# Patient Record
Sex: FEMALE | Race: ASIAN | NOT HISPANIC OR LATINO | Employment: OTHER | URBAN - METROPOLITAN AREA
[De-identification: names, ages, dates, MRNs, and addresses within clinical notes are randomized per-mention and may not be internally consistent; named-entity substitution may affect disease eponyms.]

---

## 2017-07-24 ENCOUNTER — HOSPITAL ENCOUNTER (EMERGENCY)
Facility: CLINIC | Age: 82
Discharge: HOME | End: 2017-07-24
Attending: EMERGENCY MEDICINE | Admitting: EMERGENCY MEDICINE
Payer: MEDICARE

## 2017-07-24 ENCOUNTER — APPOINTMENT (OUTPATIENT)
Dept: MRI IMAGING | Facility: CLINIC | Age: 82
End: 2017-07-24
Attending: EMERGENCY MEDICINE
Payer: MEDICARE

## 2017-07-24 ENCOUNTER — APPOINTMENT (OUTPATIENT)
Dept: CT IMAGING | Facility: CLINIC | Age: 82
End: 2017-07-24
Attending: EMERGENCY MEDICINE
Payer: MEDICARE

## 2017-07-24 VITALS
WEIGHT: 100 LBS | RESPIRATION RATE: 16 BRPM | SYSTOLIC BLOOD PRESSURE: 174 MMHG | TEMPERATURE: 98.5 F | OXYGEN SATURATION: 98 % | DIASTOLIC BLOOD PRESSURE: 89 MMHG | HEART RATE: 70 BPM | BODY MASS INDEX: 20.9 KG/M2

## 2017-07-24 DIAGNOSIS — R29.898 ARM HEAVINESS: ICD-10-CM

## 2017-07-24 LAB
ANION GAP SERPL CALCULATED.3IONS-SCNC: 4 MMOL/L (ref 3–14)
BASOPHILS # BLD AUTO: 0 10E9/L (ref 0–0.2)
BASOPHILS NFR BLD AUTO: 0.4 %
BUN SERPL-MCNC: 19 MG/DL (ref 7–30)
CALCIUM SERPL-MCNC: 9.2 MG/DL (ref 8.5–10.1)
CHLORIDE SERPL-SCNC: 103 MMOL/L (ref 94–109)
CO2 SERPL-SCNC: 28 MMOL/L (ref 20–32)
CREAT SERPL-MCNC: 0.63 MG/DL (ref 0.52–1.04)
DIFFERENTIAL METHOD BLD: NORMAL
EOSINOPHIL # BLD AUTO: 0.1 10E9/L (ref 0–0.7)
EOSINOPHIL NFR BLD AUTO: 1.3 %
ERYTHROCYTE [DISTWIDTH] IN BLOOD BY AUTOMATED COUNT: 12.3 % (ref 10–15)
GFR SERPL CREATININE-BSD FRML MDRD: 88 ML/MIN/1.7M2
GLUCOSE SERPL-MCNC: 84 MG/DL (ref 70–99)
HCT VFR BLD AUTO: 40.9 % (ref 35–47)
HGB BLD-MCNC: 13.6 G/DL (ref 11.7–15.7)
IMM GRANULOCYTES # BLD: 0 10E9/L (ref 0–0.4)
IMM GRANULOCYTES NFR BLD: 0.3 %
INTERPRETATION ECG - MUSE: NORMAL
LYMPHOCYTES # BLD AUTO: 1.8 10E9/L (ref 0.8–5.3)
LYMPHOCYTES NFR BLD AUTO: 26.5 %
MCH RBC QN AUTO: 30.8 PG (ref 26.5–33)
MCHC RBC AUTO-ENTMCNC: 33.3 G/DL (ref 31.5–36.5)
MCV RBC AUTO: 93 FL (ref 78–100)
MONOCYTES # BLD AUTO: 0.6 10E9/L (ref 0–1.3)
MONOCYTES NFR BLD AUTO: 8.7 %
NEUTROPHILS # BLD AUTO: 4.3 10E9/L (ref 1.6–8.3)
NEUTROPHILS NFR BLD AUTO: 62.8 %
NRBC # BLD AUTO: 0 10*3/UL
NRBC BLD AUTO-RTO: 0 /100
PLATELET # BLD AUTO: 280 10E9/L (ref 150–450)
POTASSIUM SERPL-SCNC: 4.1 MMOL/L (ref 3.4–5.3)
RBC # BLD AUTO: 4.42 10E12/L (ref 3.8–5.2)
SODIUM SERPL-SCNC: 135 MMOL/L (ref 133–144)
TROPONIN I BLD-MCNC: 0 UG/L (ref 0–0.1)
WBC # BLD AUTO: 6.9 10E9/L (ref 4–11)

## 2017-07-24 PROCEDURE — 96361 HYDRATE IV INFUSION ADD-ON: CPT

## 2017-07-24 PROCEDURE — 85025 COMPLETE CBC W/AUTO DIFF WBC: CPT | Performed by: EMERGENCY MEDICINE

## 2017-07-24 PROCEDURE — 84484 ASSAY OF TROPONIN QUANT: CPT

## 2017-07-24 PROCEDURE — 80048 BASIC METABOLIC PNL TOTAL CA: CPT | Performed by: EMERGENCY MEDICINE

## 2017-07-24 PROCEDURE — 70553 MRI BRAIN STEM W/O & W/DYE: CPT

## 2017-07-24 PROCEDURE — 25000128 H RX IP 250 OP 636: Performed by: EMERGENCY MEDICINE

## 2017-07-24 PROCEDURE — 96360 HYDRATION IV INFUSION INIT: CPT | Mod: 59

## 2017-07-24 PROCEDURE — A9585 GADOBUTROL INJECTION: HCPCS | Performed by: RADIOLOGY

## 2017-07-24 PROCEDURE — A9270 NON-COVERED ITEM OR SERVICE: HCPCS | Mod: GY | Performed by: EMERGENCY MEDICINE

## 2017-07-24 PROCEDURE — 70450 CT HEAD/BRAIN W/O DYE: CPT

## 2017-07-24 PROCEDURE — 99284 EMERGENCY DEPT VISIT MOD MDM: CPT | Mod: 25

## 2017-07-24 PROCEDURE — 93005 ELECTROCARDIOGRAM TRACING: CPT

## 2017-07-24 PROCEDURE — 25000128 H RX IP 250 OP 636: Performed by: RADIOLOGY

## 2017-07-24 PROCEDURE — 25000132 ZZH RX MED GY IP 250 OP 250 PS 637: Mod: GY | Performed by: EMERGENCY MEDICINE

## 2017-07-24 RX ORDER — SODIUM CHLORIDE 9 MG/ML
1000 INJECTION, SOLUTION INTRAVENOUS CONTINUOUS
Status: DISCONTINUED | OUTPATIENT
Start: 2017-07-24 | End: 2017-07-24 | Stop reason: HOSPADM

## 2017-07-24 RX ORDER — ASPIRIN 81 MG/1
324 TABLET, CHEWABLE ORAL ONCE
Status: COMPLETED | OUTPATIENT
Start: 2017-07-24 | End: 2017-07-24

## 2017-07-24 RX ORDER — LISINOPRIL 10 MG/1
20 TABLET ORAL ONCE
Status: COMPLETED | OUTPATIENT
Start: 2017-07-24 | End: 2017-07-24

## 2017-07-24 RX ORDER — GADOBUTROL 604.72 MG/ML
7.5 INJECTION INTRAVENOUS ONCE
Status: COMPLETED | OUTPATIENT
Start: 2017-07-24 | End: 2017-07-24

## 2017-07-24 RX ORDER — LIDOCAINE 40 MG/G
CREAM TOPICAL
Status: DISCONTINUED | OUTPATIENT
Start: 2017-07-24 | End: 2017-07-24 | Stop reason: HOSPADM

## 2017-07-24 RX ADMIN — GADOBUTROL 5 ML: 604.72 INJECTION INTRAVENOUS at 13:04

## 2017-07-24 RX ADMIN — LISINOPRIL 20 MG: 10 TABLET ORAL at 10:05

## 2017-07-24 RX ADMIN — ASPIRIN 81 MG 324 MG: 81 TABLET ORAL at 10:05

## 2017-07-24 RX ADMIN — SODIUM CHLORIDE 1000 ML: 9 INJECTION, SOLUTION INTRAVENOUS at 10:07

## 2017-07-24 NOTE — ED NOTES
"Dizziness episode yesterday evening that resolved. This morning at 0200 she felt left arm pain \"like lead\". Also reports \"gum pain\". These symptoms have continued since 0200. Also recurrent dizziness comes and goes as well as nausea. EKG at this time.  "

## 2017-07-24 NOTE — ED AVS SNAPSHOT
[facility] Emergency Department    [address]    [facility] [address]    Phone:  [phone]    Fax:  [phone]                                       Sury Barnett   MRN: [id number]    Department:  [facility] Emergency Department   Date of Visit:  7/24/2017           Patient Information     Date Of Birth          8/27/1925        Your diagnoses for this visit were:     Arm heaviness        You were seen by Sarmad Ramachandran MD.      Follow-up Information     Follow up with Keyla Ceron MD In 2 days.    Contact information:    [facility]  [address] [facility] [address]  [phone]          Discharge Instructions       Please see your primary care doctor in the next 1-2 days for a recheck.    Return to the Emergency Room if you develop weakness in face, arms, legs, speech difficultly, trouble swallowing, or if you have any new concerns about your health.        It was my pleasure to take care of you today. Thanks for visiting [facility] Emergency Room.     Sarmad Ramachandran MD        24 Hour Appointment Hotline       To make an appointment at any [facility] clinic, call [phone] ([phone]). If you don't have a family doctor or clinic, we will help you find one. [facility] clinics are conveniently located to serve the needs of you and your family.             Review of your medicines      Our records show that you are taking the medicines listed below. If these are incorrect, please call your family doctor or clinic.        Dose / Directions Last dose taken    LEVOTHROID PO        Take  by mouth.   Refills:  0        LISINOPRIL PO        Take  by mouth.   Refills:  0        traMADol 50 MG tablet   Commonly known as:  ULTRAM   Dose:  50 mg   Quantity:  15 tablet        Take 1 tablet (50 mg) by mouth every 6 hours as needed for moderate pain   Refills:  0        VITAMIN D (CHOLECALCIFEROL) PO        Take  by mouth daily.   Refills:  0                 Procedures and tests performed during your visit     Basic metabolic panel    CBC with platelets differential    CT Head w/o Contrast    Cardiac Continuous Monitoring    EKG 12 lead    ISTAT troponin nursing POCT    MR Brain w/o & w Contrast    Peripheral IV catheter    Troponin POCT      Orders Needing Specimen Collection     None      Pending Results     No orders found from 7/22/2017 to 7/25/2017.            Pending Culture Results     No orders found from 7/22/2017 to 7/25/2017.            Pending Results Instructions     If you had any lab results that were not finalized at the time of your Discharge, you can call the ED Lab Result RN at [phone]. You will be contacted by this team for any positive Lab results or changes in treatment. The nurses are available 7 days a week from 10A to 6:30P.  You can leave a message 24 hours per day and they will return your call.        Test Results From Your Hospital Stay        7/24/2017 10:37 AM      Component Results     Component Value Ref Range & Units Status    Sodium 135 133 - 144 mmol/L Final    Potassium 4.1 3.4 - 5.3 mmol/L Final    Chloride 103 94 - 109 mmol/L Final    Carbon Dioxide 28 20 - 32 mmol/L Final    Anion Gap 4 3 - 14 mmol/L Final    Glucose 84 70 - 99 mg/dL Final    Urea Nitrogen 19 7 - 30 mg/dL Final    Creatinine 0.63 0.52 - 1.04 mg/dL Final    GFR Estimate 88 >60 mL/min/1.7m2 Final    Non  GFR Calc    GFR Estimate If Black >90   GFR Calc   >60 mL/min/1.7m2 Final    Calcium 9.2 8.5 - 10.1 mg/dL Final         7/24/2017 11:58 AM      Narrative     CT HEAD W/O CONTRAST   7/24/2017 10:51 AM     HISTORY: left arm weakness vs pain overnight, eval for stroke, mass,  bleed    TECHNIQUE: Axial images of the head without IV contrast material.  Radiation dose for this scan was reduced using automated exposure  control, adjustment of the mA and/or kV according to patient size, or  iterative reconstruction  technique.    COMPARISON: None.    FINDINGS:  There is generalized atrophy of the brain.  Areas of low  attenuation are present in the white matter of the cerebral  hemispheres that are consistent with small vessel ischemic disease in  this age patient. There is no evidence of intracranial hemorrhage,  mass, acute infarct or anomaly. The visualized portions of the sinuses  and mastoids appear normal. There is no evidence of trauma. Vascular  calcifications are noted.        Impression     IMPRESSION:   1. No acute pathology is identified. No bleed, mass, or acute infarcts  are seen.  2. Age related changes including diffuse brain atrophy.  White matter  changes consistent with small vessel ischemic disease.    MORENO MANZANO MD         7/24/2017 10:20 AM      Component Results     Component Value Ref Range & Units Status    Troponin I 0.00 0.00 - 0.10 ug/L Final         7/24/2017 10:25 AM      Component Results     Component Value Ref Range & Units Status    WBC 6.9 4.0 - 11.0 10e9/L Final    RBC Count 4.42 3.8 - 5.2 10e12/L Final    Hemoglobin 13.6 11.7 - 15.7 g/dL Final    Hematocrit 40.9 35.0 - 47.0 % Final    MCV 93 78 - 100 fl Final    MCH 30.8 26.5 - 33.0 pg Final    MCHC 33.3 31.5 - 36.5 g/dL Final    RDW 12.3 10.0 - 15.0 % Final    Platelet Count 280 150 - 450 10e9/L Final    Diff Method Automated Method  Final    % Neutrophils 62.8 % Final    % Lymphocytes 26.5 % Final    % Monocytes 8.7 % Final    % Eosinophils 1.3 % Final    % Basophils 0.4 % Final    % Immature Granulocytes 0.3 % Final    Nucleated RBCs 0 0 /100 Final    Absolute Neutrophil 4.3 1.6 - 8.3 10e9/L Final    Absolute Lymphocytes 1.8 0.8 - 5.3 10e9/L Final    Absolute Monocytes 0.6 0.0 - 1.3 10e9/L Final    Absolute Eosinophils 0.1 0.0 - 0.7 10e9/L Final    Absolute Basophils 0.0 0.0 - 0.2 10e9/L Final    Abs Immature Granulocytes 0.0 0 - 0.4 10e9/L Final    Absolute Nucleated RBC 0.0  Final         7/24/2017  1:45 PM      Narrative     MR BRAIN  WITHOUT AND WITH CONTRAST 7/24/2017 1:08 PM     HISTORY: Left arm heaviness, evaluate for stroke.    TECHNIQUE: Multiplanar, multisequence MRI of the brain without and  with 5 mL Gadavist  IV contrast material.    COMPARISON: CT scan dated 7/24/2017.    FINDINGS:  There is generalized atrophy of the brain.  White matter  changes are present in the cerebral hemispheres that are consistent  with small vessel ischemic disease in this age patient. There is no  evidence of hemorrhage, mass, demyelination, acute infarct, or  anomaly. There are no gadolinium enhancing lesions. Left frontal sinus  is opacified. Mucosal thickening in the left maxillary sinus. The  arteries at the base of the brain and the dural venous sinuses appear  patent. There is a tiny lipoma seen along the right tentorium. This  can be seen as a small fat density structure on the recent CT scan. It  measures about 0.6 cm in maximum dimension.        Impression     IMPRESSION:   1. No acute pathology is identified. No bleed, mass, or acute infarcts  are seen.  2. Age-related changes including generalized atrophy of the brain.  White matter changes in the cerebral hemispheres consistent with small  vessel ischemic disease in this age patient.  3. Tiny incidental lipoma along the right tentorium.    MORENO MANZANO MD                Clinical Quality Measure: Blood Pressure Screening     Your blood pressure was checked while you were in the emergency department today. The last reading we obtained was  BP: 182/86 . Please read the guidelines below about what these numbers mean and what you should do about them.  If your systolic blood pressure (the top number) is less than 120 and your diastolic blood pressure (the bottom number) is less than 80, then your blood pressure is normal. There is nothing more that you need to do about it.  If your systolic blood pressure (the top number) is 120-139 or your diastolic blood pressure (the bottom number) is 80-89, your  "blood pressure may be higher than it should be. You should have your blood pressure rechecked within a year by a primary care provider.  If your systolic blood pressure (the top number) is 140 or greater or your diastolic blood pressure (the bottom number) is 90 or greater, you may have high blood pressure. High blood pressure is treatable, but if left untreated over time it can put you at risk for heart attack, stroke, or kidney failure. You should have your blood pressure rechecked by a primary care provider within the next 4 weeks.  If your provider in the emergency department today gave you specific instructions to follow-up with your doctor or provider even sooner than that, you should follow that instruction and not wait for up to 4 weeks for your follow-up visit.        Thank you for choosing [facility]       Thank you for choosing [facility] for your care. Our goal is always to provide you with excellent care. Hearing back from our patients is one way we can continue to improve our services. Please take a few minutes to complete the written survey that you may receive in the mail after you visit with us. Thank you!        [facility] Information     [facility] lets you send messages to your doctor, view your test results, renew your prescriptions, schedule appointments and more. To sign up, go to [email or web address] . Click on \"Log in\" on the left side of the screen, which will take you to the Welcome page. Then click on \"Sign up Now\" on the right side of the page.     You will be asked to enter the access code listed below, as well as some personal information. Please follow the directions to create your username and password.     Your access code is: [email or web address]  Expires: 10/22/2017  1:36 PM     Your access code will  in 90 days. If you need help or a new code, please call your [facility] clinic or [phone].        Care EveryWhere ID     This is your Care EveryWhere ID. This could be used by other " organizations to access your [facility] medical records  [id number]        Equal Access to Services     ANUJ FERMIN : Haley Barajas, doe alvarez, brendon arambula. So [facility] [phone].    ATENCIÓN: Si habla español, tiene a leonard disposición servicios gratuitos de asistencia lingüística. Llame al [phone].    We comply with applicable federal civil rights laws and [place] laws. We do not discriminate on the basis of race, color, national origin, age, disability sex, sexual orientation or gender identity.            After Visit Summary       This is your record. Keep this with you and show to your community pharmacist(s) and doctor(s) at your next visit.

## 2017-07-24 NOTE — ED PROVIDER NOTES
"[facility] Emergency Medicine Note:    History     Chief Complaint:  Dizziness; Arm Pain; and Jaw Pain      HPI: Sury Barnett is a 89 year old female who presents for evaluation of the above.  She notes last night she will cup at 2 AM and noticed a brief episode of left arm pain and gum pain. The gum pain and arm pain rapidly went away but left arm heaviness continued. She notes no headache with this no chest pain no shortness of breath. The patient is that she has no abdominal pain vomiting or diarrhea. No fevers chills or recent illness. The patient currently still feels like her left arm is heavy, describing the sensation as \"like she did a lot of work with her left arm the day before\". She has no history of coronary artery disease or cerebrovascular disease no history of stroke there is a family history of heart disease. She does not take an aspirin today she did not take her morning blood pressure medicine last night the only thing else noted on review of systems is that when this episode did happen last night she did feel a pulsing or pounding sensation in her neck and head and wonders if her blood pressure was high last night..      Allergies:  Allergies   Allergen Reactions     Sulfa Drugs Other (See Comments)     Really sick and high fever     Amoxicillin        Medications:      traMADol (ULTRAM) 50 MG tablet   Levothyroxine Sodium (LEVOTHROID PO)   LISINOPRIL PO   VITAMIN D, CHOLECALCIFEROL, PO       Problem List:    There are no active problems to display for this patient.      Past Medical History:    Past Medical History:   Diagnosis Date     Hypertension      Thyroid disease        Past Surgical History:    Past Surgical History:   Procedure Laterality Date     COLONOSCOPY  7/15/2013    Procedure: COLONOSCOPY;  Colonoscopy ;  Surgeon: Maida Rehman MD;  Location:  GI     HEAD & NECK SURGERY      tonsil      TONSILLECTOMY         Family History:    Family History   Problem Relation Age of " Onset     Cancer - colorectal Father      Hypertension Father      Colon Cancer Father      Hypertension Mother      Hyperlipidemia Mother      Hypertension Sister      Hyperlipidemia Sister        Social History:  Social History   Substance Use Topics     Smoking status: Never Smoker     Smokeless tobacco: Not on file     Alcohol use No       Review of Systems  See HPI, a 10 point review of systems was performed and is otherwise negative except as noted in HPI.     Physical Exam   Vital signs:  Patient Vitals for the past 24 hrs:   BP Temp Temp src Pulse Heart Rate Resp SpO2 Weight   07/24/17 0914 (!) 196/94 98.5  F (36.9  C) Oral 70 70 16 97 % 45.4 kg (100 lb)       Physical Exam  General: Patient is alert and interactive when I enter the room.     She appears in no distress.  Head:  The scalp, face, and head appear normal  Eyes:  no photophobia.    Conjunctivae and sclerae are normal  ENT:    External acoustic canals are normal    The oropharynx is normal without erythema.     Uvula is in the midline  Neck:  Normal range of motion  CV:  Regular rate. S1/S2. No murmurs.   Resp:  Lungs are clear without wheezes or rales. No distress  GI:  Abdomen is soft, no rigidity, guarding, or rebound    No distension. No tenderness to palpation in any quadrant.     MS:  Normal tone. Joints grossly normal without effusions.     No asymmetric leg swelling, calf or thigh tenderness.      Normal motor assessment of all extremities.    There is no cervical paraspinal tenderness.  Skin:  No rash or lesions noted. Normal capillary refill noted  Neuro: Speech is normal and fluent. Face is symmetric without droop.     Moving all extremities well. CN's II-XII intact. 5/5 UE and LE     strength. PERRLA. EOMI without nystagmus. Reflexes symmetric.     Sensation intact to light touch. Negative pronator drift.    Finger to nose intact from a pronator drift position.   Psych:  Awake. Alert.  Normal affect.  Appropriate  interactions.  Lymph: No anterior cervical lymphadenopathy noted          Emergency Department Course   ECG:  Ventricular rate 74, , QRS 72, , axis is normal there is no ST elevation there is no ST depression there is abnormal R-wave progression in the precordial leads, there are no ST changes noted.    Imaging:  MR Brain w/o & w Contrast   Preliminary Result   IMPRESSION:    1. No acute pathology is identified. No bleed, mass, or acute infarcts   are seen.   2. Age-related changes including generalized atrophy of the brain.   White matter changes in the cerebral hemispheres consistent with small   vessel ischemic disease in this age patient.   3. Tiny incidental lipoma along the right tentorium.      CT Head w/o Contrast   Final Result   IMPRESSION:    1. No acute pathology is identified. No bleed, mass, or acute infarcts   are seen.   2. Age related changes including diffuse brain atrophy.  White matter   changes consistent with small vessel ischemic disease.      MORENO MANZANO MD          Laboratory:  Labs Ordered and Resulted from Time of ED Arrival Up to the Time of Departure from the ED   BASIC METABOLIC PANEL   CBC WITH PLATELETS DIFFERENTIAL   PERIPHERAL IV CATHETER   CARDIAC CONTINUOUS MONITORING   ISTAT TROPONIN NURSING POCT   TROPONIN POCT       Procedures:    Interventions:  Medications   lidocaine 1 % 1 mL (not administered)   lidocaine (LMX4) kit (not administered)   sodium chloride (PF) 0.9% PF flush 3 mL (not administered)   sodium chloride (PF) 0.9% PF flush 3 mL (not administered)   0.9% sodium chloride BOLUS (1,000 mLs Intravenous New Bag 7/24/17 1007)     Followed by   0.9% sodium chloride infusion (not administered)   lisinopril (PRINIVIL/ZESTRIL) tablet 20 mg (20 mg Oral Given 7/24/17 1005)   aspirin chewable tablet 324 mg (324 mg Oral Given 7/24/17 1005)   gadobutrol (GADAVIST) injection 7.5 mL (5 mLs Intravenous Given 7/24/17 1304)       Emergency Department Course:  See above for  meds/radiology/procedures intervention    Impression & Plan          CMS Diagnoses: none       Medical Decision Makin-year-old female presents with left arm heaviness.  While she may have just slept on his arm funny, broad differential was of course considered. There is no evidence of this point of acute coronary syndrome or cerebrovascular accident we talked about the fact that she's had pain more than 8 hours and a negative troponin and EKG making acute coronary syndrome much less likely. The patient is stable here with a normal neurologic exam while she feels the arm is still different the MRI of her brain is negative and reassuring and I do not detect on exam any abnormality in her arm. I believe sport of outpatient management is indicated and she is in agreement she did receive an aspirin here and would not continue this at home until she sees her primary care doctor.    Critical Care time:  none    Diagnosis:    ICD-10-CM    1. Arm heaviness R29.898 Basic metabolic panel       Disposition:  discharged to home    Discharge Medications:  New Prescriptions    No medications on file         Sarmad Ramachandran MD  2017   [facility] EMERGENCY DEPARTMENT         Sarmad Ramachandran MD  17 [id number]

## 2017-07-24 NOTE — ED AVS SNAPSHOT
[facility] Emergency Department    [address]    [facility] [address]    Phone:  [phone]    Fax:  [phone]                                       Sury Barnett   MRN: [id number]    Department:  [facility] Emergency Department   Date of Visit:  7/24/2017           After Visit Summary Signature Page     I have received my discharge instructions, and my questions have been answered. I have discussed any challenges I see with this plan with the nurse or doctor.    ..........................................................................................................................................  Patient/Patient Representative Signature      ..........................................................................................................................................  Patient Representative Print Name and Relationship to Patient    ..................................................               ................................................  Date                                            Time    ..........................................................................................................................................  Reviewed by Signature/Title    ...................................................              ..............................................  Date                                                            Time

## 2017-07-24 NOTE — DISCHARGE INSTRUCTIONS
Please see your primary care doctor in the next 1-2 days for a recheck.    Return to the Emergency Room if you develop weakness in face, arms, legs, speech difficultly, trouble swallowing, or if you have any new concerns about your health.        It was my pleasure to take care of you today. Thanks for visiting [facility] Emergency Room.     Sarmad Ramachandran MD

## 2017-09-06 ENCOUNTER — HOSPITAL ENCOUNTER (INPATIENT)
Facility: CLINIC | Age: 82
LOS: 21 days | Discharge: NURSING, REHAB OR LONG-TERM CARE | DRG: 064 | End: 2017-09-27
Attending: EMERGENCY MEDICINE | Admitting: INTERNAL MEDICINE
Payer: MEDICARE

## 2017-09-06 ENCOUNTER — APPOINTMENT (OUTPATIENT)
Dept: CT IMAGING | Facility: CLINIC | Age: 82
DRG: 064 | End: 2017-09-06
Attending: EMERGENCY MEDICINE
Payer: MEDICARE

## 2017-09-06 DIAGNOSIS — Z78.9 ON TUBE FEEDING DIET: ICD-10-CM

## 2017-09-06 DIAGNOSIS — N39.0 URINARY TRACT INFECTION ASSOCIATED WITH INDWELLING URETHRAL CATHETER, INITIAL ENCOUNTER (H): ICD-10-CM

## 2017-09-06 DIAGNOSIS — I10 ESSENTIAL HYPERTENSION: ICD-10-CM

## 2017-09-06 DIAGNOSIS — B37.0 ORAL THRUSH: ICD-10-CM

## 2017-09-06 DIAGNOSIS — K21.9 GASTROESOPHAGEAL REFLUX DISEASE WITHOUT ESOPHAGITIS: ICD-10-CM

## 2017-09-06 DIAGNOSIS — T83.511A URINARY TRACT INFECTION ASSOCIATED WITH INDWELLING URETHRAL CATHETER, INITIAL ENCOUNTER (H): ICD-10-CM

## 2017-09-06 DIAGNOSIS — S06.349A: ICD-10-CM

## 2017-09-06 DIAGNOSIS — E03.9 HYPOTHYROIDISM, UNSPECIFIED TYPE: ICD-10-CM

## 2017-09-06 DIAGNOSIS — E85.4 CEREBRAL AMYLOID ANGIOPATHY (H): Primary | ICD-10-CM

## 2017-09-06 DIAGNOSIS — H04.123 DRY EYES: ICD-10-CM

## 2017-09-06 DIAGNOSIS — R52 GENERALIZED PAIN: ICD-10-CM

## 2017-09-06 DIAGNOSIS — J69.0 ASPIRATION PNEUMONIA OF RIGHT LOWER LOBE, UNSPECIFIED ASPIRATION PNEUMONIA TYPE (H): ICD-10-CM

## 2017-09-06 DIAGNOSIS — I68.0 CEREBRAL AMYLOID ANGIOPATHY (H): Primary | ICD-10-CM

## 2017-09-06 DIAGNOSIS — I61.1 NONTRAUMATIC CORTICAL HEMORRHAGE OF RIGHT CEREBRAL HEMISPHERE (H): ICD-10-CM

## 2017-09-06 PROBLEM — I61.9 ICH (INTRACEREBRAL HEMORRHAGE) (H): Status: ACTIVE | Noted: 2017-09-06

## 2017-09-06 LAB
ANION GAP SERPL CALCULATED.3IONS-SCNC: 6 MMOL/L (ref 3–14)
APTT PPP: 29 SEC (ref 22–37)
BASOPHILS # BLD AUTO: 0 10E9/L (ref 0–0.2)
BASOPHILS NFR BLD AUTO: 0.2 %
BUN SERPL-MCNC: 13 MG/DL (ref 7–30)
CALCIUM SERPL-MCNC: 9.7 MG/DL (ref 8.5–10.1)
CHLORIDE SERPL-SCNC: 101 MMOL/L (ref 94–109)
CO2 SERPL-SCNC: 25 MMOL/L (ref 20–32)
CREAT SERPL-MCNC: 0.71 MG/DL (ref 0.52–1.04)
DIFFERENTIAL METHOD BLD: NORMAL
EOSINOPHIL # BLD AUTO: 0.1 10E9/L (ref 0–0.7)
EOSINOPHIL NFR BLD AUTO: 0.6 %
ERYTHROCYTE [DISTWIDTH] IN BLOOD BY AUTOMATED COUNT: 12.4 % (ref 10–15)
GFR SERPL CREATININE-BSD FRML MDRD: 77 ML/MIN/1.7M2
GLUCOSE BLDC GLUCOMTR-MCNC: 129 MG/DL (ref 70–99)
GLUCOSE SERPL-MCNC: 99 MG/DL (ref 70–99)
HCT VFR BLD AUTO: 39.5 % (ref 35–47)
HGB BLD-MCNC: 13.6 G/DL (ref 11.7–15.7)
IMM GRANULOCYTES # BLD: 0.1 10E9/L (ref 0–0.4)
IMM GRANULOCYTES NFR BLD: 0.8 %
INR PPP: 0.99 (ref 0.86–1.14)
LYMPHOCYTES # BLD AUTO: 2.1 10E9/L (ref 0.8–5.3)
LYMPHOCYTES NFR BLD AUTO: 23.9 %
MCH RBC QN AUTO: 31.5 PG (ref 26.5–33)
MCHC RBC AUTO-ENTMCNC: 34.4 G/DL (ref 31.5–36.5)
MCV RBC AUTO: 91 FL (ref 78–100)
MONOCYTES # BLD AUTO: 0.5 10E9/L (ref 0–1.3)
MONOCYTES NFR BLD AUTO: 5.4 %
NEUTROPHILS # BLD AUTO: 5.9 10E9/L (ref 1.6–8.3)
NEUTROPHILS NFR BLD AUTO: 69.1 %
NRBC # BLD AUTO: 0 10*3/UL
NRBC BLD AUTO-RTO: 0 /100
PLATELET # BLD AUTO: 364 10E9/L (ref 150–450)
POTASSIUM SERPL-SCNC: 3.9 MMOL/L (ref 3.4–5.3)
RADIOLOGIST FLAGS: ABNORMAL
RBC # BLD AUTO: 4.32 10E12/L (ref 3.8–5.2)
SODIUM SERPL-SCNC: 132 MMOL/L (ref 133–144)
WBC # BLD AUTO: 8.6 10E9/L (ref 4–11)

## 2017-09-06 PROCEDURE — 99291 CRITICAL CARE FIRST HOUR: CPT | Mod: 25

## 2017-09-06 PROCEDURE — 25000128 H RX IP 250 OP 636: Performed by: INTERNAL MEDICINE

## 2017-09-06 PROCEDURE — 85730 THROMBOPLASTIN TIME PARTIAL: CPT | Performed by: EMERGENCY MEDICINE

## 2017-09-06 PROCEDURE — 25000125 ZZHC RX 250: Performed by: INTERNAL MEDICINE

## 2017-09-06 PROCEDURE — 85025 COMPLETE CBC W/AUTO DIFF WBC: CPT | Performed by: EMERGENCY MEDICINE

## 2017-09-06 PROCEDURE — 96361 HYDRATE IV INFUSION ADD-ON: CPT

## 2017-09-06 PROCEDURE — 70450 CT HEAD/BRAIN W/O DYE: CPT

## 2017-09-06 PROCEDURE — 99223 1ST HOSP IP/OBS HIGH 75: CPT | Mod: AI | Performed by: INTERNAL MEDICINE

## 2017-09-06 PROCEDURE — 99222 1ST HOSP IP/OBS MODERATE 55: CPT | Performed by: NURSE PRACTITIONER

## 2017-09-06 PROCEDURE — 20000003 ZZH R&B ICU

## 2017-09-06 PROCEDURE — 99292 CRITICAL CARE ADDL 30 MIN: CPT

## 2017-09-06 PROCEDURE — 72125 CT NECK SPINE W/O DYE: CPT

## 2017-09-06 PROCEDURE — 85610 PROTHROMBIN TIME: CPT | Performed by: EMERGENCY MEDICINE

## 2017-09-06 PROCEDURE — 25000125 ZZHC RX 250

## 2017-09-06 PROCEDURE — 96365 THER/PROPH/DIAG IV INF INIT: CPT

## 2017-09-06 PROCEDURE — 25000128 H RX IP 250 OP 636: Performed by: EMERGENCY MEDICINE

## 2017-09-06 PROCEDURE — 93005 ELECTROCARDIOGRAM TRACING: CPT

## 2017-09-06 PROCEDURE — 80048 BASIC METABOLIC PNL TOTAL CA: CPT | Performed by: EMERGENCY MEDICINE

## 2017-09-06 PROCEDURE — 00000146 ZZHCL STATISTIC GLUCOSE BY METER IP

## 2017-09-06 RX ORDER — ACETAMINOPHEN 325 MG/1
650 TABLET ORAL EVERY 4 HOURS PRN
Status: DISCONTINUED | OUTPATIENT
Start: 2017-09-06 | End: 2017-09-11

## 2017-09-06 RX ORDER — POTASSIUM CHLORIDE 1.5 G/1.58G
20-40 POWDER, FOR SOLUTION ORAL
Status: DISCONTINUED | OUTPATIENT
Start: 2017-09-06 | End: 2017-09-27 | Stop reason: HOSPADM

## 2017-09-06 RX ORDER — NALOXONE HYDROCHLORIDE 0.4 MG/ML
.1-.4 INJECTION, SOLUTION INTRAMUSCULAR; INTRAVENOUS; SUBCUTANEOUS
Status: DISCONTINUED | OUTPATIENT
Start: 2017-09-06 | End: 2017-09-27 | Stop reason: HOSPADM

## 2017-09-06 RX ORDER — POLYETHYLENE GLYCOL 3350 17 G/17G
17 POWDER, FOR SOLUTION ORAL DAILY PRN
Status: DISCONTINUED | OUTPATIENT
Start: 2017-09-06 | End: 2017-09-19

## 2017-09-06 RX ORDER — LEVOTHYROXINE SODIUM 50 UG/1
50 TABLET ORAL DAILY
Status: DISCONTINUED | OUTPATIENT
Start: 2017-09-07 | End: 2017-09-07

## 2017-09-06 RX ORDER — POTASSIUM CL/LIDO/0.9 % NACL 10MEQ/0.1L
10 INTRAVENOUS SOLUTION, PIGGYBACK (ML) INTRAVENOUS
Status: DISCONTINUED | OUTPATIENT
Start: 2017-09-06 | End: 2017-09-27 | Stop reason: HOSPADM

## 2017-09-06 RX ORDER — POTASSIUM CHLORIDE 7.45 MG/ML
10 INJECTION INTRAVENOUS
Status: DISCONTINUED | OUTPATIENT
Start: 2017-09-06 | End: 2017-09-27 | Stop reason: HOSPADM

## 2017-09-06 RX ORDER — SODIUM CHLORIDE 9 MG/ML
INJECTION, SOLUTION INTRAVENOUS CONTINUOUS
Status: DISCONTINUED | OUTPATIENT
Start: 2017-09-06 | End: 2017-09-08

## 2017-09-06 RX ORDER — ONDANSETRON 2 MG/ML
4 INJECTION INTRAMUSCULAR; INTRAVENOUS EVERY 6 HOURS PRN
Status: DISCONTINUED | OUTPATIENT
Start: 2017-09-06 | End: 2017-09-27 | Stop reason: HOSPADM

## 2017-09-06 RX ORDER — POTASSIUM CHLORIDE 1500 MG/1
20-40 TABLET, EXTENDED RELEASE ORAL
Status: DISCONTINUED | OUTPATIENT
Start: 2017-09-06 | End: 2017-09-27 | Stop reason: HOSPADM

## 2017-09-06 RX ORDER — BISACODYL 10 MG
10 SUPPOSITORY, RECTAL RECTAL DAILY PRN
Status: DISCONTINUED | OUTPATIENT
Start: 2017-09-06 | End: 2017-09-21

## 2017-09-06 RX ORDER — LIDOCAINE 40 MG/G
CREAM TOPICAL
Status: DISCONTINUED | OUTPATIENT
Start: 2017-09-06 | End: 2017-09-11

## 2017-09-06 RX ORDER — AMOXICILLIN 250 MG
1-2 CAPSULE ORAL 2 TIMES DAILY PRN
Status: DISCONTINUED | OUTPATIENT
Start: 2017-09-06 | End: 2017-09-21

## 2017-09-06 RX ORDER — ONDANSETRON 4 MG/1
4 TABLET, ORALLY DISINTEGRATING ORAL EVERY 6 HOURS PRN
Status: DISCONTINUED | OUTPATIENT
Start: 2017-09-06 | End: 2017-09-27 | Stop reason: HOSPADM

## 2017-09-06 RX ORDER — IOPAMIDOL 755 MG/ML
120 INJECTION, SOLUTION INTRAVASCULAR ONCE
Status: DISCONTINUED | OUTPATIENT
Start: 2017-09-06 | End: 2017-09-06

## 2017-09-06 RX ORDER — POTASSIUM CHLORIDE 29.8 MG/ML
20 INJECTION INTRAVENOUS
Status: DISCONTINUED | OUTPATIENT
Start: 2017-09-06 | End: 2017-09-27 | Stop reason: HOSPADM

## 2017-09-06 RX ADMIN — SODIUM CHLORIDE 500 ML: 9 INJECTION, SOLUTION INTRAVENOUS at 13:13

## 2017-09-06 RX ADMIN — NICARDIPINE HYDROCHLORIDE 5 MG/HR: 0.2 INJECTION, SOLUTION INTRAVENOUS at 13:47

## 2017-09-06 RX ADMIN — SODIUM CHLORIDE: 9 INJECTION, SOLUTION INTRAVENOUS at 15:34

## 2017-09-06 RX ADMIN — Medication 2.5 MG/HR: at 18:54

## 2017-09-06 ASSESSMENT — ENCOUNTER SYMPTOMS
NUMBNESS: 1
WEAKNESS: 1
CONFUSION: 1

## 2017-09-06 ASSESSMENT — VISUAL ACUITY
OU: NOT TESTABLE

## 2017-09-06 NOTE — IP AVS SNAPSHOT
` ` Patient Information     Patient Name Sex     Sury Barnett ([id number]) Female 1925       Room Bed    [address] [place], [place] [address]      Patient Demographics     Address Phone    [address] [place] [address] [phone] (Home)  [phone] (Mobile)      Patient Ethnicity & Race     Ethnic Group Patient Race    American       Emergency Contact(s)     Name Relation Home Work Mobile    Crystal Gordon Daughter [phone] none [phone]      Documents on File        Status Date Received Description       Documents for the Patient    Privacy Notice - [place] Received 12     Insurance Card Received 07/15/13     External Medication Information Consent       Patient ID Received 07/15/13     Consent for Services - Hospital/Clinic Received () 12     Consent for EHR Access  13 Copied from existing Consent for services - C/HOD collected on 2012    [facility] Specified Other       Consent for Services - Hospital/Clinic Received () 07/15/13     Patient ID Received 17 lifetime    Insurance Card Received 14 Medicare    Insurance Card Received 14 AARP    Consent for Services - Hospital/Clinic Received () 14     Consent for Services/Privacy Notice - Hospital/Clinic       Insurance Card Received 17 medicare    Insurance Card Received 17 aarp    Consent for Services/Privacy Notice - Hospital/Clinic Received 16     Consent for EHR Access Received 16     Consent to Communicate Received 16        Documents for the Encounter    CMS IM for Patient Signature Received 17 [facility]    EMS/Ambulance Record  17 [facility] FIRE DEPARTMENT    Discharge Attachment   (S) ABOUT C. DIFF INFECTION: FOR PATIENTS, FAMILY AND VISITORS (ENGLISH)    Discharge Attachment   STROKE, WHAT IS HEMORRHAGIC  (ENGLISH)      Admission Information     Attending Provider Admitting Provider Admission Type Admission Date/Time    Humza Townsend MD  Humza Townsend MD Emergency 09/06/17  1306    Discharge Date Hospital Service Auth/Cert Status Service Area     Hospitalist Incomplete [facility]    Unit Room/Bed Admission Status       [facility] 0705/0705-01 Admission (Confirmed)       Admission     Complaint    ICH (intracerebral hemorrhage) (H)      Hospital Account     Name Acct ID Class Status Primary Coverage    Sury Barnett [id number] Inpatient Open MEDICARE - MEDICARE            Guarantor Account (for Hospital Account #[id number])     Name Relation to Pt Service Area Active? Acct Type    Sury Barnett Self FCS Yes Personal/Family    Address Phone          [address] [place] [address] [phone](H)              Coverage Information (for Hospital Account #[id number])     1. MEDICARE/MEDICARE     F/O Payor/Plan Precert #    MEDICARE/MEDICARE     Subscriber Subscriber #    Sury Barnett [id number]    Address Phone    [employer]  [address]  [place] [address] [phone]          2. COMMERCIAL/AARP     F/O Payor/Plan Precert #    COMMERCIAL/AARP     Subscriber Subscriber #    Sury Barnett [id number]    Address Phone    [facility] [employer]  [id number]  [place] [address] [phone]

## 2017-09-06 NOTE — PHARMACY-ADMISSION MEDICATION HISTORY
Admission medication history interview status for the 9/6/2017  admission is complete. See EPIC admission navigator for prior to admission medications     Medication history source reliability:Good    Actions taken by pharmacist (provider contacted, etc):Verified all meds with patient's bottles that she brought from home     Additional medication history information not noted on PTA med list :None    Medication reconciliation/reorder completed by provider prior to medication history? No    Time spent in this activity: 10 minutes    Prior to Admission medications    Medication Sig Last Dose Taking? Auth Provider   Levothyroxine Sodium (LEVOTHROID PO) Take 50 mcg by mouth daily  9/6/2017 at am Yes Reported, Patient   LISINOPRIL PO Take 20 mg by mouth daily  9/6/2017 at am Yes Reported, Patient   VITAMIN D, CHOLECALCIFEROL, PO Take 50,000 Units by mouth once a week Friday 9/1/2017 Yes Reported, Patient

## 2017-09-06 NOTE — IP AVS SNAPSHOT
"    [facility] [address] [facility]: [phone]                                              INTERAGENCY TRANSFER FORM - LAB / IMAGING / EKG / EMG RESULTS   2017                    Hospital Admission Date: 2017  VICKI SILVA   : 1925  Sex: Female        Attending Provider: Humza Townsend MD     Allergies:  Cowgill Flavor, Sulfa Drugs, Amoxicillin    Infection:  None   Service:  HOSPITALIST    Ht:  1.499 m (4' 11\")   Wt:  41.2 kg (90 lb 13.3 oz)   Admission Wt:  40.5 kg (89 lb 4.6 oz)    BMI:  18.35 kg/m 2   BSA:  1.31 m 2            Patient PCP Information     Provider PCP Type    Keyla Ceron MD General         Lab Results - 3 Days      CBC with platelets [[id number]] (Abnormal)  Resulted: 17 0843, Result status: Final result    Ordering provider: Tamir Manrique MD  17 0000 Resulting lab: [facility]    Specimen Information    Type Source Collected On   Blood  17 0818          Components       Value Reference Range Flag Lab   WBC 12.1 4.0 - 11.0 10e9/L H FrStHsLb   RBC Count 3.56 3.8 - 5.2 10e12/L L FrStHsLb   Hemoglobin 11.0 11.7 - 15.7 g/dL L FrStHsLb   Hematocrit 32.8 35.0 - 47.0 % L FrStHsLb   MCV 92 78 - 100 fl  FrStHsLb   MCH 30.9 26.5 - 33.0 pg  FrStHsLb   MCHC 33.5 31.5 - 36.5 g/dL  FrStHsLb   RDW 13.5 10.0 - 15.0 %  FrStHsLb   Platelet Count 415 150 - 450 10e9/L  FrStHsLb            Basic metabolic panel [[id number]] (Abnormal)  Resulted: 17 0837, Result status: Final result    Ordering provider: Tamir Manrique MD  17 0000 Resulting lab: [facility]    Specimen Information    Type Source Collected On   Blood  17 0804          Components       Value Reference Range Flag Lab   Sodium 136 133 - 144 mmol/L  FrStHsLb   Potassium 3.6 3.4 - 5.3 mmol/L  FrStHsLb   Chloride 100 94 - 109 mmol/L  FrStHsLb   Carbon Dioxide 29 20 - 32 mmol/L  FrStHsLb   Anion Gap 7 3 - 14 mmol/L  FrStHsLb   Glucose 135 " 70 - 99 mg/dL H FrStHsLb   Urea Nitrogen 17 7 - 30 mg/dL  FrStHsLb   Creatinine 0.44 0.52 - 1.04 mg/dL L FrStHsLb   GFR Estimate >90 >60 mL/min/1.7m2  FrStHsLb   Comment:  Non  GFR Calc   GFR Estimate If Black >90 >60 mL/min/1.7m2  FrStHsLb   Comment:  African American GFR Calc   Calcium 8.5 8.5 - 10.1 mg/dL  FrStHsLb            CBC with platelets [[id number]] (Abnormal)  Resulted: 09/26/17 0823, Result status: Final result    Ordering provider: Tamir Manrique MD  09/26/17 0000 Resulting lab: [facility]    Specimen Information    Type Source Collected On   Blood  09/26/17 0804          Components       Value Reference Range Flag Lab   WBC 15.0 4.0 - 11.0 10e9/L H FrStHsLb   RBC Count 3.44 3.8 - 5.2 10e12/L L FrStHsLb   Hemoglobin 10.8 11.7 - 15.7 g/dL L FrStHsLb   Hematocrit 31.6 35.0 - 47.0 % L FrStHsLb   MCV 92 78 - 100 fl  FrStHsLb   MCH 31.4 26.5 - 33.0 pg  FrStHsLb   MCHC 34.2 31.5 - 36.5 g/dL  FrStHsLb   RDW 13.5 10.0 - 15.0 %  FrStHsLb   Platelet Count 393 150 - 450 10e9/L  FrStHsLb            Urine Culture Aerobic Bacterial [[id number]] (Abnormal)  Resulted: 09/25/17 2127, Result status: Final result    Ordering provider: Humza Townsend MD  09/23/17 1300 Resulting lab: [facility]    Specimen Information    Type Source Collected On   Catheterized Urine  09/23/17 1300          Components       Value Reference Range Flag Lab   Specimen Description Catheterized Urine      Culture Micro --  A 225   Result:         >100,000 colonies/mL  Enterococcus faecalis              Procalcitonin [[id number]]  Resulted: 09/25/17 0925, Result status: Final result    Ordering provider: Humza Townsend MD  09/25/17 0810 Resulting lab: [facility]    Specimen Information    Type Source Collected On     09/25/17 0810          Components       Value Reference Range Flag Lab   Procalcitonin 0.19 ng/ml  [facility]   Comment:         0.05-0.24  ng/ml Low risk of systemic bacterial infection. Local bacterial   infection possible.  Recommendation: Assess other clinical features of   infection. Discourage antibiotics unless strong clinical suspicion for serious   infection.              Lactic acid level STAT [[id number]]  Resulted: 09/25/17 0922, Result status: Final result    Ordering provider: Tamir Manrique MD  09/25/17 0831 Resulting lab: [facility]    Specimen Information    Type Source Collected On   Blood  09/25/17 0858          Components       Value Reference Range Flag Lab   Lactic Acid 1.3 0.7 - 2.0 mmol/L  FrStHsLb            Nt probnp inpatient [[id number]]  Resulted: 09/25/17 0918, Result status: Final result    Ordering provider: Humza Townsend MD  09/25/17 0810 Resulting lab: [facility]    Specimen Information    Type Source Collected On     09/25/17 0810          Components       Value Reference Range Flag Lab   N-Terminal Pro BNP Inpatient 240 0 - 1800 pg/mL  FrStHsLb   Comment:            Reference range shown and results flagged as abnormal are suggested inpatient   cut points for confirming diagnosis if CHF in an acute setting. Establishing a   baseline value for each individual patient is useful for follow-up. An   inpatient or emergency department NT-proPBNP <300 pg/mL effectively rules out   acute CHF, with 99% negative predictive value.  The outpatient non-acute reference range for ruling out CHF is:   0-125 pg/mL (age 18 to less than 75)   0-450 pg/mL (age 75 yrs and older)              Basic metabolic panel [[id number]] (Abnormal)  Resulted: 09/25/17 0856, Result status: Final result    Ordering provider: Humza Townsend MD  09/25/17 0000 Resulting lab: [facility]    Specimen Information    Type Source Collected On   Blood  09/25/17 0810          Components       Value Reference Range Flag Lab   Sodium 135 133 - 144 mmol/L  FrStHsLb   Potassium 4.0 3.4 - 5.3 mmol/L   FrStHsLb   Chloride 99 94 - 109 mmol/L  FrStHsLb   Carbon Dioxide 28 20 - 32 mmol/L  FrStHsLb   Anion Gap 8 3 - 14 mmol/L  FrStHsLb   Glucose 136 70 - 99 mg/dL H FrStHsLb   Urea Nitrogen 16 7 - 30 mg/dL  FrStHsLb   Creatinine 0.54 0.52 - 1.04 mg/dL  FrStHsLb   GFR Estimate >90 >60 mL/min/1.7m2  FrStHsLb   Comment:  Non  GFR Calc   GFR Estimate If Black >90 >60 mL/min/1.7m2  FrStHsLb   Comment:  African American GFR Calc   Calcium 8.6 8.5 - 10.1 mg/dL  FrStHsLb            Magnesium [[id number]] (Abnormal)  Resulted: 09/25/17 0856, Result status: Final result    Ordering provider: Humza Townsend MD  09/25/17 0000 Resulting lab: [facility]    Specimen Information    Type Source Collected On   Blood  09/25/17 0810          Components       Value Reference Range Flag Lab   Magnesium 2.6 1.6 - 2.3 mg/dL H FrStHsLb            Phosphorus [[id number]]  Resulted: 09/25/17 0856, Result status: Final result    Ordering provider: Humza Townsend MD  09/25/17 0000 Resulting lab: [facility]    Specimen Information    Type Source Collected On   Blood  09/25/17 0810          Components       Value Reference Range Flag Lab   Phosphorus 2.7 2.5 - 4.5 mg/dL  FrStHsLb            Prealbumin [[id number]]  Resulted: 09/25/17 0856, Result status: Final result    Ordering provider: Humza Townsend MD  09/25/17 0000 Resulting lab: [facility]    Specimen Information    Type Source Collected On   Blood  09/25/17 0810          Components       Value Reference Range Flag Lab   Prealbumin 26 15 - 45 mg/dL  FrStHsLb            WBC and differential [[id number]] (Abnormal)  Resulted: 09/25/17 0835, Result status: Final result    Ordering provider: Michelle Manzano MD  09/25/17 0000 Resulting lab: [facility]    Specimen Information    Type Source Collected On   Blood  09/25/17 0810          Components       Value Reference Range Flag Lab   WBC 16.2 4.0 - 11.0  10e9/L H FrStHsLb   Diff Method Automated Method   FrStHsLb   % Neutrophils 92.8 %  FrStHsLb   % Lymphocytes 3.6 %  FrStHsLb   % Monocytes 1.6 %  FrStHsLb   % Eosinophils 0.4 %  FrStHsLb   % Basophils 0.1 %  FrStHsLb   % Immature Granulocytes 1.5 %  FrStHsLb   Nucleated RBCs 0 0 /100  FrStHsLb   Absolute Neutrophil 15.1 1.6 - 8.3 10e9/L H FrStHsLb   Absolute Lymphocytes 0.6 0.8 - 5.3 10e9/L L FrStHsLb   Absolute Monocytes 0.3 0.0 - 1.3 10e9/L  FrStHsLb   Absolute Eosinophils 0.1 0.0 - 0.7 10e9/L  FrStHsLb   Absolute Basophils 0.0 0.0 - 0.2 10e9/L  FrStHsLb   Abs Immature Granulocytes 0.2 0 - 0.4 10e9/L  FrStHsLb   Absolute Nucleated RBC 0.0   FrStHsLb            Basic metabolic panel [[id number]] (Abnormal)  Resulted: 09/24/17 0818, Result status: Final result    Ordering provider: Michelle Manzano MD  09/24/17 0000 Resulting lab: [facility]    Specimen Information    Type Source Collected On   Blood  09/24/17 0744          Components       Value Reference Range Flag Lab   Sodium 132 133 - 144 mmol/L L FrStHsLb   Potassium 4.1 3.4 - 5.3 mmol/L  FrStHsLb   Chloride 98 94 - 109 mmol/L  FrStHsLb   Carbon Dioxide 27 20 - 32 mmol/L  FrStHsLb   Anion Gap 7 3 - 14 mmol/L  FrStHsLb   Glucose 126 70 - 99 mg/dL H FrStHsLb   Urea Nitrogen 20 7 - 30 mg/dL  FrStHsLb   Creatinine 0.46 0.52 - 1.04 mg/dL L FrStHsLb   GFR Estimate >90 >60 mL/min/1.7m2  FrStHsLb   Comment:  Non  GFR Calc   GFR Estimate If Black >90 >60 mL/min/1.7m2  FrStHsLb   Comment:  African American GFR Calc   Calcium 8.2 8.5 - 10.1 mg/dL L FrStHsLb            WBC and differential [[id number]] (Abnormal)  Resulted: 09/24/17 0804, Result status: Final result    Ordering provider: Michelle Manzano MD  09/24/17 0000 Resulting lab: [facility]    Specimen Information    Type Source Collected On   Blood  09/24/17 0744          Components       Value Reference Range Flag Lab   WBC 18.3 4.0 - 11.0 10e9/L H FrStHsLb   Diff  Method Automated Method   FrStHsLb   % Neutrophils 87.5 %  FrStHsLb   % Lymphocytes 6.7 %  FrStHsLb   % Monocytes 3.1 %  FrStHsLb   % Eosinophils 1.3 %  FrStHsLb   % Basophils 0.1 %  FrStHsLb   % Immature Granulocytes 1.3 %  FrStHsLb   Nucleated RBCs 0 0 /100  FrStHsLb   Absolute Neutrophil 16.0 1.6 - 8.3 10e9/L H FrStHsLb   Absolute Lymphocytes 1.2 0.8 - 5.3 10e9/L  FrStHsLb   Absolute Monocytes 0.6 0.0 - 1.3 10e9/L  FrStHsLb   Absolute Eosinophils 0.2 0.0 - 0.7 10e9/L  FrStHsLb   Absolute Basophils 0.0 0.0 - 0.2 10e9/L  FrStHsLb   Abs Immature Granulocytes 0.2 0 - 0.4 10e9/L  FrStHsLb   Absolute Nucleated RBC 0.0   FrStHsLb            Potassium [[id number]]  Resulted: 09/23/17 2153, Result status: Final result    Ordering provider: Michelle Manzano MD  09/23/17 1813 Resulting lab: [facility]    Specimen Information    Type Source Collected On   Blood  09/23/17 2130          Components       Value Reference Range Flag Lab   Potassium 4.0 3.4 - 5.3 mmol/L  FrStHsLb            Testing Performed By     Lab - Abbreviation Name Director Address Valid Date Range    14 - FrStHsLb [facility] Unknown [address] [place] [address] 05/08/15 1057 - Present    225 - Unknown [facility] Unknown [address] [facility] [address] 12/19/14 0954 - Present            Unresulted Labs (24h ago through future)    Start       Ordered    09/11/17 0600  Basic metabolic panel  EVERY MONDAY,   Routine     Comments:  Every Monday while on enteral tube feeding.    09/09/17 1200    09/11/17 0600  Magnesium  EVERY MONDAY,   Routine     Comments:  Every Monday while on enteral tube feeding.    09/09/17 1200    09/11/17 0600  Phosphorus  EVERY MONDAY,   Routine     Comments:  Every Monday while on enteral tube feeding.    09/09/17 1200    09/11/17 0600  Prealbumin  EVERY MONDAY,   Routine     Comments:  Every Monday while on enteral tube feeding.    09/09/17 1200     "Unscheduled  Potassium  CONDITIONAL (SPECIFY),   Routine     Comments:  Q6H (IF on 3% NaCl infusion or  3% sodium chloride/sodium acetate)    09/06/17 1507    Unscheduled  Potassium  (Potassium Replacement - \"Standard\" - For K levels less than 3.4 mmol/L - UU,UR,UA,RH,SH,PH,WY )  CONDITIONAL (SPECIFY),   Routine     Comments:  Obtain Potassium Level for these conditions:  *IF no potassium result within 24 hours before initiation of order set, draw potassium level with next lab collect.    *2 HOURS AFTER last IV potassium replacement dose and 4 hours after an oral replacement dose.  *Next morning after potassium dose.     Repeat Potassium Replacement if necessary.    09/06/17 1507    Unscheduled  Phosphorus  (POTASSIUM Phosphate - \"Standard\" - Replacement for levels less than or equal to 2.4 mg/dL )  CONDITIONAL (SPECIFY),   Routine     Comments:  Obtain Phosphorus Level for these conditions:  *IF no phosphorus result within 24 hrs before initiation of order set, draw phosphorus level with next lab collect.    *2 HOURS AFTER last phosphorus replacement dose for levels less than 2.0.  *Next morning after phosphorus dose.     Repeat Phosphorus Replacement if necessary.    09/10/17 1227         Imaging Results - 3 Days      XR Chest Port 1 View [[id number]]  Resulted: 09/25/17 1025, Result status: Final result    Ordering provider: Tamir Manrique MD  09/25/17 0844 Resulted by: Fabiana Alfaro MD    Performed: 09/25/17 0929 - 09/25/17 0949 Resulting lab: RADIOLOGY RESULTS    Narrative:       CHEST ONE VIEW UPRIGHT 9/25/2017 9:49 AM     HISTORY: Hypoxia.    COMPARISON: 9/16/2017.      Impression:       IMPRESSION: Minimal interstitial changes are similar to previous.  There are new minimal streaky densities at both lung bases which may  be atelectasis and/or infiltrate.    FABIANA ALFARO MD      Testing Performed By     Lab - Abbreviation Name Director Address Valid Date Range    104 - Rad Rslts RADIOLOGY " RESULTS Unknown Unknown 02/16/05 1553 - Present            Encounter-Level Documents:     There are no encounter-level documents.      Order-Level Documents:     There are no order-level documents.

## 2017-09-06 NOTE — IP AVS SNAPSHOT
"[place] [address] [facility]: [phone]                                              INTERAGENCY TRANSFER FORM - PHYSICIAN ORDERS   2017                    Hospital Admission Date: 2017  VICKI SILVA   : 1925  Sex: Female        Attending Provider: Humza Townsend MD     Allergies:  [place] Flavor, Sulfa Drugs, Amoxicillin    Infection:  None   Service:  HOSPITALIST    Ht:  1.499 m (4' 11\")   Wt:  41.2 kg (90 lb 13.3 oz)   Admission Wt:  40.5 kg (89 lb 4.6 oz)    BMI:  18.35 kg/m 2   BSA:  1.31 m 2            Patient PCP Information     Provider PCP Type    Keyla Ceron MD General      ED Clinical Impression     Diagnosis Description Comment Added By Time Added    Cerebral amyloid angiopathy (H) [E85.4, I68.0] Cerebral amyloid angiopathy (H) [E85.4, I68.0]  Jf Long MD 2017  5:00 PM    Essential hypertension [I10] Essential hypertension [I10]  Jf Long MD 2017  5:00 PM    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]  Jf Long MD 2017  5:01 PM    Aspiration pneumonia of right lower lobe, unspecified aspiration pneumonia type (H) [J69.0] Aspiration pneumonia of right lower lobe, unspecified aspiration pneumonia type (H) [J69.0]  Alecia Garcia APRN CNP 2017  8:53 AM    Generalized pain [R52] Generalized pain [R52]  Michelle Manzano MD 2017  7:24 AM    On tube feeding diet [Z78.9] On tube feeding diet [Z78.9]  Michelle Manzano MD 2017  7:26 AM    Dry eyes [H04.123] Dry eyes [H04.123]  Michelle Manzano MD 2017  7:27 AM    Hypothyroidism, unspecified type [E03.9] Hypothyroidism, unspecified type [E03.9]  Michelle Manzano MD 2017  7:27 AM    Gastroesophageal reflux disease without esophagitis [K21.9] Gastroesophageal reflux disease without esophagitis [K21.9]  Michelle Manzano MD 2017  7:28 AM    " Traumatic hemorrhage of right cerebrum with loss of consciousness, initial encounter (H) [S06.349A] Traumatic hemorrhage of right cerebrum with loss of consciousness, initial encounter (H) [S06.349A]  Tamir Manrique MD 9/27/2017  8:53 AM    Oral thrush [B37.0] Oral thrush [B37.0]  Tamir Manrique MD 9/27/2017  8:54 AM    Urinary tract infection associated with indwelling urethral catheter, initial encounter (H) [T83.511A, N39.0] Urinary tract infection associated with indwelling urethral catheter, initial encounter (H) [T83.511A, N39.0]  Tamir Manrique MD 9/27/2017  8:56 AM      Hospital Problems as of 9/27/2017              Priority Class Noted POA    ICH (intracerebral hemorrhage) (H) Medium  9/6/2017 Yes      Non-Hospital Problems as of 9/27/2017     None      Code Status History     Date Active Date Inactive Code Status Order ID Comments User Context    9/22/2017  7:36 AM  Full Code [id number]  Michelle Manzano MD Outpatient    9/12/2017 11:53 AM 9/22/2017  7:36 AM Full Code [id number]  Lucy Zazueta MD Inpatient         Medication Review      START taking        Dose / Directions Comments    acetaminophen 32 mg/mL solution   Commonly known as:  TYLENOL   Used for:  Generalized pain        Dose:  650 mg   20.3 mLs (650 mg) by Per Feeding Tube route every 4 hours as needed for mild pain   Quantity:  300 mL   Refills:  0        * amLODIPine 10 MG tablet   Commonly known as:  NORVASC   Used for:  Essential hypertension        Dose:  10 mg   1 tablet (10 mg) by Oral or Feeding Tube route daily   Quantity:  30 tablet   Refills:  0        * amLODIPine 5 MG tablet   Commonly known as:  NORVASC   Used for:  Essential hypertension        Dose:  5 mg   1 tablet (5 mg) by Oral or Feeding Tube route daily   Quantity:  30 tablet   Refills:  0        amoxicillin 875 MG tablet   Commonly known as:  AMOXIL   Indication:  Urinary Tract Infection   Used for:  Urinary tract infection associated with  indwelling urethral catheter, initial encounter (H)        Dose:  875 mg   1 tablet (875 mg) by Per Feeding Tube route every 12 hours for 8 days   Refills:  0        fiber modular packet   Used for:  On tube feeding diet        Dose:  1 packet   1 packet by Per Feeding Tube route 2 times daily   Refills:  0        hypromellose-dextran Soln ophthalmic solution   Used for:  Dry eyes        Dose:  2-3 drop   Apply 2-3 drops to eye every hour as needed for dry eyes   Refills:  0        metoprolol 10 mg/mL Susp   Commonly known as:  LOPRESSOR   Used for:  Essential hypertension        Dose:  25 mg   2.5 mLs (25 mg) by Oral or Feeding Tube route 2 times daily   Refills:  0        nystatin 935852 UNIT/ML suspension   Commonly known as:  MYCOSTATIN   Used for:  Oral thrush        Dose:  553201 Units   Swish and swallow 5 mLs (500,000 Units) in mouth 4 times daily   Quantity:  280 mL   Refills:  0        pantoprazole Susp suspension   Commonly known as:  PROTONIX   Used for:  Gastroesophageal reflux disease without esophagitis        Dose:  40 mg   20 mLs (40 mg) by Per Feeding Tube route daily   Refills:  0        ramipril 10 MG capsule   Commonly known as:  ALTACE   Used for:  Essential hypertension        Dose:  10 mg   1 capsule (10 mg) by Oral or Feeding Tube route daily   Quantity:  30 capsule   Refills:  0        * Notice:  This list has 2 medication(s) that are the same as other medications prescribed for you. Read the directions carefully, and ask your doctor or other care provider to review them with you.      CONTINUE these medications which may have CHANGED, or have new prescriptions. If we are uncertain of the size of tablets/capsules you have at home, strength may be listed as something that might have changed.        Dose / Directions Comments    levothyroxine 50 MCG tablet   Commonly known as:  SYNTHROID/LEVOTHROID   This may have changed:    - medication strength  - how to take this  - when to take this    Used for:  Hypothyroidism, unspecified type        Dose:  50 mcg   1 tablet (50 mcg) by Oral or Feeding Tube route every morning (before breakfast)   Quantity:  30 tablet   Refills:  0          CONTINUE these medications which have NOT CHANGED        Dose / Directions Comments    VITAMIN D (CHOLECALCIFEROL) PO        Dose:  95668 Units   Take 50,000 Units by mouth once a week Friday   Refills:  0          STOP taking     LISINOPRIL PO                     Further instructions from your care team       Isosource 1.5 (or equivalent) @ goal of 35 mL/hr continuous.  Nutrisource Fiber, 1 packet BID  H2O flushes of 200 cc every 6 hours    Your risk factors for stroke or TIA (transient ischemic attack):    Your Risk Factors Your Results Normal Ranges   High blood pressure BP Readings from Last 1 Encounters:   09/23/17 91/52    Less than 120/80   Cholesterol              Total No results found for: CHOL   Less than 150    Triglycerides   No results found for: TRIG Less than 150   LDL No results found for: LDL    Less than 70   HDL No results found for: HDL         Greater than 40 (men)  Greater than 50 (women)   Diabetes   Recent Labs  Lab 09/23/17  0826   GLC 96    Fasting blood glucose    Smoking/tobacco use  Quit smoking and tobacco   Overweight  Lose 1-2 pounds a week   Lack of exercise  30 minutes moderate activity each day   Other risk factors include carotid (neck) artery disease, atrial fibrillation and stress. You may be on new medicine to treat high blood pressure, cholesterol, diabetes or atrial fibrillation.    Understanding Stroke Booklet given to patient. Please refer to booklet for further information.    Stroke warning signs and symptoms - CALL 911 right away for:  - Sudden numbness or weakness in the face, arm or leg (often on one side of the body).  - Sudden confusion or trouble understanding what is going on.  - Sudden blurred or decreased vision in one or both eyes.  - Sudden trouble speaking,  loss of balance, dizziness or problems with coordination.  - Sudden, severe headache for no reason.  - Fainting or seizures.  - Symptoms may go away then come back suddenly.          Summary of Visit     Reason for your hospital stay       Acute hemorrhagic stroke.             After Care     Activity - Up with nursing assistance           Additional Discharge Instructions       Passive range of motion exercises/therapy  Frequent repositioning to prevent decubitus ulcer.  Monitor for signs of aspiration.  If neurological/cognitive function improves, and able to follow command and able to participate in therapy, then therapy order per LTC MD.       Advance Diet as Tolerated       Diet upon discharge: Adult Formula Drip Feeding: Continuous Isosource 1.5; Nasoduodenal tube; Goal Rate: 35; mL/hr; Medication - Tube Feeding Flush Frequency: At least 15-30 mL water before and after medication administration and with tube clogging  - NPO strict for oral intake       Daily weights       Call Provider for weight gain of more than 2 pounds per day or 5 pounds per week.       Discharge Instructions       If questions or problems arise regarding tube function (e.g. leaking, dislodges, etc.) Contact Interventional Radiology department 24 hours a day.    For procedures that were done at [facility]:    8 AM - 4 PM Monday through Friday Contact the Nurse Line [phone]  For afterhours and weekends [phone]    Ask for the Interventional Radiologist-on call.     For procedures that were done at [facility]:  8 AM - 4 PM Monday through Friday Contact   [phone]  For afterhours and weekends: [phone]   Ask for the Interventional Radiologist on call.       IF DIRECTED by the RADIOLOGIST, related to specific problems with the tube functioning,  go to the Emergency Department.       Ramsay catheter       To straight gravity drainage. Change catheter every 2 weeks and PRN for leaking or decreased uring output with  signs of bladder distention. DO NOT change catheter without a specific MD order IF diagnosis of  neurogenic bladder, or other urological conditions       General info for SNF       Length of Stay Estimate: Long Term Care  Condition at Discharge: Stable  Level of care:board and care  Rehabilitation Potential: Poor  Admission H&P remains valid and up-to-date: Yes  Recent Chemotherapy: N/A  Use Nursing Home Standing Orders: Yes       Intake and output       daily       Mantoux instructions       Give two-step Mantoux (PPD) Per Facility Policy Yes       Seizure precautions                 Referrals     Nutrition Services Adult IP Consult       Reason:  Pt with tube feeds       Occupational Therapy Adult Consult       Evaluate and treat as clinically indicated.    Reason:  CVA, deconditioning and treat as able       Physical Therapy Adult Consult       Evaluate and treat as clinically indicated.    Reason:  CVA, deconditioning and treat as able       Speech Language Path Adult Consult       Evaluate and treat as clinically indicated.    Reason:  CVA, deconditioning and treat as able             Radiology & Cardiology Orders     Future Labs/Procedures Complete By Expires    CT Head w/o contrast*  10/15/2017 (Approximate) 12/9/2017    Comments:    Administration of IV contrast (contrast agent, dose, and amount) will be tailored to this examination per the appropriate written protocol listed in the Protocol E-Book, or by the supervising imaging provider.       Radiology & Cardiology Orders     CT Head w/o contrast*       Administration of IV contrast (contrast agent, dose, and amount) will be tailored to this examination per the appropriate written protocol listed in the Protocol E-Book, or by the supervising imaging provider.              Follow-Up Appointment Instructions     Future Labs/Procedures    Follow Up and recommended labs and tests     Comments:    Follow up with [facility] physician.  The following labs/tests  are recommended: CBC and BMP with in a week.    Follow-up and recommended labs and tests      Comments:    [facility] in 4-6 weeks with head CT prior.  Please call the clinic at [phone] to schedule your appointment with Vin Echeverria PA-C or Nga Fung PA-C.      Follow-Up Appointment Instructions     Follow Up and recommended labs and tests       Follow up with longterm physician.  The following labs/tests are recommended: CBC and BMP with in a week.       Follow-up and recommended labs and tests        Please follow up at [facility] in 4-6 weeks with head CT prior.  Please call the clinic at [phone] to schedule your appointment with Vin Echeverria PA-C or Nga Fung PA-C.             Statement of Approval     Ordered          09/27/17 0903  I have reviewed and agree with all the recommendations and orders detailed in this document.  EFFECTIVE NOW     Approved and electronically signed by:  Tamir Manrique MD

## 2017-09-06 NOTE — IP AVS SNAPSHOT
` `     [place] [address] [facility]: [phone]            Medication Administration Report for Sury Barnett as of 09/27/17 0910   Legend:    Given Hold Not Given Due Canceled Entry Other Actions    Time Time (Time) Time  Time-Action       Inactive    Active    Linked        Medications 09/21/17 09/22/17 09/23/17 09/24/17 09/25/17 09/26/17 09/27/17    acetaminophen (TYLENOL) solution 650 mg  Dose: 650 mg Freq: EVERY 4 HOURS PRN Route: PER FEEDING   PRN Reason: mild pain  Start: 09/11/17 0749   Admin Instructions: Alternate ibuprofen (if ordered) with acetaminophen.  Maximum acetaminophen dose from all sources= 75 mg/kg/day not to exceed 4 grams/day.     0934 (650 mg)-Given                 amLODIPine (NORVASC) tablet 5 mg  Dose: 5 mg Freq: DAILY Route: ORAL OR FEED  Start: 09/24/17 0900   Admin Instructions: Hold for SBP < 100        0835 (5 mg)-Given        0905-Hold [C]        0914 (5 mg)-Given        [ ] 0900           amoxicillin (AMOXIL) tablet 875 mg  Dose: 875 mg Freq: EVERY 12 HOURS SCHEDULED Route: PER FEEDING   Indications of Use: URINARY TRACT INFECTION  Start: 09/24/17 2000 2121 (875 mg)-Given        0910 (875 mg)-Given [C]       2055 (875 mg)-Given        0914 (875 mg)-Given       1941 (875 mg)-Given        [ ] 0800       [ ] 2000           fiber modular (NUTRISOURCE FIBER) packet 1 packet  Dose: 1 packet Freq: 2 TIMES DAILY Route: PER FEEDING   Start: 09/13/17 1200   Admin Instructions: Mix each packet with 60 mL water before administering. SUPPLIED BY NUTRITION DEPARTMENT.     0937 (1 packet)-Given       2028 (1 packet)-Given        0800 (1 packet)-Given       2032 (1 packet)-Given        1128 (1 packet)-Given       1948 (1 packet)-Given               0835 (1 packet)-Given       2120 (1 packet)-Given        0925 (1 packet)-Given       2056 (1 packet)-Given        1102 (1 packet)-Given       2141 (1 packet)-Given        [ ] 0900       [ ] 2100           hydrALAZINE (APRESOLINE)  "injection 10 mg  Dose: 10 mg Freq: EVERY 4 HOURS PRN Route: IV  PRN Reason: high blood pressure  PRN Comment: give for SBP > 140  Start: 09/10/17 1359              hypromellose-dextran (ARTIFICAL TEARS) ophthalmic solution 2-3 drop  Dose: 2-3 drop Freq: EVERY 1 HOUR PRN Route: OP  PRN Reason: dry eyes  Start: 09/08/17 1536   Admin Instructions: To affected eye(s)               labetalol (NORMODYNE/TRANDATE) injection 10-20 mg  Dose: 10-20 mg Freq: EVERY 4 HOURS PRN Route: IV  PRN Reason: high blood pressure  Start: 09/07/17 0758   Admin Instructions: For SBP >140               levothyroxine (SYNTHROID/LEVOTHROID) tablet 50 mcg  Dose: 50 mcg Freq: EVERY MORNING BEFORE BREAKFAST Route: ORAL OR FEED  Start: 09/13/17 0730    0649 (50 mcg)-Given        0542 (50 mcg)-Given               (0743)-Not Given [C]        0634 (50 mcg)-Given        0459 (50 mcg)-Given               0631 (50 mcg)-Given        0639 (50 mcg)-Given           lidocaine (LMX4) cream  Freq: EVERY 1 HOUR PRN Route: Top  PRN Reason: pain  PRN Comment: with VAD insertion or accessing implanted port.  Start: 09/11/17 0714   Admin Instructions: Do NOT give if patient has a history of allergy to any local anesthetic or any \"mariel\" product.   Apply 30 minutes prior to VAD insertion or port access. MAX Dose: 2.5 g (  of 5 g tube)               lidocaine 1 % 1 mL  Dose: 1 mL Freq: EVERY 1 HOUR PRN Route: OTHER  PRN Comment: mild pain with VAD insertion or accessing implanted port  Start: 09/06/17 1507   Admin Instructions: Do NOT give if patient has a history of allergy to any local anesthetic or any \"mariel\" product. MAX dose 1 mL subcutaneous OR intradermal in divided doses.               melatonin tablet 1 mg  Dose: 1 mg Freq: AT BEDTIME PRN Route: PER G TUBE  PRN Reason: sleep  Start: 09/19/17 0805   Admin Instructions: Do not give unless at least 6 hours of uninterrupted sleep is expected.               metoprolol (LOPRESSOR) suspension 25 mg  Dose: 25 mg " Freq: 2 TIMES DAILY Route: ORAL OR FEED  Start: 09/18/17 0900   Admin Instructions: Shake well.     0934 (25 mg)-Given       2028 (25 mg)-Given        0800 (25 mg)-Given       2032 (25 mg)-Given        (1127)-Not Given       (2027)-Not Given [C]        0836 (25 mg)-Given       2123 (25 mg)-Given        0910 (25 mg)-Given       2055 (25 mg)-Given        1103 (25 mg)-Given       2141 (25 mg)-Given        [ ] 0900       [ ] 2100           miconazole (MICATIN) 2 % cream  Freq: EVERY 1 HOUR PRN Route: Top  PRN Reason: other  PRN Comment: topical candidiasis  Start: 09/08/17 1535   Admin Instructions: Apply to affected area.               naloxone (NARCAN) injection 0.1-0.4 mg  Dose: 0.1-0.4 mg Freq: EVERY 2 MIN PRN Route: IV  PRN Reason: opioid reversal  Start: 09/06/17 1507   Admin Instructions: For respiratory rate LESS than or EQUAL to 8.  Partial reversal dose:  0.1 mg titrated q 2 minutes for Analgesia Side Effects Monitoring Sedation Level of 3 (frequently drowsy, arousable, drifts to sleep during conversation).Full reversal dose:  0.4 mg bolus for Analgesia Side Effects Monitoring Sedation Level of 4 (somnolent, minimal or no response to stimulation).               nystatin (MYCOSTATIN) suspension 500,000 Units  Dose: 500,000 Units Freq: 4 TIMES DAILY Route: SWISH & SWAL  Start: 09/23/17 2200   End: 10/03/17 2159      2156 (500,000 Units)-Given        0835 (500,000 Units)-Given       1247 (500,000 Units)-Given       1734 (500,000 Units)-Given       (2120)-Not Given        0927 (500,000 Units)-Given       1332 (500,000 Units)-Given       1802 (500,000 Units)-Given       2056 (500,000 Units)-Given               0914 (500,000 Units)-Given       1518 (500,000 Units)-Given       1941 (500,000 Units)-Given       2355 (500,000 Units)-Given        [ ] 0900       [ ] 1300       [ ] 1800       [ ] 2200           ondansetron (ZOFRAN-ODT) ODT tab 4 mg  Dose: 4 mg Freq: EVERY 6 HOURS PRN Route: PO  PRN Reasons:  nausea,vomiting  Start: 09/06/17 1507   Admin Instructions: This is Step 1 of nausea and vomiting management.  If nausea not resolved in 15 minutes, go to Step 2 prochlorperazine (COMPAZINE). Do not push through foil backing. Peel back foil and gently remove. Place on tongue immediately. Administration with liquid unnecessary              Or  ondansetron (ZOFRAN) injection 4 mg  Dose: 4 mg Freq: EVERY 6 HOURS PRN Route: IV  PRN Reasons: nausea,vomiting  Start: 09/06/17 1507   Admin Instructions: This is Step 1 of nausea and vomiting management.  If nausea not resolved in 15 minutes, go to Step 2 prochlorperazine (COMPAZINE).  Irritant.               pantoprazole (PROTONIX) suspension 40 mg  Dose: 40 mg Freq: DAILY Route: PER FEEDING   Start: 09/12/17 0900    0934 (40 mg)-Given        0800 (40 mg)-Given        (1553)-Not Given        0836 (40 mg)-Given        0910 (40 mg)-Given        0914 (40 mg)-Given        [ ] 0900           potassium chloride (KLOR-CON) Packet 20-40 mEq  Dose: 20-40 mEq Freq: EVERY 2 HOURS PRN Route: ORAL OR FEED  PRN Reason: potassium supplementation  Start: 09/06/17 1507   Admin Instructions: Use if unable to tolerate tablets.  If Serum K+ 3.0-3.3, dose = 60 mEq po total dose (40 mEq x1 followed in 2 hours by 20 mEq x1). Recheck K+ level 4 hours after dose and the next AM.  If Serum K+ 2.5-2.9, dose = 80 mEq po total dose (40 mEq Q2H x2). Recheck K+ level 4 hours after dose and the next AM.  If Serum K+ less than 2.5, See IV order.  Dissolve packet contents in 4-8 ounces of cold water or juice.               potassium chloride 10 mEq in 100 mL intermittent infusion  Dose: 10 mEq Freq: EVERY 1 HOUR PRN Route: IV  PRN Reason: potassium supplementation  Start: 09/06/17 1507   Admin Instructions: Infuse via PERIPHERAL LINE or CENTRAL LINE. Use for central line replacement if patient weight less than 65 kg, if patient is on TPN with high potassium content or if unit does not stock 20 mEq bags.   If  Serum K+ 3.0-3.3, dose = 10 mEq/hr x4 doses (40 mEq IV total dose). Recheck K+ level 2 hours after dose and the next AM.   If Serum K+ less than 3.0, dose = 10 mEq/hr x6 doses (60 mEq IV total dose). Recheck K+ level 2 hours after dose and the next AM.               potassium chloride 10 mEq in 100 mL intermittent infusion with 10 mg lidocaine  Dose: 10 mEq Freq: EVERY 1 HOUR PRN Route: IV  PRN Reason: potassium supplementation  Last Dose: 10 mEq (09/23/17 1639)  Start: 09/06/17 1507   Admin Instructions: Infuse via PERIPHERAL LINE. Use potassium with lidocaine for pain with peripheral administration.  If Serum K+ 3.0-3.3, dose = 10 mEq/hr x4 doses (40 mEq IV total dose). Recheck K+ level 2 hours after dose and the next AM.  If Serum K+ less than 3.0, dose = 10 mEq/hr x6 doses (60 mEq IV total dose). Recheck K+ level 2 hours after dose and the next AM.       1126 (10 mEq)-New Bag       1252 (10 mEq)-New Bag       1351 (10 mEq)-New Bag       1639 (10 mEq)-New Bag               potassium chloride 20 mEq in 50 mL intermittent infusion  Dose: 20 mEq Freq: EVERY 1 HOUR PRN Route: IV  PRN Reason: potassium supplementation  Start: 09/06/17 1507   Admin Instructions: Infuse via CENTRAL LINE Only. May need EKG if less than 65 kg or on TPN - Max rate is 0.3 mEq/kg/hr for patients not on EKG monitoring.   If Serum K+ 3.0-3.3, dose = 20 mEq/hr x2 doses (40 mEq IV total dose). Recheck K+ level 2 hours after dose and the next AM.  If Serum K+ less than 3.0, dose = 20 mEq/hr x3 doses (60 mEq IV total dose). Recheck K+ level 2 hours after dose and the next AM.               potassium chloride SA (K-DUR/KLOR-CON M) CR tablet 20-40 mEq  Dose: 20-40 mEq Freq: EVERY 2 HOURS PRN Route: PO  PRN Reason: potassium supplementation  Start: 09/06/17 1507   Admin Instructions: Use if able to take PO.   If Serum K+ 3.0-3.3, dose = 60 mEq po total dose (40 mEq x1 followed in 2 hours by 20 mEq x1). Recheck K+ level 4 hours after dose and the next  AM.  If Serum K+ 2.5-2.9, dose = 80 mEq po total dose (40 mEq Q2H x2). Recheck K+ level 4 hours after dose and the next AM.  If Serum K+ less than 2.5, See IV order.  DO NOT CRUSH               potassium phosphate 15 mmol in D5W 250 mL intermittent infusion  Dose: 15 mmol Freq: DAILY PRN Route: IV  PRN Reason: phosphorous supplementation  Last Dose: 0 mmol (09/12/17 1317)  Start: 09/10/17 1227   Admin Instructions: For serum phosphorus level 2-2.4  Do not infuse Phosphorus in the same line as TPN.   Give 15 mmol and recheck phosphorus level next AM.               potassium phosphate 20 mmol in D5W 250 mL intermittent infusion  Dose: 20 mmol Freq: EVERY 6 HOURS PRN Route: IV  PRN Reason: phosphorous supplementation  Start: 09/10/17 1227   Admin Instructions: For serum phosphorus level 1.1-1.9  For CENTRAL Line ONLY  Do not infuse Phosphorus in the same line as TPN.   Give 20 mmol and recheck phosphorus level 2 hours after last dose and next AM.               potassium phosphate 20 mmol in D5W 500 mL intermittent infusion  Dose: 20 mmol Freq: EVERY 6 HOURS PRN Route: IV  PRN Reason: phosphorous supplementation  Last Dose: 20 mmol (09/13/17 0937)  Start: 09/10/17 1227   Admin Instructions: For serum phosphorus level 1.1-1.9  For Peripheral Line  Do not infuse Phosphorus in the same line as TPN.   Give 20 mmol and recheck phosphorus level 2 hours after last dose and next AM.               potassium phosphate 25 mmol in D5W 500 mL intermittent infusion  Dose: 25 mmol Freq: EVERY 8 HOURS PRN Route: IV  PRN Reason: phosphorous supplementation  Start: 09/10/17 1227   Admin Instructions: For serum phosphorus level less than 1.1  Do not infuse Phosphorus in the same line as TPN.   Give 25 mmol and recheck phosphorus level 2 hours after last dose and next AM.               ramipril (ALTACE) capsule 10 mg  Dose: 10 mg Freq: DAILY Route: ORAL OR FEED  Start: 09/13/17 2000 2027 (10 mg)-Given        2032 (10 mg)-Given         (1949)-Not Given        2121 (10 mg)-Given        2055 (10 mg)-Given        1941 (10 mg)-Given        [ ] 2000           sodium chloride (PF) 0.9% PF flush 3 mL  Dose: 3 mL Freq: EVERY 8 HOURS Route: IK  Start: 09/11/17 0715   Admin Instructions: And Q1H PRN, to lock peripheral IV dormant line.            0934 (3 mL)-Given       1850 (3 mL)-Given       2316 (3 mL)-Given        0524 (3 mL)-Given              1626 (3 mL)-Given       2031 (3 mL)-Given               (0743)-Not Given       (1542)-Not Given       2156 (3 mL)-Given               0334 (3 mL)-Given              1634 (3 mL)-Given       1733 (3 mL)-Given        0015 (3 mL)-Given              1414 (3 mL)-Given               0130 (3 mL)-Given [C]       [ ] 0930       1941 (3 mL)-Given        (0033)-Not Given       [ ] 0930       [ ] 1730          Discontinued Medications  Medications 09/21/17 09/22/17 09/23/17 09/24/17 09/25/17 09/26/17 09/27/17         Dose: 400 mg Freq: EVERY 12 HOURS Route: IV  Indications of Use: URINARY TRACT INFECTION  Last Dose: 400 mg (09/24/17 1634)  Start: 09/23/17 1600   End: 09/24/17 1834   Admin Instructions: Irritant.       1759 (400 mg)-New Bag        0334 (400 mg)-New Bag       1634 (400 mg)-New Bag       1834-Med Discontinued

## 2017-09-06 NOTE — IP AVS SNAPSHOT
"` Francis           [facility]: [phone]                                              INTERAGENCY TRANSFER FORM - NURSING   2017                    Hospital Admission Date: 2017  VICKI SILVA   : 1925  Sex: Female        Attending Provider: Humza Townsend MD     Allergies:  Lanett Flavor, Sulfa Drugs, Amoxicillin    Infection:  None   Service:  HOSPITALIST    Ht:  1.499 m (4' 11\")   Wt:  41.2 kg (90 lb 13.3 oz)   Admission Wt:  40.5 kg (89 lb 4.6 oz)    BMI:  18.35 kg/m 2   BSA:  1.31 m 2            Patient PCP Information     Provider PCP Type    Keyla Ceron MD General      Current Code Status     Date Active Code Status Order ID Comments User Context       Prior      Code Status History     Date Active Date Inactive Code Status Order ID Comments User Context    2017  7:36 AM  Full Code [id number]  Michelle Manzano MD Outpatient    2017 11:53 AM 2017  7:36 AM Full Code [id number]  Lucy Zazueta MD Inpatient      Advance Directives        Does patient have a scanned Advance Directive/ACP document in EPIC?           No        Hospital Problems as of 2017              Priority Class Noted POA    ICH (intracerebral hemorrhage) (H) Medium  2017 Yes      Non-Hospital Problems as of 2017     None      Immunizations     None         END      ASSESSMENT     Discharge Profile Flowsheet     EXPECTED DISCHARGE     GI Signs/Symptoms  fecal incontinence 17 0921    Expected Discharge Date  17 0841   Passing flatus  yes 17 2137    DISCHARGE NEEDS ASSESSMENT     COMMUNICATION ASSESSMENT      Concerns To Be Addressed  discharge planning concerns;utilization management concerns 17 1204   Patient's communication style  spoken language (English or Bilingual) 17 1713    Patient/family verbalizes understanding of discharge plan recommendations?  Yes 17 1204   SKIN      Medical Team notified of plan?  yes 17 " "1204   Skin WDL  ex 09/26/17 2137    Readmission Within The Last 30 Days  no previous admission in last 30 days 09/12/17 1048   Inspection of bony prominences  Full 09/26/17 2137    Anticipated Changes Related to Illness  inability to care for self 09/06/17 1713   Skin Color/Characteristics  bruised (ecchymotic);redness blanchable 09/26/17 2137    Equipment Currently Used at Home  -- (walking stick) 09/10/17 0940   Skin Integrity  excoriation 09/26/17 2137    Transportation Available  family or friend will provide 09/10/17 0940   Full except areas not inspected   -- (wilver area, coccyx, buttocks) 09/11/17 1855    # of Referrals Placed by [facility]  Palliative Care 09/14/17 1533   Skin Temperature  warm 09/26/17 2137    ASSESSMENT OF FUNCTIONAL STATUS     Skin Moisture  dry 09/26/17 2137    Assesssment of Functional Status  Not at baseline with ADL Functioning;Not at baseline with mobility;Not at  functional baseline;Has complex medical needs, requires placement in a facility 09/14/17 1524   Inspection under devices  Full 09/26/17 0459    GASTROINTESTINAL (ADULT,PEDIATRIC,OB)     Skin Elasticity  slow return to original state 09/26/17 2137    GI WDL  WDL 09/26/17 2137   SAFETY      Abdominal Appearance  rounded 09/26/17 2137   Safety WDL  WDL 09/27/17 0445    All Quadrants Bowel Sounds  audible and active in all quadrants 09/26/17 2137   Safety Equipment  suction equipment 09/24/17 0121    Last Bowel Movement  09/23/17 09/23/17 1851   All Alarms  alarm(s) activated and audible 09/27/17 0445                 Assessment WDL (Within Defined Limits) Definitions           Safety WDL     Effective: 09/28/15    Row Information: <b>WDL Definition:</b> Bed in low position, wheels locked; call light in reach; upper side rails up x 2; ID band on<br> <font color=\"gray\"><i>Item=AS safety wdl>>List=AS safety wdl>>Version=F14</i></font>      Skin WDL     Effective: 09/28/15    Row Information: <b>WDL Definition:</b> Warm; dry; intact; " "elastic; without discoloration; pressure points without redness<br> <font color=\"gray\"><i>Item=AS skin wdl>>List=AS skin wdl>>Version=F14</i></font>      Vitals     Vital Signs Flowsheet     COMMENTS     Pain Rating: FLACC (rest) - Cry  0 09/25/17 1626    Comments  iv labatolol 09/08/17 0949   Pain Rating: FLACC (rest) - Consolability  0 09/25/17 1626    VITAL SIGNS     Score: FLACC (rest)  0 09/25/17 1626    Temp  98  F (36.7  C) 09/27/17 0825   PAIN ASSESSMENT/NONVERBAL ICU (ADULT)      Temp src  Axillary 09/27/17 0825   Pain Management Interventions  Warm application 09/18/17 0910    Resp  18 09/27/17 0825   ANALGESIA SIDE EFFECTS MONITORING      Pulse  73 09/27/17 0426   Side Effects Monitoring: Respiratory Quality  R 09/25/17 2149    Heart Rate  85 09/27/17 0825   Side Effects Monitoring: Respiratory Depth  S 09/25/17 2149    Pulse/Heart Rate Source  Monitor 09/27/17 0825   Side Effects Monitoring: Sedation Level  S 09/25/17 2149    BP  119/65 09/27/17 0825   HEIGHT AND WEIGHT      BP Location  Right arm 09/27/17 0825   Height  1.499 m (4' 11\") 09/06/17 1655    OXYGEN THERAPY     Height Method  Actual 09/06/17 1314    SpO2  99 % 09/27/17 0825   Weight  41.2 kg (90 lb 13.3 oz) 09/26/17 0704    O2 Device  None (Room air) 09/27/17 0426   Weight Method  Bed scale 09/26/17 0704    Oxygen Delivery  4 LPM 09/25/17 1035   BSA (Calculated - sq m)  1.32 09/06/17 1655    PAIN/COMFORT     BMI (Calculated)  18.74 09/06/17 1655    Patient Currently in Pain  MONICA 09/26/17 0518   POSITIONING      Preferred Pain Scale  FLACC (Face Legs Arms Cry Consolability Scale) 09/24/17 0426   Body Position  side-lying, right 09/27/17 0426    0-10 Pain Scale  0 09/25/17 1833   Head of Bed (HOB)  HOB at 30 degrees 09/27/17 0426    rFLACC Pain Rating: Face  0-->no particular expression or smile 09/27/17 0438   Positioning/Transfer Devices  pillows;in use 09/27/17 0426    rFLACC Pain Rating - Legs  0-->normal position or relaxed 09/27/17 0438   " DAILY CARE      rFLACC Pain Rating: Activity  0-->lying quietly, normal position, moves easily 09/27/17 0438   Activity Management  bedrest 09/27/17 0438    rFLACC Pain Rating - Cry  0-->no cry (awake or asleep) 09/27/17 0438   Activity Assistance Provided  assistance, 2 people 09/27/17 0438    rFLACC Pain Rating - Consolability  0-->content, relaxed 09/27/17 0438   Assistive Device Utilized  mechanical lift 09/25/17 2159    rFLACC Score: Activity  0 09/27/17 0438   Additional Documentation  Activity Device Assistance (Row) 09/14/17 1832    PAINAD Breathing  0-->normal 09/23/17 0520   ECG      PAINAD Negative Vocalization  0-->none 09/23/17 0520   ECG Rhythm  Normal sinus rhythm 09/11/17 1325    PAINAD Facial Expression  0-->smiling or inexpressive 09/23/17 0520   Ectopy  None 09/08/17 0038    PAINAD Body Language  0-->relaxed 09/23/17 0520   Lead Monitored  Lead II;V 1 09/08/17 1830    PAINAD Consolability  0-->no need to console 09/23/17 0520   RESPIRATORY MONITORING      PAINAD Score  0 09/23/17 0520   Respiratory Monitoring (EtCO2)  0 mmHg 09/06/17 1455    Pain Location  Hip 09/18/17 0910   REUBEN COMA SCALE      Pain Orientation  Right 09/18/17 0910   Best Eye Response  3-->(E3) to speech 09/23/17 0253    Nonverbal Indicators Of Pain  rubbing 09/18/17 0910   Best Motor Response  4-->(M4) withdraws from pain 09/23/17 0253    Pain Management Interventions  analgesia administered;pillow support provided 09/16/17 0345   Best Verbal Response  1-->(V1) none 09/23/17 0253    Pain Intervention(s)  Repositioned;Medication (See eMAR) 09/19/17 0947   Reuben Coma Scale Score  8 09/23/17 0253    PAIN ASSESSMENT/FLACC     POINT OF CARE TESTING      Pain Rating: FLACC (rest) - Face  0 09/25/17 1626   Puncture Site  fingertip 09/06/17 1655    Pain Rating: FLACC (rest) - Legs  0 09/25/17 1626   Bedside Glucose (mg/dl )   129 mg/dl 09/06/17 9515    Pain Rating: FLACC (rest) - Activity  0 09/25/17 8668                  Patient Lines/Drains/Airways Status    Active LINES/DRAINS/AIRWAYS     Name: Placement date: Placement time: Site: Days: Last dressing change:    Gastrostomy/Enterostomy Gastrostomy LUQ 1 14 fr JUVENTINO Gastrostomy tube, 14Fr, Lot# [id number], exp. 5/1/2020 09/11/17   1326   LUQ   15     Urethral Catheter 09/26/17   0336      1     Wound 09/21/17 Left Buttocks Suspected pressure ulcer skin peeling, wound edges jagged 09/21/17   1833   Buttocks   5             Patient Lines/Drains/Airways Status    Active PICC/CVC     None            Intake/Output Detail Report     Date Intake         Output   Net    Shift P.O. I.V. NG/GT IV Piggyback Enteral Total Urine Emesis/NG output Total       Noc 09/25/17 2300 - 09/26/17 0659 -- -- 200 -- 385 585 850 -- 850 -265    Day 09/26/17 0700 - 09/26/17 1459 -- -- 250 -- -- 250 800 -- 800 -550    Jenni 09/26/17 1500 - 09/26/17 2259 -- -- 460 -- -- 460 450 -- 450 10    Noc 09/26/17 2300 - 09/27/17 0659 -- -- 200 -- -- 200 800 -- 800 -600    Day 09/27/17 0700 - 09/27/17 1459 -- -- -- -- -- -- -- -- -- 0      Last Void/BM       Most Recent Value    Urine Occurrence 1 at 09/26/2017 0300    Stool Occurrence 1 at 09/25/2017 1047      Case Management/Discharge Planning     Case Management/Discharge Planning Flowsheet     REFERRAL INFORMATION     Reaction To Health Status  unable to assess;other (see comments) (minimally responsive) 09/12/17 1048    Did the Initial Social Work Assessment result in a Social Work Case?  Yes 09/13/17 1224   Understanding Of Condition And Treatment  unable to assess 09/12/17 1048    Admission Type  inpatient 09/13/17 1224   COPING/STRESS CAREGIVER      Arrived From  home or self-care 09/13/17 1224   Major Change/Loss/Stressor  family/significant other illness;other (see comments) (dtr perceives pt received inadequate care) 09/14/17 1524    Referral Source  case finding;high risk screening;interdisciplinary rounds;physician 09/21/17 1206   Primary Caregiver Strengths   expressive of emotions 09/14/17 1524    New Steerage to  Clinics?  No 09/12/17 1048   Sources Of Support  other family members 09/14/17 1524    # of Referrals Placed by CTS  Palliative Care 09/14/17 1533   Reaction To Health Status  anger;anxious;hopeful 09/14/17 1524    Reason For Consult  care coordination/care conference;discharge planning 09/21/17 1204   Understanding Of Condition And Treatment  partial understanding of medical condition;partial understanding of treatment 09/14/17 1524    Record Reviewed  medical record 09/21/17 1204   EXPECTED DISCHARGE      CTS Assigned to Case  Shey Thurman 09/21/17 1204   Expected Discharge Date  09/27/17 09/25/17 0841    Primary Care Clinic Name  [facility] 09/12/17 1048   ASSESSMENT/CONCERNS TO BE ADDRESSED      Primary Care MD Name  Keyla Ceron 09/12/17 1048   Concerns To Be Addressed  discharge planning concerns;utilization management concerns 09/21/17 1204    LIVING ENVIRONMENT     DISCHARGE PLANNING      Lives With  child(kaila), adult 09/13/17 1224   Patient/family verbalizes understanding of discharge plan recommendations?  Yes 09/21/17 1204    Living Arrangements  house 09/13/17 1224   Medical Team notified of plan?  yes 09/21/17 1204    Provides Primary Care For  no one 09/13/17 1224   Readmission Within The Last 30 Days  no previous admission in last 30 days 09/12/17 1048    Primary Care Provided By  child(kaila) (specify) 09/13/17 1224   Anticipated Changes Related to Illness  inability to care for self 09/06/17 1713    Quality Of Family Relationships  supportive;helpful;involved 09/13/17 1224   Transportation Available  family or friend will provide 09/10/17 0940    Able to Return to Prior Living Arrangements  no 09/13/17 1224   FINAL RESOURCES      ASSESSMENT OF FAMILY/SOCIAL SUPPORT     Equipment Currently Used at Home  -- (walking stick) 09/10/17 0940    Marital Status   09/13/17 1224   ABUSE RISK SCREEN      Who is your support system?  Children  09/13/17 1224   QUESTION TO PATIENT:  Has a member of your family or a partner(now or in the past) intimidated, hurt, manipulated, or controlled you in any way?  no 09/06/17 1319    Description of Support System  Supportive;Involved 09/13/17 1224   QUESTION TO PATIENT: Do you feel safe going back to the place where you are living?  yes 09/06/17 1319    Support Assessment  Adequate family and caregiver support;Adequate social supports 09/12/17 1048   OBSERVATION: Is there reason to believe there has been maltreatment of a vulnerable adult (ie. Physical/Sexual/Emotional abuse, self neglect, lack of adequate food, shelter, medical care, or financial exploitation)?  no 09/06/17 1319    Quality of Family Relationships  supportive;involved 09/13/17 1224   (R) MENTAL HEALTH SUICIDE RISK      ASSESSMENT OF FUNCTIONAL STATUS     Are you depressed or being treated for depression?  No 09/06/17 1718    Assesssment of Functional Status  Not at baseline with ADL Functioning;Not at baseline with mobility;Not at  functional baseline;Has complex medical needs, requires placement in a facility 09/14/17 1524   HOMICIDE RISK      COPING/STRESS     Feels Like Hurting Others  no 09/06/17 1319    Major Change/Loss/Stressor  none 09/06/17 1729

## 2017-09-06 NOTE — CONSULTS
"[facility]    Neurosurgery Consultation     Date of Admission:  9/6/2017  Date of Consult (When I saw the patient): 09/06/17    Assessment & Plan   Sury Barnett is a 89 year old female who was admitted on 9/6/2017. I was asked to see the patient for ICH.    Active Problems:    ICH (intracerebral hemorrhage) (H)    Assessment: ICH    Plan: Imaging reviewed.  Per Dr. Russell non-surgical at this time. Agree with admission thru hospitalist and recommend keeping SBP <140.  Patient is not on any blood thinners. Will continue to monitor. Repeat CT tomorrow am, or sooner if changes to mental status.        I have discussed the following assessment and plan with Dr. Russell who is in agreement with initial plan and will follow up with further consultation recommendations.    [facility]  [facility]  [address] [address]    Tel [phone]  Pager [phone]        Code Status    No Order    Reason for Consult   Reason for consult: I was asked by Dr. Townsend to evaluate this patient for for ICH.    Primary Care Physician   Keyla Ceron    Chief Complaint   Weakness    History is obtained from the patient    History of Present Illness   Sury Barnett is a 89 year old female who presents with weakness after a fall.  According to the patient's daughter, the patient was with her at an appointment and when they were leaving standing by the car the patient fell backwards hitting the back of her head.  Patient was transported to [facility] ED via EMS.  She has had confusion and left sided weakness since the fall.  Per the patient's daughter she had no issues prior to the fall and is relatively healthy with a history of HTN.  She has not had episodes of confusion at home and is not on any blood thinners. She is able to answer some questions during exam.  She motions to the back of her head when asked if she has pain.  She states \"thank you\" at the " end of our visit.  She is able to raise right leg off of the bed, but noted weakness to right side of body.  She appears to be comfortable while lying supine with head on a pillow.  CT of head today shows large right frontal lobe mass consistent with large parenchymal hematoma.      Past Medical History   I have reviewed this patient's medical history and updated it with pertinent information if needed.   Past Medical History:   Diagnosis Date     Hypertension      Thyroid disease        Past Surgical History   I have reviewed this patient's surgical history and updated it with pertinent information if needed.  Past Surgical History:   Procedure Laterality Date     COLONOSCOPY  7/15/2013    Procedure: COLONOSCOPY;  Colonoscopy ;  Surgeon: Maida Rehman MD;  Location:  GI     HEAD & NECK SURGERY      tonsil      TONSILLECTOMY         Prior to Admission Medications   Prior to Admission Medications   Prescriptions Last Dose Informant Patient Reported? Taking?   LISINOPRIL PO 9/6/2017 at am  Yes Yes   Sig: Take 20 mg by mouth daily    Levothyroxine Sodium (LEVOTHROID PO) 9/6/2017 at am  Yes Yes   Sig: Take 50 mcg by mouth daily    VITAMIN D, CHOLECALCIFEROL, PO 9/1/2017  Yes Yes   Sig: Take 50,000 Units by mouth once a week Friday      Facility-Administered Medications: None     Allergies   Allergies   Allergen Reactions     Sulfa Drugs Other (See Comments)     Really sick and high fever     Amoxicillin        Social History   I have reviewed this patient's social history and updated it with pertinent information if needed. Sury Barnett  reports that she has never smoked. She does not have any smokeless tobacco history on file. She reports that she does not drink alcohol or use illicit drugs.    Family History   I have reviewed this patient's family history and updated it with pertinent information if needed.   Family History   Problem Relation Age of Onset     Cancer - colorectal Father      Hypertension Father  "     Colon Cancer Father      Hypertension Mother      Hyperlipidemia Mother      Hypertension Sister      Hyperlipidemia Sister        Review of Systems   Limited, noted in HPI.    Physical Exam   Temp: 97.6  F (36.4  C) Temp src: Axillary BP: 114/61   Heart Rate: 107 Resp: 15 SpO2: 99 % O2 Device: None (Room air) Oxygen Delivery: 1 LPM  Vital Signs with Ranges  Temp:  [97.6  F (36.4  C)-97.9  F (36.6  C)] 97.6  F (36.4  C)  Heart Rate:  [] 107  Resp:  [13-21] 15  BP: (114-187)/() 114/61  SpO2:  [96 %-99 %] 99 %  92 lbs 9.49 oz    Heart Rate: 107, Blood pressure 114/61, temperature 97.6  F (36.4  C), temperature source Axillary, resp. rate 15, height 4' 11\" (1.499 m), weight 92 lb 9.5 oz (42 kg), SpO2 99 %, not currently breastfeeding.  92 lbs 9.49 oz  HEENT:  Normocephalic.  Moderate sized hematoma to mid/right occipital region, no bleeding. PERRLA.  Difficulty with EOM's.   Neck:  Supple, non-tender, without lymphadenopathy.  Heart:  No peripheral edema  Lungs:  No SOB  Abdomen:  Soft, non-tender, non-distended.    Skin:  Warm and dry, good capillary refill.  Extremities:  Good radial and dorsalis pedis pulses bilaterally, no edema, cyanosis or clubbing.    NEUROLOGICAL EXAMINATION:     Mental status:  Awake and answers some questions appropriately.  Tracks most conversation.  Cranial nerves: PERRLA.  Gaze to the right.  Motor:  strength on right 4/5, unable to grasp on left.  Lifts right leg off bed, unable to move left leg.  DF and PF on right 4/5.  Unable to DF and PF on left.  Sensation:  Intact to light touch on the right.    Reflexes:   Negative Babinski.  Negative Clonus.    Coordination:  Smooth finger to nose on the right.  Unable to do on the left.   Able to lift arm for pronator drift on right; Unable to lift left arm.    Gait:  Deferred.     Cervical examination reveals good range of motion.  No tenderness to palpation of the cervical spine or paraspinous muscles bilaterally. "       Data   CBC RESULTS:   Recent Labs   Lab Test  09/06/17   1310   WBC  8.6   RBC  4.32   HGB  13.6   HCT  39.5   MCV  91   MCH  31.5   MCHC  34.4   RDW  12.4   PLT  364     Basic Metabolic Panel:  Lab Results   Component Value Date     09/06/2017      Lab Results   Component Value Date    POTASSIUM 3.9 09/06/2017     Lab Results   Component Value Date    CHLORIDE 101 09/06/2017     Lab Results   Component Value Date    ANTHONY 9.7 09/06/2017     Lab Results   Component Value Date    CO2 25 09/06/2017     Lab Results   Component Value Date    BUN 13 09/06/2017     Lab Results   Component Value Date    CR 0.71 09/06/2017     Lab Results   Component Value Date    GLC 99 09/06/2017     INR:  Lab Results   Component Value Date    INR 0.99 09/06/2017

## 2017-09-06 NOTE — ED PROVIDER NOTES
History   Chief Complaint:  Fall     HPI   History is limited secondary to patient being confused, history obtained from EMS personnel     Sury Barnett is a 89 year old female with history of hypertension who presents to the emergency department via EMS for evaluation after a fall and weakness. Per EMS patient was at the mall with her daughter and when she walked out of the mall in the parking lot patient stopped and fell backwards on her head. Per her daughter patient is usually very healthy and no history of dementia and currently patient is unable to answer questions and is confused. Per EMS patient has significant weakness and no movement on her left side.     Allergies:  Sulfa drugs   Amoxicillin     Medications:    Tramadol   Levothyroxine   Lisinopril   Vitamin D    Past Medical History:    Hypertension   Thyroid disease     Past Surgical History:    Tonsillectomy   Colonoscopy     Family History:    Cancer   HTN   HLP     Social History:  Negative for tobacco use.  Negative for alcohol use.  Presents to ED via EMS  Marital Status:       Review of Systems   Neurological: Positive for weakness and numbness.   Psychiatric/Behavioral: Positive for confusion.   All other systems reviewed and are negative.    Physical Exam   First Vitals:  Temp: 97.9  F (36.6  C)  Temp src: Oral  SpO2: 97 %  Weight: 40.5 kg (89 lb 4.6 oz) (09/06 1309)    Physical Exam  Constitutional: The patient is oriented to person, place, and time.   HENT: Awake, gaze perefrence ot hte right, large soft occipitial hematoma, no lac, no cervical tenderness  Head: Atraumatic  Right Ear: Normal  Left Ear: Normal  Nose: Nose normal.   Mouth/Throat: Oropharynx is clear and moist. No erythema or exudate.   Eyes: Conjunctivae and EOM are normal. Pupils are equal, round, and reactive to light. No discharge  Neck: Normal range of motion. Neck supple.   Cardiovascular: Normal rate, regular rhythm, no murmur gallops or rubs. Intact distal pulses.     Pulmonary/Chest: CTA bilaterally. No wheezes rale or rhonchi.  Abdominal: Soft. Non tender.  No masses   Musculoskeletal: No edema. No bony deformity. Normal range of motion  Lymphadenopathy:     The patient has no cervical adenopathy.   Neurological: The patient is alert and oriented to person, place, and time. Flacid paralysis of left upper and lower extremity. Face is symmetric, gaze preference to right. Coordination normal. GCS 13  Skin: Skin is warm and dry. No rash noted. The patient is not diaphoretic.   Psychiatric: The patient has a normal mood and affect.    Emergency Department Course   ECG:  Indication: Weakness  Time: 1310  Vent. Rate 98 bpm. CO interval 140. QRS duration 76. QT/QTc 362/462. P-R-T axis 51 17 27. Normal Sinus rhythm, normal ECG Read time: 1315    Imaging:  Radiographic findings were communicated with the patient who voiced understanding of the findings.  Cervical Spine CT w/o Contrast  Degenerative changes of the cervical spine. No evidence  for fracture or traumatic malalignment. As per radiology.     CT Head w/o Contrast  1. New large hyperdense mass in the right frontal lobe measuring 5.5 x  4.2 x 3.5 cm consistent with a large parenchymal hematoma. This does  not result in any significant midline shift or effacement of the basal  cisterns.  2. Diffuse cerebral volume loss and cerebral white matter changes  consistent with chronic small vessel ischemic disease.  As per radiology.     Laboratory:  CBC: WBC: 8.6, HGB: 13.6, PLT: 364  BMP: (L), o/w WNL (Creatinine: 0.71)    INR: 0.99  Glucose by meter: 129(H)    Interventions:  1534 0.9% sodium chloride bolus IV  1534 Cardene 40mg IV continuous     Emergency Department Course:  Nursing notes and vitals reviewed. I performed an exam of the patient as documented above.     Blood drawn. This was sent to the lab for further testing, results above.    The patient was sent for a CT while in the emergency department, findings above.      1305 Code stroke was called     1312 I consulted with Dr. Sams, neurology     1401 I consulted with Fiona JULIAN for neurosurgery    1438 I consulted with Dr. Townsend, hospitalist serivces     Findings and plan explained to the Patient and daughter who consents to admission. Discussed the patient with Dr. Ness, who will admit the patient to a ICU bed for further monitoring, evaluation, and treatment.     Impression & Plan    Medical Decision Making:  Sury Barnett is a 89 year old female who presents after having a fall now with  with left sided weakness. CT does reveal intraparenchymal bleed which I suspect likely is the cause of her fall. No obvious injury from the fall except for occipital hematoma. Patient has significant flaccid paralysis of her left side. On arrival, code stroke was immediately called. Although she is not a candidate for TPA and with the CT showing intraparenchymal hemmorhage we do not feel this is ischemic in nature. I did speak  with JULIÁN Cantor for neurosurgery who will be communicating with Dr. Russell who is in surgery. Patient was somewhat hypertensive with pressures in the 180's.  HOB was elevated to 30 degrees she and was started on Niacarpine drip. I spoke with Dr. Tim of the hospitalist services. Patient  will be admitted to the ICU for further evaluation and treatment.     Critical Care time exclusive of procedures: 40 minutes    Diagnosis:    ICD-10-CM    1. Acute idiopathic spontaneous intraparenchymal intracranial hemorrhage (H) I62.9      Shandra RODRIGUEZ Said, am serving as a scribe on 9/6/2017 at 1:30 PM to personally document services performed by Beto Tejada MD based on my observations and the provider's statements to me.     Shandra Said  9/6/2017    EMERGENCY DEPARTMENT       Beto Tejada MD  09/06/17 [id number]

## 2017-09-06 NOTE — H&P
PRIMARY CARE PROVIDER:  Keyla Ceron MD      CHIEF COMPLAINT:  Fall.      HISTORY OF PRESENT ILLNESS:  Ms. Sury Barnett is a 89-year-old female with history of hypertension and hypothyroidism who presents to the Emergency Room after she had a fall and was subsequently weak on the left side.  The history was predominantly obtained from the daughter who was at the bedside.  The patient apparently was in the mall with her daughter picking of her glasses.  She apparently went out of the mall in a wheelchair and had reached the parking lot.  The daughter went on to get her combination keys to open the door of her car.  In the process, the patient had attempted to get up from the wheelchair and in the process fell backwards and hit her head.  Per her daughter, the patient is usually very healthy and independent with ambulation except for long distances.  Given the distance from the glasses store to the parking lot, she had her in the wheelchair, but normally, she walks short distances without any problem.  Right before the fall, she had not complained of any new symptoms to her daughter.  There was no headache or vision change reported.  No lightheadedness or dizziness reported.  Otherwise, the patient has been in her usual state of health.  There is no recent history of chest pain, shortness of breath or palpitations.  No diarrhea or vomiting.  No dysuria, urinary urgency or frequency.  EMS was called, and when they arrived at the scene, they found that she was significantly weak on the left side without any movement.  She was eventually brought to [facility] Emergency Room.      In the Emergency Room, the patient was evaluated by Dr. Tejada.  She was found to have a gaze preference on the right side and an occipital hematoma.  She was also found to be weak on the left upper and lower extremities.  A CT head was done which showed a new large intracerebral hemorrhage measuring 5.5 x 4.2 x 3.5 cm.  There was no  resultant midline shift.  Dr. Tejada called Neurosurgery, who recommended hospitalist admission to the ICU.      PAST MEDICAL HISTORY:   1.  Hypertension.   2.  Hypothyroidism.      ALLERGIES:  Sulfa and penicillin.      SOCIAL AND PERSONAL HISTORY:  The patient lives with her daughter.  No tobacco, alcohol or recreational drug use.      FAMILY HISTORY:  Reviewed and is noncontributory.      HOME MEDICATIONS:   1.  Levothyroxine 50 mcg daily.   2.  Lisinopril 20 mg daily.   3.  Vitamin D 50,000 units once a week.      REVIEW OF SYSTEMS:  A complete review was done and was negative for anything else other than that mentioned in the HPI.      PHYSICAL EXAMINATION:   GENERAL:  Ms. Barnett is a 89-year-old female.  She is not in any obvious distress.  She does have a preferential gaze on the right side.   VITAL SIGNS:  Temperature 97.9, blood pressure 144/71, heart rate 116, respiratory rate 17, O2 sat 98% on room air.   HEENT:  She has a hematoma on the occipital area, otherwise no bruising or laceration on the face.  Pupils are 3 mm bilaterally.   NECK:  Supple, with good range of motion.   RESPIRATORY:  Good air entry bilaterally with normal effort of breathing.   CARDIOVASCULAR:  Tachycardic, no murmur.   GASTROINTESTINAL:  Abdomen is soft, nontender, nondistended, bowel sounds normoactive.   EXTREMITIES:  There is no edema.   SKIN:  Warm and dry.   NEUROLOGIC:  She is awake, answers simple questions appropriately.  She does have a left facial droop and the muscle strength is 0/5 on the left upper and lower extremities.      LABORATORY AND DIAGNOSTIC DATA:   1.  Sodium is 132.  Electrolytes are within normal limits.  CBC is unremarkable.  INR is 0.99.   2.  CT of the cervical spine shows degenerative changes, no acute fracture or traumatic malalignment.   3.  CT head as described above with large right frontal lobe intraparenchymal hemorrhage.      ASSESSMENT AND PLAN:  Ms. Barnett is a 89-year-old pretty healthy female  with history of hypertension and hypothyroidism who presents with a fall and was found to have large right frontal lobe intracranial hemorrhage.   1.  Large right frontal intracranial hemorrhage.  The patient presents after a fall and was found on the CT head to have a large intraparenchymal hemorrhage.  More than likely, the patient sustained this before the fall.  Given the location of the hemorrhage and no other subdural or extradural bleed, that is the most likely mechanism.  More than likely, this is related to her hypertension.  Despite the size, there is no midline shift and the patient appears to be fairly lucid and responding appropriately.  She will be admitted to ICU.  Neurosurgery is about to see the patient right now, but my understanding is there is no indication for surgical intervention at this time.  She will be on a nicardipine drip with a blood pressure goal of less than 140.  I will also consult Neurocritical Care.  Will monitor her neurological status.  If anything changes, will get a repeat head CT, otherwise repeat head CT in the morning.   2.  Mild hyponatremia.  Her sodium today is 132.  I do not have much for comparison.  She had one in July that was 135.  Will defer management of hyponatremia in the context of intracranial hemorrhage to Neurocritical Care.  At this time, I will place her on normal saline at 100 mL per hour.   3.  Benign essential hypertension.  The patient does have a history of hypertension and is normally on lisinopril.  I will hold lisinopril as above.  She will be on nicardipine drip.   4.  Hypothyroidism.  The patient is on levothyroxine 50 mcg daily.  That will be resumed once her swallowing is assessed.      ANTICIPATED LENGTH OF STAY:  More than 2 midnights.      CODE STATUS:  This was discussed with the daughter at the bedside.  She is going to remain a FULL CODE for now.         JACINTA FISHER MD             D: 09/06/2017 14:45   T: 09/06/2017 15:27   MT: [id number]       Name:     VICKI SILVA   MRN:      [id number]        Account:      [id number]   :      1925           Admitted:     [id number]      Document: [id number]       cc: Keyla Ceron MD

## 2017-09-06 NOTE — IP AVS SNAPSHOT
ErickauSry #[id number] (CSN: [id number])  (89 year old F)  (Adm: 17)     [id number]               [place] [address] [facility]: [phone]            Patient Demographics     Patient Name Sex          Age SSN Address Phone    ErickaSury Female 1925 (89 year old) [id number] [address] [place] [address] [phone] (Home)  [phone] (Mobile)      Emergency Contact(s)     Name Relation Home Work Mobile    Crystal Gordon Daughter [phone] none [phone]      Admission Information     Attending Provider Admitting Provider Admission Type Admission Date/Time    Humza Townsend MD Bhattarai, Nimesh, MD Emergency 17  1306    Discharge Date Hospital Service Auth/Cert Status Service Area     Hospitalist Incomplete [facility]    Unit Room/Bed Admission Status       [facility]  Admission (Confirmed)       Admission     Complaint    ICH (intracerebral hemorrhage) (H)      Hospital Account     Name Acct ID Class Status Primary Coverage    ErickaSury [id number] Inpatient Open MEDICARE - MEDICARE            Guarantor Account (for Hospital Account #[id number])     Name Relation to Pt Service Area Active? Acct Type    Sury Barnett Self FCS Yes Personal/Family    Address Phone          [address] [place] [id number] [phone](H)              Coverage Information (for Hospital Account #[id number])     1. MEDICARE/MEDICARE     F/O Payor/Plan Precert #    MEDICARE/MEDICARE     Subscriber Subscriber #    ErickaSury [id number]    Address Phone    ATTN CLAIMS  [address]  [facility] [place] [address] [phone]          2. COMMERCIAL/AARP     F/O Payor/Plan Precert #    COMMERCIAL/AARP     Subscriber Subscriber #    Sury Barnett [id number]    Address Phone    [facility] CLAIM DIV  [address]  [place] [address] [phone]                                                       "INTERAGENCY TRANSFER FORM - PHYSICIAN ORDERS   9/6/2017                       [place] [address] [facility]: [phone]            Attending Provider: Humza Townsend MD     Allergies:  [place] Flavor, Sulfa Drugs, Amoxicillin    Infection:  None   Service:  HOSPITALIST    Ht:  1.499 m (4' 11\")   Wt:  41.2 kg (90 lb 13.3 oz)   Admission Wt:  40.5 kg (89 lb 4.6 oz)    BMI:  18.35 kg/m 2   BSA:  1.31 m 2            ED Clinical Impression     Diagnosis Description Comment Added By Time Added    Cerebral amyloid angiopathy (H) [E85.4, I68.0] Cerebral amyloid angiopathy (H) [E85.4, I68.0]  Jf Long MD 9/6/2017  5:00 PM    Essential hypertension [I10] Essential hypertension [I10]  Jf Long MD 9/6/2017  5:00 PM    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]  Jf Long MD 9/6/2017  5:01 PM    Aspiration pneumonia of right lower lobe, unspecified aspiration pneumonia type (H) [J69.0] Aspiration pneumonia of right lower lobe, unspecified aspiration pneumonia type (H) [J69.0]  Alecia Garcia APRN CNP 9/13/2017  8:53 AM    Generalized pain [R52] Generalized pain [R52]  Michelle Manzano MD 9/22/2017  7:24 AM    On tube feeding diet [Z78.9] On tube feeding diet [Z78.9]  Michelle Manzano MD 9/22/2017  7:26 AM    Dry eyes [H04.123] Dry eyes [H04.123]  Michelle Manzano MD 9/22/2017  7:27 AM    Hypothyroidism, unspecified type [E03.9] Hypothyroidism, unspecified type [E03.9]  Michelle Manzano MD 9/22/2017  7:27 AM    Gastroesophageal reflux disease without esophagitis [K21.9] Gastroesophageal reflux disease without esophagitis [K21.9]  Michelle Manzano MD 9/22/2017  7:28 AM    Traumatic hemorrhage of right cerebrum with loss of consciousness, initial encounter (H) [S06.349A] Traumatic hemorrhage of right cerebrum with loss of consciousness, initial encounter (H) " [S06.349A]  Tamir Manrique MD 9/27/2017  8:53 AM    Oral thrush [B37.0] Oral thrush [B37.0]  Tamir Manrique MD 9/27/2017  8:54 AM    Urinary tract infection associated with indwelling urethral catheter, initial encounter (H) [T83.511A, N39.0] Urinary tract infection associated with indwelling urethral catheter, initial encounter (H) [T83.511A, N39.0]  Tamir Manrique MD 9/27/2017  8:56 AM      Hospital Problems as of 9/27/2017              Priority Class Noted POA    ICH (intracerebral hemorrhage) (H) Medium  9/6/2017 Yes      Non-Hospital Problems as of 9/27/2017     None      Code Status History     Date Active Date Inactive Code Status Order ID Comments User Context    9/22/2017  7:36 AM  Full Code [id number]  Michelle Manzano MD Outpatient    9/12/2017 11:53 AM 9/22/2017  7:36 AM Full Code [id number]  Lucy Zazueta MD Inpatient      Current Code Status     Date Active Code Status Order ID Comments User Context       Prior      Summary of Visit     Reason for your hospital stay       Acute hemorrhagic stroke.                Medication Review      START taking        Dose / Directions Comments    acetaminophen 32 mg/mL solution   Commonly known as:  TYLENOL   Used for:  Generalized pain        Dose:  650 mg   20.3 mLs (650 mg) by Per Feeding Tube route every 4 hours as needed for mild pain   Quantity:  300 mL   Refills:  0        * amLODIPine 10 MG tablet   Commonly known as:  NORVASC   Used for:  Essential hypertension        Dose:  10 mg   1 tablet (10 mg) by Oral or Feeding Tube route daily   Quantity:  30 tablet   Refills:  0        * amLODIPine 5 MG tablet   Commonly known as:  NORVASC   Used for:  Essential hypertension        Dose:  5 mg   1 tablet (5 mg) by Oral or Feeding Tube route daily   Quantity:  30 tablet   Refills:  0        amoxicillin 875 MG tablet   Commonly known as:  AMOXIL   Indication:  Urinary Tract Infection   Used for:  Urinary tract infection associated with  indwelling urethral catheter, initial encounter (H)        Dose:  875 mg   1 tablet (875 mg) by Per Feeding Tube route every 12 hours for 8 days   Refills:  0        fiber modular packet   Used for:  On tube feeding diet        Dose:  1 packet   1 packet by Per Feeding Tube route 2 times daily   Refills:  0        hypromellose-dextran Soln ophthalmic solution   Used for:  Dry eyes        Dose:  2-3 drop   Apply 2-3 drops to eye every hour as needed for dry eyes   Refills:  0        metoprolol 10 mg/mL Susp   Commonly known as:  LOPRESSOR   Used for:  Essential hypertension        Dose:  25 mg   2.5 mLs (25 mg) by Oral or Feeding Tube route 2 times daily   Refills:  0        nystatin [id number] UNIT/ML suspension   Commonly known as:  MYCOSTATIN   Used for:  Oral thrush        Dose:  621711 Units   Swish and swallow 5 mLs (500,000 Units) in mouth 4 times daily   Quantity:  280 mL   Refills:  0        pantoprazole Susp suspension   Commonly known as:  PROTONIX   Used for:  Gastroesophageal reflux disease without esophagitis        Dose:  40 mg   20 mLs (40 mg) by Per Feeding Tube route daily   Refills:  0        ramipril 10 MG capsule   Commonly known as:  ALTACE   Used for:  Essential hypertension        Dose:  10 mg   1 capsule (10 mg) by Oral or Feeding Tube route daily   Quantity:  30 capsule   Refills:  0        * Notice:  This list has 2 medication(s) that are the same as other medications prescribed for you. Read the directions carefully, and ask your doctor or other care provider to review them with you.      CONTINUE these medications which may have CHANGED, or have new prescriptions. If we are uncertain of the size of tablets/capsules you have at home, strength may be listed as something that might have changed.        Dose / Directions Comments    levothyroxine 50 MCG tablet   Commonly known as:  SYNTHROID/LEVOTHROID   This may have changed:    - medication strength  - how to take this  - when to take this    Used for:  Hypothyroidism, unspecified type        Dose:  50 mcg   1 tablet (50 mcg) by Oral or Feeding Tube route every morning (before breakfast)   Quantity:  30 tablet   Refills:  0          CONTINUE these medications which have NOT CHANGED        Dose / Directions Comments    VITAMIN D (CHOLECALCIFEROL) PO        Dose:  41306 Units   Take 50,000 Units by mouth once a week Friday   Refills:  0          STOP taking     LISINOPRIL PO                   After Care     Activity - Up with nursing assistance           Additional Discharge Instructions       Passive range of motion exercises/therapy  Frequent repositioning to prevent decubitus ulcer.  Monitor for signs of aspiration.  If neurological/cognitive function improves, and able to follow command and able to participate in therapy, then therapy order per C MD.       Advance Diet as Tolerated       Diet upon discharge: Adult Formula Drip Feeding: Continuous Isosource 1.5; Nasoduodenal tube; Goal Rate: 35; mL/hr; Medication - Tube Feeding Flush Frequency: At least 15-30 mL water before and after medication administration and with tube clogging  - NPO strict for oral intake       Daily weights       Call Provider for weight gain of more than 2 pounds per day or 5 pounds per week.       Discharge Instructions       If questions or problems arise regarding tube function (e.g. leaking, dislodges, etc.) Contact Interventional Radiology department 24 hours a day.    For procedures that were done at [facility]:    8 AM - 4 PM Monday through Friday Contact the Nurse Line [phone]  For afterhours and weekends [phone]    Ask for the Interventional Radiologist-on call.     For procedures that were done at [facility]:  8 AM - 4 PM Monday through Friday Contact   [phone]  For afterhours and weekends: [phone]   Ask for the Interventional Radiologist on call.       IF DIRECTED by the RADIOLOGIST, related to specific problems with the tube  functioning,  go to the Emergency Department.       Ramsay catheter       To straight gravity drainage. Change catheter every 2 weeks and PRN for leaking or decreased uring output with signs of bladder distention. DO NOT change catheter without a specific MD order IF diagnosis of  neurogenic bladder, or other urological conditions       General info for SNF       Length of Stay Estimate: Long Term Care  Condition at Discharge: Stable  Level of care:board and care  Rehabilitation Potential: Poor  Admission H&P remains valid and up-to-date: Yes  Recent Chemotherapy: N/A  Use Nursing Home Standing Orders: Yes       Intake and output       daily       Mantoux instructions       Give two-step Mantoux (PPD) Per Facility Policy Yes       Seizure precautions                   Further instructions from your care team       Isosource 1.5 (or equivalent) @ goal of 35 mL/hr continuous.  Nutrisource Fiber, 1 packet BID  H2O flushes of 200 cc every 6 hours    Your risk factors for stroke or TIA (transient ischemic attack):    Your Risk Factors Your Results Normal Ranges   High blood pressure BP Readings from Last 1 Encounters:   09/23/17 91/52    Less than 120/80   Cholesterol              Total No results found for: CHOL   Less than 150    Triglycerides   No results found for: TRIG Less than 150   LDL No results found for: LDL    Less than 70   HDL No results found for: HDL         Greater than 40 (men)  Greater than 50 (women)   Diabetes   Recent Labs  Lab 09/23/17  0826   GLC 96    Fasting blood glucose    Smoking/tobacco use  Quit smoking and tobacco   Overweight  Lose 1-2 pounds a week   Lack of exercise  30 minutes moderate activity each day   Other risk factors include carotid (neck) artery disease, atrial fibrillation and stress. You may be on new medicine to treat high blood pressure, cholesterol, diabetes or atrial fibrillation.    Understanding Stroke Booklet given to patient. Please refer to booklet for further  information.    Stroke warning signs and symptoms - CALL 911 right away for:  - Sudden numbness or weakness in the face, arm or leg (often on one side of the body).  - Sudden confusion or trouble understanding what is going on.  - Sudden blurred or decreased vision in one or both eyes.  - Sudden trouble speaking, loss of balance, dizziness or problems with coordination.  - Sudden, severe headache for no reason.  - Fainting or seizures.  - Symptoms may go away then come back suddenly.          Radiology & Cardiology Orders     CT Head w/o contrast*       Administration of IV contrast (contrast agent, dose, and amount) will be tailored to this examination per the appropriate written protocol listed in the Protocol E-Book, or by the supervising imaging provider.              Referrals     Nutrition Services Adult IP Consult       Reason:  Pt with tube feeds       Occupational Therapy Adult Consult       Evaluate and treat as clinically indicated.    Reason:  CVA, deconditioning and treat as able       Physical Therapy Adult Consult       Evaluate and treat as clinically indicated.    Reason:  CVA, deconditioning and treat as able       Speech Language Path Adult Consult       Evaluate and treat as clinically indicated.    Reason:  CVA, deconditioning and treat as able             Follow-Up Appointment Instructions     Follow Up and recommended labs and tests       Follow up with detention physician.  The following labs/tests are recommended: CBC and BMP with in a week.       Follow-up and recommended labs and tests        [facility] 4-6 weeks with head CT prior.  Please call the clinic at [phone] to schedule your appointment with Vin Echeverria PA-C or Nga Fung PA-C.             Statement of Approval     Ordered          09/27/17 0903  I have reviewed and agree with all the recommendations and orders detailed in this document.  EFFECTIVE NOW     Approved and electronically  "signed by:  Tamir Manrique MD                                                 INTERAGENCY TRANSFER FORM - NURSING   9/6/2017                       [place] [address] [facility]: [phone]            Attending Provider: Humza Townsend MD     Allergies:  [place] Flavor, Sulfa Drugs, Amoxicillin    Infection:  None   Service:  HOSPITALIST    Ht:  1.499 m (4' 11\")   Wt:  41.2 kg (90 lb 13.3 oz)   Admission Wt:  40.5 kg (89 lb 4.6 oz)    BMI:  18.35 kg/m 2   BSA:  1.31 m 2            Advance Directives        Does patient have a scanned Advance Directive/ACP document in EPIC?           No        Immunizations     None      ASSESSMENT     Discharge Profile Flowsheet     EXPECTED DISCHARGE     GI Signs/Symptoms  fecal incontinence 09/24/17 0921    Expected Discharge Date  09/27/17 09/25/17 0841   Passing flatus  yes 09/26/17 2137    DISCHARGE NEEDS ASSESSMENT     COMMUNICATION ASSESSMENT      Concerns To Be Addressed  discharge planning concerns;utilization management concerns 09/21/17 1204   Patient's communication style  spoken language (English or Bilingual) 09/06/17 1713    Patient/family verbalizes understanding of discharge plan recommendations?  Yes 09/21/17 1204   SKIN      Medical Team notified of plan?  yes 09/21/17 1204   Skin WDL  ex 09/26/17 2137    Readmission Within The Last 30 Days  no previous admission in last 30 days 09/12/17 1048   Inspection of bony prominences  Full 09/26/17 2137    Anticipated Changes Related to Illness  inability to care for self 09/06/17 1713   Skin Color/Characteristics  bruised (ecchymotic);redness blanchable 09/26/17 2137    Equipment Currently Used at Home  -- (walking stick) 09/10/17 0940   Skin Integrity  excoriation 09/26/17 2137    Transportation Available  family or friend will provide 09/10/17 0940   Full except areas not inspected   -- (wilver area, coccyx, buttocks) 09/11/17 1855    # of Referrals Placed by [facility]  Palliative Care 09/14/17 1533   " "Skin Temperature  warm 09/26/17 2137    ASSESSMENT OF FUNCTIONAL STATUS     Skin Moisture  dry 09/26/17 2137    Assesssment of Functional Status  Not at baseline with ADL Functioning;Not at baseline with mobility;Not at  functional baseline;Has complex medical needs, requires placement in a facility 09/14/17 1524   Inspection under devices  Full 09/26/17 0459    GASTROINTESTINAL (ADULT,PEDIATRIC,OB)     Skin Elasticity  slow return to original state 09/26/17 2137    GI WDL  WDL 09/26/17 2137   SAFETY      Abdominal Appearance  rounded 09/26/17 2137   Safety WDL  WDL 09/27/17 0445    All Quadrants Bowel Sounds  audible and active in all quadrants 09/26/17 2137   Safety Equipment  suction equipment 09/24/17 0121    Last Bowel Movement  09/23/17 09/23/17 1851   All Alarms  alarm(s) activated and audible 09/27/17 0445                 Assessment WDL (Within Defined Limits) Definitions           Safety WDL     Effective: 09/28/15    Row Information: <b>WDL Definition:</b> Bed in low position, wheels locked; call light in reach; upper side rails up x 2; ID band on<br> <font color=\"gray\"><i>Item=AS safety wdl>>List=AS safety wdl>>Version=F14</i></font>      Skin WDL     Effective: 09/28/15    Row Information: <b>WDL Definition:</b> Warm; dry; intact; elastic; without discoloration; pressure points without redness<br> <font color=\"gray\"><i>Item=AS skin wdl>>List=AS skin wdl>>Version=F14</i></font>      Vitals     Vital Signs Flowsheet     COMMENTS     Pain Rating: FLACC (rest) - Cry  0 09/25/17 1626    Comments  iv labatolol 09/08/17 0949   Pain Rating: FLACC (rest) - Consolability  0 09/25/17 1626    VITAL SIGNS     Score: FLACC (rest)  0 09/25/17 1626    Temp  98  F (36.7  C) 09/27/17 0825   PAIN ASSESSMENT/NONVERBAL ICU (ADULT)      Temp src  Axillary 09/27/17 0825   Pain Management Interventions  Warm application 09/18/17 0910    Resp  18 09/27/17 0825   ANALGESIA SIDE EFFECTS MONITORING      Pulse  73 09/27/17 0426   " "Side Effects Monitoring: Respiratory Quality  R 09/25/17 2149    Heart Rate  85 09/27/17 0825   Side Effects Monitoring: Respiratory Depth  S 09/25/17 2149    Pulse/Heart Rate Source  Monitor 09/27/17 0825   Side Effects Monitoring: Sedation Level  S 09/25/17 2149    BP  119/65 09/27/17 0825   HEIGHT AND WEIGHT      BP Location  Right arm 09/27/17 0825   Height  1.499 m (4' 11\") 09/06/17 1655    OXYGEN THERAPY     Height Method  Actual 09/06/17 1314    SpO2  99 % 09/27/17 0825   Weight  41.2 kg (90 lb 13.3 oz) 09/26/17 0704    O2 Device  None (Room air) 09/27/17 0426   Weight Method  Bed scale 09/26/17 0704    Oxygen Delivery  4 LPM 09/25/17 1035   BSA (Calculated - sq m)  1.32 09/06/17 1655    PAIN/COMFORT     BMI (Calculated)  18.74 09/06/17 1655    Patient Currently in Pain  MONICA 09/26/17 0518   POSITIONING      Preferred Pain Scale  FLACC (Face Legs Arms Cry Consolability Scale) 09/24/17 0426   Body Position  side-lying, right 09/27/17 0426    0-10 Pain Scale  0 09/25/17 1833   Head of Bed (HOB)  HOB at 30 degrees 09/27/17 0426    rFLACC Pain Rating: Face  0-->no particular expression or smile 09/27/17 0438   Positioning/Transfer Devices  pillows;in use 09/27/17 0426    rFLACC Pain Rating - Legs  0-->normal position or relaxed 09/27/17 0438   DAILY CARE      rFLACC Pain Rating: Activity  0-->lying quietly, normal position, moves easily 09/27/17 0438   Activity Management  bedrest 09/27/17 0438    rFLACC Pain Rating - Cry  0-->no cry (awake or asleep) 09/27/17 0438   Activity Assistance Provided  assistance, 2 people 09/27/17 0438    rFLACC Pain Rating - Consolability  0-->content, relaxed 09/27/17 0438   Assistive Device Utilized  mechanical lift 09/25/17 2159    rFLACC Score: Activity  0 09/27/17 0438   Additional Documentation  Activity Device Assistance (Row) 09/14/17 1832    PAINAD Breathing  0-->normal 09/23/17 0520   ECG      PAINAD Negative Vocalization  0-->none 09/23/17 0520   ECG Rhythm  Normal sinus " rhythm 09/11/17 1325    PAINAD Facial Expression  0-->smiling or inexpressive 09/23/17 0520   Ectopy  None 09/08/17 0038    PAINAD Body Language  0-->relaxed 09/23/17 0520   Lead Monitored  Lead II;V 1 09/08/17 1830    PAINAD Consolability  0-->no need to console 09/23/17 0520   RESPIRATORY MONITORING      PAINAD Score  0 09/23/17 0520   Respiratory Monitoring (EtCO2)  0 mmHg 09/06/17 1455    Pain Location  Hip 09/18/17 0910   REUBEN COMA SCALE      Pain Orientation  Right 09/18/17 0910   Best Eye Response  3-->(E3) to speech 09/23/17 0253    Nonverbal Indicators Of Pain  rubbing 09/18/17 0910   Best Motor Response  4-->(M4) withdraws from pain 09/23/17 0253    Pain Management Interventions  analgesia administered;pillow support provided 09/16/17 0345   Best Verbal Response  1-->(V1) none 09/23/17 0253    Pain Intervention(s)  Repositioned;Medication (See eMAR) 09/19/17 0947   [place] Coma Scale Score  8 09/23/17 0253    PAIN ASSESSMENT/FLACC     POINT OF CARE TESTING      Pain Rating: FLACC (rest) - Face  0 09/25/17 1626   Puncture Site  fingertip 09/06/17 1655    Pain Rating: FLACC (rest) - Legs  0 09/25/17 1626   Bedside Glucose (mg/dl )   129 mg/dl 09/06/17 1655    Pain Rating: FLACC (rest) - Activity  0 09/25/17 1626                 Patient Lines/Drains/Airways Status    Active LINES/DRAINS/AIRWAYS     Name: Placement date: Placement time: Site: Days: Last dressing change:    Gastrostomy/Enterostomy Gastrostomy LUQ 1 14 fr JUVENTINO Gastrostomy tube, 14Fr, Lot# [id number], exp. 5/1/2020 09/11/17   1326   LUQ   15     Urethral Catheter 09/26/17   0336      1     Wound 09/21/17 Left Buttocks Suspected pressure ulcer skin peeling, wound edges jagged 09/21/17   1833   Buttocks   5             Patient Lines/Drains/Airways Status    Active PICC/CVC     None            Intake/Output Detail Report     Date Intake         Output   Net    Shift P.O. I.V. NG/GT IV Piggyback Enteral Total Urine Emesis/NG output Total        Noc 09/25/17 2300 - 09/26/17 0659 -- -- 200 -- 385 585 850 -- 850 -265    Day 09/26/17 0700 - 09/26/17 1459 -- -- 250 -- -- 250 800 -- 800 -550    Jenni 09/26/17 1500 - 09/26/17 2259 -- -- 460 -- -- 460 450 -- 450 10    Noc 09/26/17 2300 - 09/27/17 0659 -- -- 200 -- -- 200 800 -- 800 -600    Day 09/27/17 0700 - 09/27/17 1459 -- -- -- -- -- -- -- -- -- 0      Last Void/BM       Most Recent Value    Urine Occurrence 1 at 09/26/2017 0300    Stool Occurrence 1 at 09/25/2017 1047      Case Management/Discharge Planning     Case Management/Discharge Planning Flowsheet     REFERRAL INFORMATION     Reaction To Health Status  unable to assess;other (see comments) (minimally responsive) 09/12/17 1048    Did the Initial Social Work Assessment result in a Social Work Case?  Yes 09/13/17 1224   Understanding Of Condition And Treatment  unable to assess 09/12/17 1048    Admission Type  inpatient 09/13/17 1224   COPING/STRESS CAREGIVER      Arrived From  home or self-care 09/13/17 1224   Major Change/Loss/Stressor  family/significant other illness;other (see comments) (dtr perceives pt received inadequate care) 09/14/17 1524    Referral Source  case finding;high risk screening;interdisciplinary rounds;physician 09/21/17 1204   Primary Caregiver Strengths  expressive of emotions 09/14/17 1524    New Steerage to  Clinics?  No 09/12/17 1048   Sources Of Support  other family members 09/14/17 1524    # of Referrals Placed by CTS  Palliative Care 09/14/17 1533   Reaction To Health Status  anger;anxious;hopeful 09/14/17 1524    Reason For Consult  care coordination/care conference;discharge planning 09/21/17 1204   Understanding Of Condition And Treatment  partial understanding of medical condition;partial understanding of treatment 09/14/17 1524    Record Reviewed  medical record 09/21/17 1204   EXPECTED DISCHARGE      CTS Assigned to Case  Shey Thurman 09/21/17 1204   Expected Discharge Date  09/27/17 09/25/17 0841    Primary Care  Clinic Name  [facility] 09/12/17 1048   ASSESSMENT/CONCERNS TO BE ADDRESSED      Primary Care MD Name  Keyla Ceron 09/12/17 1048   Concerns To Be Addressed  discharge planning concerns;utilization management concerns 09/21/17 1204    LIVING ENVIRONMENT     DISCHARGE PLANNING      Lives With  child(kaila), adult 09/13/17 1224   Patient/family verbalizes understanding of discharge plan recommendations?  Yes 09/21/17 1204    Living Arrangements  house 09/13/17 1224   Medical Team notified of plan?  yes 09/21/17 1204    Provides Primary Care For  no one 09/13/17 1224   Readmission Within The Last 30 Days  no previous admission in last 30 days 09/12/17 1048    Primary Care Provided By  child(kaila) (specify) 09/13/17 1224   Anticipated Changes Related to Illness  inability to care for self 09/06/17 1713    Quality Of Family Relationships  supportive;helpful;involved 09/13/17 1224   Transportation Available  family or friend will provide 09/10/17 0940    Able to Return to Prior Living Arrangements  no 09/13/17 1224   FINAL RESOURCES      ASSESSMENT OF FAMILY/SOCIAL SUPPORT     Equipment Currently Used at Home  -- (walking stick) 09/10/17 0940    Marital Status   09/13/17 1224   ABUSE RISK SCREEN      Who is your support system?  Children 09/13/17 1224   QUESTION TO PATIENT:  Has a member of your family or a partner(now or in the past) intimidated, hurt, manipulated, or controlled you in any way?  no 09/06/17 1319    Description of Support System  Supportive;Involved 09/13/17 1224   QUESTION TO PATIENT: Do you feel safe going back to the place where you are living?  yes 09/06/17 1319    Support Assessment  Adequate family and caregiver support;Adequate social supports 09/12/17 1048   OBSERVATION: Is there reason to believe there has been maltreatment of a vulnerable adult (ie. Physical/Sexual/Emotional abuse, self neglect, lack of adequate food, shelter, medical care, or financial exploitation)?  no 09/06/17  1319    Quality of Family Relationships  supportive;involved 09/13/17 1224   (R) MENTAL HEALTH SUICIDE RISK      ASSESSMENT OF FUNCTIONAL STATUS     Are you depressed or being treated for depression?  No 09/06/17 1718    Assesssment of Functional Status  Not at baseline with ADL Functioning;Not at baseline with mobility;Not at  functional baseline;Has complex medical needs, requires placement in a facility 09/14/17 1524   HOMICIDE RISK      COPING/STRESS     Feels Like Hurting Others  no 09/06/17 1319    Major Change/Loss/Stressor  none 09/06/17 1729                       [place] [address] [facility]: [phone]            Medication Administration Report for Sury Barnett as of 09/27/17 0910   Legend:    Given Hold Not Given Due Canceled Entry Other Actions    Time Time (Time) Time  Time-Action       Inactive    Active    Linked        Medications 09/21/17 09/22/17 09/23/17 09/24/17 09/25/17 09/26/17 09/27/17    acetaminophen (TYLENOL) solution 650 mg  Dose: 650 mg Freq: EVERY 4 HOURS PRN Route: PER FEEDING   PRN Reason: mild pain  Start: 09/11/17 0749   Admin Instructions: Alternate ibuprofen (if ordered) with acetaminophen.  Maximum acetaminophen dose from all sources= 75 mg/kg/day not to exceed 4 grams/day.     0934 (650 mg)-Given                 amLODIPine (NORVASC) tablet 5 mg  Dose: 5 mg Freq: DAILY Route: ORAL OR FEED  Start: 09/24/17 0900   Admin Instructions: Hold for SBP < 100        0835 (5 mg)-Given        0905-Hold [C]        0914 (5 mg)-Given        [ ] 0900           amoxicillin (AMOXIL) tablet 875 mg  Dose: 875 mg Freq: EVERY 12 HOURS SCHEDULED Route: PER FEEDING   Indications of Use: URINARY TRACT INFECTION  Start: 09/24/17 2000       2121 (875 mg)-Given        0910 (875 mg)-Given [C]       2055 (875 mg)-Given        0914 (875 mg)-Given       1941 (875 mg)-Given        [ ] 0800       [ ] 2000           fiber modular (NUTRISOURCE FIBER) packet 1 packet  Dose: 1 packet Freq: 2 TIMES  "DAILY Route: PER FEEDING   Start: 09/13/17 1200   Admin Instructions: Mix each packet with 60 mL water before administering. SUPPLIED BY NUTRITION DEPARTMENT.     0937 (1 packet)-Given       2028 (1 packet)-Given        0800 (1 packet)-Given       2032 (1 packet)-Given        1128 (1 packet)-Given       1948 (1 packet)-Given               0835 (1 packet)-Given       2120 (1 packet)-Given        0925 (1 packet)-Given       2056 (1 packet)-Given        1102 (1 packet)-Given       2141 (1 packet)-Given        [ ] 0900       [ ] 2100           hydrALAZINE (APRESOLINE) injection 10 mg  Dose: 10 mg Freq: EVERY 4 HOURS PRN Route: IV  PRN Reason: high blood pressure  PRN Comment: give for SBP > 140  Start: 09/10/17 1359              hypromellose-dextran (ARTIFICAL TEARS) ophthalmic solution 2-3 drop  Dose: 2-3 drop Freq: EVERY 1 HOUR PRN Route: OP  PRN Reason: dry eyes  Start: 09/08/17 1536   Admin Instructions: To affected eye(s)               labetalol (NORMODYNE/TRANDATE) injection 10-20 mg  Dose: 10-20 mg Freq: EVERY 4 HOURS PRN Route: IV  PRN Reason: high blood pressure  Start: 09/07/17 0758   Admin Instructions: For SBP >140               levothyroxine (SYNTHROID/LEVOTHROID) tablet 50 mcg  Dose: 50 mcg Freq: EVERY MORNING BEFORE BREAKFAST Route: ORAL OR FEED  Start: 09/13/17 0730    0649 (50 mcg)-Given        0542 (50 mcg)-Given               (0743)-Not Given [C]        0634 (50 mcg)-Given        0459 (50 mcg)-Given               0631 (50 mcg)-Given        0639 (50 mcg)-Given           lidocaine (LMX4) cream  Freq: EVERY 1 HOUR PRN Route: Top  PRN Reason: pain  PRN Comment: with VAD insertion or accessing implanted port.  Start: 09/11/17 0714   Admin Instructions: Do NOT give if patient has a history of allergy to any local anesthetic or any \"mariel\" product.   Apply 30 minutes prior to VAD insertion or port access. MAX Dose: 2.5 g (  of 5 g tube)               lidocaine 1 % 1 mL  Dose: 1 mL Freq: EVERY 1 HOUR PRN " "Route: OTHER  PRN Comment: mild pain with VAD insertion or accessing implanted port  Start: 09/06/17 1507   Admin Instructions: Do NOT give if patient has a history of allergy to any local anesthetic or any \"mariel\" product. MAX dose 1 mL subcutaneous OR intradermal in divided doses.               melatonin tablet 1 mg  Dose: 1 mg Freq: AT BEDTIME PRN Route: PER G TUBE  PRN Reason: sleep  Start: 09/19/17 0805   Admin Instructions: Do not give unless at least 6 hours of uninterrupted sleep is expected.               metoprolol (LOPRESSOR) suspension 25 mg  Dose: 25 mg Freq: 2 TIMES DAILY Route: ORAL OR FEED  Start: 09/18/17 0900   Admin Instructions: Shake well.     0934 (25 mg)-Given       2028 (25 mg)-Given        0800 (25 mg)-Given       2032 (25 mg)-Given        (1127)-Not Given       (2027)-Not Given [C]        0836 (25 mg)-Given       2123 (25 mg)-Given        0910 (25 mg)-Given       2055 (25 mg)-Given        1103 (25 mg)-Given       2141 (25 mg)-Given        [ ] 0900       [ ] 2100           miconazole (MICATIN) 2 % cream  Freq: EVERY 1 HOUR PRN Route: Top  PRN Reason: other  PRN Comment: topical candidiasis  Start: 09/08/17 1535   Admin Instructions: Apply to affected area.               naloxone (NARCAN) injection 0.1-0.4 mg  Dose: 0.1-0.4 mg Freq: EVERY 2 MIN PRN Route: IV  PRN Reason: opioid reversal  Start: 09/06/17 1507   Admin Instructions: For respiratory rate LESS than or EQUAL to 8.  Partial reversal dose:  0.1 mg titrated q 2 minutes for Analgesia Side Effects Monitoring Sedation Level of 3 (frequently drowsy, arousable, drifts to sleep during conversation).Full reversal dose:  0.4 mg bolus for Analgesia Side Effects Monitoring Sedation Level of 4 (somnolent, minimal or no response to stimulation).               nystatin (MYCOSTATIN) suspension 500,000 Units  Dose: 500,000 Units Freq: 4 TIMES DAILY Route: SWISH & SWAL  Start: 09/23/17 2200   End: 10/03/17 2159      2156 (500,000 Units)-Given        " 0835 (500,000 Units)-Given       1247 (500,000 Units)-Given       1734 (500,000 Units)-Given       (2120)-Not Given        0927 (500,000 Units)-Given       1332 (500,000 Units)-Given       1802 (500,000 Units)-Given       2056 (500,000 Units)-Given               0914 (500,000 Units)-Given       1518 (500,000 Units)-Given       1941 (500,000 Units)-Given       2355 (500,000 Units)-Given        [ ] 0900       [ ] 1300       [ ] 1800       [ ] 2200           ondansetron (ZOFRAN-ODT) ODT tab 4 mg  Dose: 4 mg Freq: EVERY 6 HOURS PRN Route: PO  PRN Reasons: nausea,vomiting  Start: 09/06/17 1507   Admin Instructions: This is Step 1 of nausea and vomiting management.  If nausea not resolved in 15 minutes, go to Step 2 prochlorperazine (COMPAZINE). Do not push through foil backing. Peel back foil and gently remove. Place on tongue immediately. Administration with liquid unnecessary              Or  ondansetron (ZOFRAN) injection 4 mg  Dose: 4 mg Freq: EVERY 6 HOURS PRN Route: IV  PRN Reasons: nausea,vomiting  Start: 09/06/17 1507   Admin Instructions: This is Step 1 of nausea and vomiting management.  If nausea not resolved in 15 minutes, go to Step 2 prochlorperazine (COMPAZINE).  Irritant.               pantoprazole (PROTONIX) suspension 40 mg  Dose: 40 mg Freq: DAILY Route: PER FEEDING   Start: 09/12/17 0900    0934 (40 mg)-Given        0800 (40 mg)-Given        (1553)-Not Given        0836 (40 mg)-Given        0910 (40 mg)-Given        0914 (40 mg)-Given        [ ] 0900           potassium chloride (KLOR-CON) Packet 20-40 mEq  Dose: 20-40 mEq Freq: EVERY 2 HOURS PRN Route: ORAL OR FEED  PRN Reason: potassium supplementation  Start: 09/06/17 1507   Admin Instructions: Use if unable to tolerate tablets.  If Serum K+ 3.0-3.3, dose = 60 mEq po total dose (40 mEq x1 followed in 2 hours by 20 mEq x1). Recheck K+ level 4 hours after dose and the next AM.  If Serum K+ 2.5-2.9, dose = 80 mEq po total dose (40 mEq Q2H x2). Recheck  K+ level 4 hours after dose and the next AM.  If Serum K+ less than 2.5, See IV order.  Dissolve packet contents in 4-8 ounces of cold water or juice.               potassium chloride 10 mEq in 100 mL intermittent infusion  Dose: 10 mEq Freq: EVERY 1 HOUR PRN Route: IV  PRN Reason: potassium supplementation  Start: 09/06/17 1507   Admin Instructions: Infuse via PERIPHERAL LINE or CENTRAL LINE. Use for central line replacement if patient weight less than 65 kg, if patient is on TPN with high potassium content or if unit does not stock 20 mEq bags.   If Serum K+ 3.0-3.3, dose = 10 mEq/hr x4 doses (40 mEq IV total dose). Recheck K+ level 2 hours after dose and the next AM.   If Serum K+ less than 3.0, dose = 10 mEq/hr x6 doses (60 mEq IV total dose). Recheck K+ level 2 hours after dose and the next AM.               potassium chloride 10 mEq in 100 mL intermittent infusion with 10 mg lidocaine  Dose: 10 mEq Freq: EVERY 1 HOUR PRN Route: IV  PRN Reason: potassium supplementation  Last Dose: 10 mEq (09/23/17 1639)  Start: 09/06/17 1507   Admin Instructions: Infuse via PERIPHERAL LINE. Use potassium with lidocaine for pain with peripheral administration.  If Serum K+ 3.0-3.3, dose = 10 mEq/hr x4 doses (40 mEq IV total dose). Recheck K+ level 2 hours after dose and the next AM.  If Serum K+ less than 3.0, dose = 10 mEq/hr x6 doses (60 mEq IV total dose). Recheck K+ level 2 hours after dose and the next AM.       1126 (10 mEq)-New Bag       1252 (10 mEq)-New Bag       1351 (10 mEq)-New Bag       1639 (10 mEq)-New Bag               potassium chloride 20 mEq in 50 mL intermittent infusion  Dose: 20 mEq Freq: EVERY 1 HOUR PRN Route: IV  PRN Reason: potassium supplementation  Start: 09/06/17 1507   Admin Instructions: Infuse via CENTRAL LINE Only. May need EKG if less than 65 kg or on TPN - Max rate is 0.3 mEq/kg/hr for patients not on EKG monitoring.   If Serum K+ 3.0-3.3, dose = 20 mEq/hr x2 doses (40 mEq IV total dose).  Recheck K+ level 2 hours after dose and the next AM.  If Serum K+ less than 3.0, dose = 20 mEq/hr x3 doses (60 mEq IV total dose). Recheck K+ level 2 hours after dose and the next AM.               potassium chloride SA (K-DUR/KLOR-CON M) CR tablet 20-40 mEq  Dose: 20-40 mEq Freq: EVERY 2 HOURS PRN Route: PO  PRN Reason: potassium supplementation  Start: 09/06/17 1507   Admin Instructions: Use if able to take PO.   If Serum K+ 3.0-3.3, dose = 60 mEq po total dose (40 mEq x1 followed in 2 hours by 20 mEq x1). Recheck K+ level 4 hours after dose and the next AM.  If Serum K+ 2.5-2.9, dose = 80 mEq po total dose (40 mEq Q2H x2). Recheck K+ level 4 hours after dose and the next AM.  If Serum K+ less than 2.5, See IV order.  DO NOT CRUSH               potassium phosphate 15 mmol in D5W 250 mL intermittent infusion  Dose: 15 mmol Freq: DAILY PRN Route: IV  PRN Reason: phosphorous supplementation  Last Dose: 0 mmol (09/12/17 1317)  Start: 09/10/17 1227   Admin Instructions: For serum phosphorus level 2-2.4  Do not infuse Phosphorus in the same line as TPN.   Give 15 mmol and recheck phosphorus level next AM.               potassium phosphate 20 mmol in D5W 250 mL intermittent infusion  Dose: 20 mmol Freq: EVERY 6 HOURS PRN Route: IV  PRN Reason: phosphorous supplementation  Start: 09/10/17 1227   Admin Instructions: For serum phosphorus level 1.1-1.9  For CENTRAL Line ONLY  Do not infuse Phosphorus in the same line as TPN.   Give 20 mmol and recheck phosphorus level 2 hours after last dose and next AM.               potassium phosphate 20 mmol in D5W 500 mL intermittent infusion  Dose: 20 mmol Freq: EVERY 6 HOURS PRN Route: IV  PRN Reason: phosphorous supplementation  Last Dose: 20 mmol (09/13/17 0937)  Start: 09/10/17 1227   Admin Instructions: For serum phosphorus level 1.1-1.9  For Peripheral Line  Do not infuse Phosphorus in the same line as TPN.   Give 20 mmol and recheck phosphorus level 2 hours after last dose and  next AM.               potassium phosphate 25 mmol in D5W 500 mL intermittent infusion  Dose: 25 mmol Freq: EVERY 8 HOURS PRN Route: IV  PRN Reason: phosphorous supplementation  Start: 09/10/17 1227   Admin Instructions: For serum phosphorus level less than 1.1  Do not infuse Phosphorus in the same line as TPN.   Give 25 mmol and recheck phosphorus level 2 hours after last dose and next AM.               ramipril (ALTACE) capsule 10 mg  Dose: 10 mg Freq: DAILY Route: ORAL OR FEED  Start: 09/13/17 2000 2027 (10 mg)-Given        2032 (10 mg)-Given        (1949)-Not Given        2121 (10 mg)-Given        2055 (10 mg)-Given        1941 (10 mg)-Given        [ ] 2000           sodium chloride (PF) 0.9% PF flush 3 mL  Dose: 3 mL Freq: EVERY 8 HOURS Route: IK  Start: 09/11/17 0715   Admin Instructions: And Q1H PRN, to lock peripheral IV dormant line.            0934 (3 mL)-Given       1850 (3 mL)-Given       2316 (3 mL)-Given        0524 (3 mL)-Given              1626 (3 mL)-Given       2031 (3 mL)-Given               (0743)-Not Given       (1542)-Not Given       2156 (3 mL)-Given               0334 (3 mL)-Given              1634 (3 mL)-Given       1733 (3 mL)-Given        0015 (3 mL)-Given              1414 (3 mL)-Given               0130 (3 mL)-Given [C]       [ ] 0930       1941 (3 mL)-Given        (0033)-Not Given       [ ] 0930       [ ] 1730          Discontinued Medications  Medications 09/21/17 09/22/17 09/23/17 09/24/17 09/25/17 09/26/17 09/27/17         Dose: 400 mg Freq: EVERY 12 HOURS Route: IV  Indications of Use: URINARY TRACT INFECTION  Last Dose: 400 mg (09/24/17 1634)  Start: 09/23/17 1600   End: 09/24/17 1834   Admin Instructions: Irritant.       1759 (400 mg)-New Bag        0334 (400 mg)-New Bag       1634 (400 mg)-New Bag       1834-Med Discontinued                   INTERAGENCY TRANSFER FORM - NOTES (H&P, Discharge Summary, Consults, Procedures, Therapies)   9/6/2017                       [facility]  [place] [address] [facility]: [phone]               History & Physicals      H&P signed by Humza Townsend MD at 9/16/2017  3:59 PM      Author:  Humza Townsend MD Service:  Hospitalist Author Type:  Physician    Filed:  9/16/2017  3:59 PM Date of Service:  9/6/2017  2:45 PM Creation Time:  9/6/2017  3:28 PM    Status:  Signed :  Humza Townsend MD (Physician)         PRIMARY CARE PROVIDER:  Keyla Ceron MD      CHIEF COMPLAINT:  Fall.      HISTORY OF PRESENT ILLNESS:  Ms. Sury Barnett is a 89-year-old female with history of hypertension and hypothyroidism who presents to the Emergency Room after she had a fall and was subsequently weak on the left side.  The history was predominantly obtained from the daughter who was at the bedside.  The patient apparently was in the mall with her daughter picking of her glasses.  She apparently went out of the mall in a wheelchair and had reached the parking lot.  The daughter went on to get her combination keys to open the door of her car.  In the process, the patient had attempted to get up from the wheelchair and in the process fell backwards and hit her head.  Per her daughter, the patient is usually very healthy and independent with ambulation except for long distances.  Given the distance from the glasses store to the parking lot, she had her in the wheelchair, but normally, she walks short distances without any problem.  Right before the fall, she had not complained of any new symptoms to her daughter.  There was no headache or vision change reported.  No lightheadedness or dizziness reported.  Otherwise, the patient has been in her usual state of health.  There is no recent history of chest pain, shortness of breath or palpitations.  No diarrhea or vomiting.  No dysuria, urinary urgency or frequency.  EMS was called, and when they arrived at the scene, they found that she was significantly weak on the left side without any movement.  She was  eventually brought to [facility] Emergency Room.      In the Emergency Room, the patient was evaluated by Dr. Tejada.  She was found to have a gaze preference on the right side and an occipital hematoma.  She was also found to be weak on the left upper and lower extremities.  A CT head was done which showed a new large intracerebral hemorrhage measuring 5.5 x 4.2 x 3.5 cm.  There was no resultant midline shift.  Dr. Tejada called Neurosurgery, who recommended hospitalist admission to the ICU.      PAST MEDICAL HISTORY:   1.  Hypertension.   2.  Hypothyroidism.      ALLERGIES:  Sulfa and penicillin.      SOCIAL AND PERSONAL HISTORY:  The patient lives with her daughter.  No tobacco, alcohol or recreational drug use.      FAMILY HISTORY:  Reviewed and is noncontributory.      HOME MEDICATIONS:   1.  Levothyroxine 50 mcg daily.   2.  Lisinopril 20 mg daily.   3.  Vitamin D 50,000 units once a week.      REVIEW OF SYSTEMS:  A complete review was done and was negative for anything else other than that mentioned in the HPI.      PHYSICAL EXAMINATION:   GENERAL:  Ms. Barnett is a 89-year-old female.  She is not in any obvious distress.  She does have a preferential gaze on the right side.   VITAL SIGNS:  Temperature 97.9, blood pressure 144/71, heart rate 116, respiratory rate 17, O2 sat 98% on room air.   HEENT:  She has a hematoma on the occipital area, otherwise no bruising or laceration on the face.  Pupils are 3 mm bilaterally.   NECK:  Supple, with good range of motion.   RESPIRATORY:  Good air entry bilaterally with normal effort of breathing.   CARDIOVASCULAR:  Tachycardic, no murmur.   GASTROINTESTINAL:  Abdomen is soft, nontender, nondistended, bowel sounds normoactive.   EXTREMITIES:  There is no edema.   SKIN:  Warm and dry.   NEUROLOGIC:  She is awake, answers simple questions appropriately.  She does have a left facial droop and the muscle strength is 0/5 on the left upper and lower extremities.       LABORATORY AND DIAGNOSTIC DATA:   1.  Sodium is 132.  Electrolytes are within normal limits.  CBC is unremarkable.  INR is 0.99.   2.  CT of the cervical spine shows degenerative changes, no acute fracture or traumatic malalignment.   3.  CT head as described above with large right frontal lobe intraparenchymal hemorrhage.      ASSESSMENT AND PLAN:  Ms. Barnett is a 89-year-old pretty healthy female with history of hypertension and hypothyroidism who presents with a fall and was found to have large right frontal lobe intracranial hemorrhage.   1.  Large right frontal intracranial hemorrhage.  The patient presents after a fall and was found on the CT head to have a large intraparenchymal hemorrhage.  More than likely, the patient sustained this before the fall.  Given the location of the hemorrhage and no other subdural or extradural bleed, that is the most likely mechanism.  More than likely, this is related to her hypertension.  Despite the size, there is no midline shift and the patient appears to be fairly lucid and responding appropriately.  She will be admitted to ICU.  Neurosurgery is about to see the patient right now, but my understanding is there is no indication for surgical intervention at this time.  She will be on a nicardipine drip with a blood pressure goal of less than 140.  I will also consult Neurocritical Care.  Will monitor her neurological status.  If anything changes, will get a repeat head CT, otherwise repeat head CT in the morning.   2.  Mild hyponatremia.  Her sodium today is 132.  I do not have much for comparison.  She had one in July that was 135.  Will defer management of hyponatremia in the context of intracranial hemorrhage to Neurocritical Care.  At this time, I will place her on normal saline at 100 mL per hour.   3.  Benign essential hypertension.  The patient does have a history of hypertension and is normally on lisinopril.  I will hold lisinopril as above.  She will be on  nicardipine drip.   4.  Hypothyroidism.  The patient is on levothyroxine 50 mcg daily.  That will be resumed once her swallowing is assessed.      ANTICIPATED LENGTH OF STAY:  More than 2 midnights.      CODE STATUS:  This was discussed with the daughter at the bedside.  She is going to remain a FULL CODE for now.         JACINTA FISHER MD             D: 2017 14:45   T: 2017 15:27   MT: TS      Name:     SURY BARNETT   MRN:      [id number]        Account:      [id number]   :      1925           Admitted:     [id number]      Document: [id number]       cc: Keyla Ceron MD[NB1.1]        Revision History        User Key Date/Time User Provider Type Action    > NB1.1 2017  3:59 PM Jacinta Fisher MD Physician Sign                  Discharge Summaries     No notes of this type exist for this encounter.         Consult Notes      Consults by Turner Cassidy MD at 2017 12:25 PM     Author:  Turner Cassidy MD Service:  Surgery Author Type:  Physician    Filed:  2017 12:25 PM Date of Service:  2017 12:25 PM Creation Time:  2017 12:14 PM    Status:  Signed :  Turner Cassidy MD (Physician)     Consult Orders:    1. Surgery General IP Consult: Patient to be seen: Routine - within 24 hours; abnormal abd CT with leukocytosis: ?blood in pelvis; Consultant may enter orders: Yes [[id number]] ordered by Eric Wong MD at 17 0127                Surgery Consultation     Sury Barnett MRN# [id number]   YOB: 1925 Age: 89 year old   Date of Admission: 2017     Reason for consult: I was asked by Dr. Wong to evaluate this patient for pneumoperitoneum and fluid in the pelvis.           Assessment and Plan:   Post procedural pneumoperitoneum and pelvic fluid.    Observation, resumption of enteric feeds, consider repeat imaging or surgical intervention if significant changes occur clinically.               Chief Complaint:   Due to elevation of white  blood cell count CT scan of the abdomen was obtained, pneumoperitoneum and retroperitoneal air as well as some pelvic fluid can be observed.    History is obtained from the electronic health record and patient's daughter         History of Present Illness:   This patient is a 89 year old female who presents with recently placed percutaneous gastrostomy tube, due to elevation of her white blood cell, CT scan of the abdomen was obtained and this showed retroperitoneal air, some pneumoperitoneum as well as some pelvic fluid.  No extravasation of contrast can be seen.            Past Medical History:[LL1.1]     Past Medical History:   Diagnosis Date     Hypertension      Thyroid disease[LL1.2]              Past Surgical History:[LL1.1]     Past Surgical History:   Procedure Laterality Date     COLONOSCOPY  7/15/2013    Procedure: COLONOSCOPY;  Colonoscopy ;  Surgeon: Maida Rehman MD;  Location:  GI     HEAD & NECK SURGERY      tonsil      TONSILLECTOMY[LL1.3]                  Social History:[LL1.1]     Social History   Substance Use Topics     Smoking status: Never Smoker     Smokeless tobacco: Not on file     Alcohol use No[LL1.3]             Family History:[LL1.1]     Family History   Problem Relation Age of Onset     Cancer - colorectal Father      Hypertension Father      Colon Cancer Father      Hypertension Mother      Hyperlipidemia Mother      Hypertension Sister      Hyperlipidemia Sister[LL1.3]              Immunizations:[LL1.1]   There is no immunization history for the selected administration types on file for this patient.[LL1.3]          Allergies:[LL1.1]     Allergies   Allergen Reactions     Alon Flavor Difficulty breathing     Sulfa Drugs Other (See Comments)     Really sick and high fever     Amoxicillin[LL1.3]              Medications:[LL1.1]       [START ON 9/24/2017] amLODIPine  5 mg Oral or Feeding Tube Daily     metoprolol  25 mg Oral or Feeding Tube BID     ramipril  10 mg Oral or  Feeding Tube Daily     fiber modular  1 packet Per Feeding Tube BID     levothyroxine  50 mcg Oral or Feeding Tube QAM AC     sodium chloride (PF)  3 mL Intracatheter Q8H     pantoprazole  40 mg Per Feeding Tube Daily[LL1.4]             Review of Systems:   Review of systems not obtained due to patient factors - condition           Physical Exam:   Vitals were reviewed  Abdomen:   soft, non-distended, non-tender, involuntary guarding absent, no masses palpated           Data:   All laboratory and imaging data in the past 24 hours reviewed[LL1.1]         Revision History        User Key Date/Time User Provider Type Action    > LL1.4 9/23/2017 12:25 PM Turner Cassidy MD Physician Sign     LL1.3 9/23/2017 12:22 PM Turner Cassidy MD Physician      LL1.2 9/23/2017 12:21 PM Turner Cassidy MD Physician      LL1.1 9/23/2017 12:14 PM Turner Cassidy MD Physician             Consults signed by Antoni Mckenna MD at 9/22/2017  3:30 PM      Author:  Antoni Mckenna MD Service:  Infectious Disease Author Type:  Physician    Filed:  9/22/2017  3:30 PM Date of Service:  9/22/2017 12:50 PM Creation Time:  9/22/2017  1:22 PM    Status:  Signed :  Antoni Mckenna MD (Physician)         INFECTIOUS DISEASE CONSULTATION      ATTENDING PHYSICIAN:  Michelle Manzano MD      REASON FOR CONSULTATION:  Asked by Dr. Manzano to assess leukocytosis.      IMPRESSION:   1.  Sury Barnett is a 89-year-old female with history of hypertension.   2.  Admitted on 09/06 with change in mental status and found to have large right frontal intracranial hemorrhage.  She has residual left hemiplegia.   3.  Persistent leukocytosis with white blood count around 20,000.  I suspect that this most likely is stress induced.  There are no signs of infection on exam and the patient has had a negative evaluation for infection including negative blood cultures, small numbers of Enterococcus on urine culture which probably represent  colonization given benign urinalysis, negative Clostridium difficile study, absence of pneumonia on chest x-ray.  The patient is not receiving steroid medications.  Procalcitonin is low at 0.08.      RECOMMENDATIONS:   1.  No indication for antibiotic therapy at this time.   2.  Only additional testing to consider at this time would be abdominal CT scan, though no suggestion of intra-abdominal infection by examination.      HISTORY OF PRESENT ILLNESS:  This is a 89-year-old female with a history of hypertension and hypothyroidism who was brought into the hospital because of change of mental status and found to have large right frontal intracranial hemorrhage.  She then suffered a fall.  It was felt that more likely the hemorrhage preceeded the fall.  Initial white blood count was elevated and has remained so.  She has not had significant fever during this hospitalization with the exception of one reading to 101 degrees.  She is afebrile at present.  Blood cultures have been negative.  Urine culture showed fewer than 10,000 Enterococcus, but urinalysis showed only 1 white blood cell.  Chest x-ray has shown no pneumonia.  The patient is receiving tube feedings and has had loose stools, but C. difficile has been negative on 2 occasions.  The patient is not receiving any prednisone or other steroid medication at present.  She did receive a 7-day course of levofloxacin.  Procalcitonin has been low on several occasions.      PAST MEDICAL HISTORY:   1.  Hypertension.   2.  Hypothyroidism.   3.  Intracranial hemorrhage.      ALLERGIES:  Listed to sulfa and penicillin.      SOCIAL HISTORY:  Unobtainable.      FAMILY HISTORY:  Unobtainable.      REVIEW OF SYSTEMS:  Unobtainable.      PHYSICAL EXAMINATION:   VITAL SIGNS:  Temp 97.4, blood pressure 107/57, heart rate 69 and regular.   GENERAL:  Well-developed, well-nourished, unresponsive.   SKIN:  No rashes or nodules.   HEENT:  Eyes with no subconjunctival hemorrhages or  scleral icterus.  Oropharynx:  Unable to evaluate.   NECK:  Supple, no thyromegaly.   LYMPHATICS:  No cervical or axillary lymphadenopathy.   LUNGS:  Clear to auscultation.  No use of accessory muscles of respiration.   COR:  S1, S2 normal.  No S3, S4 or murmur.   ABDOMEN:  Soft, nontender, no mass or hepatosplenomegaly.  Gastrostomy tube in place without signs of surrounding infection.   EXTREMITIES:  The patient is wearing pneumo boots.  No obvious calf tenderness or pedal edema.      For further details of patient's history, please refer to the chart.  Thank you very much for this consultation.         ANTONI SOARES MD             D: 2017 12:50   T: 2017 13:22   MT: RADHA      Name:     VICKI SILVA   MRN:      [id number]        Account:       [id number]   :      1925           Consult Date:  2017      Document: [id number]    [SO1.1]   Revision History        User Key Date/Time User Provider Type Action    > SO1.1 2017  3:30 PM Antoni Soares MD Physician Sign            Consults by Antoni Soares MD at 2017 12:52 PM     Author:  Antoni Soares MD Service:  (none) Author Type:  Physician    Filed:  2017 12:53 PM Date of Service:  2017 12:52 PM Creation Time:  2017 12:50 PM    Status:  Addendum :  Antoni Soares MD (Physician)         ID consult dictated.  Persistent leukocytosis.  Likely stress related.  No evidence for infection.  No indication for antibiotics.[SO1.1]  If issues on the w/e please call Dr. Tate.[SO1.2]     Revision History        User Key Date/Time User Provider Type Action    > [N/A] 2017 12:53 PM Antoni Soares MD Physician Addend     SO1.2 2017 12:52 PM Antoni Soares MD Physician Sign     SO1.1 2017 12:50 PM Antoni Soares MD Physician             Consults by Dotty Rodriguez, RD, LD at 2017 12:27 PM     Author:  Dotty Rodriguez RD, LD Service:   Nutrition Author Type:  Registered Dietitian    Filed:  9/9/2017 12:27 PM Date of Service:  9/9/2017 12:27 PM Creation Time:  9/9/2017 12:08 PM    Status:  Signed :  Dotty Rodriguez RD, LD (Registered Dietitian)     Consult Orders:    1. Nutrition Services Adult IP Consult [[id number]] ordered by Humza Townsend MD at 09/09/17 0812                CLINICAL NUTRITION SERVICES  -  ASSESSMENT NOTE      Recommendations Ordered by Registered Dietitian (MERT):   Begin TF Isosource 1.5 at 15 mL/hr;  Increase by 10 mL every 24 hrs to goal 35 mL/hr = 1260 kcals (31 kcal/kg), 57 gm pro (1.4 gm/kg), 638 mL H20, 13 gm fiber, 148 gm CHO  Free H20 30 mL every 4 hrs (minimal flushes while on IVF)   Future/Additional Recommendations:    When IVF off, recommend increase H20 flushes via FT, ie 150 mL 4x/day = 600 mL for total fluid 1238 mL (31 mL/kg).   Malnutrition:  Non-Severe malnutrition  In Context of:  Acute illness or injury        REASON FOR ASSESSMENT  Sury Barnett is a 89 year old female seen by Registered Dietitian for Provider Order - Registered Dietitian to Assess and Order TF per Medical Nutrition protocol      NUTRITION HISTORY  - Information obtained from daughter Crystal.  - Patient is on a regular diet at home.  - Typical food/fluid intake is good.  - Diet history:  Pt eats 3 small meals per day + 2-3 snacks in between.  She eats small amounts often.   - Supplements:  None.   - Allergic to mangos (difficulty breathing).  Daughter was surprised that her weight was so low.  She said her mom actually complained of her clothes fitting tighter recently.  She has noticed her mom stooping more lately (poor posture).    CURRENT NUTRITION ORDERS  Diet Order:     NPO   SLP completed bedside swallow eval yesterday and deemed moderate-severe dysphagia.  Was asked to initiate TF for pt.  9/8:  FT = The feeding tube tip is looped in the stomach. The distal tip may be within the proximal duodenum    Current  "Intake/Tolerance:  NPO x 2 days.    PHYSICAL FINDINGS  Observed  Muscle Wasting - temporal, clavicle  Obtained from Chart/Interdisciplinary Team  None noted    ANTHROPOMETRICS  Height: 4' 11\"  Admit Wt:  40.5 kg  Current Wt:  40.3 kg (88 lbs 13.53 oz) - same  Body mass index is 17.94 kg/(m^2).  Weight Status:  Underweight BMI <18.5  IBW:  44.7 kg  % IBW:  90%  Weight History:  Weight loss 5.1 kg recently (11%) over the past 1.5 months[MH1.1]    Wt Readings from Last 20 Encounters:   09/09/17 40.3 kg (88 lb 13.5 oz)   07/24/17 45.4 kg (100 lb)   11/07/16 43.5 kg (96 lb)   07/15/13 43.1 kg (95 lb)[MH1.2]       LABS  Labs reviewed  Na 135 (NL, improved from being low on admit)  K 4.2 (NL, improved from 2.8)    MEDICATIONS  Medications reviewed  IVF NaCl + 20 KCl at 75 mL/hr - for fluid delivery  Nicardipine off    Dosing Weight:  40.3 kg (current wt)     ASSESSED NUTRITION NEEDS PER APPROVED PRACTICE GUIDELINES:  Estimated Energy Needs: 6397-4594 kcals (30-35 Kcal/Kg)  Justification: underweight  Estimated Protein Needs:  48-60 grams protein (1.2-1.5 g pro/Kg)  Justification: Repletion  Estimated Fluid Needs:  9804-5698 mL (1 mL/Kcal)  Justification: maintenance    MALNUTRITION:  % Weight Loss:  > 5% in 1 month (severe malnutrition)  % Intake:  No decreased intake noted  Subcutaneous Fat Loss:  None observed  Muscle Loss:  Temporal region mild depletion and Clavicle bone region mild depletion  Fluid Retention:  None noted    Malnutrition Diagnosis: Non-Severe malnutrition  In Context of:  Acute illness or injury    NUTRITION DIAGNOSIS:  Inadequate protein-energy intake related to mod-severe dysphagia s/p ICH as evidenced by NPO status, meeting 0% estimated needs and TF planned.    NUTRITION INTERVENTIONS  Recommendations / Nutrition Prescription  Begin TF Isosource 1.5 at 15 mL/hr;  Increase by 10 mL every 24 hrs to goal 35 mL/hr = 1260 kcals (31 kcal/kg), 57 gm pro (1.4 gm/kg), 638 mL H20, 13 gm fiber, 148 gm " CHO  Free H20 30 mL every 4 hrs (minimal flushes while on IVF)    - When IVF off, recommend increase H20 flushes via FT, ie 150 mL 4x/day = 600 mL for total fluid 1238 mL (31 mL/kg).    Implementation  Nutrition education: Not appropriate at this time due to patient condition   Provided education to gage Fuller on TF process and components.  She had questions about the TF schedule and product if the FT gets switched to a G-tube.  Briefly explained possible future bolus TF schedule.    EN Composition, EN Schedule and Feeding Tube Flush - Entered TF and H20 flush orders as above.    Nutrition Goals  Pt will tolerate TF without refeeding syndrome.  TF goal Isosource 1.5 at 35 mL/hr will meet % estimated needs.    MONITORING AND EVALUATION:  Progress towards goals will be monitored and evaluated per protocol and Practice Guidelines    Dotty Rodriguez RD, LD, [facility][MH1.1]             Revision History        User Key Date/Time User Provider Type Action    > MH1.2 9/9/2017 12:27 PM Dotty Rodriguez RD, LD Registered Dietitian Sign     MH1.1 9/9/2017 12:08 PM Dotty Rodriguez RD, LD Registered Dietitian             Consults by Fiona Leger NP at 9/6/2017  5:52 PM     Author:  Fiona Leger NP Service:  Neurosurgery Author Type:  Nurse Practitioner    Filed:  9/6/2017  6:29 PM Date of Service:  9/6/2017  5:52 PM Creation Time:  9/6/2017  5:52 PM    Status:  Attested :  Fiona Leger NP (Nurse Practitioner)    Cosigner:  Yony Russell MD at 9/6/2017 10:14 PM         Consult Orders:    1. Neurosurgery IP Consult: Patient to be seen: Routine - within 24 hours; ICH; Consultant may enter orders: Yes [[id number]] ordered by Humza Townsend MD at 09/06/17 1410           Attestation signed by Yony Russell MD at 9/6/2017 10:14 PM        Physician Attestation   I agree with the information in this note.    89F w/ right frontal intraparenchymal hemorrhage.  No surgical  intervention recommended.  Appreciate care per Neurology.    Yony Russell                               [facility]    Neurosurgery Consultation     Date of Admission:  9/6/2017  Date of Consult (When I saw the patient):[LB1.1] 09/06/17    Assessment & Plan[LB1.2]   Sury Barnett is a 89 year old female who was admitted on 9/6/2017. I was asked to see the patient for ICH.    Active Problems:    ICH (intracerebral hemorrhage) (H)    Assessment: ICH    Plan: Imaging reviewed.  Per Dr. Russell non-surgical at this time. Agree with admission thru hospitalist and recommend keeping SBP <140.  Patient is not on any blood thinners. Will continue to monitor. Repeat CT tomorrow am, or sooner if changes to mental status.        I have discussed the following assessment and plan with Dr. Russell who is in agreement with initial plan and will follow up with further consultation recommendations.    Fiona Leger [facility]  [facility]  [place] [address]    Tel [phone]  Pager [phone][LB1.1]        Code Status    No Order    Reason for Consult[LB1.2]   Reason for consult: I was asked by Dr. Townsend to evaluate this patient for for ICH.[LB1.1]    Primary Care Physician   Keyla Ceron    Chief Complaint[LB1.2]   Weakness    History is obtained from the patient[LB1.1]    History of Present Illness[LB1.2]   Sury Barnett is a 89 year old female who presents with weakness after a fall.  According to the patient's daughter, the patient was with her at an appointment and when they were leaving standing by the car the patient fell backwards hitting the back of her head.  [facility]  She has had confusion and left sided weakness since the fall.  Per the patient's daughter she had no issues prior to the fall and is relatively healthy with a history of HTN.  She has not had episodes of confusion at home and is not on any  "blood thinners. She is able to answer some questions during exam.  She motions to the back of her head when asked if she has pain.  She states \"thank you\" at the end of our visit.  She is able to raise right leg off of the bed, but noted weakness to right side of body.  She appears to be comfortable while lying supine with head on a pillow.  CT of head today shows large right frontal lobe mass consistent with large parenchymal hematoma.[LB1.1]      Past Medical History[LB1.2]   I have reviewed this patient's medical history and updated it with pertinent information if needed.[LB1.1]   Past Medical History:   Diagnosis Date     Hypertension      Thyroid disease[LB1.3]        Past Surgical History[LB1.2]   I have reviewed this patient's surgical history and updated it with pertinent information if needed.[LB1.1]  Past Surgical History:   Procedure Laterality Date     COLONOSCOPY  7/15/2013    Procedure: COLONOSCOPY;  Colonoscopy ;  Surgeon: Maida Rehman MD;  Location:  GI     HEAD & NECK SURGERY      tonsil      TONSILLECTOMY[LB1.3]         Prior to Admission Medications   Prior to Admission Medications   Prescriptions Last Dose Informant Patient Reported? Taking?   LISINOPRIL PO 9/6/2017 at am  Yes Yes   Sig: Take 20 mg by mouth daily    Levothyroxine Sodium (LEVOTHROID PO) 9/6/2017 at am  Yes Yes   Sig: Take 50 mcg by mouth daily    VITAMIN D, CHOLECALCIFEROL, PO 9/1/2017  Yes Yes   Sig: Take 50,000 Units by mouth once a week Friday      Facility-Administered Medications: None     Allergies   Allergies   Allergen Reactions     Sulfa Drugs Other (See Comments)     Really sick and high fever     Amoxicillin        Social History[LB1.2]   I have reviewed this patient's social history and updated it with pertinent information if needed. Sury Barnett[LB1.1]  reports that she has never smoked. She does not have any smokeless tobacco history on file. She reports that she does not drink alcohol or use illicit " "drugs.[LB1.3]    Family History[LB1.2]   I have reviewed this patient's family history and updated it with pertinent information if needed.[LB1.1]   Family History   Problem Relation Age of Onset     Cancer - colorectal Father      Hypertension Father      Colon Cancer Father      Hypertension Mother      Hyperlipidemia Mother      Hypertension Sister      Hyperlipidemia Sister[LB1.3]        Review of Systems[LB1.2]   Limited, noted in HPI.[LB1.1]    Physical Exam   Temp: 97.6  F (36.4  C) Temp src: Axillary BP: 114/61   Heart Rate: 107 Resp: 15 SpO2: 99 % O2 Device: None (Room air) Oxygen Delivery: 1 LPM[LB1.2]  Vital Signs with Ranges[LB1.1]  Temp:  [97.6  F (36.4  C)-97.9  F (36.6  C)] 97.6  F (36.4  C)  Heart Rate:  [] 107  Resp:  [13-21] 15  BP: (114-187)/() 114/61  SpO2:  [96 %-99 %] 99 %  92 lbs 9.49 oz    Heart Rate: 107[LB1.2],[LB1.1] Blood pressure 114/61, temperature 97.6  F (36.4  C), temperature source Axillary, resp. rate 15, height 4' 11\" (1.499 m), weight 92 lb 9.5 oz (42 kg), SpO2 99 %, not currently breastfeeding.  92 lbs 9.49 oz[LB1.2]  HEENT:  Normocephalic.  Moderate sized hematoma to mid/right occipital region, no bleeding. PERRLA.  Difficulty with EOM's.   Neck:  Supple, non-tender, without lymphadenopathy.  Heart:  No peripheral edema  Lungs:  No SOB  Abdomen:  Soft, non-tender, non-distended.    Skin:  Warm and dry, good capillary refill.  Extremities:  Good radial and dorsalis pedis pulses bilaterally, no edema, cyanosis or clubbing.    NEUROLOGICAL EXAMINATION:     Mental status:  Awake and answers some questions appropriately.  Tracks most conversation.  Cranial nerves: PERRLA.  Gaze to the right.  Motor:  strength on right 4/5, unable to grasp on left.  Lifts right leg off bed, unable to move left leg.  DF and PF on right 4/5.  Unable to DF and PF on left.  Sensation:  Intact to light touch on the right.    Reflexes:   Negative Babinski.  Negative Clonus.    Coordination: "  Smooth finger to nose on the right.  Unable to do on the left.   Able to lift arm for pronator drift on right; Unable to lift left arm.    Gait:  Deferred.     Cervical examination reveals good range of motion.  No tenderness to palpation of the cervical spine or paraspinous muscles bilaterally.[LB1.1]       Data[LB1.2]   CBC RESULTS:   Recent Labs   Lab Test  09/06/17   1310   WBC  8.6   RBC  4.32   HGB  13.6   HCT  39.5   MCV  91   MCH  31.5   MCHC  34.4   RDW  12.4   PLT  364     Basic Metabolic Panel:  Lab Results   Component Value Date     09/06/2017      Lab Results   Component Value Date    POTASSIUM 3.9 09/06/2017     Lab Results   Component Value Date    CHLORIDE 101 09/06/2017     Lab Results   Component Value Date    ANTHONY 9.7 09/06/2017     Lab Results   Component Value Date    CO2 25 09/06/2017     Lab Results   Component Value Date    BUN 13 09/06/2017     Lab Results   Component Value Date    CR 0.71 09/06/2017     Lab Results   Component Value Date    GLC 99 09/06/2017     INR:  Lab Results   Component Value Date    INR 0.99 09/06/2017[LB1.1]            Revision History        User Key Date/Time User Provider Type Action    > LB1.3 9/6/2017  6:29 PM Fiona Leger NP Nurse Practitioner Sign     LB1.2 9/6/2017  5:53 PM Fiona Leger, NP Nurse Practitioner      LB1.1 9/6/2017  5:52 PM Fiona Leger, NP Nurse Practitioner             Consults by Jf Long MD at 9/6/2017  5:06 PM     Author:  Jf Long MD Service:  Neuro ICU Author Type:  Physician    Filed:  9/6/2017  8:35 PM Date of Service:  9/6/2017  5:06 PM Creation Time:  9/6/2017  4:57 PM    Status:  Signed :  Jf Long MD (Physician)     Consult Orders:    1. Neurology Critical Care IP Consult: Patient to be seen: Routine - within 24 hours; ICH; Consultant may enter orders: Yes [[id number]] ordered by Humza Townsend MD at 09/06/17 1410                  Neuroscience and Spine  [facility]    NeuroCritical Care Consultation Note     Sury Barnett MRN# [id number]   YOB: 1925 Age: 89 year old    Code Status:No Order   Date of Admission: 9/6/2017  Date of Consult: 09/06/2017    _________________________________   Primary Care Physician   Keyla Ceron  ______________________________________________         Assessment & Plan   ______________________________________________[AZ1.1]  (I61.1) 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  ---One microhemorrhage is located in the area of present hemorrhage[AZ1.1]  (I10) Essential hypertension[AZ1.2]  --Keep BP <140 mm Hg  --Nicardipine infusion  #. DVT Prophylaxis  --Mechanical   #. PT/OT/Speech  --Start  evaluations  #. Nutrition / GI Prophylaxis  --Per recommendations of speech therapy    #. Code Status: Full Code[AZ1.1]    Discussed with family. They want to maintain full code for now[AZ1.3]         TIME:   Neurocritical care time:  60 minutes for evaluation and management of large ICH Patient is critically ill / has a very high risk of deterioration  ----------------------------------------------------------------------------------  ----------------------------------------------------------------------------------  Reason for consult: I was asked by Dr. Townsend to evaluate this patient for ICH.    Chief Complaint   ______________________________________________  fall  History is obtained from the patient    History of Present Illness   ______________________________________________  89-year-old female with history of hypertension and hypothyroidism who presents to the Emergency Room after she had a fall and was subsequently weak on the  left side.  The history was predominantly obtained from the daughter who was at the bedside.  The patient apparently was in the mall with her daughter picking of her glasses.  She apparently went out of the mall in a wheelchair and had reached the parking lot.  The daughter went on to get her combination keys to open the door of her car.  In the process, the patient had attempted to get up from the wheelchair and in the process fell backwards and hit her head.  Per her daughter, the patient is usually very healthy and independent with ambulation except for long distances.  Given the distance from the glasses store to the parking lot, she had her in the wheelchair, but normally, she walks short distances without any problem.  Right before the fall, she had not complained of any new symptoms to her daughter.  There was no headache or vision change reported.  No lightheadedness or dizziness reported.  Otherwise, the patient has been in her usual state of health.  There is no recent history of chest pain, shortness of breath or palpitations.  No diarrhea or vomiting.  No dysuria, urinary urgency or frequency.  EMS was called, and when they arrived at the scene, they found that she was significantly weak on the left side without any movement.  She was eventually brought to [facility] Emergency Room.       In the Emergency Room, the patient was evaluated by Dr. Tejada.  She was found to have a gaze preference on the right side and an occipital hematoma.  She was also found to be weak on the left upper and lower extremities.  A CT head was done which showed a new large intracerebral hemorrhage measuring 5.5 x 4.2 x 3.5 cm.  There was no resultant midline shift. NCC consulted to provide care in ICU    Past Medical History    ______________________________________________  Past Medical History:   Diagnosis Date     Hypertension      Thyroid disease      Past Surgical History  "  ______________________________________________  Past Surgical History:   Procedure Laterality Date     COLONOSCOPY  7/15/2013    Procedure: COLONOSCOPY;  Colonoscopy ;  Surgeon: Maida Rehman MD;  Location: [facility]     HEAD & NECK SURGERY      tonsil      TONSILLECTOMY       Prior to Admission Medications   ______________________________________________  Prior to Admission Medications   Prescriptions Last Dose Informant Patient Reported? Taking?   LISINOPRIL PO 9/6/2017 at am  Yes Yes   Sig: Take 20 mg by mouth daily    Levothyroxine Sodium (LEVOTHROID PO) 9/6/2017 at am  Yes Yes   Sig: Take 50 mcg by mouth daily    VITAMIN D, CHOLECALCIFEROL, PO 9/1/2017  Yes Yes   Sig: Take 50,000 Units by mouth once a week Friday      Facility-Administered Medications: None     Allergies   Allergies   Allergen Reactions     Sulfa Drugs Other (See Comments)     Really sick and high fever     Amoxicillin        Social History   ______________________________________________  Social History     Social History     Marital status:      Spouse name: N/A     Number of children: N/A     Years of education: N/A     Social History Main Topics     Smoking status: Never Smoker     Smokeless tobacco: None     Alcohol use No     Drug use: No     Sexual activity: Not Asked     Other Topics Concern     None     Social History Narrative         Family History   ______________________________________________  Family History   Problem Relation Age of Onset     Cancer - colorectal Father      Hypertension Father      Colon Cancer Father      Hypertension Mother      Hyperlipidemia Mother      Hypertension Sister      Hyperlipidemia Sister          Review of Systems   ______________________________________________[AZ1.1]  Review of systems is not obtainable due to patient factors - mental status[AZ1.3]      Physical Exam   ______________________________________________  Weight:92 lbs 9.49 oz; Height:4' 11\"  Temp: 97.6  F (36.4  C) Temp " src: Axillary BP: 114/61   Heart Rate: 107 Resp: 15 SpO2: 99 % O2 Device: None (Room air) Oxygen Delivery: 1 LPM  General Appearance:  No acute distress[AZ1.1]  Neuro:       Mental Status Exam:   Awake, not alert, disoriented. Speech and language are very minimal and slurred. Mental status is decreased       Cranial Nerves:  Does not open eyes. Pupils 3 mm, reactive. EOMI. Face sensation is normal. Face is symmetric.Tongue and uvula are midline. Other CN are normal           Motor: left sided weakness. Tone and bulk are normal           Reflexes:  Normal DTR.Toes downgoing.        Sensory: Untestable               Coordination:  Untestable         Gait: Untestable[AZ1.3]    Neck: no nuchal rigidity, normal thyroid. No carotid bruits.    Cardiovascular: Regular rate and rhythm, no m/r/g  Lungs: Clear to auscultation  Abdomen: Soft, not tender, not distended  Extremities: No clubbing, no cyanosis, no edema    Data   ______________________________________________  All Data personally reviewed:       Labs:   CBC RESULTS:     Recent Labs  Lab 09/06/17  1310   WBC 8.6   RBC 4.32   HGB 13.6   HCT 39.5        Basic Metabolic Panel:   Recent Labs   Lab Test  09/06/17   1310  07/24/17   0945   NA  132*  135   POTASSIUM  3.9  4.1   CHLORIDE  101  103   CO2  25  28   BUN  13  19   CR  0.71  0.63   GLC  99  84   ANTHONY  9.7  9.2     Liver panel:  No lab results found.  INR:  Recent Labs   Lab Test  09/06/17   1310   INR  0.99      Lipid Profile:No lab results found.  Thyroid Panel:No lab results found.   Vitamin B12: No lab results found.   Vitamin D level: No lab results found.  A1C: No lab results found.  Troponin I: No lab results found.  Ammonia: No lab results found.  CK: No lab results found.     CRP inflammation: No lab results found.  ESR: No lab results found.    RAJ: No lab results found.    ANCA: No lab results found.   Drug Screen: No lab results found.  Alcohol level:No lab results found.  UA Results:  No lab  results found.  Most Recent 6 Bacteria Isolates From Any Culture (See EPIC Reports for Culture Details):No lab results found.     Cardiac US:[AZ1.1]   --[AZ1.3]       Neurophysiology:   --       Imaging:   All imaging studies were reviewed personally  CT head:   1. New large hyperdense mass in the right frontal lobe measuring 5.5 x  4.2 x 3.5 cm consistent with a large parenchymal hematoma. This does  not result in any significant midline shift or effacement of the basal  cisterns.  2. Diffuse cerebral volume loss and cerebral white matter changes  consistent with chronic small vessel ischemic disease.     MRI brain 7/24/17:   1. No acute pathology is identified. No bleed, mass, or acute infarcts  are seen.  2. Age-related changes including generalized atrophy of the brain.  White matter changes in the cerebral hemispheres consistent with small  vessel ischemic disease in this age patient.  3. Tiny incidental lipoma along the right tentorium.  4. 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           Recent Results (from the past 24 hour(s))   CT Head w/o Contrast   Result Value    Radiologist flags Intracranial hemorrhage (AA)    Narrative    CT OF THE HEAD WITHOUT CONTRAST 9/6/2017 1:23 PM     COMPARISON: Brain MR 7/24/2017.    HISTORY: Stroke.    TECHNIQUE: 5 mm thick axial CT images of the head were acquired  without IV contrast material.    FINDINGS: There is a new large hyperdense intra-axial mass centered in  the right frontal lobe consistent with a large parenchymal hematoma  measuring 5.5 x 4.2 x 3.5 cm in the AP, transverse and cephalocaudad  dimensions respectively. There is a minimal amount of subarachnoid  extension of hemorrhage surrounding this parenchymal hematoma. Mass  effect due to the hematoma results in minimal local sulcal effacement  but no significant midline shift and no effacement of the basal  cisterns. There is no intraventricular extension of hemorrhage.      There is moderate diffuse cerebral volume loss. There are extensive  confluent areas of decreased density in the cerebral white matter  bilaterally that are consistent with sequela of chronic small vessel  ischemic disease disease. The ventricles and basal cisterns are within  normal limits in configuration given the degree of cerebral volume  loss.    No intracranial mass or recent infarct.    The visualized paranasal sinuses are well-aerated. There is no  mastoiditis. There are no fractures of the visualized bones.      Impression    IMPRESSION:   1. New large hyperdense mass in the right frontal lobe measuring 5.5 x  4.2 x 3.5 cm consistent with a large parenchymal hematoma. This does  not result in any significant midline shift or effacement of the basal  cisterns.  2. Diffuse cerebral volume loss and cerebral white matter changes  consistent with chronic small vessel ischemic disease.     [Critical Result: Intracranial hemorrhage]    Finding was identified on 9/6/2017 1:24 PM.     Beto Tejada was contacted by Dr. Goff on 9/6/2017 1:25 PM and  verbalized understanding of the critical result.       Radiation dose for this scan was reduced using automated exposure  control, adjustment of the mA and/or kV according to patient size, or  iterative reconstruction technique.    TRIXIE GOFF MD   Cervical spine CT w/o contrast    Narrative    CT OF THE CERVICAL SPINE WITHOUT CONTRAST   9/6/2017 1:28 PM     COMPARISON: None.    HISTORY: Trauma.     TECHNIQUE: Axial images of the cervical spine were acquired without  intravenous contrast. Multiplanar reformations were created.      FINDINGS: There is normal alignment of the cervical vertebrae.  Vertebral body heights of the cervical spine are normal.  Craniocervical alignment is normal. There are no fractures of the  cervical spine.  There is moderate facet arthropathy on the left at  the C3-C4, C4-C5 and C5-C6 levels. There is degenerative endplate  spurring  at the C4-C5 level. There is mild degenerative spinal canal  narrowing from lower C3 to upper C5. There is no prevertebral soft  tissue swelling.      Impression    IMPRESSION: Degenerative changes of the cervical spine. No evidence  for fracture or traumatic malalignment.    Radiation dose for this scan was reduced using automated exposure  control, adjustment of the mA and/or kV according to patient size, or  iterative reconstruction technique.    TRIXIE VACA MD[AZ1.1]          Revision History        User Key Date/Time User Provider Type Action    > AZ1.3 9/6/2017  8:35 PM Jf Long MD Physician Sign     AZ1.2 9/6/2017  5:01 PM Jf Long MD Physician      AZ1.1 9/6/2017  4:57 PM Jf Long MD Physician                      Progress Notes - Physician (Notes for yesterday and today)      Progress Notes by Tamir Manrique MD at 9/26/2017 10:46 AM     Author:  Tamir Manrique MD Service:  Hospitalist Author Type:  Physician    Filed:  9/26/2017 12:30 PM Date of Service:  9/26/2017 10:46 AM Creation Time:  9/26/2017 10:46 AM    Status:  Addendum :  Tamir Manrique MD (Physician)         [facility]    Hospitalist Progress Note    Assessment & Plan   Sury Barnett is a 89 year old female with PMHx of hypertension and hypothyroidism who was admitted on 9/6/2017 with a large R intracranial hemorrhage.      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 Seen by neurosurgery, bleed nonsurgical. Likely the hemorrhage caused fall. CT next day showed increased size but then multiple subsequent CTs with stable to slightly improved hematoma   -- repeat head CTs 9/16, 9/20,9/23 unchanged hemorrhage  -- clinical status has remained waxing and waning from neuro standpoint. PT signed off on 9/18 as  patient was unable to participate in therapy as she was not alert enough.   -- had G-tube placed on 9/11, tolerating TFs at goal  -- goal SBP <140 -- mgmt as below  -- marked improvement in mentation noted on 9/26. Pt able to answer questions correctly nodding/ shaking head with questions     Multiple providers have had goals of care discussions with patient's daughters and son this stay several times. Family wishes for restorative cares, does not want to talk about palliative care or hospice at this time. Family's main concern remains lack of nutrition during initial days of stay. Daughter Crystal does not wish to engage in discussions regarding patient's current status whether it is due to perceived lack of nutrition (which they feel is the  cause) vs underlying hemorrhage. Crystal hopes that patient will recover and would like see her being independent again. Family requested second opinion and transfer to [facility] 9/20, discussed with Dr Olguin hospitalist at [facility] and reviewed her course so far, recommended continuing current care and no reason to transfer to other facility. Requested again on 9/21 but no indication for transfer    On most recent discussions, family adamant about pt being full code despite her underlying pathology    Hypoxia/ acute respiratory failure 9/25, suspect 2/2 aspiration pneumonia, organism nknown  CXR on 9/12 showed bilateral interstitial infiltrates consistent with pneumonia and fluid overload -- had been on IVF since admission on 9/6. IVFs dc'd. One dose of lasix 20mg IV given on 9/12 then maintained on lasix to 20mg PO daily on 9/14 given via tube. IV Levaquin started on 9/12 (given PCN allergy).  -- resolved, likely 2/2 mucus plug  -- CXR 9/25 with minimal changes/ largely unremarkable     Fever / Leukocytosis  Pneumatosis of colon and free peritoneal and retroperitoneal cavity  Has had variable leukocytosis this stay. Improved and was down to 11.7 on 9/13, then started trending up  and was up to 25.8 on 9/23. Developed fever 9/17 PM with Tmax 101.1. Further infectious workup pursued but has been unrevealing.    -- 9/16 CXR showed resolution of infiltrate, blood cultures drawn 9/17 now growth, repeat UA 9/17 neg (though culture grew <10k enterococcus), serial procalcitonins and lactate neg  -- no evidence for infection around PEG tube or peripheral IVs (has no central IV access)  -- c diff was neg on 9/13, on TFs and stools occ semi-formed, repeat c diff 9/19 negative  -- completed 7d course of Levaquin on 9/18  -- CT abd pelvis with contrast done, shows pneumatosis of colon and air in peritoneal and retroperitoneal cavity, and thick fluid in pelvis. Gen surgery consulted and discussed with Dr Cassidy, though air in retroperitoneal cavity is unusual with feeding tube placement, still suspected this was due to tube placement. No surgical intervention planned, okayed to continue tube feeding.  -- WBC improving, hold on any abx    Benign essential hypertension.   On lisinopril prior to admission.   -- meds adjusted this stay -- was stable on Ramipril 10mg daily and Norvasc 5 mg daily, Metoprolol 25mg BID added 9/16 given tachycardia and hypertension  -- goal BP <140 systolic   -- Hold BP med if SBP<100     Hypothyroidism.   Chronic and stable on levothyroxine    Hypernatremia: Resolved, now mild hyponatremia  Hypokalemia  Na 149 on 9/18 while on TFs.  -- increased free water flushes from 100ml q4h to 250ml q6h   -- Na normalized, 9/26 at 136  -- monitor with current fluids down NG  -- replace potassium per protocol.     FEN: on TFs via PEG as above   DVT Prophylaxis: PCDs  Code Status: Prior    Disposition: hopeful d/c on 9/27 if remains stable[ES1.1]    Called and left message with daughter, Crystal, on 6/26[ES1.2]  Tamir Manrique M.D.  Hospitalist  Pager [phone]  Text Page until 6 pm (after call answering service)      Interval History    Marked improvement in mentation today. Denies pain,  nods when asked if she is comfortable. Gave thumbs up when I told her she was doing well. Denies cp/sob.     -Data reviewed today: I reviewed all new labs and imaging results over the last 24 hours. I personally reviewed no images or EKG's today.    Physical Exam   Temp: 98.2  F (36.8  C) Temp src: Axillary BP: 112/57   Heart Rate: 75 Resp: 18 SpO2: 97 % O2 Device: None (Room air)    Vitals:    09/21/17 0300 09/22/17 0332 09/26/17 0700   Weight: 40.9 kg (90 lb 2.7 oz) 41.5 kg (91 lb 7.9 oz) 41.2 kg (90 lb 13.3 oz)     Vital Signs with Ranges  Temp:  [97.6  F (36.4  C)-98.4  F (36.9  C)] 98.2  F (36.8  C)  Heart Rate:  [75-83] 75  Resp:  [18-26] 18  BP: (102-115)/(53-68) 112/57  SpO2:  [93 %-97 %] 97 %  I/O last 3 completed shifts:  In: 1305 [NG/GT:920]  Out: 1200 [Urine:1200]    Constitutional: alert, no distress  HEENT: Oral thrush noted.  Neuro: unable to fully assess  Respiratory: large clear  Cardiovascular: RRR, no MGR, no LE edema  GI: S, NT, ND, +BS, +PEG, tube site normal  Skin/Integumen: warm/dry      Medications        amoxicillin  875 mg Per Feeding Tube Q12H JEZ     amLODIPine  5 mg Oral or Feeding Tube Daily     nystatin  500,000 Units Swish & Swallow 4x Daily     metoprolol  25 mg Oral or Feeding Tube BID     ramipril  10 mg Oral or Feeding Tube Daily     fiber modular  1 packet Per Feeding Tube BID     levothyroxine  50 mcg Oral or Feeding Tube QAM AC     sodium chloride (PF)  3 mL Intracatheter Q8H     pantoprazole  40 mg Per Feeding Tube Daily       Data     Recent Labs  Lab 09/26/17  0804 09/25/17  0810 09/24/17  0744  09/23/17  0826  09/21/17  0746 09/20/17  0814   WBC 15.0* 16.2* 18.3*  --  25.8*  < > 19.6* 22.5*   HGB 10.8*  --   --   --  12.4  --  13.0 13.8   MCV 92  --   --   --   --   --  94 95     --   --   --   --   --  370 394    135 132*  --  134  --  142 145*   POTASSIUM 3.6 4.0 4.1  < > 3.3*  --   --  3.6   CHLORIDE 100 99 98  --  97  --   --  107   CO2 29 28 27  --  29  --    --  34*   BUN 17 16 20  --  20  --   --  33*   CR 0.44* 0.54 0.46*  --  0.55  --   --  0.59   ANIONGAP 7 8 7  --  8  --   --  4   ANTHONY 8.5 8.6 8.2*  --  8.5  --   --  9.2   * 136* 126*  --  96  --   --  134*   < > = values in this interval not displayed.    No results found for this or any previous visit (from the past 24 hour(s)).[ES1.1]       Revision History        User Key Date/Time User Provider Type Action    > ES1.2 9/26/2017 12:30 PM Tamir Manrique MD Physician Addend     ES1.1 9/26/2017 10:51 AM Tamir Manrique MD Physician Sign            Progress Notes by Antoni Mckenna MD at 9/26/2017  7:21 AM     Author:  Antoni Mckenna MD Service:  (none) Author Type:  Physician    Filed:  9/26/2017  7:22 AM Date of Service:  9/26/2017  7:21 AM Creation Time:  9/26/2017  7:21 AM    Status:  Signed :  Antoni Mckenna MD (Physician)         [facility]  Infectious Disease Progress Note          Assessment and Plan:   IMPRESSION:   1.  Sury Barnett is a 89-year-old female with history of hypertension.   2.  Admitted on 09/06 with change in mental status and found to have large right frontal intracranial hemorrhage.  She has residual left hemiplegia.   3.  Persistent leukocytosis with white blood count around 20,000.  I suspect that this most likely is stress induced.  There are no signs of infection on exam and the patient has had a negative evaluation for infection including negative blood cultures, small numbers of Enterococcus on urine culture which probably represent colonization given benign urinalysis, negative Clostridium difficile study, absence of pneumonia on chest x-ray.  The patient is not receiving steroid medications.  Procalcitonin is low at 0.08.   4. Possible new enterococcal UTI.  Repeat UC with greater nos of enterococcus and UA with 56 WBCs.      RECOMMENDATIONS:   1. Would give ten day course of PO Amoxicillin  2. We will sign  "off.  Please call if issues.  Thanks.        Interval History:   Afebrile.  WBC pending.  Down further at 16k yesterday.              Medications:       amoxicillin  875 mg Per Feeding Tube Q12H JEZ     amLODIPine  5 mg Oral or Feeding Tube Daily     nystatin  500,000 Units Swish & Swallow 4x Daily     metoprolol  25 mg Oral or Feeding Tube BID     ramipril  10 mg Oral or Feeding Tube Daily     fiber modular  1 packet Per Feeding Tube BID     levothyroxine  50 mcg Oral or Feeding Tube QAM AC     sodium chloride (PF)  3 mL Intracatheter Q8H     pantoprazole  40 mg Per Feeding Tube Daily                  Physical Exam:   Blood pressure 102/53, pulse 100, temperature 98  F (36.7  C), temperature source Axillary, resp. rate 20, height 1.499 m (4' 11\"), weight 41.2 kg (90 lb 13.3 oz), SpO2 96 %, not currently breastfeeding.  [unfilled]  Vital Signs with Ranges  Temp:  [97.6  F (36.4  C)-98.4  F (36.9  C)] 98  F (36.7  C)  Pulse:  [100-116] 100  Heart Rate:  [77-83] 78  Resp:  [20-36] 20  BP: (102-126)/(53-79) 102/53  SpO2:  [78 %-98 %] 96 %    Constitutional: Awake, alert, cooperative, no apparent distress   Lungs: Clear to auscultation bilaterally, no crackles or wheezing   Cardiovascular: Regular rate and rhythm, normal S1 and S2, and no murmur noted   Abdomen: Normal bowel sounds, soft, non-distended, non-tender   Skin: No rashes, no cyanosis, no edema   Other:           Data:   All microbiology laboratory data reviewed.  Recent Labs   Lab Test  09/25/17   0810  09/24/17   0744  09/23/17   0826   09/21/17   0746  09/20/17   0814  09/19/17   0832   WBC  16.2*  18.3*  25.8*   < >  19.6*  22.5*  20.2*   HGB   --    --   12.4   --   13.0  13.8  14.1   HCT   --    --   35.8   --   39.8  41.4  43.1   MCV   --    --    --    --   94  95  95   PLT   --    --    --    --   370  394  374    < > = values in this interval not displayed.     Recent Labs   Lab Test  09/25/17   0810  09/24/17   0744  09/23/17   0826   CR  0.54  0.46* " " 0.55     No lab results found.[SO1.1]     Revision History        User Key Date/Time User Provider Type Action    > SO1.1 9/26/2017  7:22 AM Antoni Mckenna MD Physician Sign                  Procedure Notes     No notes of this type exist for this encounter.      Progress Notes - Therapies (Notes from 09/24/17 through 09/27/17)     No notes of this type exist for this encounter.                                          INTERAGENCY TRANSFER FORM - LAB / IMAGING / EKG / EMG RESULTS   9/6/2017                       [address] [facility]: [phone]            Unresulted Labs (24h ago through future)    Start       Ordered    09/11/17 0600  Basic metabolic panel  EVERY MONDAY,   Routine     Comments:  Every Monday while on enteral tube feeding.    09/09/17 1200    09/11/17 0600  Magnesium  EVERY MONDAY,   Routine     Comments:  Every Monday while on enteral tube feeding.    09/09/17 1200    09/11/17 0600  Phosphorus  EVERY MONDAY,   Routine     Comments:  Every Monday while on enteral tube feeding.    09/09/17 1200    09/11/17 0600  Prealbumin  EVERY MONDAY,   Routine     Comments:  Every Monday while on enteral tube feeding.    09/09/17 1200    Unscheduled  Potassium  CONDITIONAL (SPECIFY),   Routine     Comments:  Q6H (IF on 3% NaCl infusion or  3% sodium chloride/sodium acetate)    09/06/17 1507    Unscheduled  Potassium  (Potassium Replacement - \"Standard\" - For K levels less than 3.4 mmol/L - UU,UR,UA,RH,SH,PH,WY )  CONDITIONAL (SPECIFY),   Routine     Comments:  Obtain Potassium Level for these conditions:  *IF no potassium result within 24 hours before initiation of order set, draw potassium level with next lab collect.    *2 HOURS AFTER last IV potassium replacement dose and 4 hours after an oral replacement dose.  *Next morning after potassium dose.     Repeat Potassium Replacement if necessary.    09/06/17 1507    Unscheduled  Phosphorus  (POTASSIUM Phosphate - \"Standard\" - " Replacement for levels less than or equal to 2.4 mg/dL )  CONDITIONAL (SPECIFY),   Routine     Comments:  Obtain Phosphorus Level for these conditions:  *IF no phosphorus result within 24 hrs before initiation of order set, draw phosphorus level with next lab collect.    *2 HOURS AFTER last phosphorus replacement dose for levels less than 2.0.  *Next morning after phosphorus dose.     Repeat Phosphorus Replacement if necessary.    09/10/17 1227         Lab Results - 3 Days      CBC with platelets [[id number]] (Abnormal)  Resulted: 09/27/17 0843, Result status: Final result    Ordering provider: Tamir Manrique MD  09/27/17 0000 Resulting lab: [facility]    Specimen Information    Type Source Collected On   Blood  09/27/17 0818          Components       Value Reference Range Flag Lab   WBC 12.1 4.0 - 11.0 10e9/L H FrStHsLb   RBC Count 3.56 3.8 - 5.2 10e12/L L FrStHsLb   Hemoglobin 11.0 11.7 - 15.7 g/dL L FrStHsLb   Hematocrit 32.8 35.0 - 47.0 % L FrStHsLb   MCV 92 78 - 100 fl  FrStHsLb   MCH 30.9 26.5 - 33.0 pg  FrStHsLb   MCHC 33.5 31.5 - 36.5 g/dL  FrStHsLb   RDW 13.5 10.0 - 15.0 %  FrStHsLb   Platelet Count 415 150 - 450 10e9/L  FrStHsLb            Basic metabolic panel [[id number]] (Abnormal)  Resulted: 09/26/17 0837, Result status: Final result    Ordering provider: Tamir Manrique MD  09/26/17 0000 Resulting lab: [facility]    Specimen Information    Type Source Collected On   Blood  09/26/17 0804          Components       Value Reference Range Flag Lab   Sodium 136 133 - 144 mmol/L  FrStHsLb   Potassium 3.6 3.4 - 5.3 mmol/L  FrStHsLb   Chloride 100 94 - 109 mmol/L  FrStHsLb   Carbon Dioxide 29 20 - 32 mmol/L  FrStHsLb   Anion Gap 7 3 - 14 mmol/L  FrStHsLb   Glucose 135 70 - 99 mg/dL H FrStHsLb   Urea Nitrogen 17 7 - 30 mg/dL  FrStHsLb   Creatinine 0.44 0.52 - 1.04 mg/dL L FrStHsLb   GFR Estimate >90 >60 mL/min/1.7m2  FrStHsLb   Comment:  Non   GFR Calc   GFR Estimate If Black >90 >60 mL/min/1.7m2  FrStHsLb   Comment:  African American GFR Calc   Calcium 8.5 8.5 - 10.1 mg/dL  FrStHsLb            CBC with platelets [[id number]] (Abnormal)  Resulted: 09/26/17 0823, Result status: Final result    Ordering provider: Tamir Manrique MD  09/26/17 0000 Resulting lab: [facility]    Specimen Information    Type Source Collected On   Blood  09/26/17 0804          Components       Value Reference Range Flag Lab   WBC 15.0 4.0 - 11.0 10e9/L H FrStHsLb   RBC Count 3.44 3.8 - 5.2 10e12/L L FrStHsLb   Hemoglobin 10.8 11.7 - 15.7 g/dL L FrStHsLb   Hematocrit 31.6 35.0 - 47.0 % L FrStHsLb   MCV 92 78 - 100 fl  FrStHsLb   MCH 31.4 26.5 - 33.0 pg  FrStHsLb   MCHC 34.2 31.5 - 36.5 g/dL  FrStHsLb   RDW 13.5 10.0 - 15.0 %  FrStHsLb   Platelet Count 393 150 - 450 10e9/L  FrStHsLb            Urine Culture Aerobic Bacterial [[id number]] (Abnormal)  Resulted: 09/25/17 2127, Result status: Final result    Ordering provider: Humza Townsend MD  09/23/17 1300 Resulting lab: [facility]    Specimen Information    Type Source Collected On   Catheterized Urine  09/23/17 1300          Components       Value Reference Range Flag Lab   Specimen Description Catheterized Urine      Culture Micro --  A 225   Result:         >100,000 colonies/mL  Enterococcus faecalis              Procalcitonin [[id number]]  Resulted: 09/25/17 0925, Result status: Final result    Ordering provider: Humza Townsend MD  09/25/17 0810 Resulting lab: [facility]    Specimen Information    Type Source Collected On     09/25/17 0810          Components       Value Reference Range Flag Lab   Procalcitonin 0.19 ng/ml  FrStHsLb   Comment:         0.05-0.24 ng/ml Low risk of systemic bacterial infection. Local bacterial   infection possible.  Recommendation: Assess other clinical features of   infection. Discourage antibiotics unless strong  clinical suspicion for serious   infection.              Lactic acid level STAT [[id number]]  Resulted: 09/25/17 0922, Result status: Final result    Ordering provider: Tamir Manrique MD  09/25/17 0831 Resulting lab: [facility]    Specimen Information    Type Source Collected On   Blood  09/25/17 0858          Components       Value Reference Range Flag Lab   Lactic Acid 1.3 0.7 - 2.0 mmol/L  FrStHsLb            Nt probnp inpatient [[id number]]  Resulted: 09/25/17 0918, Result status: Final result    Ordering provider: Humza Townsend MD  09/25/17 0810 Resulting lab: [facility]    Specimen Information    Type Source Collected On     09/25/17 0810          Components       Value Reference Range Flag Lab   N-Terminal Pro BNP Inpatient 240 0 - 1800 pg/mL  FrStHsLb   Comment:            Reference range shown and results flagged as abnormal are suggested inpatient   cut points for confirming diagnosis if CHF in an acute setting. Establishing a   baseline value for each individual patient is useful for follow-up. An   inpatient or emergency department NT-proPBNP <300 pg/mL effectively rules out   acute CHF, with 99% negative predictive value.  The outpatient non-acute reference range for ruling out CHF is:   0-125 pg/mL (age 18 to less than 75)   0-450 pg/mL (age 75 yrs and older)              Basic metabolic panel [[id number]] (Abnormal)  Resulted: 09/25/17 0856, Result status: Final result    Ordering provider: Humza Townsend MD  09/25/17 0000 Resulting lab: [facility]    Specimen Information    Type Source Collected On   Blood  09/25/17 0810          Components       Value Reference Range Flag Lab   Sodium 135 133 - 144 mmol/L  FrStHsLb   Potassium 4.0 3.4 - 5.3 mmol/L  FrStHsLb   Chloride 99 94 - 109 mmol/L  FrStHsLb   Carbon Dioxide 28 20 - 32 mmol/L  FrStHsLb   Anion Gap 8 3 - 14 mmol/L  FrStHsLb   Glucose 136 70 - 99 mg/dL H FrStHsLb   Urea Nitrogen  16 7 - 30 mg/dL  FrStHsLb   Creatinine 0.54 0.52 - 1.04 mg/dL  FrStHsLb   GFR Estimate >90 >60 mL/min/1.7m2  FrStHsLb   Comment:  Non  GFR Calc   GFR Estimate If Black >90 >60 mL/min/1.7m2  FrStHsLb   Comment:  African American GFR Calc   Calcium 8.6 8.5 - 10.1 mg/dL  FrStHsLb            Magnesium [[id number]] (Abnormal)  Resulted: 09/25/17 0856, Result status: Final result    Ordering provider: Humza Townsend MD  09/25/17 0000 Resulting lab: [facility]    Specimen Information    Type Source Collected On   Blood  09/25/17 0810          Components       Value Reference Range Flag Lab   Magnesium 2.6 1.6 - 2.3 mg/dL H FrStHsLb            Phosphorus [[id number]]  Resulted: 09/25/17 0856, Result status: Final result    Ordering provider: Humza Townsend MD  09/25/17 0000 Resulting lab: [facility]    Specimen Information    Type Source Collected On   Blood  09/25/17 0810          Components       Value Reference Range Flag Lab   Phosphorus 2.7 2.5 - 4.5 mg/dL  FrStHsLb            Prealbumin [[id number]]  Resulted: 09/25/17 0856, Result status: Final result    Ordering provider: Humza Townsend MD  09/25/17 0000 Resulting lab: [facility]    Specimen Information    Type Source Collected On   Blood  09/25/17 0810          Components       Value Reference Range Flag Lab   Prealbumin 26 15 - 45 mg/dL  FrStHsLb            WBC and differential [[id number]] (Abnormal)  Resulted: 09/25/17 0835, Result status: Final result    Ordering provider: Michelle Manzano MD  09/25/17 0000 Resulting lab: [facility]    Specimen Information    Type Source Collected On   Blood  09/25/17 0810          Components       Value Reference Range Flag Lab   WBC 16.2 4.0 - 11.0 10e9/L H FrStHsLb   Diff Method Automated Method   FrStHsLb   % Neutrophils 92.8 %  FrStHsLb   % Lymphocytes 3.6 %  FrStHsLb   % Monocytes 1.6 %  FrStHsLb   % Eosinophils 0.4 %  FrStHsLb    % Basophils 0.1 %  FrStHsLb   % Immature Granulocytes 1.5 %  FrStHsLb   Nucleated RBCs 0 0 /100  FrStHsLb   Absolute Neutrophil 15.1 1.6 - 8.3 10e9/L H FrStHsLb   Absolute Lymphocytes 0.6 0.8 - 5.3 10e9/L L FrStHsLb   Absolute Monocytes 0.3 0.0 - 1.3 10e9/L  FrStHsLb   Absolute Eosinophils 0.1 0.0 - 0.7 10e9/L  FrStHsLb   Absolute Basophils 0.0 0.0 - 0.2 10e9/L  FrStHsLb   Abs Immature Granulocytes 0.2 0 - 0.4 10e9/L  FrStHsLb   Absolute Nucleated RBC 0.0   FrStHsLb            Basic metabolic panel [[id number]] (Abnormal)  Resulted: 09/24/17 0818, Result status: Final result    Ordering provider: Michelle Manzano MD  09/24/17 0000 Resulting lab: [facility]    Specimen Information    Type Source Collected On   Blood  09/24/17 0744          Components       Value Reference Range Flag Lab   Sodium 132 133 - 144 mmol/L L FrStHsLb   Potassium 4.1 3.4 - 5.3 mmol/L  FrStHsLb   Chloride 98 94 - 109 mmol/L  FrStHsLb   Carbon Dioxide 27 20 - 32 mmol/L  FrStHsLb   Anion Gap 7 3 - 14 mmol/L  FrStHsLb   Glucose 126 70 - 99 mg/dL H FrStHsLb   Urea Nitrogen 20 7 - 30 mg/dL  FrStHsLb   Creatinine 0.46 0.52 - 1.04 mg/dL L FrStHsLb   GFR Estimate >90 >60 mL/min/1.7m2  FrStHsLb   Comment:  Non  GFR Calc   GFR Estimate If Black >90 >60 mL/min/1.7m2  FrStHsLb   Comment:  African American GFR Calc   Calcium 8.2 8.5 - 10.1 mg/dL L FrStHsLb            WBC and differential [[id number]] (Abnormal)  Resulted: 09/24/17 0804, Result status: Final result    Ordering provider: Michelle Manzano MD  09/24/17 0000 Resulting lab: [facility]    Specimen Information    Type Source Collected On   Blood  09/24/17 0744          Components       Value Reference Range Flag Lab   WBC 18.3 4.0 - 11.0 10e9/L H FrStHsLb   Diff Method Automated Method   FrStHsLb   % Neutrophils 87.5 %  FrStHsLb   % Lymphocytes 6.7 %  FrStHsLb   % Monocytes 3.1 %  FrStHsLb   % Eosinophils 1.3 %  FrStHsLb   % Basophils 0.1 %   FrStHsLb   % Immature Granulocytes 1.3 %  FrStHsLb   Nucleated RBCs 0 0 /100  FrStHsLb   Absolute Neutrophil 16.0 1.6 - 8.3 10e9/L H FrStHsLb   Absolute Lymphocytes 1.2 0.8 - 5.3 10e9/L  FrStHsLb   Absolute Monocytes 0.6 0.0 - 1.3 10e9/L  FrStHsLb   Absolute Eosinophils 0.2 0.0 - 0.7 10e9/L  FrStHsLb   Absolute Basophils 0.0 0.0 - 0.2 10e9/L  FrStHsLb   Abs Immature Granulocytes 0.2 0 - 0.4 10e9/L  FrStHsLb   Absolute Nucleated RBC 0.0   FrStHsLb            Potassium [[id number]]  Resulted: 09/23/17 2153, Result status: Final result    Ordering provider: Michelle Manzano MD  09/23/17 1813 Resulting lab: [facility]    Specimen Information    Type Source Collected On   Blood  09/23/17 2130          Components       Value Reference Range Flag Lab   Potassium 4.0 3.4 - 5.3 mmol/L  FrStHsLb            Testing Performed By     Lab - Abbreviation Name Director Address Valid Date Range    14 - FrStHsLb [facility] Unknown [address] [place] [address] 05/08/15 1057 - Present    225 - Unknown [facility] Unknown [address] [facility] [address] 12/19/14 0954 - Present               Imaging Results - 3 Days      XR Chest Port 1 View [[id number]]  Resulted: 09/25/17 1025, Result status: Final result    Ordering provider: Tamir Manrique MD  09/25/17 0844 Resulted by: Fabiana Rico MD    Performed: 09/25/17 0929 - 09/25/17 0949 Resulting lab: RADIOLOGY RESULTS    Narrative:       CHEST ONE VIEW UPRIGHT 9/25/2017 9:49 AM     HISTORY: Hypoxia.    COMPARISON: 9/16/2017.      Impression:       IMPRESSION: Minimal interstitial changes are similar to previous.  There are new minimal streaky densities at both lung bases which may  be atelectasis and/or infiltrate.    FABIANA RICO MD      Testing Performed By     Lab - Abbreviation Name Director Address Valid Date Range    104 - Rad Rslts RADIOLOGY RESULTS Unknown Unknown 02/16/05 1553 - Present             Encounter-Level Documents:     There are no encounter-level documents.      Order-Level Documents:     There are no order-level documents.

## 2017-09-06 NOTE — IP AVS SNAPSHOT
MRN:[id number]                      After Visit Summary   9/6/2017    Sury Barnett    MRN: [id number]           Thank you!     Thank you for choosing [facility] for your care. Our goal is always to provide you with excellent care. Hearing back from our patients is one way we can continue to improve our services. Please take a few minutes to complete the written survey that you may receive in the mail after you visit with us. Thank you!        Patient Information     Date Of Birth          8/27/1925        Designated Caregiver       Most Recent Value    Caregiver    Will someone help with your care after discharge? yes    Name of designated caregiver Crystal    Phone number of caregiver [phone]    Caregiver address same as pt      About your hospital stay     You were admitted on:  September 6, 2017 You last received care in the:  [facility] [address] [facility]    You were discharged on:  September 27, 2017        Reason for your hospital stay       Acute hemorrhagic stroke.                  Who to Call     For medical emergencies, please call 911.  For non-urgent questions about your medical care, please call your primary care provider or clinic, [phone]          Attending Provider     Provider Specialty    Beto Tejada MD Emergency Medicine    [facility]       Primary Care Provider Office Phone # Fax #    Keyla Ceron [phone]      After Care Instructions     Activity - Up with nursing assistance           Additional Discharge Instructions       Passive range of motion exercises/therapy  Frequent repositioning to prevent decubitus ulcer.  Monitor for signs of aspiration.  If neurological/cognitive function improves, and able to follow command and able to participate in therapy, then therapy order per LTC MD.            Advance Diet as Tolerated       Diet upon discharge: Adult Formula Drip Feeding: Continuous Isosource 1.5;  Nasoduodenal tube; Goal Rate: 35; mL/hr; Medication - Tube Feeding Flush Frequency: At least 15-30 mL water before and after medication administration and with tube clogging  - NPO strict for oral intake            Daily weights       Call Provider for weight gain of more than 2 pounds per day or 5 pounds per week.            Discharge Instructions       If questions or problems arise regarding tube function (e.g. leaking, dislodges, etc.) Contact Interventional Radiology department 24 hours a day.    For procedures that were done at [facility]:    8 AM - 4 PM Monday through Friday Contact the Nurse Line [phone]  For afterhours and weekends [phone]    Ask for the Interventional Radiologist-on call.     For procedures that were done at [facility]:  8 AM - 4 PM Monday through Friday Contact   [phone]  For afterhours and weekends: [phone]   Ask for the Interventional Radiologist on call.       IF DIRECTED by the RADIOLOGIST, related to specific problems with the tube functioning,  go to the Emergency Department.            Ramsay catheter       To straight gravity drainage. Change catheter every 2 weeks and PRN for leaking or decreased uring output with signs of bladder distention. DO NOT change catheter without a specific MD order IF diagnosis of  neurogenic bladder, or other urological conditions            General info for SNF       Length of Stay Estimate: Long Term Care  Condition at Discharge: Stable  Level of care:board and care  Rehabilitation Potential: Poor  Admission H&P remains valid and up-to-date: Yes  Recent Chemotherapy: N/A  Use Nursing Home Standing Orders: Yes            Intake and output       daily            Mantoux instructions       Give two-step Mantoux (PPD) Per Facility Policy Yes            Seizure precautions                 Follow-up Appointments     Follow Up and recommended labs and tests       Follow up with [facility].  The following  labs/tests are recommended: CBC and BMP with in a week.            Follow-up and recommended labs and tests        [facility] in 4-6 weeks with head CT prior.  Please call the clinic at [phone] to schedule your appointment with Vin Echeverria PA-C or Nga Fung PA-C.                  Additional Services     Nutrition Services Adult IP Consult       Reason:  Pt with tube feeds            Occupational Therapy Adult Consult       Evaluate and treat as clinically indicated.    Reason:  CVA, deconditioning and treat as able            Physical Therapy Adult Consult       Evaluate and treat as clinically indicated.    Reason:  CVA, deconditioning and treat as able            Speech Language Path Adult Consult       Evaluate and treat as clinically indicated.    Reason:  CVA, deconditioning and treat as able                  Future tests that were ordered for you     CT Head w/o contrast*       Administration of IV contrast (contrast agent, dose, and amount) will be tailored to this examination per the appropriate written protocol listed in the Protocol E-Book, or by the supervising imaging provider.                  Further instructions from your care team       Isosource 1.5 (or equivalent) @ goal of 35 mL/hr continuous.  Nutrisource Fiber, 1 packet BID  H2O flushes of 200 cc every 6 hours    Your risk factors for stroke or TIA (transient ischemic attack):    Your Risk Factors Your Results Normal Ranges   High blood pressure BP Readings from Last 1 Encounters:   09/23/17 91/52    Less than 120/80   Cholesterol              Total No results found for: CHOL   Less than 150    Triglycerides   No results found for: TRIG Less than 150   LDL No results found for: LDL    Less than 70   HDL No results found for: HDL         Greater than 40 (men)  Greater than 50 (women)   Diabetes   Recent Labs  Lab 09/23/17  0826   GLC 96    Fasting blood glucose    Smoking/tobacco use  Quit smoking  "and tobacco   Overweight  Lose 1-2 pounds a week   Lack of exercise  30 minutes moderate activity each day   Other risk factors include carotid (neck) artery disease, atrial fibrillation and stress. You may be on new medicine to treat high blood pressure, cholesterol, diabetes or atrial fibrillation.    Understanding Stroke Booklet given to patient. Please refer to booklet for further information.    Stroke warning signs and symptoms - CALL 911 right away for:  - Sudden numbness or weakness in the face, arm or leg (often on one side of the body).  - Sudden confusion or trouble understanding what is going on.  - Sudden blurred or decreased vision in one or both eyes.  - Sudden trouble speaking, loss of balance, dizziness or problems with coordination.  - Sudden, severe headache for no reason.  - Fainting or seizures.  - Symptoms may go away then come back suddenly.          Pending Results     No orders found from 9/4/2017 to 9/7/2017.            Statement of Approval     Ordered          09/27/17 0903  I have reviewed and agree with all the recommendations and orders detailed in this document.  EFFECTIVE NOW     Approved and electronically signed by:  Tamir Manrique MD             Admission Information     Date & Time Provider Department Dept. Phone    9/6/2017 Humza Townsend MD [address] [facility] [phone]      Your Vitals Were     Blood Pressure Pulse Temperature Respirations Height Weight    119/65 (BP Location: Right arm) 73 98  F (36.7  C) (Axillary) 18 1.499 m (4' 11\") 41.2 kg (90 lb 13.3 oz)    Pulse Oximetry BMI (Body Mass Index)                99% 18.35 kg/m2          emere Information     emere lets you send messages to your doctor, view your test results, renew your prescriptions, schedule appointments and more. To sign up, go to [email or web address] . Click on \"Log in\" on the left side of the screen, which will take you to the Welcome page. Then click on " "\"Sign up Now\" on the right side of the page.     You will be asked to enter the access code listed below, as well as some personal information. Please follow the directions to create your username and password.     Your access code is: [email or web address]  Expires: 10/22/2017  1:36 PM     Your access code will  in 90 days. If you need help or a new code, please call your [facility] clinic or [phone].        Care EveryWhere ID     This is your Care EveryWhere ID. This could be used by other organizations to access your [facility] medical records  [id number]        Equal Access to Services     [facility]: Haley Barajas, doe alvarez, delfina vargas, brendon jacome . So [facility] [phone].    ATENCIÓN: Si habla español, tiene a leonard disposición servicios gratuitos de asistencia lingüística. Llame al [phone].    We comply with applicable federal civil rights laws and [place] laws. We do not discriminate on the basis of race, color, national origin, age, disability sex, sexual orientation or gender identity.               Review of your medicines      START taking        Dose / Directions    acetaminophen 32 mg/mL solution   Commonly known as:  TYLENOL   Used for:  Generalized pain        Dose:  650 mg   20.3 mLs (650 mg) by Per Feeding Tube route every 4 hours as needed for mild pain   Quantity:  300 mL   Refills:  0       * amLODIPine 10 MG tablet   Commonly known as:  NORVASC   Used for:  Essential hypertension        Dose:  10 mg   1 tablet (10 mg) by Oral or Feeding Tube route daily   Quantity:  30 tablet   Refills:  0       * amLODIPine 5 MG tablet   Commonly known as:  NORVASC   Used for:  Essential hypertension        Dose:  5 mg   1 tablet (5 mg) by Oral or Feeding Tube route daily   Quantity:  30 tablet   Refills:  0       amoxicillin 875 MG tablet   Commonly known as:  AMOXIL   Indication:  Urinary Tract Infection   Used for:  Urinary tract " infection associated with indwelling urethral catheter, initial encounter (H)        Dose:  875 mg   1 tablet (875 mg) by Per Feeding Tube route every 12 hours for 8 days   Refills:  0       fiber modular packet   Used for:  On tube feeding diet        Dose:  1 packet   1 packet by Per Feeding Tube route 2 times daily   Refills:  0       hypromellose-dextran Soln ophthalmic solution   Used for:  Dry eyes        Dose:  2-3 drop   Apply 2-3 drops to eye every hour as needed for dry eyes   Refills:  0       metoprolol 10 mg/mL Susp   Commonly known as:  LOPRESSOR   Used for:  Essential hypertension        Dose:  25 mg   2.5 mLs (25 mg) by Oral or Feeding Tube route 2 times daily   Refills:  0       nystatin 797850 UNIT/ML suspension   Commonly known as:  MYCOSTATIN   Used for:  Oral thrush        Dose:  786144 Units   Swish and swallow 5 mLs (500,000 Units) in mouth 4 times daily   Quantity:  280 mL   Refills:  0       pantoprazole Susp suspension   Commonly known as:  PROTONIX   Used for:  Gastroesophageal reflux disease without esophagitis        Dose:  40 mg   20 mLs (40 mg) by Per Feeding Tube route daily   Refills:  0       ramipril 10 MG capsule   Commonly known as:  ALTACE   Used for:  Essential hypertension        Dose:  10 mg   1 capsule (10 mg) by Oral or Feeding Tube route daily   Quantity:  30 capsule   Refills:  0       * Notice:  This list has 2 medication(s) that are the same as other medications prescribed for you. Read the directions carefully, and ask your doctor or other care provider to review them with you.      CONTINUE these medicines which may have CHANGED, or have new prescriptions. If we are uncertain of the size of tablets/capsules you have at home, strength may be listed as something that might have changed.        Dose / Directions    levothyroxine 50 MCG tablet   Commonly known as:  SYNTHROID/LEVOTHROID   This may have changed:    - medication strength  - how to take this  - when to take  this   Used for:  Hypothyroidism, unspecified type        Dose:  50 mcg   1 tablet (50 mcg) by Oral or Feeding Tube route every morning (before breakfast)   Quantity:  30 tablet   Refills:  0         CONTINUE these medicines which have NOT CHANGED        Dose / Directions    VITAMIN D (CHOLECALCIFEROL) PO        Dose:  87365 Units   Take 50,000 Units by mouth once a week Friday   Refills:  0         STOP taking     LISINOPRIL PO                Where to get your medicines      Some of these will need a paper prescription and others can be bought over the counter. Ask your nurse if you have questions.     You don't need a prescription for these medications     acetaminophen 32 mg/mL solution    amLODIPine 10 MG tablet    amLODIPine 5 MG tablet    amoxicillin 875 MG tablet    fiber modular packet    hypromellose-dextran Soln ophthalmic solution    levothyroxine 50 MCG tablet    metoprolol 10 mg/mL Susp    nystatin 340230 UNIT/ML suspension    pantoprazole Susp suspension    ramipril 10 MG capsule               ANTIBIOTIC INSTRUCTION     You've Been Prescribed an Antibiotic - Now What?  Your healthcare team thinks that you or your loved one might have an infection. Some infections can be treated with antibiotics, which are powerful, life-saving drugs. Like all medications, antibiotics have side effects and should only be used when necessary. There are some important things you should know about your antibiotic treatment.      Your healthcare team may run tests before you start taking an antibiotic.    Your team may take samples (e.g., from your blood, urine or other areas) to run tests to look for bacteria. These test can be important to determine if you need an antibiotic at all and, if you do, which antibiotic will work best.      Within a few days, your healthcare team might change or even stop your antibiotic.    Your team may start you on an antibiotic while they are working to find out what is making you  sick.    Your team might change your antibiotic because test results show that a different antibiotic would be better to treat your infection.    In some cases, once your team has more information, they learn that you do not need an antibiotic at all. They may find out that you don't have an infection, or that the antibiotic you're taking won't work against your infection. For example, an infection caused by a virus can't be treated with antibiotics. Staying on an antibiotic when you don't need it is more likely to be harmful than helpful.      You may experience side effects from your antibiotic.    Like all medications, antibiotics have side effects. Some of these can be serious.    Let you healthcare team know if you have any known allergies when you are admitted to the hospital.    One significant side effect of nearly all antibiotics is the risk of severe and sometimes deadly diarrhea caused by Clostridium difficile (C. Difficile). This occurs when a person takes antibiotics because some good germs are destroyed. Antibiotic use allows C. diificile to take over, putting patients at high risk for this serious infection.    As a patient or caregiver, it is important to understand your or your loved one's antibiotic treatment. It is especially important for caregivers to speak up when patients can't speak for themselves. Here are some important questions to ask your healthcare team.    What infection is this antibiotic treating and how do you know I have that infection?    What side effects might occur from this antibiotic?    How long will I need to take this antibiotic?    Is it safe to take this antibiotic with other medications or supplements (e.g., vitamins) that I am taking?     Are there any special directions I need to know about taking this antibiotic? For example, should I take it with food?    How will I be monitored to know whether my infection is responding to the antibiotic?    What tests may help to  make sure the right antibiotic is prescribed for me?      Information provided by:  www.cdc.gov/getsmart  U.S. Department of Health and Human Services  Centers for disease Control and Prevention  National Center for Emerging and Zoonotic Infectious Diseases  Division of Healthcare Quality Promotion         Protect others around you: Learn how to safely use, store and throw away your medicines at www.disposemymeds.org.             Medication List: This is a list of all your medications and when to take them. Check marks below indicate your daily home schedule. Keep this list as a reference.      Medications           Morning Afternoon Evening Bedtime As Needed    acetaminophen 32 mg/mL solution   Commonly known as:  TYLENOL   20.3 mLs (650 mg) by Per Feeding Tube route every 4 hours as needed for mild pain   Last time this was given:  650 mg on 9/21/2017  9:34 AM                                * amLODIPine 10 MG tablet   Commonly known as:  NORVASC   1 tablet (10 mg) by Oral or Feeding Tube route daily   Last time this was given:  5 mg on 9/26/2017  9:14 AM                                * amLODIPine 5 MG tablet   Commonly known as:  NORVASC   1 tablet (5 mg) by Oral or Feeding Tube route daily   Last time this was given:  5 mg on 9/26/2017  9:14 AM                                amoxicillin 875 MG tablet   Commonly known as:  AMOXIL   1 tablet (875 mg) by Per Feeding Tube route every 12 hours for 8 days   Last time this was given:  875 mg on 9/26/2017  7:41 PM                                fiber modular packet   1 packet by Per Feeding Tube route 2 times daily   Last time this was given:  1 packet on 9/26/2017  9:41 PM                                hypromellose-dextran Soln ophthalmic solution   Apply 2-3 drops to eye every hour as needed for dry eyes                                levothyroxine 50 MCG tablet   Commonly known as:  SYNTHROID/LEVOTHROID   1 tablet (50 mcg) by Oral or Feeding Tube route every morning  (before breakfast)   Last time this was given:  50 mcg on 9/27/2017  6:39 AM                                metoprolol 10 mg/mL Susp   Commonly known as:  LOPRESSOR   2.5 mLs (25 mg) by Oral or Feeding Tube route 2 times daily   Last time this was given:  25 mg on 9/26/2017  9:41 PM                                nystatin 484337 UNIT/ML suspension   Commonly known as:  MYCOSTATIN   Swish and swallow 5 mLs (500,000 Units) in mouth 4 times daily   Last time this was given:  500,000 Units on 9/26/2017 11:55 PM                                pantoprazole Susp suspension   Commonly known as:  PROTONIX   20 mLs (40 mg) by Per Feeding Tube route daily   Last time this was given:  40 mg on 9/26/2017  9:14 AM                                ramipril 10 MG capsule   Commonly known as:  ALTACE   1 capsule (10 mg) by Oral or Feeding Tube route daily   Last time this was given:  10 mg on 9/26/2017  7:41 PM                                VITAMIN D (CHOLECALCIFEROL) PO   Take 50,000 Units by mouth once a week Friday                                * Notice:  This list has 2 medication(s) that are the same as other medications prescribed for you. Read the directions carefully, and ask your doctor or other care provider to review them with you.              More Information      About C. diff Infection  For Patients, Family and Visitors  What is C. diff (clostridium difficile)?  C. diff are germs (bacteria). These germs can live in the guts of healthy people. Antibiotic medicine can change the balance of germs in the gut, causing C. diff infection and diarrhea (dye--[facility]).  You can also get C. diff infection during a hospital stay, after surgery, or if you have a weak immune system or IBD (inflammatory bowel disease).  What are the symptoms?  If you have these symptoms, your doctor will ask for a stool (poop) sample for testing:    Diarrhea (loose, watery stools)    Belly pain, tenderness and cramping    Fever  How does it  spread?  C. diff leaves your body through your stool. You can get infected when :  1. You touch a surface that has this germ, then  2. You touch food or something else that you put in your mouth.  Here's how you, your family and visitors can help prevent the infection from spreading:     Wash hands often, especially in the hospital, after using the bathroom and before you eat.    Use antibiotics only when you need them. Don't ask for them if your doctor says you have a virus.    If you take antibiotics, follow the directions. Finish all the pills, even if you feel better.    If you have C. diff infection, try to use a separate restroom until you are well.    At home, clean countertops, sinks, faucets, bathroom doorknobs and toilets often. Use warm water with soap or cleaning products with bleach. (Don't use pure bleach. It's too strong.)    In the hospital, your care team should wear gloves and gowns. They should clean their hands before touching you and before leaving the room. If they don't, please remind them.  Hand washing  For this illness, soap and water works better than hand .    Wash hands with warm water and plenty of soap. Wash for 15 to 20 seconds.    Clean under nails, between fingers and up the wrists.    Rinse hands, letting water run down your fingers.    Dry hands well. Use a paper towel to turn off the faucet and open the door.   How is it treated?  Your doctor may change your antibiotics and give you medicine for diarrhea. Don't take other anti-diarrhea medicines. They will make things worse.  You may get extra fluids through an IV (small tube in the arm or hand).  Sometimes, the infection will come back. If you have symptoms, please call your doctor.   For informational purposes only. Not to replace the advice of your health care provider. Copyright   2014 [employer]. All rights reserved. [employer] 930961 - REV 05/16.            What is Hemorrhagic Stroke?     The carotids  are large arteries that carry blood from the heart to the brain.   The brain needs a constant supply of blood to work. During a stroke, blood stops flowing to part of the brain. The affected area is damaged. Its functions are harmed or even lost. Most strokes are caused by a blockage in a blood vessel that supplies the brain. They can also occur if a blood vessel in the brain breaks open (ruptures).  From the heart to the brain  The heart is a pump. It sends oxygen-rich blood out through blood vessels called arteries. Carotid arteries carry blood from the heart to the brain. Blood vessels in the brain carry oxygen-rich blood to brain tissue.  How a stroke occurs  Hemorrhagic stroke occurs when a blood vessel in the brain ruptures. This lets blood spill into or build up in nearby brain tissue. The extra blood presses on those brain cells and can damage them or even cause them to die. Other brain cells die because their normal blood supply is cut off because of high pressure in the skull.     Blood from a ruptured artery puts pressure on the brain and damages brain cells.     Date Last Reviewed: 6/15/2015    5898-8001 Standardized Safety. [address] [place] [address]. All rights reserved. This information is not intended as a substitute for professional medical care. Always follow your healthcare professional's instructions.

## 2017-09-06 NOTE — IP AVS SNAPSHOT
[address] [facility]    [facility] [address]    [place] [address]    Phone:  [phone]                                       After Visit Summary   9/6/2017    Sury Barnett    MRN: [id number]           After Visit Summary Signature Page     I have received my discharge instructions, and my questions have been answered. I have discussed any challenges I see with this plan with the nurse or doctor.    ..........................................................................................................................................  Patient/Patient Representative Signature      ..........................................................................................................................................  Patient Representative Print Name and Relationship to Patient    ..................................................               ................................................  Date                                            Time    ..........................................................................................................................................  Reviewed by Signature/Title    ...................................................              ..............................................  Date                                                            Time

## 2017-09-06 NOTE — CONSULTS
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  ---One microhemorrhage is located in the area of present hemorrhage  (I10) Essential hypertension  --Keep BP <140 mm Hg  --Nicardipine infusion  #. DVT Prophylaxis  --Mechanical   #. PT/OT/Speech  --Start  evaluations  #. Nutrition / GI Prophylaxis  --Per recommendations of speech therapy    #. Code Status: Full Code    Discussed with family. They want to maintain full code for now         TIME:   Neurocritical care time:  60 minutes for evaluation and management of large ICH Patient is critically ill / has a very high risk of deterioration  ----------------------------------------------------------------------------------  ----------------------------------------------------------------------------------  Reason for consult: I was asked by Dr. Townsend to evaluate this patient for ICH.    Chief Complaint   ______________________________________________  fall  History is obtained from the patient    History of Present Illness   ______________________________________________  89-year-old female with history of hypertension and hypothyroidism who presents to the Emergency Room after she had a fall and was subsequently weak on the left side.  The history was  predominantly obtained from the daughter who was at the bedside.  The patient apparently was in the mall with her daughter picking of her glasses.  She apparently went out of the mall in a wheelchair and had reached the parking lot.  The daughter went on to get her combination keys to open the door of her car.  In the process, the patient had attempted to get up from the wheelchair and in the process fell backwards and hit her head.  Per her daughter, the patient is usually very healthy and independent with ambulation except for long distances.  Given the distance from the glasses store to the parking lot, she had her in the wheelchair, but normally, she walks short distances without any problem.  Right before the fall, she had not complained of any new symptoms to her daughter.  There was no headache or vision change reported.  No lightheadedness or dizziness reported.  Otherwise, the patient has been in her usual state of health.  There is no recent history of chest pain, shortness of breath or palpitations.  No diarrhea or vomiting.  No dysuria, urinary urgency or frequency.  EMS was called, and when they arrived at the scene, they found that she was significantly weak on the left side without any movement.  She was eventually brought to [facility] Emergency Room.       In the Emergency Room, the patient was evaluated by Dr. Tejada.  She was found to have a gaze preference on the right side and an occipital hematoma.  She was also found to be weak on the left upper and lower extremities.  A CT head was done which showed a new large intracerebral hemorrhage measuring 5.5 x 4.2 x 3.5 cm.  There was no resultant midline shift. NCC consulted to provide care in ICU    Past Medical History    ______________________________________________  Past Medical History:   Diagnosis Date     Hypertension      Thyroid disease      Past Surgical History   ______________________________________________  Past Surgical  "History:   Procedure Laterality Date     COLONOSCOPY  7/15/2013    Procedure: COLONOSCOPY;  Colonoscopy ;  Surgeon: Maida Rehman MD;  Location:  GI     HEAD & NECK SURGERY      tonsil      TONSILLECTOMY       Prior to Admission Medications   ______________________________________________  Prior to Admission Medications   Prescriptions Last Dose Informant Patient Reported? Taking?   LISINOPRIL PO 9/6/2017 at am  Yes Yes   Sig: Take 20 mg by mouth daily    Levothyroxine Sodium (LEVOTHROID PO) 9/6/2017 at am  Yes Yes   Sig: Take 50 mcg by mouth daily    VITAMIN D, CHOLECALCIFEROL, PO 9/1/2017  Yes Yes   Sig: Take 50,000 Units by mouth once a week Friday      Facility-Administered Medications: None     Allergies   Allergies   Allergen Reactions     Sulfa Drugs Other (See Comments)     Really sick and high fever     Amoxicillin        Social History   ______________________________________________  Social History     Social History     Marital status:      Spouse name: N/A     Number of children: N/A     Years of education: N/A     Social History Main Topics     Smoking status: Never Smoker     Smokeless tobacco: None     Alcohol use No     Drug use: No     Sexual activity: Not Asked     Other Topics Concern     None     Social History Narrative         Family History   ______________________________________________  Family History   Problem Relation Age of Onset     Cancer - colorectal Father      Hypertension Father      Colon Cancer Father      Hypertension Mother      Hyperlipidemia Mother      Hypertension Sister      Hyperlipidemia Sister          Review of Systems   ______________________________________________  Review of systems is not obtainable due to patient factors - mental status      Physical Exam   ______________________________________________  Weight:92 lbs 9.49 oz; Height:4' 11\"  Temp: 97.6  F (36.4  C) Temp src: Axillary BP: 114/61   Heart Rate: 107 Resp: 15 SpO2: 99 % O2 Device: None " (Room air) Oxygen Delivery: 1 LPM  General Appearance:  No acute distress  Neuro:       Mental Status Exam:   Awake, not alert, disoriented. Speech and language are very minimal and slurred. Mental status is decreased       Cranial Nerves:  Does not open eyes. Pupils 3 mm, reactive. EOMI. Face sensation is normal. Face is symmetric.Tongue and uvula are midline. Other CN are normal           Motor: left sided weakness. Tone and bulk are normal           Reflexes:  Normal DTR.Toes downgoing.        Sensory: Untestable               Coordination:  Untestable         Gait: Untestable    Neck: no nuchal rigidity, normal thyroid. No carotid bruits.    Cardiovascular: Regular rate and rhythm, no m/r/g  Lungs: Clear to auscultation  Abdomen: Soft, not tender, not distended  Extremities: No clubbing, no cyanosis, no edema    Data   ______________________________________________  All Data personally reviewed:       Labs:   CBC RESULTS:     Recent Labs  Lab 09/06/17  1310   WBC 8.6   RBC 4.32   HGB 13.6   HCT 39.5        Basic Metabolic Panel:   Recent Labs   Lab Test  09/06/17   1310  07/24/17   0945   NA  132*  135   POTASSIUM  3.9  4.1   CHLORIDE  101  103   CO2  25  28   BUN  13  19   CR  0.71  0.63   GLC  99  84   ANTHONY  9.7  9.2     Liver panel:  No lab results found.  INR:  Recent Labs   Lab Test  09/06/17   1310   INR  0.99      Lipid Profile:No lab results found.  Thyroid Panel:No lab results found.   Vitamin B12: No lab results found.   Vitamin D level: No lab results found.  A1C: No lab results found.  Troponin I: No lab results found.  Ammonia: No lab results found.  CK: No lab results found.     CRP inflammation: No lab results found.  ESR: No lab results found.    RAJ: No lab results found.    ANCA: No lab results found.   Drug Screen: No lab results found.  Alcohol level:No lab results found.  UA Results:  No lab results found.  Most Recent 6 Bacteria Isolates From Any Culture (See EPIC Reports for Culture  Details):No lab results found.     Cardiac US:   --       Neurophysiology:   --       Imaging:   All imaging studies were reviewed personally  CT head:   1. New large hyperdense mass in the right frontal lobe measuring 5.5 x  4.2 x 3.5 cm consistent with a large parenchymal hematoma. This does  not result in any significant midline shift or effacement of the basal  cisterns.  2. Diffuse cerebral volume loss and cerebral white matter changes  consistent with chronic small vessel ischemic disease.     MRI brain 7/24/17:   1. No acute pathology is identified. No bleed, mass, or acute infarcts  are seen.  2. Age-related changes including generalized atrophy of the brain.  White matter changes in the cerebral hemispheres consistent with small  vessel ischemic disease in this age patient.  3. Tiny incidental lipoma along the right tentorium.  4. 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           Recent Results (from the past 24 hour(s))   CT Head w/o Contrast   Result Value    Radiologist flags Intracranial hemorrhage (AA)    Narrative    CT OF THE HEAD WITHOUT CONTRAST 9/6/2017 1:23 PM     COMPARISON: Brain MR 7/24/2017.    HISTORY: Stroke.    TECHNIQUE: 5 mm thick axial CT images of the head were acquired  without IV contrast material.    FINDINGS: There is a new large hyperdense intra-axial mass centered in  the right frontal lobe consistent with a large parenchymal hematoma  measuring 5.5 x 4.2 x 3.5 cm in the AP, transverse and cephalocaudad  dimensions respectively. There is a minimal amount of subarachnoid  extension of hemorrhage surrounding this parenchymal hematoma. Mass  effect due to the hematoma results in minimal local sulcal effacement  but no significant midline shift and no effacement of the basal  cisterns. There is no intraventricular extension of hemorrhage.     There is moderate diffuse cerebral volume loss. There are extensive  confluent areas of decreased density in the  cerebral white matter  bilaterally that are consistent with sequela of chronic small vessel  ischemic disease disease. The ventricles and basal cisterns are within  normal limits in configuration given the degree of cerebral volume  loss.    No intracranial mass or recent infarct.    The visualized paranasal sinuses are well-aerated. There is no  mastoiditis. There are no fractures of the visualized bones.      Impression    IMPRESSION:   1. New large hyperdense mass in the right frontal lobe measuring 5.5 x  4.2 x 3.5 cm consistent with a large parenchymal hematoma. This does  not result in any significant midline shift or effacement of the basal  cisterns.  2. Diffuse cerebral volume loss and cerebral white matter changes  consistent with chronic small vessel ischemic disease.     [Critical Result: Intracranial hemorrhage]    Finding was identified on 9/6/2017 1:24 PM.     Beto Tejada was contacted by Dr. Goff on 9/6/2017 1:25 PM and  verbalized understanding of the critical result.       Radiation dose for this scan was reduced using automated exposure  control, adjustment of the mA and/or kV according to patient size, or  iterative reconstruction technique.    TRIXIE GOFF MD   Cervical spine CT w/o contrast    Narrative    CT OF THE CERVICAL SPINE WITHOUT CONTRAST   9/6/2017 1:28 PM     COMPARISON: None.    HISTORY: Trauma.     TECHNIQUE: Axial images of the cervical spine were acquired without  intravenous contrast. Multiplanar reformations were created.      FINDINGS: There is normal alignment of the cervical vertebrae.  Vertebral body heights of the cervical spine are normal.  Craniocervical alignment is normal. There are no fractures of the  cervical spine.  There is moderate facet arthropathy on the left at  the C3-C4, C4-C5 and C5-C6 levels. There is degenerative endplate  spurring at the C4-C5 level. There is mild degenerative spinal canal  narrowing from lower C3 to upper C5. There is no  prevertebral soft  tissue swelling.      Impression    IMPRESSION: Degenerative changes of the cervical spine. No evidence  for fracture or traumatic malalignment.    Radiation dose for this scan was reduced using automated exposure  control, adjustment of the mA and/or kV according to patient size, or  iterative reconstruction technique.    TRIXIE VACA MD

## 2017-09-06 NOTE — IP AVS SNAPSHOT
` `     [place] [address] [facility]: [phone]                 INTERAGENCY TRANSFER FORM - NOTES (H&P, Discharge Summary, Consults, Procedures, Therapies)   2017                    Hospital Admission Date: 2017  SURY BARNETT   : 1925  Sex: Female        Patient PCP Information     Provider PCP Type    Keyla Ceron MD General         History & Physicals      H&P signed by Humza Townsend MD at 2017  3:59 PM      Author:  Humza Townsend MD Service:  Hospitalist Author Type:  Physician    Filed:  2017  3:59 PM Date of Service:  2017  2:45 PM Creation Time:  2017  3:28 PM    Status:  Signed :  Humza Townsend MD (Physician)         PRIMARY CARE PROVIDER:  Keyla Ceron MD      CHIEF COMPLAINT:  Fall.      HISTORY OF PRESENT ILLNESS:  Ms. Sury Barnett is a 89-year-old female with history of hypertension and hypothyroidism who presents to the Emergency Room after she had a fall and was subsequently weak on the left side.  The history was predominantly obtained from the daughter who was at the bedside.  The patient apparently was in the mall with her daughter picking of her glasses.  She apparently went out of the mall in a wheelchair and had reached the parking lot.  The daughter went on to get her combination keys to open the door of her car.  In the process, the patient had attempted to get up from the wheelchair and in the process fell backwards and hit her head.  Per her daughter, the patient is usually very healthy and independent with ambulation except for long distances.  Given the distance from the glasses store to the parking lot, she had her in the wheelchair, but normally, she walks short distances without any problem.  Right before the fall, she had not complained of any new symptoms to her daughter.  There was no headache or vision change reported.  No lightheadedness or dizziness reported.  Otherwise, the patient has been in her usual state of  health.  There is no recent history of chest pain, shortness of breath or palpitations.  No diarrhea or vomiting.  No dysuria, urinary urgency or frequency.  EMS was called, and when they arrived at the scene, they found that she was significantly weak on the left side without any movement.  She was eventually brought to [facility] Emergency Room.      In the Emergency Room, the patient was evaluated by Dr. Tejada.  She was found to have a gaze preference on the right side and an occipital hematoma.  She was also found to be weak on the left upper and lower extremities.  A CT head was done which showed a new large intracerebral hemorrhage measuring 5.5 x 4.2 x 3.5 cm.  There was no resultant midline shift.  Dr. Tejada called Neurosurgery, who recommended hospitalist admission to the ICU.      PAST MEDICAL HISTORY:   1.  Hypertension.   2.  Hypothyroidism.      ALLERGIES:  Sulfa and penicillin.      SOCIAL AND PERSONAL HISTORY:  The patient lives with her daughter.  No tobacco, alcohol or recreational drug use.      FAMILY HISTORY:  Reviewed and is noncontributory.      HOME MEDICATIONS:   1.  Levothyroxine 50 mcg daily.   2.  Lisinopril 20 mg daily.   3.  Vitamin D 50,000 units once a week.      REVIEW OF SYSTEMS:  A complete review was done and was negative for anything else other than that mentioned in the HPI.      PHYSICAL EXAMINATION:   GENERAL:  Ms. Barnett is a 89-year-old female.  She is not in any obvious distress.  She does have a preferential gaze on the right side.   VITAL SIGNS:  Temperature 97.9, blood pressure 144/71, heart rate 116, respiratory rate 17, O2 sat 98% on room air.   HEENT:  She has a hematoma on the occipital area, otherwise no bruising or laceration on the face.  Pupils are 3 mm bilaterally.   NECK:  Supple, with good range of motion.   RESPIRATORY:  Good air entry bilaterally with normal effort of breathing.   CARDIOVASCULAR:  Tachycardic, no murmur.   GASTROINTESTINAL:  Abdomen is  soft, nontender, nondistended, bowel sounds normoactive.   EXTREMITIES:  There is no edema.   SKIN:  Warm and dry.   NEUROLOGIC:  She is awake, answers simple questions appropriately.  She does have a left facial droop and the muscle strength is 0/5 on the left upper and lower extremities.      LABORATORY AND DIAGNOSTIC DATA:   1.  Sodium is 132.  Electrolytes are within normal limits.  CBC is unremarkable.  INR is 0.99.   2.  CT of the cervical spine shows degenerative changes, no acute fracture or traumatic malalignment.   3.  CT head as described above with large right frontal lobe intraparenchymal hemorrhage.      ASSESSMENT AND PLAN:  Ms. Barnett is a 89-year-old pretty healthy female with history of hypertension and hypothyroidism who presents with a fall and was found to have large right frontal lobe intracranial hemorrhage.   1.  Large right frontal intracranial hemorrhage.  The patient presents after a fall and was found on the CT head to have a large intraparenchymal hemorrhage.  More than likely, the patient sustained this before the fall.  Given the location of the hemorrhage and no other subdural or extradural bleed, that is the most likely mechanism.  More than likely, this is related to her hypertension.  Despite the size, there is no midline shift and the patient appears to be fairly lucid and responding appropriately.  She will be admitted to ICU.  Neurosurgery is about to see the patient right now, but my understanding is there is no indication for surgical intervention at this time.  She will be on a nicardipine drip with a blood pressure goal of less than 140.  I will also consult Neurocritical Care.  Will monitor her neurological status.  If anything changes, will get a repeat head CT, otherwise repeat head CT in the morning.   2.  Mild hyponatremia.  Her sodium today is 132.  I do not have much for comparison.  She had one in July that was 135.  Will defer management of hyponatremia in the  context of intracranial hemorrhage to Neurocritical Care.  At this time, I will place her on normal saline at 100 mL per hour.   3.  Benign essential hypertension.  The patient does have a history of hypertension and is normally on lisinopril.  I will hold lisinopril as above.  She will be on nicardipine drip.   4.  Hypothyroidism.  The patient is on levothyroxine 50 mcg daily.  That will be resumed once her swallowing is assessed.      ANTICIPATED LENGTH OF STAY:  More than 2 midnights.      CODE STATUS:  This was discussed with the daughter at the bedside.  She is going to remain a FULL CODE for now.         JACINTA FISHER MD             D: 2017 14:45   T: 2017 15:27   MT: TS      Name:     SURY BARNETT   MRN:      [id number]        Account:      [id number]   :      1925           Admitted:     [id number]      Document: [id number]       cc: Keyla Ceron MD[NB1.1]        Revision History        User Key Date/Time User Provider Type Action    > NB1.1 2017  3:59 PM Jacinta Fisher MD Physician Sign                  Discharge Summaries     No notes of this type exist for this encounter.         Consult Notes      Consults by Turner Cassidy MD at 2017 12:25 PM     Author:  Turner Cassidy MD Service:  Surgery Author Type:  Physician    Filed:  2017 12:25 PM Date of Service:  2017 12:25 PM Creation Time:  2017 12:14 PM    Status:  Signed :  Turner Cassidy MD (Physician)     Consult Orders:    1. Surgery General IP Consult: Patient to be seen: Routine - within 24 hours; abnormal abd CT with leukocytosis: ?blood in pelvis; Consultant may enter orders: Yes [[id number]] ordered by Eric Wong MD at 17 0127                Surgery Consultation     Sury Barnett MRN# [id number]   YOB: 1925 Age: 89 year old   Date of Admission: 2017     Reason for consult: I was asked by Dr. Wong to evaluate this patient for pneumoperitoneum and fluid in  the pelvis.           Assessment and Plan:   Post procedural pneumoperitoneum and pelvic fluid.    Observation, resumption of enteric feeds, consider repeat imaging or surgical intervention if significant changes occur clinically.               Chief Complaint:   Due to elevation of white blood cell count CT scan of the abdomen was obtained, pneumoperitoneum and retroperitoneal air as well as some pelvic fluid can be observed.    History is obtained from the electronic health record and patient's daughter         History of Present Illness:   This patient is a 89 year old female who presents with recently placed percutaneous gastrostomy tube, due to elevation of her white blood cell, CT scan of the abdomen was obtained and this showed retroperitoneal air, some pneumoperitoneum as well as some pelvic fluid.  No extravasation of contrast can be seen.            Past Medical History:[LL1.1]     Past Medical History:   Diagnosis Date     Hypertension      Thyroid disease[LL1.2]              Past Surgical History:[LL1.1]     Past Surgical History:   Procedure Laterality Date     COLONOSCOPY  7/15/2013    Procedure: COLONOSCOPY;  Colonoscopy ;  Surgeon: Maida Rehman MD;  Location:  GI     HEAD & NECK SURGERY      tonsil      TONSILLECTOMY[LL1.3]                  Social History:[LL1.1]     Social History   Substance Use Topics     Smoking status: Never Smoker     Smokeless tobacco: Not on file     Alcohol use No[LL1.3]             Family History:[LL1.1]     Family History   Problem Relation Age of Onset     Cancer - colorectal Father      Hypertension Father      Colon Cancer Father      Hypertension Mother      Hyperlipidemia Mother      Hypertension Sister      Hyperlipidemia Sister[LL1.3]              Immunizations:[LL1.1]   There is no immunization history for the selected administration types on file for this patient.[LL1.3]          Allergies:[LL1.1]     Allergies   Allergen Reactions     [place] Flavor  Difficulty breathing     Sulfa Drugs Other (See Comments)     Really sick and high fever     Amoxicillin[LL1.3]              Medications:[LL1.1]       [START ON 9/24/2017] amLODIPine  5 mg Oral or Feeding Tube Daily     metoprolol  25 mg Oral or Feeding Tube BID     ramipril  10 mg Oral or Feeding Tube Daily     fiber modular  1 packet Per Feeding Tube BID     levothyroxine  50 mcg Oral or Feeding Tube QAM AC     sodium chloride (PF)  3 mL Intracatheter Q8H     pantoprazole  40 mg Per Feeding Tube Daily[LL1.4]             Review of Systems:   Review of systems not obtained due to patient factors - condition           Physical Exam:   Vitals were reviewed  Abdomen:   soft, non-distended, non-tender, involuntary guarding absent, no masses palpated           Data:   All laboratory and imaging data in the past 24 hours reviewed[LL1.1]         Revision History        User Key Date/Time User Provider Type Action    > LL1.4 9/23/2017 12:25 PM Turner Cassidy MD Physician Sign     LL1.3 9/23/2017 12:22 PM Turner Cassidy MD Physician      LL1.2 9/23/2017 12:21 PM Turner Cassidy MD Physician      LL1.1 9/23/2017 12:14 PM Turner Cassidy MD Physician             Consults signed by Antoni Mckenna MD at 9/22/2017  3:30 PM      Author:  Antoni Mckenna MD Service:  Infectious Disease Author Type:  Physician    Filed:  9/22/2017  3:30 PM Date of Service:  9/22/2017 12:50 PM Creation Time:  9/22/2017  1:22 PM    Status:  Signed :  Antoni Mckenna MD (Physician)         INFECTIOUS DISEASE CONSULTATION      ATTENDING PHYSICIAN:  Michelle Manzano MD      REASON FOR CONSULTATION:  Asked by Dr. Manzano to assess leukocytosis.      IMPRESSION:   1.  Sury Barnett is a 89-year-old female with history of hypertension.   2.  Admitted on 09/06 with change in mental status and found to have large right frontal intracranial hemorrhage.  She has residual left hemiplegia.   3.  Persistent leukocytosis with white blood  count around 20,000.  I suspect that this most likely is stress induced.  There are no signs of infection on exam and the patient has had a negative evaluation for infection including negative blood cultures, small numbers of Enterococcus on urine culture which probably represent colonization given benign urinalysis, negative Clostridium difficile study, absence of pneumonia on chest x-ray.  The patient is not receiving steroid medications.  Procalcitonin is low at 0.08.      RECOMMENDATIONS:   1.  No indication for antibiotic therapy at this time.   2.  Only additional testing to consider at this time would be abdominal CT scan, though no suggestion of intra-abdominal infection by examination.      HISTORY OF PRESENT ILLNESS:  This is a 89-year-old female with a history of hypertension and hypothyroidism who was brought into the hospital because of change of mental status and found to have large right frontal intracranial hemorrhage.  She then suffered a fall.  It was felt that more likely the hemorrhage preceeded the fall.  Initial white blood count was elevated and has remained so.  She has not had significant fever during this hospitalization with the exception of one reading to 101 degrees.  She is afebrile at present.  Blood cultures have been negative.  Urine culture showed fewer than 10,000 Enterococcus, but urinalysis showed only 1 white blood cell.  Chest x-ray has shown no pneumonia.  The patient is receiving tube feedings and has had loose stools, but C. difficile has been negative on 2 occasions.  The patient is not receiving any prednisone or other steroid medication at present.  She did receive a 7-day course of levofloxacin.  Procalcitonin has been low on several occasions.      PAST MEDICAL HISTORY:   1.  Hypertension.   2.  Hypothyroidism.   3.  Intracranial hemorrhage.      ALLERGIES:  Listed to sulfa and penicillin.      SOCIAL HISTORY:  Unobtainable.      FAMILY HISTORY:  Unobtainable.       REVIEW OF SYSTEMS:  Unobtainable.      PHYSICAL EXAMINATION:   VITAL SIGNS:  Temp 97.4, blood pressure 107/57, heart rate 69 and regular.   GENERAL:  Well-developed, well-nourished, unresponsive.   SKIN:  No rashes or nodules.   HEENT:  Eyes with no subconjunctival hemorrhages or scleral icterus.  Oropharynx:  Unable to evaluate.   NECK:  Supple, no thyromegaly.   LYMPHATICS:  No cervical or axillary lymphadenopathy.   LUNGS:  Clear to auscultation.  No use of accessory muscles of respiration.   COR:  S1, S2 normal.  No S3, S4 or murmur.   ABDOMEN:  Soft, nontender, no mass or hepatosplenomegaly.  Gastrostomy tube in place without signs of surrounding infection.   EXTREMITIES:  The patient is wearing pneumo boots.  No obvious calf tenderness or pedal edema.      For further details of patient's history, please refer to the chart.  Thank you very much for this consultation.         ANTONI SOARES MD             D: 2017 12:50   T: 2017 13:22   MT:       Name:     VICKI SILVA   MRN:      [id number]        Account:       [id number]   :      1925           Consult Date:  2017      Document: [id number]    [SO1.1]   Revision History        User Key Date/Time User Provider Type Action    > SO1.1 2017  3:30 PM Antoni Soares MD Physician Sign            Consults by Antoni Soares MD at 2017 12:52 PM     Author:  Antoni Soares MD Service:  (none) Author Type:  Physician    Filed:  2017 12:53 PM Date of Service:  2017 12:52 PM Creation Time:  2017 12:50 PM    Status:  Addendum :  Antoni Soares MD (Physician)         ID consult dictated.  Persistent leukocytosis.  Likely stress related.  No evidence for infection.  No indication for antibiotics.[SO1.1]  If issues on the w/e please call Dr. Tate.[SO1.2]     Revision History        User Key Date/Time User Provider Type Action    > [N/A] 2017 12:53 PM Antoni Soares  MD Physician Addend     SO1.2 9/22/2017 12:52 PM Antoni Mckenna MD Physician Sign     SO1.1 9/22/2017 12:50 PM Antoni Mckenna MD Physician             Consults by Dotty Rodriguez RD, LD at 9/9/2017 12:27 PM     Author:  Dotty Rodriguez RD, LD Service:  Nutrition Author Type:  Registered Dietitian    Filed:  9/9/2017 12:27 PM Date of Service:  9/9/2017 12:27 PM Creation Time:  9/9/2017 12:08 PM    Status:  Signed :  Dotty Rodriguez RD, LD (Registered Dietitian)     Consult Orders:    1. Nutrition Services Adult IP Consult [[id number]] ordered by Humza Townsend MD at 09/09/17 0812                CLINICAL NUTRITION SERVICES  -  ASSESSMENT NOTE      Recommendations Ordered by Registered Dietitian (MERT):   Begin TF Isosource 1.5 at 15 mL/hr;  Increase by 10 mL every 24 hrs to goal 35 mL/hr = 1260 kcals (31 kcal/kg), 57 gm pro (1.4 gm/kg), 638 mL H20, 13 gm fiber, 148 gm CHO  Free H20 30 mL every 4 hrs (minimal flushes while on IVF)   Future/Additional Recommendations:    When IVF off, recommend increase H20 flushes via FT, ie 150 mL 4x/day = 600 mL for total fluid 1238 mL (31 mL/kg).   Malnutrition:  Non-Severe malnutrition  In Context of:  Acute illness or injury        REASON FOR ASSESSMENT  Sury Barnett is a 89 year old female seen by Registered Dietitian for Provider Order - Registered Dietitian to Assess and Order TF per Medical Nutrition protocol      NUTRITION HISTORY  - Information obtained from daughter Crystal.  - Patient is on a regular diet at home.  - Typical food/fluid intake is good.  - Diet history:  Pt eats 3 small meals per day + 2-3 snacks in between.  She eats small amounts often.   - Supplements:  None.   - Allergic to mangos (difficulty breathing).  Daughter was surprised that her weight was so low.  She said her mom actually complained of her clothes fitting tighter recently.  She has noticed her mom stooping more lately (poor posture).    CURRENT NUTRITION  "ORDERS  Diet Order:     NPO   SLP completed bedside swallow eval yesterday and deemed moderate-severe dysphagia.  Was asked to initiate TF for pt.  9/8:  FT = The feeding tube tip is looped in the stomach. The distal tip may be within the proximal duodenum    Current Intake/Tolerance:  NPO x 2 days.    PHYSICAL FINDINGS  Observed  Muscle Wasting - temporal, clavicle  Obtained from Chart/Interdisciplinary Team  None noted    ANTHROPOMETRICS  Height: 4' 11\"  Admit Wt:  40.5 kg  Current Wt:  40.3 kg (88 lbs 13.53 oz) - same  Body mass index is 17.94 kg/(m^2).  Weight Status:  Underweight BMI <18.5  IBW:  44.7 kg  % IBW:  90%  Weight History:  Weight loss 5.1 kg recently (11%) over the past 1.5 months[MH1.1]    Wt Readings from Last 20 Encounters:   09/09/17 40.3 kg (88 lb 13.5 oz)   07/24/17 45.4 kg (100 lb)   11/07/16 43.5 kg (96 lb)   07/15/13 43.1 kg (95 lb)[MH1.2]       LABS  Labs reviewed  Na 135 (NL, improved from being low on admit)  K 4.2 (NL, improved from 2.8)    MEDICATIONS  Medications reviewed  IVF NaCl + 20 KCl at 75 mL/hr - for fluid delivery  Nicardipine off    Dosing Weight:  40.3 kg (current wt)     ASSESSED NUTRITION NEEDS PER APPROVED PRACTICE GUIDELINES:  Estimated Energy Needs: 6539-8800 kcals (30-35 Kcal/Kg)  Justification: underweight  Estimated Protein Needs:  48-60 grams protein (1.2-1.5 g pro/Kg)  Justification: Repletion  Estimated Fluid Needs:  5570-8376 mL (1 mL/Kcal)  Justification: maintenance    MALNUTRITION:  % Weight Loss:  > 5% in 1 month (severe malnutrition)  % Intake:  No decreased intake noted  Subcutaneous Fat Loss:  None observed  Muscle Loss:  Temporal region mild depletion and Clavicle bone region mild depletion  Fluid Retention:  None noted    Malnutrition Diagnosis: Non-Severe malnutrition  In Context of:  Acute illness or injury    NUTRITION DIAGNOSIS:  Inadequate protein-energy intake related to mod-severe dysphagia s/p ICH as evidenced by NPO status, meeting 0% estimated " needs and TF planned.    NUTRITION INTERVENTIONS  Recommendations / Nutrition Prescription  Begin TF Isosource 1.5 at 15 mL/hr;  Increase by 10 mL every 24 hrs to goal 35 mL/hr = 1260 kcals (31 kcal/kg), 57 gm pro (1.4 gm/kg), 638 mL H20, 13 gm fiber, 148 gm CHO  Free H20 30 mL every 4 hrs (minimal flushes while on IVF)    - When IVF off, recommend increase H20 flushes via FT, ie 150 mL 4x/day = 600 mL for total fluid 1238 mL (31 mL/kg).    Implementation  Nutrition education: Not appropriate at this time due to patient condition   Provided education to daughter Alina on TF process and components.  She had questions about the TF schedule and product if the FT gets switched to a G-tube.  Briefly explained possible future bolus TF schedule.    EN Composition, EN Schedule and Feeding Tube Flush - Entered TF and H20 flush orders as above.    Nutrition Goals  Pt will tolerate TF without refeeding syndrome.  TF goal Isosource 1.5 at 35 mL/hr will meet % estimated needs.    MONITORING AND EVALUATION:  Progress towards goals will be monitored and evaluated per protocol and Practice Guidelines    Dotty Rodriguez RD, LD, CNSC[MH1.1]             Revision History        User Key Date/Time User Provider Type Action    > MH1.2 9/9/2017 12:27 PM Dotty Rodriguez RD, LD Registered Dietitian Sign     MH1.1 9/9/2017 12:08 PM Dotty Rodriguez RD, LD Registered Dietitian             Consults by Fiona Leger NP at 9/6/2017  5:52 PM     Author:  Fiona Leger NP Service:  Neurosurgery Author Type:  Nurse Practitioner    Filed:  9/6/2017  6:29 PM Date of Service:  9/6/2017  5:52 PM Creation Time:  9/6/2017  5:52 PM    Status:  Attested :  Fiona Leger NP (Nurse Practitioner)    Cosigner:  Yony Russell MD at 9/6/2017 10:14 PM         Consult Orders:    1. Neurosurgery IP Consult: Patient to be seen: Routine - within 24 hours; ICH; Consultant may enter orders: Yes [[id number]] ordered by  Humza Townsend MD at 09/06/17 1410           Attestation signed by Yony Russell MD at 9/6/2017 10:14 PM        Physician Attestation   I agree with the information in this note.    89F w/ right frontal intraparenchymal hemorrhage.  No surgical intervention recommended.  Appreciate care per Neurology.    Yony Russell                               [facility]    Neurosurgery Consultation     Date of Admission:  9/6/2017  Date of Consult (When I saw the patient):[LB1.1] 09/06/17    Assessment & Plan[LB1.2]   Sury Barnett is a 89 year old female who was admitted on 9/6/2017. I was asked to see the patient for ICH.    Active Problems:    ICH (intracerebral hemorrhage) (H)    Assessment: ICH    Plan: Imaging reviewed.  Per Dr. Russell non-surgical at this time. Agree with admission thru hospitalist and recommend keeping SBP <140.  Patient is not on any blood thinners. Will continue to monitor. Repeat CT tomorrow am, or sooner if changes to mental status.        I have discussed the following assessment and plan with Dr. Russell who is in agreement with initial plan and will follow up with further consultation recommendations.    Fiona Leger [facility]  [facility]  [place] [address]    Tel [phone]  Pager [phone][LB1.1]        Code Status    No Order    Reason for Consult[LB1.2]   Reason for consult: I was asked by Dr. Townsend to evaluate this patient for for ICH.[LB1.1]    Primary Care Physician   Keyla Ceron    Chief Complaint[LB1.2]   Weakness    History is obtained from the patient[LB1.1]    History of Present Illness[LB1.2]   Sury Barnett is a 89 year old female who presents with weakness after a fall.  According to the patient's daughter, the patient was with her at an appointment and when they were leaving standing by the car the patient fell backwards hitting the back of her head.  Patient was transported to [facility]  "ED via EMS.  She has had confusion and left sided weakness since the fall.  Per the patient's daughter she had no issues prior to the fall and is relatively healthy with a history of HTN.  She has not had episodes of confusion at home and is not on any blood thinners. She is able to answer some questions during exam.  She motions to the back of her head when asked if she has pain.  She states \"thank you\" at the end of our visit.  She is able to raise right leg off of the bed, but noted weakness to right side of body.  She appears to be comfortable while lying supine with head on a pillow.  CT of head today shows large right frontal lobe mass consistent with large parenchymal hematoma.[LB1.1]      Past Medical History[LB1.2]   I have reviewed this patient's medical history and updated it with pertinent information if needed.[LB1.1]   Past Medical History:   Diagnosis Date     Hypertension      Thyroid disease[LB1.3]        Past Surgical History[LB1.2]   I have reviewed this patient's surgical history and updated it with pertinent information if needed.[LB1.1]  Past Surgical History:   Procedure Laterality Date     COLONOSCOPY  7/15/2013    Procedure: COLONOSCOPY;  Colonoscopy ;  Surgeon: Maida Rehman MD;  Location:  GI     HEAD & NECK SURGERY      tonsil      TONSILLECTOMY[LB1.3]         Prior to Admission Medications   Prior to Admission Medications   Prescriptions Last Dose Informant Patient Reported? Taking?   LISINOPRIL PO 9/6/2017 at am  Yes Yes   Sig: Take 20 mg by mouth daily    Levothyroxine Sodium (LEVOTHROID PO) 9/6/2017 at am  Yes Yes   Sig: Take 50 mcg by mouth daily    VITAMIN D, CHOLECALCIFEROL, PO 9/1/2017  Yes Yes   Sig: Take 50,000 Units by mouth once a week Friday      Facility-Administered Medications: None     Allergies   Allergies   Allergen Reactions     Sulfa Drugs Other (See Comments)     Really sick and high fever     Amoxicillin        Social History[LB1.2]   I have reviewed this " "patient's social history and updated it with pertinent information if needed. Sury GILMORE Lines[LB1.1]  reports that she has never smoked. She does not have any smokeless tobacco history on file. She reports that she does not drink alcohol or use illicit drugs.[LB1.3]    Family History[LB1.2]   I have reviewed this patient's family history and updated it with pertinent information if needed.[LB1.1]   Family History   Problem Relation Age of Onset     Cancer - colorectal Father      Hypertension Father      Colon Cancer Father      Hypertension Mother      Hyperlipidemia Mother      Hypertension Sister      Hyperlipidemia Sister[LB1.3]        Review of Systems[LB1.2]   Limited, noted in HPI.[LB1.1]    Physical Exam   Temp: 97.6  F (36.4  C) Temp src: Axillary BP: 114/61   Heart Rate: 107 Resp: 15 SpO2: 99 % O2 Device: None (Room air) Oxygen Delivery: 1 LPM[LB1.2]  Vital Signs with Ranges[LB1.1]  Temp:  [97.6  F (36.4  C)-97.9  F (36.6  C)] 97.6  F (36.4  C)  Heart Rate:  [] 107  Resp:  [13-21] 15  BP: (114-187)/() 114/61  SpO2:  [96 %-99 %] 99 %  92 lbs 9.49 oz    Heart Rate: 107[LB1.2],[LB1.1] Blood pressure 114/61, temperature 97.6  F (36.4  C), temperature source Axillary, resp. rate 15, height 4' 11\" (1.499 m), weight 92 lb 9.5 oz (42 kg), SpO2 99 %, not currently breastfeeding.  92 lbs 9.49 oz[LB1.2]  HEENT:  Normocephalic.  Moderate sized hematoma to mid/right occipital region, no bleeding. PERRLA.  Difficulty with EOM's.   Neck:  Supple, non-tender, without lymphadenopathy.  Heart:  No peripheral edema  Lungs:  No SOB  Abdomen:  Soft, non-tender, non-distended.    Skin:  Warm and dry, good capillary refill.  Extremities:  Good radial and dorsalis pedis pulses bilaterally, no edema, cyanosis or clubbing.    NEUROLOGICAL EXAMINATION:     Mental status:  Awake and answers some questions appropriately.  Tracks most conversation.  Cranial nerves: PERRLA.  Gaze to the right.  Motor:  strength on right 4/5, " unable to grasp on left.  Lifts right leg off bed, unable to move left leg.  DF and PF on right 4/5.  Unable to DF and PF on left.  Sensation:  Intact to light touch on the right.    Reflexes:   Negative Babinski.  Negative Clonus.    Coordination:  Smooth finger to nose on the right.  Unable to do on the left.   Able to lift arm for pronator drift on right; Unable to lift left arm.    Gait:  Deferred.     Cervical examination reveals good range of motion.  No tenderness to palpation of the cervical spine or paraspinous muscles bilaterally.[LB1.1]       Data[LB1.2]   CBC RESULTS:   Recent Labs   Lab Test  09/06/17   1310   WBC  8.6   RBC  4.32   HGB  13.6   HCT  39.5   MCV  91   MCH  31.5   MCHC  34.4   RDW  12.4   PLT  364     Basic Metabolic Panel:  Lab Results   Component Value Date     09/06/2017      Lab Results   Component Value Date    POTASSIUM 3.9 09/06/2017     Lab Results   Component Value Date    CHLORIDE 101 09/06/2017     Lab Results   Component Value Date    ANTHONY 9.7 09/06/2017     Lab Results   Component Value Date    CO2 25 09/06/2017     Lab Results   Component Value Date    BUN 13 09/06/2017     Lab Results   Component Value Date    CR 0.71 09/06/2017     Lab Results   Component Value Date    GLC 99 09/06/2017     INR:  Lab Results   Component Value Date    INR 0.99 09/06/2017[LB1.1]            Revision History        User Key Date/Time User Provider Type Action    > LB1.3 9/6/2017  6:29 PM Fiona Leger NP Nurse Practitioner Sign     LB1.2 9/6/2017  5:53 PM Fiona Leger NP Nurse Practitioner      LB1.1 9/6/2017  5:52 PM Fiona Leger NP Nurse Practitioner             Consults by Jf Long MD at 9/6/2017  5:06 PM     Author:  Jf Long MD Service:  Neuro ICU Author Type:  Physician    Filed:  9/6/2017  8:35 PM Date of Service:  9/6/2017  5:06 PM Creation Time:  9/6/2017  4:57 PM    Status:  Signed :  Jf Long MD (Physician)      Consult Orders:    1. Neurology Critical Care IP Consult: Patient to be seen: Routine - within 24 hours; ICH; Consultant may enter orders: Yes [[id number]] ordered by Humza Townsend MD at 09/06/17 1410                  [facility] [place]  [facility]    NeuroCritical Care Consultation Note     Sury Barnett MRN# [id number]   YOB: 1925 Age: 89 year old    Code Status:No Order   Date of Admission: 9/6/2017  Date of Consult: 09/06/2017    _________________________________   Primary Care Physician   Keyla Ceron  ______________________________________________         Assessment & Plan   ______________________________________________[AZ1.1]  (I61.1) 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  ---One microhemorrhage is located in the area of present hemorrhage[AZ1.1]  (I10) Essential hypertension[AZ1.2]  --Keep BP <140 mm Hg  --Nicardipine infusion  #. DVT Prophylaxis  --Mechanical   #. PT/OT/Speech  --Start  evaluations  #. Nutrition / GI Prophylaxis  --Per recommendations of speech therapy    #. Code Status: Full Code[AZ1.1]    Discussed with family. They want to maintain full code for now[AZ1.3]         TIME:   Neurocritical care time:  60 minutes for evaluation and management of large ICH Patient is critically ill / has a very high risk of deterioration  ----------------------------------------------------------------------------------  ----------------------------------------------------------------------------------  Reason for consult: I was asked by Dr. Townsend to evaluate this patient for ICH.    Chief Complaint   ______________________________________________  fall  History is obtained from the  patient    History of Present Illness   ______________________________________________  89-year-old female with history of hypertension and hypothyroidism who presents to the Emergency Room after she had a fall and was subsequently weak on the left side.  The history was predominantly obtained from the daughter who was at the bedside.  The patient apparently was in the mall with her daughter picking of her glasses.  She apparently went out of the mall in a wheelchair and had reached the parking lot.  The daughter went on to get her combination keys to open the door of her car.  In the process, the patient had attempted to get up from the wheelchair and in the process fell backwards and hit her head.  Per her daughter, the patient is usually very healthy and independent with ambulation except for long distances.  Given the distance from the glasses store to the parking lot, she had her in the wheelchair, but normally, she walks short distances without any problem.  Right before the fall, she had not complained of any new symptoms to her daughter.  There was no headache or vision change reported.  No lightheadedness or dizziness reported.  Otherwise, the patient has been in her usual state of health.  There is no recent history of chest pain, shortness of breath or palpitations.  No diarrhea or vomiting.  No dysuria, urinary urgency or frequency.  EMS was called, and when they arrived at the scene, they found that she was significantly weak on the left side without any movement.  She was eventually brought to [facility] Emergency Room.       In the Emergency Room, the patient was evaluated by Dr. Tejada.  She was found to have a gaze preference on the right side and an occipital hematoma.  She was also found to be weak on the left upper and lower extremities.  A CT head was done which showed a new large intracerebral hemorrhage measuring 5.5 x 4.2 x 3.5 cm.  There was no resultant midline shift. NCC consulted  to provide care in ICU    Past Medical History    ______________________________________________  Past Medical History:   Diagnosis Date     Hypertension      Thyroid disease      Past Surgical History   ______________________________________________  Past Surgical History:   Procedure Laterality Date     COLONOSCOPY  7/15/2013    Procedure: COLONOSCOPY;  Colonoscopy ;  Surgeon: Maida Rehman MD;  Location:  GI     HEAD & NECK SURGERY      tonsil      TONSILLECTOMY       Prior to Admission Medications   ______________________________________________  Prior to Admission Medications   Prescriptions Last Dose Informant Patient Reported? Taking?   LISINOPRIL PO 9/6/2017 at am  Yes Yes   Sig: Take 20 mg by mouth daily    Levothyroxine Sodium (LEVOTHROID PO) 9/6/2017 at am  Yes Yes   Sig: Take 50 mcg by mouth daily    VITAMIN D, CHOLECALCIFEROL, PO 9/1/2017  Yes Yes   Sig: Take 50,000 Units by mouth once a week Friday      Facility-Administered Medications: None     Allergies   Allergies   Allergen Reactions     Sulfa Drugs Other (See Comments)     Really sick and high fever     Amoxicillin        Social History   ______________________________________________  Social History     Social History     Marital status:      Spouse name: N/A     Number of children: N/A     Years of education: N/A     Social History Main Topics     Smoking status: Never Smoker     Smokeless tobacco: None     Alcohol use No     Drug use: No     Sexual activity: Not Asked     Other Topics Concern     None     Social History Narrative         Family History   ______________________________________________  Family History   Problem Relation Age of Onset     Cancer - colorectal Father      Hypertension Father      Colon Cancer Father      Hypertension Mother      Hyperlipidemia Mother      Hypertension Sister      Hyperlipidemia Sister          Review of Systems   ______________________________________________[AZ1.1]  Review of  "systems is not obtainable due to patient factors - mental status[AZ1.3]      Physical Exam   ______________________________________________  Weight:92 lbs 9.49 oz; Height:4' 11\"  Temp: 97.6  F (36.4  C) Temp src: Axillary BP: 114/61   Heart Rate: 107 Resp: 15 SpO2: 99 % O2 Device: None (Room air) Oxygen Delivery: 1 LPM  General Appearance:  No acute distress[AZ1.1]  Neuro:       Mental Status Exam:   Awake, not alert, disoriented. Speech and language are very minimal and slurred. Mental status is decreased       Cranial Nerves:  Does not open eyes. Pupils 3 mm, reactive. EOMI. Face sensation is normal. Face is symmetric.Tongue and uvula are midline. Other CN are normal           Motor: left sided weakness. Tone and bulk are normal           Reflexes:  Normal DTR.Toes downgoing.        Sensory: Untestable               Coordination:  Untestable         Gait: Untestable[AZ1.3]    Neck: no nuchal rigidity, normal thyroid. No carotid bruits.    Cardiovascular: Regular rate and rhythm, no m/r/g  Lungs: Clear to auscultation  Abdomen: Soft, not tender, not distended  Extremities: No clubbing, no cyanosis, no edema    Data   ______________________________________________  All Data personally reviewed:       Labs:   CBC RESULTS:     Recent Labs  Lab 09/06/17  1310   WBC 8.6   RBC 4.32   HGB 13.6   HCT 39.5        Basic Metabolic Panel:   Recent Labs   Lab Test  09/06/17   1310  07/24/17   0945   NA  132*  135   POTASSIUM  3.9  4.1   CHLORIDE  101  103   CO2  25  28   BUN  13  19   CR  0.71  0.63   GLC  99  84   ANTHONY  9.7  9.2     Liver panel:  No lab results found.  INR:  Recent Labs   Lab Test  09/06/17   1310   INR  0.99      Lipid Profile:No lab results found.  Thyroid Panel:No lab results found.   Vitamin B12: No lab results found.   Vitamin D level: No lab results found.  A1C: No lab results found.  Troponin I: No lab results found.  Ammonia: No lab results found.  CK: No lab results found.     CRP inflammation: " No lab results found.  ESR: No lab results found.    RAJ: No lab results found.    ANCA: No lab results found.   Drug Screen: No lab results found.  Alcohol level:No lab results found.  UA Results:  No lab results found.  Most Recent 6 Bacteria Isolates From Any Culture (See EPIC Reports for Culture Details):No lab results found.     Cardiac US:[AZ1.1]   --[AZ1.3]       Neurophysiology:   --       Imaging:   All imaging studies were reviewed personally  CT head:   1. New large hyperdense mass in the right frontal lobe measuring 5.5 x  4.2 x 3.5 cm consistent with a large parenchymal hematoma. This does  not result in any significant midline shift or effacement of the basal  cisterns.  2. Diffuse cerebral volume loss and cerebral white matter changes  consistent with chronic small vessel ischemic disease.     MRI brain 7/24/17:   1. No acute pathology is identified. No bleed, mass, or acute infarcts  are seen.  2. Age-related changes including generalized atrophy of the brain.  White matter changes in the cerebral hemispheres consistent with small  vessel ischemic disease in this age patient.  3. Tiny incidental lipoma along the right tentorium.  4. 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           Recent Results (from the past 24 hour(s))   CT Head w/o Contrast   Result Value    Radiologist flags Intracranial hemorrhage (AA)    Narrative    CT OF THE HEAD WITHOUT CONTRAST 9/6/2017 1:23 PM     COMPARISON: Brain MR 7/24/2017.    HISTORY: Stroke.    TECHNIQUE: 5 mm thick axial CT images of the head were acquired  without IV contrast material.    FINDINGS: There is a new large hyperdense intra-axial mass centered in  the right frontal lobe consistent with a large parenchymal hematoma  measuring 5.5 x 4.2 x 3.5 cm in the AP, transverse and cephalocaudad  dimensions respectively. There is a minimal amount of subarachnoid  extension of hemorrhage surrounding this parenchymal hematoma.  Mass  effect due to the hematoma results in minimal local sulcal effacement  but no significant midline shift and no effacement of the basal  cisterns. There is no intraventricular extension of hemorrhage.     There is moderate diffuse cerebral volume loss. There are extensive  confluent areas of decreased density in the cerebral white matter  bilaterally that are consistent with sequela of chronic small vessel  ischemic disease disease. The ventricles and basal cisterns are within  normal limits in configuration given the degree of cerebral volume  loss.    No intracranial mass or recent infarct.    The visualized paranasal sinuses are well-aerated. There is no  mastoiditis. There are no fractures of the visualized bones.      Impression    IMPRESSION:   1. New large hyperdense mass in the right frontal lobe measuring 5.5 x  4.2 x 3.5 cm consistent with a large parenchymal hematoma. This does  not result in any significant midline shift or effacement of the basal  cisterns.  2. Diffuse cerebral volume loss and cerebral white matter changes  consistent with chronic small vessel ischemic disease.     [Critical Result: Intracranial hemorrhage]    Finding was identified on 9/6/2017 1:24 PM.     Beto Tejada was contacted by Dr. Goff on 9/6/2017 1:25 PM and  verbalized understanding of the critical result.       Radiation dose for this scan was reduced using automated exposure  control, adjustment of the mA and/or kV according to patient size, or  iterative reconstruction technique.    TRIXIE GOFF MD   Cervical spine CT w/o contrast    Narrative    CT OF THE CERVICAL SPINE WITHOUT CONTRAST   9/6/2017 1:28 PM     COMPARISON: None.    HISTORY: Trauma.     TECHNIQUE: Axial images of the cervical spine were acquired without  intravenous contrast. Multiplanar reformations were created.      FINDINGS: There is normal alignment of the cervical vertebrae.  Vertebral body heights of the cervical spine are  normal.  Craniocervical alignment is normal. There are no fractures of the  cervical spine.  There is moderate facet arthropathy on the left at  the C3-C4, C4-C5 and C5-C6 levels. There is degenerative endplate  spurring at the C4-C5 level. There is mild degenerative spinal canal  narrowing from lower C3 to upper C5. There is no prevertebral soft  tissue swelling.      Impression    IMPRESSION: Degenerative changes of the cervical spine. No evidence  for fracture or traumatic malalignment.    Radiation dose for this scan was reduced using automated exposure  control, adjustment of the mA and/or kV according to patient size, or  iterative reconstruction technique.    TRIXIE VACA MD[AZ1.1]          Revision History        User Key Date/Time User Provider Type Action    > AZ1.3 9/6/2017  8:35 PM Jf Long MD Physician Sign     AZ1.2 9/6/2017  5:01 PM Jf Long MD Physician      AZ1.1 9/6/2017  4:57 PM Jf Long MD Physician                      Progress Notes - Physician (Notes from 09/24/17 through 09/27/17)      Progress Notes by Gissell Doe RD, LD at 9/27/2017  8:40 AM     Author:  Gissell Doe RD, LD Service:  Nutrition Author Type:  Registered Dietitian    Filed:  9/27/2017  8:48 AM Date of Service:  9/27/2017  8:40 AM Creation Time:  9/27/2017  8:40 AM    Status:  Signed :  Gissell Doe RD, LD (Registered Dietitian)         CLINICAL NUTRITION SERVICES - REASSESSMENT NOTE        EVALUATION OF PROGRESS TOWARD GOALS   Diet:  NPO. SLP is following, the continue to recommend NPO with TF    Type of Feeding Tube: G-tube (placed 9/11)  Enteral Frequency:  Continuous  Enteral Regimen: Isosource 1.5 at goal of 35 ml/hr  Total Enteral Provisions: 1260 kcals (31 kcal/kg), 57 gm pro (1.4 gm/kg), 638 mL H20, 13 gm fiber, 148 gm CHO  Nutrisource Fiber, 1 packet BID: Add'l 6 gm fiber. Total fiber = 19 gm fiber/day  Free Water Flush: 200 mL every 6 hours. Total fluid (TF +  flushes) = 1450 mL/day     Intake/tolerance:  Pt continues to tolerate TF without issues. Daily stools x 1.    9/8:  ND FT placed = Looped in the stomach, feeding not initiated  9/9:  Begin TF via ND  9/10: FT clogged and feeding stopped  9/11: G-tube placed, 14 FR. Feeding resumed at 15/hr  9/13: Reached goal of 35/hr.      ASSESSED NUTRITION: Dosing Weight:  40.3 kg (9/9)   Estimated Energy Needs: 6324-7294 kcals (30-35 Kcal/Kg) - underweight  Estimated Protein Needs:  48-60 grams protein (1.2-1.5 g pro/Kg) - Repletion  Estimated Fluid Needs:  8430-3018 mL (1 mL/Kcal) - maintenance      NEW FINDINGS:   Weight is stable ~90# (40.9 kg)    Previous Goals:   Isosource 1.5 @ goal of 35 mL/hr will continue to meet needs  Evaluation: Met    Previous Nutrition Diagnosis:   None identified      CURRENT NUTRITION DIAGNOSIS  No nutrition diagnosis identified at this time     INTERVENTIONS  Recommendations / Nutrition Prescription  Continue current TF and H2O flushes    Implementation  No interventions at this time    Goals  Isosource 1.5 @ goal of 35 mL/hr will continue to meet needs    MONITORING AND EVALUATION:  Progress towards goals will be monitored and evaluated per protocol and Practice Guidelines    Gissell Doe RD  Pager [phone] (M-F)            [phone] (W/E & Hol)[CM1.1]             Revision History        User Key Date/Time User Provider Type Action    > CM1.1 9/27/2017  8:48 AM Gissell Doe RD, LD Registered Dietitian Sign            Progress Notes by Tamir Manrique MD at 9/26/2017 10:46 AM     Author:  Tamir Manrique MD Service:  Hospitalist Author Type:  Physician    Filed:  9/26/2017 12:30 PM Date of Service:  9/26/2017 10:46 AM Creation Time:  9/26/2017 10:46 AM    Status:  Addendum :  Tamir Manrique MD (Physician)         [facility]    Hospitalist Progress Note    Assessment & Plan   Sury Barnett is a 89 year old female with PMHx of hypertension  and hypothyroidism who was admitted on 9/6/2017 with a large R intracranial hemorrhage.      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 Seen by neurosurgery, bleed nonsurgical. Likely the hemorrhage caused fall. CT next day showed increased size but then multiple subsequent CTs with stable to slightly improved hematoma   -- repeat head CTs 9/16, 9/20,9/23 unchanged hemorrhage  -- clinical status has remained waxing and waning from neuro standpoint. PT signed off on 9/18 as patient was unable to participate in therapy as she was not alert enough.   -- had G-tube placed on 9/11, tolerating TFs at goal  -- goal SBP <140 -- mgmt as below  -- marked improvement in mentation noted on 9/26. Pt able to answer questions correctly nodding/ shaking head with questions     Multiple providers have had goals of care discussions with patient's daughters and son this stay several times. Family wishes for restorative cares, does not want to talk about palliative care or hospice at this time. Family's main concern remains lack of nutrition during initial days of stay. Daughter Crystal does not wish to engage in discussions regarding patient's current status whether it is due to perceived lack of nutrition (which they feel is the  cause) vs underlying hemorrhage. Crystal hopes that patient will recover and would like see her being independent again. Family requested second opinion and transfer to [facility] 9/20, discussed with Dr Olguin hospitalist at [facility] and reviewed her course so far, recommended continuing current care and no reason to transfer to other facility. Requested again on 9/21 but no indication for transfer    On most recent discussions, family adamant about pt being full code despite her underlying pathology    Hypoxia/ acute respiratory failure 9/25, suspect 2/2  aspiration pneumonia, organism nknown  CXR on 9/12 showed bilateral interstitial infiltrates consistent with pneumonia and fluid overload -- had been on IVF since admission on 9/6. IVFs dc'd. One dose of lasix 20mg IV given on 9/12 then maintained on lasix to 20mg PO daily on 9/14 given via tube. IV Levaquin started on 9/12 (given PCN allergy).  -- resolved, likely 2/2 mucus plug  -- CXR 9/25 with minimal changes/ largely unremarkable     Fever / Leukocytosis  Pneumatosis of colon and free peritoneal and retroperitoneal cavity  Has had variable leukocytosis this stay. Improved and was down to 11.7 on 9/13, then started trending up and was up to 25.8 on 9/23. Developed fever 9/17 PM with Tmax 101.1. Further infectious workup pursued but has been unrevealing.    -- 9/16 CXR showed resolution of infiltrate, blood cultures drawn 9/17 now growth, repeat UA 9/17 neg (though culture grew <10k enterococcus), serial procalcitonins and lactate neg  -- no evidence for infection around PEG tube or peripheral IVs (has no central IV access)  -- c diff was neg on 9/13, on TFs and stools occ semi-formed, repeat c diff 9/19 negative  -- completed 7d course of Levaquin on 9/18  -- CT abd pelvis with contrast done, shows pneumatosis of colon and air in peritoneal and retroperitoneal cavity, and thick fluid in pelvis. Gen surgery consulted and discussed with Dr Cassidy, though air in retroperitoneal cavity is unusual with feeding tube placement, still suspected this was due to tube placement. No surgical intervention planned, okayed to continue tube feeding.  -- WBC improving, hold on any abx    Benign essential hypertension.   On lisinopril prior to admission.   -- meds adjusted this stay -- was stable on Ramipril 10mg daily and Norvasc 5 mg daily, Metoprolol 25mg BID added 9/16 given tachycardia and hypertension  -- goal BP <140 systolic   -- Hold BP med if SBP<100     Hypothyroidism.   Chronic and stable on  levothyroxine    Hypernatremia: Resolved, now mild hyponatremia  Hypokalemia  Na 149 on 9/18 while on TFs.  -- increased free water flushes from 100ml q4h to 250ml q6h   -- Na normalized, 9/26 at 136  -- monitor with current fluids down NG  -- replace potassium per protocol.     FEN: on TFs via PEG as above   DVT Prophylaxis: PCDs  Code Status: Prior    Disposition: hopeful d/c on 9/27 if remains stable[ES1.1]    Called and left message with daughter, Crystal, on 6/26[ES1.2]  Tamir Manrique M.D.  Hospitalist  Pager [phone]  Text Page until 6 pm (after call answering service)      Interval History    Marked improvement in mentation today. Denies pain, nods when asked if she is comfortable. Gave thumbs up when I told her she was doing well. Denies cp/sob.     -Data reviewed today: I reviewed all new labs and imaging results over the last 24 hours. I personally reviewed no images or EKG's today.    Physical Exam   Temp: 98.2  F (36.8  C) Temp src: Axillary BP: 112/57   Heart Rate: 75 Resp: 18 SpO2: 97 % O2 Device: None (Room air)    Vitals:    09/21/17 0300 09/22/17 0332 09/26/17 0700   Weight: 40.9 kg (90 lb 2.7 oz) 41.5 kg (91 lb 7.9 oz) 41.2 kg (90 lb 13.3 oz)     Vital Signs with Ranges  Temp:  [97.6  F (36.4  C)-98.4  F (36.9  C)] 98.2  F (36.8  C)  Heart Rate:  [75-83] 75  Resp:  [18-26] 18  BP: (102-115)/(53-68) 112/57  SpO2:  [93 %-97 %] 97 %  I/O last 3 completed shifts:  In: 1305 [NG/GT:920]  Out: 1200 [Urine:1200]    Constitutional: alert, no distress  HEENT: Oral thrush noted.  Neuro: unable to fully assess  Respiratory: large clear  Cardiovascular: RRR, no MGR, no LE edema  GI: S, NT, ND, +BS, +PEG, tube site normal  Skin/Integumen: warm/dry      Medications        amoxicillin  875 mg Per Feeding Tube Q12H JEZ     amLODIPine  5 mg Oral or Feeding Tube Daily     nystatin  500,000 Units Swish & Swallow 4x Daily     metoprolol  25 mg Oral or Feeding Tube BID     ramipril  10 mg Oral or Feeding Tube  Daily     fiber modular  1 packet Per Feeding Tube BID     levothyroxine  50 mcg Oral or Feeding Tube QAM AC     sodium chloride (PF)  3 mL Intracatheter Q8H     pantoprazole  40 mg Per Feeding Tube Daily       Data     Recent Labs  Lab 09/26/17  0804 09/25/17  0810 09/24/17  0744  09/23/17  0826  09/21/17  0746 09/20/17  0814   WBC 15.0* 16.2* 18.3*  --  25.8*  < > 19.6* 22.5*   HGB 10.8*  --   --   --  12.4  --  13.0 13.8   MCV 92  --   --   --   --   --  94 95     --   --   --   --   --  370 394    135 132*  --  134  --  142 145*   POTASSIUM 3.6 4.0 4.1  < > 3.3*  --   --  3.6   CHLORIDE 100 99 98  --  97  --   --  107   CO2 29 28 27  --  29  --   --  34*   BUN 17 16 20  --  20  --   --  33*   CR 0.44* 0.54 0.46*  --  0.55  --   --  0.59   ANIONGAP 7 8 7  --  8  --   --  4   ANTHONY 8.5 8.6 8.2*  --  8.5  --   --  9.2   * 136* 126*  --  96  --   --  134*   < > = values in this interval not displayed.    No results found for this or any previous visit (from the past 24 hour(s)).[ES1.1]       Revision History        User Key Date/Time User Provider Type Action    > ES1.2 9/26/2017 12:30 PM Tamir Manrique MD Physician Addend     ES1.1 9/26/2017 10:51 AM Tamir Manrique MD Physician Sign            Progress Notes by Antoni Mckenna MD at 9/26/2017  7:21 AM     Author:  Antoni Mckenna MD Service:  (none) Author Type:  Physician    Filed:  9/26/2017  7:22 AM Date of Service:  9/26/2017  7:21 AM Creation Time:  9/26/2017  7:21 AM    Status:  Signed :  Antoni Mckenna MD (Physician)         [facility]  Infectious Disease Progress Note          Assessment and Plan:   IMPRESSION:   1.  Sury Barnett is a 89-year-old female with history of hypertension.   2.  Admitted on 09/06 with change in mental status and found to have large right frontal intracranial hemorrhage.  She has residual left hemiplegia.   3.  Persistent leukocytosis with white  "blood count around 20,000.  I suspect that this most likely is stress induced.  There are no signs of infection on exam and the patient has had a negative evaluation for infection including negative blood cultures, small numbers of Enterococcus on urine culture which probably represent colonization given benign urinalysis, negative Clostridium difficile study, absence of pneumonia on chest x-ray.  The patient is not receiving steroid medications.  Procalcitonin is low at 0.08.   4. Possible new enterococcal UTI.  Repeat UC with greater nos of enterococcus and UA with 56 WBCs.      RECOMMENDATIONS:   1. Would give ten day course of PO Amoxicillin  2. We will sign off.  Please call if issues.  Thanks.        Interval History:   Afebrile.  WBC pending.  Down further at 16k yesterday.              Medications:       amoxicillin  875 mg Per Feeding Tube Q12H JEZ     amLODIPine  5 mg Oral or Feeding Tube Daily     nystatin  500,000 Units Swish & Swallow 4x Daily     metoprolol  25 mg Oral or Feeding Tube BID     ramipril  10 mg Oral or Feeding Tube Daily     fiber modular  1 packet Per Feeding Tube BID     levothyroxine  50 mcg Oral or Feeding Tube QAM AC     sodium chloride (PF)  3 mL Intracatheter Q8H     pantoprazole  40 mg Per Feeding Tube Daily                  Physical Exam:   Blood pressure 102/53, pulse 100, temperature 98  F (36.7  C), temperature source Axillary, resp. rate 20, height 1.499 m (4' 11\"), weight 41.2 kg (90 lb 13.3 oz), SpO2 96 %, not currently breastfeeding.  [unfilled]  Vital Signs with Ranges  Temp:  [97.6  F (36.4  C)-98.4  F (36.9  C)] 98  F (36.7  C)  Pulse:  [100-116] 100  Heart Rate:  [77-83] 78  Resp:  [20-36] 20  BP: (102-126)/(53-79) 102/53  SpO2:  [78 %-98 %] 96 %    Constitutional: Awake, alert, cooperative, no apparent distress   Lungs: Clear to auscultation bilaterally, no crackles or wheezing   Cardiovascular: Regular rate and rhythm, normal S1 and S2, and no murmur noted   Abdomen: " Normal bowel sounds, soft, non-distended, non-tender   Skin: No rashes, no cyanosis, no edema   Other:           Data:   All microbiology laboratory data reviewed.  Recent Labs   Lab Test  09/25/17   0810  09/24/17   0744  09/23/17   0826   09/21/17   0746  09/20/17   0814  09/19/17   0832   WBC  16.2*  18.3*  25.8*   < >  19.6*  22.5*  20.2*   HGB   --    --   12.4   --   13.0  13.8  14.1   HCT   --    --   35.8   --   39.8  41.4  43.1   MCV   --    --    --    --   94  95  95   PLT   --    --    --    --   370  394  374    < > = values in this interval not displayed.     Recent Labs   Lab Test  09/25/17   0810  09/24/17 0744  09/23/17   0826   CR  0.54  0.46*  0.55     No lab results found.[SO1.1]     Revision History        User Key Date/Time User Provider Type Action    > SO1.1 9/26/2017  7:22 AM Antoni Mckenna MD Physician Sign            Progress Notes by Pennie Howe LSW at 9/25/2017 11:33 AM     Author:  Pennie Howe LSW Service:  Social Work Author Type:      Filed:  9/25/2017  2:23 PM Date of Service:  9/25/2017 11:33 AM Creation Time:  9/25/2017 11:33 AM    Status:  Addendum :  Pennie Howe LSW ()          Progress Note  Chart Reviewed, Pt discussed in Interdisciplinary Rounds.   Pt is not ready for discharge today. Family is anticipating discharge to [facility].  Intervention:   REGAN received call from James Bermeo at [facility] regarding referral that was made previously. They have reviewed and accepted pt for placement . Bassam is out on vacation and James will be following for admission. He can be reached at  [phone]. James updated via v-message that pt is expected to discharge Wednesday. James had been advised that pt is not discharging today and indicated that they would have a bed for pt. REGAN advised James that Wednesday is anticipated and James will return call if Wednesday is not acceptable . Prior to discharge, [facility] would need  specific orders and some advanced supply of tube feeding formula.  Team Members notified:   EFRAINRN    Plan: Discharge anticipated [facility] on Wednesday 9/27     Follow up plan:   Discharge orders and formula to be sent to facility at discharge.  Stretcher transportation, PCS and PAS would need completion.[SC1.1]    ADDENDUM: REGAN spoke with James at [facility] to confirm bed for Wednesday. He stated that Bassam toured three family members and room is a semi-private room. The expectation financially is that pt is private pay and a $5,000.00 down payment is due on admission. REGAN faxed tube feeding orders to James so the formula order can be filled prior to admission.   REGAN called and updated Crystal per v-message of the anticipated discharge on Wednesday and the required down payment that is required on admission.[SC1.2]     Revision History        User Key Date/Time User Provider Type Action    > SC1.2 9/25/2017  2:23 PM Pennie Howe LSW  Addend     SC1.1 9/25/2017 11:41 AM Pennie Howe LSW  Sign            Progress Notes by Rachna Anglin RN at 9/25/2017 11:00 AM     Author:  Rachna Anglin, RN Service:  (none) Author Type:  Registered Nurse    Filed:  9/25/2017  1:58 PM Date of Service:  9/25/2017 11:00 AM Creation Time:  9/25/2017  1:47 PM    Status:  Addendum :  Rachna Anglin RN (Registered Nurse)         [id number]: Pt was 78% on RA & RR 36 with accessory muscles use. Productive cough, yellow tinged mucous. Required frequent suctioning. No increase in sats despite 6L O2 via nasal canula. Pt required 10L via oxymask to get sats up to 92%. Dr. Manrique was updated-- chest x-ray & labs ordered.    At 1100 was able to wean pt to RA, per Dr. Manrique will leave continuous pulse oxy on & continue to monitor respiratory status.[TO1.1]      Revision History        User Key Date/Time User Provider Type Action    > [N/A] 9/25/2017  1:58 PM Rachna Anglin, YRN Registered  "Nurse Addend     TO1.1 9/25/2017  1:57 PM Rachna Anglin, RN Registered Nurse Sign            Progress Notes by Monica Eaton at 9/25/2017  1:20 PM     Author:  Monica Eaton Service:  Spiritual Health Author Type:      Filed:  9/25/2017  1:43 PM Date of Service:  9/25/2017  1:20 PM Creation Time:  9/25/2017  1:20 PM    Status:  Signed :  Monica Eaton ()         SPIRITUAL [facility] SERVICES Progress Note  [facility]    Follow-up visit with pt's daughter Crystal. Crystal feels optimistic about pt's possible discharge to [facility] and hopes pt will discharge Wednesday. Family goal is that pt would begin rehab and continue \"as long as progress is being made.\" Pt's daughter expressed family solidarity around their goals for pt and felt these goals were at odds with perceived staff goals, particularly around whether or not rehab is appropriate for pt. Family is adamantly in support of rehab. SH listened as daughter discussed this conflict and her own response. SH inquired as to what the outlook is for someone with this sort of stroke in terms of rehabilitation and daughter stated that the family wants to give pt every chance she can get at recovery.  will follow as available.    Monica Eaton  Chaplain Resident[EW1.1]     Revision History        User Key Date/Time User Provider Type Action    > EW1.1 9/25/2017  1:43 PM Monica Eaton  Sign            Progress Notes by Tamir Manrique MD at 9/25/2017 12:24 PM     Author:  Tamir Manrique MD Service:  Hospitalist Author Type:  Physician    Filed:  9/25/2017 12:25 PM Date of Service:  9/25/2017 12:24 PM Creation Time:  9/25/2017 12:24 PM    Status:  Signed :  Tamir Manrique MD (Physician)         Update:  With hypoxia to upper 70%'s this am, improved with high flow O2.   CXR done, with ? Infiltrate  BNP, procal negative  With cough sats improved, suspect mucus plug  Currently breathing fine on room " air.     Tamir Manrique M.D.  Hospitalist  Pager [phone]  Text Page until 6 pm (after call answering service)[ES1.1]       Revision History        User Key Date/Time User Provider Type Action    > ES1.1 9/25/2017 12:25 PM Tamir Manrique MD Physician Sign            Progress Notes by Tamir Manrique MD at 9/25/2017  8:50 AM     Author:  Tamir Manrique MD Service:  Hospitalist Author Type:  Physician    Filed:  9/25/2017  9:07 AM Date of Service:  9/25/2017  8:50 AM Creation Time:  9/25/2017  8:50 AM    Status:  Signed :  Tamir Manrique MD (Physician)         [facility]    Hospitalist Progress Note    Assessment & Plan   Sury Barnett is a 89 year old female with PMHx of hypertension and hypothyroidism who was admitted on 9/6/2017 with a large R intracranial hemorrhage.      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 Seen by neurosurgery, bleed nonsurgical. Likely the hemorrhage caused fall. CT next day showed increased size but then multiple subsequent CTs with stable to slightly improved hematoma   -- repeat head CTs 9/16, 9/20,9/23 unchanged hemorrhage  -- clinical status has remained waxing and waning from neuro standpoint. PT signed off on 9/18 as patient was unable to participate in therapy as she was not alert enough.   -- had G-tube placed on 9/11, tolerating TFs at goal  -- goal SBP <140 -- mgmt as below  -- see below re: hypoxia     Multiple providers have had goals of care discussions with patient's daughters and son this stay several times. Family wishes for restorative cares, does not want to talk about palliative care or hospice at this time. Family's main concern remains lack of nutrition during initial days of stay. Daughter Crystal does not wish to engage in discussions regarding  patient's current status whether it is due to perceived lack of nutrition (which they feel is the  cause) vs underlying hemorrhage. Crystal hopes that patient will recover and would like see her being independent again. Family requested second opinion and transfer to [facility] 9/20, discussed with Dr Olguin hospitalist at [facility] and reviewed her course so far, recommended continuing current care and no reason to transfer to other facility. Requested again on 9/21 but no indication for transfer    On most recent discussions, family adamant about pt being full code despite her underlying pathology    Hypoxia/ acute respiratory failure, suspect 2/2 aspiration pneumonia, organism nknown  CXR on 9/12 showed bilateral interstitial infiltrates consistent with pneumonia and fluid overload -- had been on IVF since admission on 9/6. IVFs dc'd. One dose of lasix 20mg IV given on 9/12 then maintained on lasix to 20mg PO daily on 9/14 given via tube. IV Levaquin started on 9/12 (given PCN allergy).  -- s/p course of levofloxacin, completed course on 9/18  -- with acute hypoxic respiratory failure 9/25 (previously not needing O2). Given her underlying CVA and cough, suspect she may have had an aspiration event  -- high flow oxygen, may need BiPAP  -- stat CXR, check procalcitonin, BNP  -- transfer to [facility]  -- suspect will need broad spectrum antibiotics but will await CXR results     Fever / Leukocytosis  Pneumatosis of colon and free peritoneal and retroperitoneal cavity  Has had variable leukocytosis this stay. Improved and was down to 11.7 on 9/13, then started trending up and was up to 25.8 on 9/23. Developed fever 9/17 PM with Tmax 101.1. Further infectious workup pursued but has been unrevealing.    -- 9/16 CXR showed resolution of infiltrate, blood cultures drawn 9/17 now growth, repeat UA 9/17 neg (though culture grew <10k enterococcus), serial procalcitonins and lactate neg  -- no evidence for infection around PEG tube or  peripheral IVs (has no central IV access)  -- cdiff was neg on 9/13, on TFs and stools occ semi-formed, repeat c diff 9/19 negative  -- completed 7d course of Levaquin on 9/18  -- CT abd pelvis with contrast done, shows pneumatosis of colon and air in peritoneal and retroperitoneal cavity, and thick fluid in pelvis. Gen surgery consulted and discussed with Dr Cassidy, though air in retroperitoneal cavity is unusual with feeding tube placement, still suspected this was due to tube placement. No surgical intervention planned, okayed to continue tube feeding.  -- above re: increased O2 needs    Benign essential hypertension.   On lisinopril prior to admission.   -- meds adjusted this stay -- was stable on Ramipril 10mg daily and Norvasc 5 mg daily, Metoprolol 25mg BID added 9/16 given tachycardia and hypertension  -- goal BP <140 systolic   -- Hold BP med if SBP<100     Hypothyroidism.   Chronic and stable on levothyroxine    Hypernatremia: Resolved, now mild hyponatremia  Hypokalemia  Na 149 on 9/18 while on TFs.  -- increased free water flushes from 100ml q4h to 250ml q6h   -- Na normalized, 9/25 at 135  -- monitor with current fluids down NG  -- replace potassium per protocol.     FEN: on TFs via PEG as above   DVT Prophylaxis: PCDs  Code Status: Prior    Disposition: on hold given acute respiratory failure    Tamir Manrique MD  Hospitalist    Interval History    Contacted re: pt with acute respiratory distress/ failure. Was doing fine on room air, then sudden drop in O2 sats to upper 70's, RR over 30. Cough, no fever and leukocytosis improved this am compared with yesteray. Unable to obtain further history given pts underlying medical pathology (CVA)    -Data reviewed today: I reviewed all new labs and imaging results over the last 24 hours. I personally reviewed no images or EKG's today.    Physical Exam   Temp: 98.1  F (36.7  C) Temp src: Axillary BP: 126/79 Pulse: 111 Heart Rate: 81 Resp: (!) 32 SpO2: 90 % O2  Device: Oxymask Oxygen Delivery: 10 LPM  Vitals:    09/20/17 0658 09/21/17 0300 09/22/17 0332   Weight: 41.5 kg (91 lb 7.9 oz) 40.9 kg (90 lb 2.7 oz) 41.5 kg (91 lb 7.9 oz)     Vital Signs with Ranges  Temp:  [97.3  F (36.3  C)-98.1  F (36.7  C)] 98.1  F (36.7  C)  Pulse:  [110-111] 111  Heart Rate:  [64-81] 81  Resp:  [16-36] 32  BP: ()/(50-79) 126/79  SpO2:  [78 %-98 %] 90 %  I/O last 3 completed shifts:  In: 1400 [I.V.:200; NG/GT:1200]  Out: 1925 [Urine:1925]    Constitutional: not arousable  HEENT: Oral thrush noted.  Neuro: not responsive, not able to assess  Respiratory: coarse expiratory phase, good air movement  Cardiovascular: RRR, no MGR, no LE edema  GI: S, NT, ND, +BS, +PEG, tube site normal  Skin/Integumen: warm/dry      Medications        amoxicillin  875 mg Per Feeding Tube Q12H JEZ     amLODIPine  5 mg Oral or Feeding Tube Daily     nystatin  500,000 Units Swish & Swallow 4x Daily     metoprolol  25 mg Oral or Feeding Tube BID     ramipril  10 mg Oral or Feeding Tube Daily     fiber modular  1 packet Per Feeding Tube BID     levothyroxine  50 mcg Oral or Feeding Tube QAM AC     sodium chloride (PF)  3 mL Intracatheter Q8H     pantoprazole  40 mg Per Feeding Tube Daily       Data     Recent Labs  Lab 09/25/17  0810 09/24/17  0744 09/23/17  2130 09/23/17  0826  09/21/17  0746 09/20/17  0814 09/19/17  0832   WBC 16.2* 18.3*  --  25.8*  < > 19.6* 22.5* 20.2*   HGB  --   --   --  12.4  --  13.0 13.8 14.1   MCV  --   --   --   --   --  94 95 95   PLT  --   --   --   --   --  370 394 374   NA  --  132*  --  134  --  142 145* 144   POTASSIUM  --  4.1 4.0 3.3*  --   --  3.6 3.6   CHLORIDE  --  98  --  97  --   --  107 105   CO2  --  27  --  29  --   --  34* 34*   BUN  --  20  --  20  --   --  33* 40*   CR  --  0.46*  --  0.55  --   --  0.59 0.64   ANIONGAP  --  7  --  8  --   --  4 5   ANTHONY  --  8.2*  --  8.5  --   --  9.2 9.2   GLC  --  126*  --  96  --   --  134* 123*   < > = values in this interval  not displayed.    No results found for this or any previous visit (from the past 24 hour(s)).[ES1.1]       Revision History        User Key Date/Time User Provider Type Action    > ES1.1 9/25/2017  9:07 AM Tamir Manrique MD Physician Sign            Progress Notes by Antoni Mckenna MD at 9/25/2017  7:32 AM     Author:  Antoni Mckenna MD Service:  (none) Author Type:  Physician    Filed:  9/25/2017  7:37 AM Date of Service:  9/25/2017  7:32 AM Creation Time:  9/25/2017  7:32 AM    Status:  Signed :  Antoni Mckenna MD (Physician)         [facility]  Infectious Disease Progress Note          Assessment and Plan:   IMPRESSION:   1.  Sury Barnett is a 89-year-old female with history of hypertension.   2.  Admitted on 09/06 with change in mental status and found to have large right frontal intracranial hemorrhage.  She has residual left hemiplegia.   3.  Persistent leukocytosis with white blood count around 20,000.  I suspect that this most likely is stress induced.  There are no signs of infection on exam and the patient has had a negative evaluation for infection including negative blood cultures, small numbers of Enterococcus on urine culture which probably represent colonization given benign urinalysis, negative Clostridium difficile study, absence of pneumonia on chest x-ray.  The patient is not receiving steroid medications.  Procalcitonin is low at 0.08.   4. Possible new enterococcal UTI.  Repeat UC with greater nos of enterococcus and UA with 56 WBCs.      RECOMMENDATIONS:   1. Would give ten day course of PO Amoxicillin        Interval History:   Repeat UA/UC as above.  Afebrile.  Remains poorly responsive.  Started on Amox.  Looks to be tolerating.  No rash.  WBC lower at 18k.              Medications:       amoxicillin  875 mg Per Feeding Tube Q12H JEZ     amLODIPine  5 mg Oral or Feeding Tube Daily     nystatin  500,000 Units Swish & Swallow 4x  "Daily     metoprolol  25 mg Oral or Feeding Tube BID     ramipril  10 mg Oral or Feeding Tube Daily     fiber modular  1 packet Per Feeding Tube BID     levothyroxine  50 mcg Oral or Feeding Tube QAM AC     sodium chloride (PF)  3 mL Intracatheter Q8H     pantoprazole  40 mg Per Feeding Tube Daily                  Physical Exam:   Blood pressure 122/66, pulse 75, temperature 97.9  F (36.6  C), temperature source Axillary, resp. rate 18, height 1.499 m (4' 11\"), weight 41.5 kg (91 lb 7.9 oz), SpO2 95 %, not currently breastfeeding.  [unfilled]  Vital Signs with Ranges  Temp:  [97.3  F (36.3  C)-97.9  F (36.6  C)] 97.9  F (36.6  C)  Heart Rate:  [64-81] 81  Resp:  [16-18] 18  BP: ()/(50-66) 122/66  SpO2:  [95 %-98 %] 95 %    Constitutional: Awake, alert, cooperative, no apparent distress   Lungs: Clear to auscultation bilaterally, no crackles or wheezing   Cardiovascular: Regular rate and rhythm, normal S1 and S2, and no murmur noted   Abdomen: Normal bowel sounds, soft, non-distended, non-tender   Skin: No rashes, no cyanosis, no edema   Other:           Data:   All microbiology laboratory data reviewed.  Recent Labs   Lab Test  09/24/17   0744  09/23/17   0826  09/22/17   0740  09/21/17   0746  09/20/17   0814  09/19/17   0832   WBC  18.3*  25.8*  21.7*  19.6*  22.5*  20.2*   HGB   --   12.4   --   13.0  13.8  14.1   HCT   --   35.8   --   39.8  41.4  43.1   MCV   --    --    --   94  95  95   PLT   --    --    --   370  394  374     Recent Labs   Lab Test  09/24/17   0744  09/23/17   0826  09/20/17   0814   CR  0.46*  0.55  0.59     No lab results found.[SO1.1]     Revision History        User Key Date/Time User Provider Type Action    > SO1.1 9/25/2017  7:37 AM Antoni Mckenna MD Physician Sign            Progress Notes by Faisal Tate MD at 9/24/2017  6:32 PM     Author:  Faisal Tate MD Service:  Infectious Disease Author Type:  Physician    Filed:  9/24/2017  7:48 PM Date of Service:  " "9/24/2017  6:32 PM Creation Time:  9/24/2017  6:32 PM    Status:  Signed :  Faisal Abrams MD (Physician)          INFECTIOUS DISEASE Progress Note  September 24, 2017  [id number]  Sury S Lines    ANTIBIOTICS:  'cipro 400 mg iv q 12 hours[SS1.1]    89 year old female with PMHx of hypertension and hypothyroidism who was admitted on 9/6/2017 with a large R intracranial hemorrhage[SS1.2]    SUBJECTIVE: afebrile[SS1.1], had han, doing ok per son, very quiet, he was not aware of any amoxicillin allergy, no cough[SS1.3]    OBJECTIVE:  /50 (BP Location: Right arm)  Pulse 75  Temp 97.3  F (36.3  C) (Oral)  Resp 16  Ht 1.499 m (4' 11\")  Wt 41.5 kg (91 lb 7.9 oz)  SpO2 95%  BMI 18.48 kg/m2[SS1.1]  Very weak, awake, soft voice  Lung CTA  abd benign  Han  Neck supple[SS1.3]    LAB Data:[SS1.1]  Creat 0.4  WBC  21 to 25 to 19[SS1.4]    MICROBIOLOGY:  BC 9/17 NG  UC 9/17-[SS1.1]< 10[SS1.5]k Enterococcus amp S  UC 9/23-100k Enterococcus faecalis JUVENTINO pending[SS1.1]  UA 9/23-56 WBC[SS1.4]  C.diff neg[SS1.5]  Procalcitonin is low at 0.08[SS1.6]    IMAGING:      Attestation:  I have reviewed today's vital signs, notes, medications, labs and imaging.    ASSESSMENT:[SS1.1]  1. LEUKOCYTOSIS-worse then better, cipro added due to pyuria, UC again with Enterococcus but higher #s[SS1.6]; could be benign, procal normal[SS1.3]  2. POSSIBLE ENTEROCOCCAL UTI[SS1.6]-pyuria, now greater # Enterococcus, cipro  May cover but lot more  Side effects and not optimal, would       Try to avoid vancomycin, cannot be certain a UTI but doubt she would show any sxs with han in[SS1.3]  3. ICH[SS1.6]  4. POSSIBLE AMOX ALLERGY-no hx of taking, no detail, think low risk[SS1.3]      RECOMMENDATION[SS1.1]  1. D/C CIPRO  2. Oral amoxicillin 875 mg bid[SS1.6] x 7 days  3. F/u WBC[SS1.3]    EDILMA ABRAMS M.D.  O:[phone]   B:[phone][SS1.1]             Revision History        User Key Date/Time User Provider Type Action    > " SS1.3 9/24/2017  7:48 PM Faisal Tate MD Physician Sign     SS1.6 9/24/2017  6:36 PM Faisal Tate MD Physician      SS1.5 9/24/2017  6:35 PM Faisal Tate MD Physician      SS1.2 9/24/2017  6:34 PM Faisal Tate MD Physician      SS1.4 9/24/2017  6:33 PM Faisal Tate MD Physician      SS1.1 9/24/2017  6:32 PM Faisal Tate MD Physician             Progress Notes by Ann Singleton LSW at 9/24/2017  8:38 AM     Author:  Ann Singleton LSW Service:  (none) Author Type:      Filed:  9/24/2017  2:40 PM Date of Service:  9/24/2017  8:38 AM Creation Time:  9/24/2017  8:38 AM    Status:  Signed :  Ann Singleton LSW ()         SW:     D: REGAN met with patient;s daughter, Dotty who reports that patient has bed on hold at [facility]. Patient's daughter would like for REGAN to contact [facility] to confirm bed hold. Patient's daughter informed that [facility] has accepted patient. Discussed the financial cost with patient's daughter. Informed her that patient will have to be private pay for LTC as it is not covered by her insurance. Britney verbalized an understanding and is in agreement. Patient's daughter states that the family strongly prefers to discharge to [facility]. REGAN spoke with Elizabeth at [facility] ([phone]). Angel reports that patient has  bed on hold at [facility]. Angel states that he hasn't had a chance to review the referral yet but has no reason to believe that patient will be declined.     P:[NA1.1] REGAN will continuo to follow[NA1.2]      Revision History        User Key Date/Time User Provider Type Action    > NA1.2 9/24/2017  2:40 PM Ann Singleton LSW  Sign     NA1.1 9/24/2017  8:38 AM Afyare, Nafiso, LSW              Progress Notes by Michelle Manzano MD at 9/24/2017  9:43 AM     Author:  Michelle Manzano MD Service:  Hospitalist Author Type:   Physician    Filed:  9/24/2017 10:20 AM Date of Service:  9/24/2017  9:43 AM Creation Time:  9/24/2017  9:43 AM    Status:  Signed :  Michelle Manzano MD (Physician)         [facility]    Hospitalist Progress Note[TK1.1]    Assessment & Plan[TK1.2]   Sury Barnett is a 89 year old female with PMHx of hypertension and hypothyroidism who was admitted on 9/6/2017 with a large R intracranial hemorrhage.      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 Seen by neurosurgery, bleed nonsurgical. Likely the hemorrhage caused fall. CT next day showed increased size but then multiple subsequent CTs with stable to slightly improved hematoma   -- repeat head CTs 9/16 and then 9/20  And 9/23 showed slight decrease in hemorrhage.  -- clinical status has remained stable from neuro standpoint -- ongoing left sided hemiplegia, remains lethargic mostly and intermittently awake, PT signed off on 9/18 as patient was unable to participate in therapy as she was not alert enough. Given increased lethargy and no movement 9/23, discussed with daughter and repeat CT head was done, stable. Patient more awakre 9/24  -- had G-tube placed on 9/11, tolerating TFs at goal  -- goal SBP <140 -- mgmt as below     Multiple providers have had goals of care discussions with patient's daughters and son this stay including myself several times. Family wishes for restorative cares, does not want to talk about palliative care or hospice at this time. Family's main concern remains lack of nutrition during initial days of stay. Daughter Crystal does not wish to engage in discussions regarding patient's current status whether it is due to perceived lack of nutrition (which they feel is the  cause) vs underlying hemorrhage. Crystal hopes that patient will recover and would like see her being  independent again. Family requested second opinion and transfer to [facility] 9/20, which I discussed with Dr Olguin hospitalist at [facility] and reviewed her course so far, recommended continuing current care and no reason to transfer to other facility. She requested on 9/21 that they be contacted with all details again, which I believe was not going to change the decision on accepting her care at [facility] so subsequent call not made.    Have discussed with daughter Crystal daily. Have outlined discharge plan, and that she will be ready for discharge if WBC count trend down. I have discussed with her regarding post discharge care including tube feeding, follow ups (PCP/neurology/CT scans/Labs), catheter care if needed, position change, skin care, passive range of motion as therapy. AVS provided.    I discussion with Crystal, 9/23 since patient was increasingly lethargic. I discussed about resuscitation again in case she deteriorates and unable to protect her airway. Crystal wants patient be full code at this point.     Fever / Leukocytosis  Pneumatosis of colon and free peritoneal and retroperitoneal cavity  Has had lingering leukocytosis this stay. Improved and was down to 11.7 on 9/13, then started trending up and was up to 25.8 on 9/23. Developed fever 9/17 PM with Tmax 101.1. Further infectious workup pursued but has been unrevealing.    -- 9/16 CXR showed resolution of infiltrate, blood cultures drawn 9/17 now growth, repeat UA 9/17 neg (though culture grew <10k enterococcus), serial procalcitonins and lactate neg  -- no evidence for infection around PEG tube or peripheral IVs (has no central IV access)  -- cdiff was neg on 9/13, on TFs and stools occ semi-formed, repeat c diff 9/19 negative  -- completed 7d course of Levaquin on 9/18 as below (WBC began to bump while on abx)  -- ID consulted and discussed with Dr Mckenna,  CT abd pelvis with contrast done, shows pneumatosis of colon and air in peritoneal and retroperitoneal  cavity, and thick fluid in pelvis. Gen surgery consulted and discussed with Dr Cassidy, though air in retroperitoneal cavity is unusual with feeding tube placement, still suspected this was due to tube placement. No surgical intervention planned, okayed to continue tube feeding.  -- no recurrence of fever despite marked persistent leukocytosis -- etiology not completely clear but suspect stress response due to above and ICH. Ramsay catheter exchanged 9/21, and UA repeated again 9/23, appears abnormal. Started on Cipro 9/24, WBC down to 18 K today, discussed with Dr Tate, will review today, await urine culture.     Possible aspiration pneumonia and fluid overload: resolved  CXR on 9/12 showed bilateral interstitial infiltrates consistent with pneumonia and fluid overload -- had been on IVF since admission on 9/6. IVFs dc'd. One dose of lasix 20mg IV given on 9/12 then maintained on lasix to 20mg PO daily on 9/14 given via tube. IV Levaquin started on 9/12 (given PCN allergy).  -- managed with Lasix 20mg po daily beginning 9/14 -- no evidence of ongoing infiltrate/edema on 9/16 CXR so Lasix stopped 9/17  -- cont Levaquin -- completed 7d course of treatment on 9/18  -- remains stable form a respiratory standpoint -- maintaining sats on RA     Benign essential hypertension.   On lisinopril prior to admission.   -- meds adjusted this stay -- was stable on Ramipril 10mg daily and Norvasc 10mg daily, Metoprolol 25mg BID added 9/16 given tachycardia and hypertension  -- Given soft BP 9/23, amlodipine held and decreased to 5 mg daily from 9/24  -- goal BP <140 systolic   -- Hold BP med if SBP<100     Hypothyroidism.   Chronic and stable on levothyroxine    Hypernatremia: Resolved, now mild hyponatremia  Hypokalemia  Na 149 on 9/18 while on TFs.  -- increased free water flushes from 100ml q4h to 250ml q6h -- Na improved to 134 on 9/23 and now 132  -- decrease FWF to 200 ccq6 hour, follow sodium in am  -- replace potassium  "per protocol.     FEN: on TFs via PEG as above   DVT Prophylaxis: PCDs  Code Status:[TK1.1] Prior[TK1.2]    Disposition: once WBC count trends down, and urine culture back, and if sodium stable. Expect 1-3 days. Has bed at [facility], will ask SW to hold 1 more day.  Discussed with patient's daughter Dotty 9/24, discussed with Dr Tate  Also discussed with RN[TK1.1]     Michelle Manzano[TK1.2], MD  Hospitalist[TK1.1]    Interval History[TK1.2]    Patient is awake this morning, giving thumbs up on calling her with her name.   Whispered \"no\" on asking if she had pain.  Remains afebrile, leucocytosis slightly improved today  Daughter Dotty at bedside, Unable to obtain ROS as patient is non verbal.    -Data reviewed today: I reviewed all new labs and imaging results over the last 24 hours. I personally reviewed no images or EKG's today.[TK1.1]    Physical Exam   Temp: 97.4  F (36.3  C) Temp src: Axillary BP: 104/60   Heart Rate: 76 Resp: 18 SpO2: 96 % O2 Device: None (Room air)    Vitals:    09/20/17 0658 09/21/17 0300 09/22/17 0332   Weight: 41.5 kg (91 lb 7.9 oz) 40.9 kg (90 lb 2.7 oz) 41.5 kg (91 lb 7.9 oz)[TK1.2]     Vital Signs with Ranges[TK1.1]  Temp:  [97.2  F (36.2  C)-97.9  F (36.6  C)] 97.4  F (36.3  C)  Heart Rate:  [68-81] 76  Resp:  [14-18] 18  BP: ()/(52-64) 104/60  SpO2:  [95 %-100 %] 96 %  I/O last 3 completed shifts:  In: 1060 [I.V.:60; NG/GT:1000]  Out: 775 [Urine:775][TK1.2]    Constitutional:alert today, resting comfortably, NAD  HEENT: Oral thrush noted.  Neuro: alert, gives thumbs up, spontaneous eyes movement, whispered no on asking about pain, wiggling toes Rt foot.  Respiratory: CTA through anterior fields, no wheeze or coarse BS. Normal work of breathing.  Cardiovascular: HRRR, no MGR, no LE edema  GI: S, NT, ND, +BS, +PEG, tube site normal  Skin/Integumen: warm/dry[TK1.1]      Medications        amLODIPine  5 mg Oral or Feeding Tube Daily     ciprofloxacin  400 mg Intravenous Q12H     " nystatin  500,000 Units Swish & Swallow 4x Daily     metoprolol  25 mg Oral or Feeding Tube BID     ramipril  10 mg Oral or Feeding Tube Daily     fiber modular  1 packet Per Feeding Tube BID     levothyroxine  50 mcg Oral or Feeding Tube QAM AC     sodium chloride (PF)  3 mL Intracatheter Q8H     pantoprazole  40 mg Per Feeding Tube Daily       Data     Recent Labs  Lab 09/24/17  0744 09/23/17  2130 09/23/17  0826 09/22/17  0740 09/21/17  0746 09/20/17  0814 09/19/17  0832   WBC 18.3*  --  25.8* 21.7* 19.6* 22.5* 20.2*   HGB  --   --  12.4  --  13.0 13.8 14.1   MCV  --   --   --   --  94 95 95   PLT  --   --   --   --  370 394 374   *  --  134  --  142 145* 144   POTASSIUM 4.1 4.0 3.3*  --   --  3.6 3.6   CHLORIDE 98  --  97  --   --  107 105   CO2 27  --  29  --   --  34* 34*   BUN 20  --  20  --   --  33* 40*   CR 0.46*  --  0.55  --   --  0.59 0.64   ANIONGAP 7  --  8  --   --  4 5   ANTHONY 8.2*  --  8.5  --   --  9.2 9.2   *  --  96  --   --  134* 123*       Recent Results (from the past 24 hour(s))   CT Head w/o Contrast    Narrative    CT SCAN OF THE HEAD WITHOUT CONTRAST   9/23/2017 2:35 PM     HISTORY: Known ICH, increased lethargy.    TECHNIQUE:  Axial images of the head and coronal reformations without  IV contrast material. Radiation dose for this scan was reduced using  automated exposure control, adjustment of the mA and/or kV according  to patient size, or iterative reconstruction technique.    COMPARISON: Head CT 9/20/2017.    FINDINGS: Fairly large hyperdense parenchymal hematoma centered in the  right frontal lobe overall does not appear significantly changed in  size. Punctate 2 mm focus of hyperdensity in the right centrum  semiovale was not appreciated on axial images on the previous CT, but  was present on the coronal images. No evidence of new intracranial  hemorrhage. Small amount of subarachnoid hemorrhage in the right  cerebral hemisphere is unchanged. Surrounding hypodensity and  mass  effect on the frontal horn of the right lateral ventricle is  unchanged. Ventricular size is similar to prior without evidence of  hydrocephalus or entrapment. The basal cisterns remain patent.  Extensive periventricular white matter hypodensity is similar to  prior. Moderate diffuse parenchymal volume loss.    The visualized portions of the sinuses and mastoids appear normal. The  bony calvarium and bones of the skull base appear intact.       Impression    IMPRESSION:     1. No significant change of the intraparenchymal hemorrhage centered  in the right frontal lobe. Unchanged small amount of subarachnoid  hemorrhage in the right cerebral hemisphere. No evidence of new  intracranial hemorrhage.  2. Diffuse parenchymal volume loss and extensive periventricular white  matter hypodensities likely due to chronic microvascular ischemic  disease. No significant change since prior.    BIMAL FERRARA MD[TK1.2]          Revision History        User Key Date/Time User Provider Type Action    > TK1.2 9/24/2017 10:20 AM Michelle Manzano MD Physician Sign     TK1.1 9/24/2017  9:43 AM Michelle Manzano MD Physician             Progress Notes by Georgette Morrison RD, LD at 9/24/2017  9:30 AM     Author:  Georgette Morrison RD, LD Service:  Nutrition Author Type:  Registered Dietitian    Filed:  9/24/2017  9:38 AM Date of Service:  9/24/2017  9:30 AM Creation Time:  9/24/2017  9:30 AM    Status:  Signed :  Georgette Morrison RD, LD (Registered Dietitian)         BRIEF NUTRITION NOTE        Spoke with RN about low sodium levels. Na 132 (L). Current total fluids: TF + water flushes (250 Q4) = 2138 mL/ day     Dosing Weight:  40.3 kg (current wt)      ASSESSED NUTRITION NEEDS PER APPROVED PRACTICE GUIDELINES:  Estimated Energy Needs: 7692-3185 kcals (30-35 Kcal/Kg)  Justification: underweight  Estimated Protein Needs:  48-60 grams protein (1.2-1.5 g pro/Kg)  Justification: Repletion  Estimated Fluid Needs:  2785-3579  mL (1 mL/Kcal)  Justification: maintenance      Intervention:  Free water flushes: 200mL Q 6 hrs  TF + Free water flushes (200mL Q6hr) = 1438 mL/ day      Georgette Morrison RD, LD[AP1.1]      Revision History        User Key Date/Time User Provider Type Action    > AP1.1 9/24/2017  9:38 Georgette Forbes RD, NAYELI Registered Dietitian Sign                  Procedure Notes     No notes of this type exist for this encounter.      Progress Notes - Therapies (Notes from 09/24/17 through 09/27/17)     No notes of this type exist for this encounter.

## 2017-09-06 NOTE — ED NOTES
Bed: ST01  Expected date:   Expected time:   Means of arrival:   Comments:  Shawna PerazaF fall code stroke eta 1310

## 2017-09-07 ENCOUNTER — APPOINTMENT (OUTPATIENT)
Dept: CT IMAGING | Facility: CLINIC | Age: 82
DRG: 064 | End: 2017-09-07
Attending: PSYCHIATRY & NEUROLOGY
Payer: MEDICARE

## 2017-09-07 LAB
ANION GAP SERPL CALCULATED.3IONS-SCNC: 10 MMOL/L (ref 3–14)
BUN SERPL-MCNC: 9 MG/DL (ref 7–30)
CALCIUM SERPL-MCNC: 8.9 MG/DL (ref 8.5–10.1)
CHLORIDE SERPL-SCNC: 103 MMOL/L (ref 94–109)
CO2 SERPL-SCNC: 22 MMOL/L (ref 20–32)
CREAT SERPL-MCNC: 0.47 MG/DL (ref 0.52–1.04)
ERYTHROCYTE [DISTWIDTH] IN BLOOD BY AUTOMATED COUNT: 12.3 % (ref 10–15)
GFR SERPL CREATININE-BSD FRML MDRD: >90 ML/MIN/1.7M2
GLUCOSE BLDC GLUCOMTR-MCNC: 131 MG/DL (ref 70–99)
GLUCOSE SERPL-MCNC: 129 MG/DL (ref 70–99)
HCT VFR BLD AUTO: 38.4 % (ref 35–47)
HGB BLD-MCNC: 13.4 G/DL (ref 11.7–15.7)
MCH RBC QN AUTO: 31.4 PG (ref 26.5–33)
MCHC RBC AUTO-ENTMCNC: 34.9 G/DL (ref 31.5–36.5)
MCV RBC AUTO: 90 FL (ref 78–100)
PLATELET # BLD AUTO: 355 10E9/L (ref 150–450)
POTASSIUM SERPL-SCNC: 3.2 MMOL/L (ref 3.4–5.3)
POTASSIUM SERPL-SCNC: 3.8 MMOL/L (ref 3.4–5.3)
PROCALCITONIN SERPL-MCNC: <0.05 NG/ML
RBC # BLD AUTO: 4.27 10E12/L (ref 3.8–5.2)
SODIUM SERPL-SCNC: 135 MMOL/L (ref 133–144)
TROPONIN I SERPL-MCNC: 0.01 UG/L (ref 0–0.04)
WBC # BLD AUTO: 14.6 10E9/L (ref 4–11)

## 2017-09-07 PROCEDURE — 84132 ASSAY OF SERUM POTASSIUM: CPT | Performed by: PSYCHIATRY & NEUROLOGY

## 2017-09-07 PROCEDURE — 99207 ZZC CDG-MDM COMPONENT: MEETS LOW - DOWN CODED: CPT | Performed by: INTERNAL MEDICINE

## 2017-09-07 PROCEDURE — 99232 SBSQ HOSP IP/OBS MODERATE 35: CPT | Performed by: INTERNAL MEDICINE

## 2017-09-07 PROCEDURE — 25000128 H RX IP 250 OP 636: Performed by: PSYCHIATRY & NEUROLOGY

## 2017-09-07 PROCEDURE — 00000146 ZZHCL STATISTIC GLUCOSE BY METER IP

## 2017-09-07 PROCEDURE — 36415 COLL VENOUS BLD VENIPUNCTURE: CPT | Performed by: INTERNAL MEDICINE

## 2017-09-07 PROCEDURE — 20000003 ZZH R&B ICU

## 2017-09-07 PROCEDURE — 84484 ASSAY OF TROPONIN QUANT: CPT | Performed by: PSYCHIATRY & NEUROLOGY

## 2017-09-07 PROCEDURE — 80048 BASIC METABOLIC PNL TOTAL CA: CPT | Performed by: INTERNAL MEDICINE

## 2017-09-07 PROCEDURE — 25000128 H RX IP 250 OP 636: Performed by: INTERNAL MEDICINE

## 2017-09-07 PROCEDURE — 36415 COLL VENOUS BLD VENIPUNCTURE: CPT | Performed by: PSYCHIATRY & NEUROLOGY

## 2017-09-07 PROCEDURE — 25000125 ZZHC RX 250: Performed by: INTERNAL MEDICINE

## 2017-09-07 PROCEDURE — 85027 COMPLETE CBC AUTOMATED: CPT | Performed by: INTERNAL MEDICINE

## 2017-09-07 PROCEDURE — 70450 CT HEAD/BRAIN W/O DYE: CPT

## 2017-09-07 PROCEDURE — 84145 PROCALCITONIN (PCT): CPT | Performed by: PSYCHIATRY & NEUROLOGY

## 2017-09-07 RX ORDER — LEVOTHYROXINE SODIUM ANHYDROUS 100 UG/5ML
25 INJECTION, POWDER, LYOPHILIZED, FOR SOLUTION INTRAVENOUS DAILY
Status: DISCONTINUED | OUTPATIENT
Start: 2017-09-07 | End: 2017-09-10

## 2017-09-07 RX ORDER — LABETALOL HYDROCHLORIDE 5 MG/ML
10-20 INJECTION, SOLUTION INTRAVENOUS EVERY 4 HOURS PRN
Status: DISCONTINUED | OUTPATIENT
Start: 2017-09-07 | End: 2017-09-27 | Stop reason: HOSPADM

## 2017-09-07 RX ORDER — LABETALOL HYDROCHLORIDE 5 MG/ML
10-20 INJECTION, SOLUTION INTRAVENOUS EVERY 4 HOURS
Status: DISCONTINUED | OUTPATIENT
Start: 2017-09-07 | End: 2017-09-07

## 2017-09-07 RX ADMIN — SODIUM CHLORIDE: 9 INJECTION, SOLUTION INTRAVENOUS at 15:56

## 2017-09-07 RX ADMIN — SODIUM CHLORIDE: 9 INJECTION, SOLUTION INTRAVENOUS at 00:08

## 2017-09-07 RX ADMIN — Medication 10 MEQ: at 06:43

## 2017-09-07 RX ADMIN — LEVOTHYROXINE SODIUM ANHYDROUS 25 MCG: 100 INJECTION, POWDER, LYOPHILIZED, FOR SOLUTION INTRAVENOUS at 10:41

## 2017-09-07 RX ADMIN — LABETALOL HYDROCHLORIDE 10 MG: 5 INJECTION, SOLUTION INTRAVENOUS at 13:38

## 2017-09-07 RX ADMIN — LABETALOL HYDROCHLORIDE 10 MG: 5 INJECTION, SOLUTION INTRAVENOUS at 17:26

## 2017-09-07 RX ADMIN — PANTOPRAZOLE SODIUM 40 MG: 40 INJECTION, POWDER, FOR SOLUTION INTRAVENOUS at 08:56

## 2017-09-07 RX ADMIN — LABETALOL HYDROCHLORIDE 10 MG: 5 INJECTION, SOLUTION INTRAVENOUS at 13:10

## 2017-09-07 RX ADMIN — Medication 10 MEQ: at 08:52

## 2017-09-07 RX ADMIN — Medication 10 MEQ: at 07:37

## 2017-09-07 RX ADMIN — Medication 10 MEQ: at 10:02

## 2017-09-07 RX ADMIN — LABETALOL HYDROCHLORIDE 10 MG: 5 INJECTION, SOLUTION INTRAVENOUS at 18:10

## 2017-09-07 ASSESSMENT — VISUAL ACUITY
OU: NOT TESTABLE

## 2017-09-07 NOTE — PLAN OF CARE
Problem: Goal Outcome Summary  Goal: Goal Outcome Summary  Outcome: No Change  Pt attempts to open eyes to command and will intermittently open eyes spontaneously. Hemiparesis on L UE/LE with neglect. Pt incontinent of large amounts of urine. Labetolol given prn for SBP >140. Pt denies pain/headache. Pt remains on RA and is now starting to mobilize secretions and requires frequent suctioning. Repeat CT in am. Family at bedside - updated on plan of care.  1835: Nicardipine gtt restarted.

## 2017-09-07 NOTE — PROGRESS NOTES
[facility]    Hospitalist Progress Note    Assessment & Plan   Ms. Barnett is a 89-year-old pretty healthy female with history of hypertension and hypothyroidism who presented with a fall and was found to have large right frontal lobe intracranial hemorrhage.     1.  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    -The patient presented after a fall and was found on the CT head to have a large intraparenchymal hemorrhage. 5.5 X 4.2 X 3.5  -Likely she sustained the hemorrhages prior to the fall  -Repeat CT this morning shows increased size of parenchymal hematoma and surrounding edema with new intraventricular extension   -Neurologically appears to be stable with complete left-sided hemiplegia  -Continue monitoring in the ICU  -Neuro critical care and neurosurgery following.     2.  Mild hyponatremia.    -Her sodium at presentation was 132  -Improved to normal with IV fluids    3.  Benign essential hypertension.   -PTA on lisinopril.    -Hold lisinopril until swallow is evaluated/improves   -nicardipine drip for blood pressure control with a systolic goal of less than 140.     4.  Hypothyroidism.   -PTA on levothyroxine 50 mcg daily.    -I will place on levothyroxine 25  g IV daily until her swallowing improves    D/W: RN  DVT Prophylaxis: Pneumatic Compression Devices  Code Status: No Order    Disposition: Expected discharge in 2+ days    Humza Townsend MD    Interval History   She denies new complaints, continues to be completely hemiplegic on the left side without any new neurological changes.  No acute events overnight    -Data reviewed today: I reviewed all new labs and imaging results over the last 24 hours. I personally reviewed no images or EKG's today.    Physical Exam   Temp: 98  F (36.7  C) Temp src: Axillary BP: 123/61   Heart Rate: 98 Resp: 17 SpO2: 97 % O2 Device: None (Room air) Oxygen Delivery: 2 LPM  Vitals:    09/06/17 1309 09/06/17 1530 09/07/17 0415    Weight: 40.5 kg (89 lb 4.6 oz) 42 kg (92 lb 9.5 oz) 40.4 kg (89 lb 1.1 oz)     Vital Signs with Ranges  Temp:  [97.6  F (36.4  C)-98  F (36.7  C)] 98  F (36.7  C)  Heart Rate:  [] 98  Resp:  [7-32] 17  BP: (107-187)/() 123/61  SpO2:  [95 %-99 %] 97 %  I/O last 3 completed shifts:  In: 1700.21 [I.V.:1700.21]  Out: -     Constitutional: AAOX3, NAD, Appears comfortable  HEENT: Moist oral mucosa, no oral lesions, No pallor or icterus  Neck- Supple, Good ROM, No JVD  Respiratory:  No crackles, No wheezes, CTA B/L, Normal WOB  Cardiovascular: RRR, No murmur  GI: Soft, Non- tender, BS- normoactive  Skin/Integument: Warm and dry, no rashes  MSK: No joint deformity or swelling, no edema  Neuro: Right-sided preferential gaze, complete hemiplegia on the left side with 0/5 power    Medications     niCARdipine 40 mg in 200 mL 0.9% NaCl Stopped (09/07/17 0730)     NaCl Stopped (09/07/17 0700)       pantoprazole  40 mg Intravenous QAM AC     sodium chloride (PF)  3 mL Intracatheter Q8H     levothyroxine (SYNTHROID/LEVOTHROID) tablet 50 mcg  50 mcg Oral Daily     pneumococcal vaccine  0.5 mL Intramuscular Prior to discharge       Data     Recent Labs  Lab 09/07/17  0425 09/06/17  1310   WBC 14.6* 8.6   HGB 13.4 13.6   MCV 90 91    364   INR  --  0.99    132*   POTASSIUM 3.2* 3.9   CHLORIDE 103 101   CO2 22 25   BUN 9 13   CR 0.47* 0.71   ANIONGAP 10 6   ANTHONY 8.9 9.7   * 99       Recent Results (from the past 24 hour(s))   CT Head w/o Contrast   Result Value    Radiologist flags Intracranial hemorrhage (AA)    Narrative    CT OF THE HEAD WITHOUT CONTRAST 9/6/2017 1:23 PM     COMPARISON: Brain MR 7/24/2017.    HISTORY: Stroke.    TECHNIQUE: 5 mm thick axial CT images of the head were acquired  without IV contrast material.    FINDINGS: There is a new large hyperdense intra-axial mass centered in  the right frontal lobe consistent with a large parenchymal hematoma  measuring 5.5 x 4.2 x 3.5 cm in the  AP, transverse and cephalocaudad  dimensions respectively. There is a minimal amount of subarachnoid  extension of hemorrhage surrounding this parenchymal hematoma. Mass  effect due to the hematoma results in minimal local sulcal effacement  but no significant midline shift and no effacement of the basal  cisterns. There is no intraventricular extension of hemorrhage.     There is moderate diffuse cerebral volume loss. There are extensive  confluent areas of decreased density in the cerebral white matter  bilaterally that are consistent with sequela of chronic small vessel  ischemic disease disease. The ventricles and basal cisterns are within  normal limits in configuration given the degree of cerebral volume  loss.    No intracranial mass or recent infarct.    The visualized paranasal sinuses are well-aerated. There is no  mastoiditis. There are no fractures of the visualized bones.      Impression    IMPRESSION:   1. New large hyperdense mass in the right frontal lobe measuring 5.5 x  4.2 x 3.5 cm consistent with a large parenchymal hematoma. This does  not result in any significant midline shift or effacement of the basal  cisterns.  2. Diffuse cerebral volume loss and cerebral white matter changes  consistent with chronic small vessel ischemic disease.     [Critical Result: Intracranial hemorrhage]    Finding was identified on 9/6/2017 1:24 PM.     Beto Tejada was contacted by Dr. Goff on 9/6/2017 1:25 PM and  verbalized understanding of the critical result.       Radiation dose for this scan was reduced using automated exposure  control, adjustment of the mA and/or kV according to patient size, or  iterative reconstruction technique.    TRIXIE GOFF MD   Cervical spine CT w/o contrast    Narrative    CT OF THE CERVICAL SPINE WITHOUT CONTRAST   9/6/2017 1:28 PM     COMPARISON: None.    HISTORY: Trauma.     TECHNIQUE: Axial images of the cervical spine were acquired without  intravenous contrast.  Multiplanar reformations were created.      FINDINGS: There is normal alignment of the cervical vertebrae.  Vertebral body heights of the cervical spine are normal.  Craniocervical alignment is normal. There are no fractures of the  cervical spine.  There is moderate facet arthropathy on the left at  the C3-C4, C4-C5 and C5-C6 levels. There is degenerative endplate  spurring at the C4-C5 level. There is mild degenerative spinal canal  narrowing from lower C3 to upper C5. There is no prevertebral soft  tissue swelling.      Impression    IMPRESSION: Degenerative changes of the cervical spine. No evidence  for fracture or traumatic malalignment.    Radiation dose for this scan was reduced using automated exposure  control, adjustment of the mA and/or kV according to patient size, or  iterative reconstruction technique.    TRIXIE VACA MD   CT Head w/o Contrast    Narrative    CT SCAN OF THE HEAD WITHOUT CONTRAST   9/7/2017 4:02 AM     HISTORY: ICH.    TECHNIQUE:  Axial images of the head and coronal reformations without  IV contrast material. Radiation dose for this scan was reduced using  automated exposure control, adjustment of the mA and/or kV according  to patient size, or iterative reconstruction technique.    COMPARISON: 9/6/2017.    FINDINGS: Increased size of the hyperdense parenchymal hematoma  centered in the right frontal lobe now measuring approximately 6.5 x  4.2 x 4.2 cm, previously 5.5 x 4.2 x 3.5 cm. Surrounding edema around  the hematoma also appears to be increased. The hematoma now  demonstrates intraventricular extension towards the right lateral  ventricle. There is hemorrhage layering in the ventricular system,  particularly the lateral ventricles, right greater than left. There is  unchanged mass effect on the right lateral ventricle. No significant  midline shift.    Ventricular size is unchanged since prior without evidence of  hydrocephalus. The basal cisterns are patent. Moderate  diffuse  parenchymal volume loss and fairly extensive periventricular white  matter changes likely related to chronic microvascular ischemic  disease.      Impression    IMPRESSION:     1. Increased size of parenchymal hematoma and surrounding edema  centered in the right frontal lobe. There is new intraventricular  extension of the hematoma. No significant midline shift or herniation.  No hydrocephalus.  2. Diffuse parenchymal volume loss and fairly extensive white matter  changes likely due to chronic microvascular ischemic disease.    BIMAL FERRARA MD

## 2017-09-07 NOTE — PROGRESS NOTES
"[facility]    Neurosurgery Progress Note    Date of Service (when I saw the patient): 09/07/2017     Assessment & Plan   Sury Barnett is a 89 year old female who was admitted on 9/6/2017. We were asked to the see the pt for right parenchymal hemorrhage.  The pt was found to have a large non surgical right frontal hemorrhage per CT scan.  Today the pts CT scan shows increased size of hematoma without midline shift, herniation or hydrocephalus.  The pt is alert but sleepy. She is able to follow all commands. Family is in room and updated. We will continue to monitor.  Per nursing she is appropriate with left sided neglect     Active Problems:    ICH (intracerebral hemorrhage) (H)    Assessment: stabe    Plan: Per NCC    Non surgical      I have discussed the following assessment and plan Dr. Yony Russell  who is in agreement with initial plan and will follow up with further consultation recommendations.    [facility]  [facility]  [facility]  [address]  [address] [place] [id number]    Tel [phone]  Pager [phone]      Interval History   stable    Physical Exam   Temp: 98  F (36.7  C) Temp src: Axillary BP: 135/69   Heart Rate: 108 Resp: 16 SpO2: 97 % O2 Device: None (Room air) Oxygen Delivery: 2 LPM  Vitals:    09/06/17 1309 09/06/17 1530 09/07/17 0415   Weight: 89 lb 4.6 oz (40.5 kg) 92 lb 9.5 oz (42 kg) 89 lb 1.1 oz (40.4 kg)     Vital Signs with Ranges  Temp:  [97.6  F (36.4  C)-98  F (36.7  C)] 98  F (36.7  C)  Heart Rate:  [] 108  Resp:  [7-32] 16  BP: (107-187)/() 135/69  SpO2:  [95 %-99 %] 97 %  I/O last 3 completed shifts:  In: 1700.21 [I.V.:1700.21]  Out: -     Heart Rate: 108, Blood pressure 135/69, temperature 98  F (36.7  C), temperature source Axillary, resp. rate 16, height 4' 11\" (1.499 m), weight 89 lb 1.1 oz (40.4 kg), SpO2 97 %, not currently breastfeeding.  89 lbs 1.05 oz  HEENT:  Normocephalic, atraumatic.   Neck:  " Supple, non-tender, without lymphadenopathy.  Heart:  No peripheral edema  Lungs:  No SOB  Abdomen:  Soft, non-tender, non-distended.   Skin:  Warm and dry, good capillary refill.  Extremities:  Good radial and dorsalis pedis pulses bilaterally, no edema, cyanosis or clubbing.    NEUROLOGICAL EXAMINATION:     Mental status:  Alert and Oriented x 3, speech is fluent.  Cranial nerves:  II-XII intact.   Motor: strength 5/5 on the RUE and RLE.  Left sided weakness  Sensation:  intact  Reflexes:  Negative Babinski.  Negative Clonus.    Gait: deferred      Medications     niCARdipine 40 mg in 200 mL 0.9% NaCl Stopped (09/07/17 0730)     NaCl Stopped (09/07/17 0700)       pantoprazole  40 mg Intravenous QAM AC     levothyroxine  25 mcg Intravenous Daily     sodium chloride (PF)  3 mL Intracatheter Q8H     pneumococcal vaccine  0.5 mL Intramuscular Prior to discharge       Data     All new lab and imaging data was personally reviewed by me.  CT:IMPRESSION:     1. Increased size of parenchymal hematoma and surrounding edema  centered in the right frontal lobe. There is new intraventricular  extension of the hematoma. No significant midline shift or herniation.  No hydrocephalus.  2. Diffuse parenchymal volume loss and fairly extensive white matter  changes likely due to chronic microvascular ischemic disease.         CBC RESULTS:   Recent Labs   Lab Test  09/07/17   0425   WBC  14.6*   RBC  4.27   HGB  13.4   HCT  38.4   MCV  90   MCH  31.4   MCHC  34.9   RDW  12.3   PLT  355     Basic Metabolic Panel:  Lab Results   Component Value Date     09/07/2017      Lab Results   Component Value Date    POTASSIUM 3.2 09/07/2017     Lab Results   Component Value Date    CHLORIDE 103 09/07/2017     Lab Results   Component Value Date    ANTHONY 8.9 09/07/2017     Lab Results   Component Value Date    CO2 22 09/07/2017     Lab Results   Component Value Date    BUN 9 09/07/2017     Lab Results   Component Value Date    CR 0.47  09/07/2017     Lab Results   Component Value Date     09/07/2017     INR:  Lab Results   Component Value Date    INR 0.99 09/06/2017       35 minutes were spent with patient and on the floor.

## 2017-09-07 NOTE — PLAN OF CARE
Problem: Goal Outcome Summary  Goal: Goal Outcome Summary  OT and PT: Per discussion with PA, patient not appropriate to initiate therapies today.

## 2017-09-07 NOTE — PLAN OF CARE
Problem: Goal Outcome Summary  Goal: Goal Outcome Summary  Outcome: No Change  Oriented x 4, left side hemiparesis, but has sensation in all extremities. Left neglect, tracks R to L only. CHANA, slurred speech. LS clear on RA. Nicardipine titrated to keep SBP <140. Tele Sinus Tach. Family in room and updated. CT done this am, results pending.

## 2017-09-07 NOTE — PROGRESS NOTES
"[facility]  Neuroscience and Spine [place]  Neuro-ICU Progress Note       Admission Date: 2017  Date of Service:2017   Hospital Day: 2   _________________________________     Main Plans for Today   Continue ICU support  Assessment & Plan   (I61.1) Nontraumatic cortical hemorrhage of right cerebral hemisphere (H)  --Large, non-surgical right frontal hemorrhage  ---No significant cerebral edema at this time  ----Likely secondary to CAA  ----Further extension of the hemorrhage with rupture into ventricular system  (E85.4,  I68.0) Cerebral amyloid angiopathy (H)  --MRI from 2017 consistent with amyloid angiopathy.  ---One microhemorrhage was located in the area of present hemorrhage  (I10) Essential hypertension  --Keep BP <140 mm Hg  --Nicardipine infusion  #. Cardiovascular  --no active issues.   #. Infection   --Elevated WBC  --Will get procalcitonin  --Management per hospitalists.   #. Endocrine:   --Insulin protocol to keep blood sugars 100-150.   #. Heme/Onc:   --no issues   #. Renal  --Sodium goal 140-155  #. Skin/Musculoskeletal:  -- No issues   #. PT/OT/Speech  --continue  evaluations   #.Nutrition  --Per speech therapy evaluation   #. ICU prophylaxis:   --Stress ulcer prophylaxis Protonix   --DVT prophylaxis: Mechanical only due to ICH      CODE STATUS: Full Code    Family update by me today: yes, discussed extension of the hemorrhage and it's significance.     Interval History   No changes overnight. Still not able to open eyes, still with significant neglect and weakness on the left.     Physical Exam     Vitals: Blood pressure 149/66, temperature 98  F (36.7  C), temperature source Axillary, resp. rate 17, height 1.499 m (4' 11\"), weight 40.4 kg (89 lb 1.1 oz), SpO2 97 %, not currently breastfeeding.  Tmax: Temp (24hrs), Av.8  F (36.6  C), Min:97.6  F (36.4  C), Max:98  F (36.7  C)    Vital Signs with Ranges  Temp:  [97.6  F (36.4  C)-98  F (36.7  C)] 98  F (36.7 "  C)  Heart Rate:  [] 113  Resp:  [7-32] 17  BP: (107-187)/() 149/66  SpO2:  [95 %-99 %] 97 %   I&O:  I/O this shift:  In: 855.42 [I.V.:855.42]  Out: -   I/O last 3 completed shifts:  In: 844.79 [I.V.:844.79]  Out: -  I/O since admission: 1700.21         General Appearance:   No acute distress  Neuro:       Mental Status Exam:   Awake, not alert, disoriented. Speech and language are very minimal and slurred. Mental status is decreased       Cranial Nerves:  Does not open eyes. Pupils 3 mm, reactive. EOMI. Face sensation is normal. Face is symmetric.Tongue and uvula are midline. Other CN are normal           Motor: left sided severe weakness. Tone and bulk are normal           Reflexes:  Normal DTR.Toes downgoing.        Sensory: left sided neglect              Coordination:  Untestable         Gait: Untestable    Cardiovascular: Regular rate and rhythm, no m/r/g  Lungs: Resp: 17  Both hemithoraces are symmetrical, auscultation of lungs revealed no bronchovesicular sounds, expirium prolongation, wheezing, rhonci and crackles  Abdomen: Soft, not tender, not distended  Skin/Extremities: No clubbing, cyanosis, no edema     Medications   Infusions medications:    niCARdipine 40 mg in 200 mL 0.9% NaCl 2.5 mg/hr (09/07/17 0554)     NaCl 100 mL/hr at 09/07/17 0008     Schedule medications:    sodium chloride (PF)  3 mL Intracatheter Q8H     levothyroxine (SYNTHROID/LEVOTHROID) tablet 50 mcg  50 mcg Oral Daily     pneumococcal vaccine  0.5 mL Intramuscular Prior to discharge     PRN medications:lidocaine (buffered or not buffered), lidocaine 4%, sodium chloride (PF), potassium chloride, potassium chloride, potassium chloride, potassium chloride with lidocaine, potassium chloride, acetaminophen, naloxone, melatonin, senna-docusate, polyethylene glycol, bisacodyl, ondansetron **OR** ondansetron  Data       Labotary Data:   All data was reviewed by me personally  CBC RESULTS:  Recent Labs   Lab Test  09/07/17   7887   09/06/17   1310  07/24/17   0945   WBC  14.6*  8.6  6.9   RBC  4.27  4.32  4.42   HGB  13.4  13.6  13.6   HCT  38.4  39.5  40.9   PLT  355  364  280     Basic Metabolic Panel:  Recent Labs   Lab Test  09/07/17   0425  09/06/17   1310  07/24/17   0945   NA  135  132*  135   POTASSIUM  3.2*  3.9  4.1   CHLORIDE  103  101  103   CO2  22  25  28   BUN  9  13  19   CR  0.47*  0.71  0.63   GLC  129*  99  84   ANTHONY  8.9  9.7  9.2     ABG:No lab results found.  Liver panel:  No lab results found.  Procalcitonin: No lab results found.  Coagulation  Recent Labs   Lab Test  09/06/17   1310   INR  0.99   PTT  29      Lipid Profile:No lab results found.  Thyroid Panel:No lab results found.   Vitamin B12: No lab results found.   Vitamin D:No lab results found.  A1C: No lab results found.  Troponin I: No lab results found.  CPK: No lab results found.  Ammonia: No lab results found.  Serum Osmolality:No lab results found.  Most Recent 6 Bacteria Isolates From Any Culture (See EPIC Reports for Culture Details):No lab results found.  Alcohol level:No lab results found.  Drug Screen: No lab results found.  UA Results:  No lab results found.       Cardiac US:   --     Neurophysiology:   --       Imaging:   All imaging studies were reviewed personally  CT head:   1. New large hyperdense mass in the right frontal lobe measuring 5.5 x  4.2 x 3.5 cm consistent with a large parenchymal hematoma. This does  not result in any significant midline shift or effacement of the basal  cisterns.  2. Diffuse cerebral volume loss and cerebral white matter changes  consistent with chronic small vessel ischemic disease.   CT spine:   Degenerative changes of the cervical spine. No evidence  for fracture or traumatic malalignment.  MRI brain 7/24/17:   1. No acute pathology is identified. No bleed, mass, or acute infarcts  are seen.  2. Age-related changes including generalized atrophy of the brain.  White matter changes in the cerebral hemispheres  consistent with small  vessel ischemic disease in this age patient.  3. Tiny incidental lipoma along the right tentorium.  4. 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   CT head 9/7/17  1. Increased size of parenchymal hematoma and surrounding edema centered in the right frontal lobe. There is new intraventricular  extension of the hematoma. No significant midline shift or herniation. No hydrocephalus.  2. Diffuse parenchymal volume loss and fairly extensive white matter changes likely due to chronic microvascular ischemic disease.      Time Spent on this Encounter      Time:   Neurocritical care time:  I spent 45 minutes bedside and on the inpatient unit today managing the neurocritical care of Sury GILMORE Ericka in relation to the issues listed in this note. Patient is critically ill / has very high risk of deterioration

## 2017-09-07 NOTE — PROGRESS NOTES
"Pt admitted from ED. DaughterCrystal, at bedside, whom pt lives with. See assessment. Education given regarding diagnosis, treatment and plan of care. Verbalized understanding. Pt has 5 children. 3 local, 2 out of state. 9/7 son will arrive and a brother from [place]. DaughterCrystal, aware of severity of diagnosis. Is hopeful but realistic and tearful at times. DaughterDotty, states to RN \"I feel that she will breeze through this and bounce back.\" Encouraged to hope for best but also educated about potential physiological outcomes/events. Dr Long spoke with family. Plan to repeat CT in am.   "

## 2017-09-07 NOTE — PROGRESS NOTES
ICU Multi-Disciplinary Note  89-year-old pretty healthy female with history of hypertension and hypothyroidism who presents with a fall and was found to have large right frontal lobe intracranial hemorrhage, now extending. Patient condition reviewed and discussed while on multidisciplinary rounds today.     The Critical Care service will continue to follow peripherally while patient is within the ICU. We are readily available should issues arise.  Please feel free to contact us for anything with which we may be of assistance.    Julia Villar

## 2017-09-08 ENCOUNTER — APPOINTMENT (OUTPATIENT)
Dept: SPEECH THERAPY | Facility: CLINIC | Age: 82
DRG: 064 | End: 2017-09-08
Attending: PHYSICIAN ASSISTANT
Payer: MEDICARE

## 2017-09-08 ENCOUNTER — APPOINTMENT (OUTPATIENT)
Dept: GENERAL RADIOLOGY | Facility: CLINIC | Age: 82
DRG: 064 | End: 2017-09-08
Attending: INTERNAL MEDICINE
Payer: MEDICARE

## 2017-09-08 ENCOUNTER — APPOINTMENT (OUTPATIENT)
Dept: CT IMAGING | Facility: CLINIC | Age: 82
DRG: 064 | End: 2017-09-08
Attending: PSYCHIATRY & NEUROLOGY
Payer: MEDICARE

## 2017-09-08 LAB
ALBUMIN UR-MCNC: NEGATIVE MG/DL
ANION GAP SERPL CALCULATED.3IONS-SCNC: 10 MMOL/L (ref 3–14)
APPEARANCE UR: CLEAR
BILIRUB UR QL STRIP: NEGATIVE
BUN SERPL-MCNC: 12 MG/DL (ref 7–30)
CALCIUM SERPL-MCNC: 9.1 MG/DL (ref 8.5–10.1)
CHLORIDE SERPL-SCNC: 102 MMOL/L (ref 94–109)
CO2 SERPL-SCNC: 23 MMOL/L (ref 20–32)
COLOR UR AUTO: ABNORMAL
CREAT SERPL-MCNC: 0.46 MG/DL (ref 0.52–1.04)
ERYTHROCYTE [DISTWIDTH] IN BLOOD BY AUTOMATED COUNT: 12.3 % (ref 10–15)
GFR SERPL CREATININE-BSD FRML MDRD: >90 ML/MIN/1.7M2
GLUCOSE SERPL-MCNC: 99 MG/DL (ref 70–99)
GLUCOSE UR STRIP-MCNC: NEGATIVE MG/DL
HCT VFR BLD AUTO: 40.1 % (ref 35–47)
HGB BLD-MCNC: 14 G/DL (ref 11.7–15.7)
HGB UR QL STRIP: ABNORMAL
KETONES UR STRIP-MCNC: 80 MG/DL
LEUKOCYTE ESTERASE UR QL STRIP: NEGATIVE
MCH RBC QN AUTO: 31.2 PG (ref 26.5–33)
MCHC RBC AUTO-ENTMCNC: 34.9 G/DL (ref 31.5–36.5)
MCV RBC AUTO: 89 FL (ref 78–100)
MUCOUS THREADS #/AREA URNS LPF: PRESENT /LPF
NITRATE UR QL: NEGATIVE
PH UR STRIP: 5.5 PH (ref 5–7)
PLATELET # BLD AUTO: 339 10E9/L (ref 150–450)
POTASSIUM SERPL-SCNC: 2.8 MMOL/L (ref 3.4–5.3)
POTASSIUM SERPL-SCNC: 4.2 MMOL/L (ref 3.4–5.3)
RBC # BLD AUTO: 4.49 10E12/L (ref 3.8–5.2)
RBC #/AREA URNS AUTO: 3 /HPF (ref 0–2)
SODIUM SERPL-SCNC: 135 MMOL/L (ref 133–144)
SOURCE: ABNORMAL
SP GR UR STRIP: 1.01 (ref 1–1.03)
UROBILINOGEN UR STRIP-MCNC: NORMAL MG/DL (ref 0–2)
WBC # BLD AUTO: 13.1 10E9/L (ref 4–11)
WBC #/AREA URNS AUTO: 1 /HPF (ref 0–2)

## 2017-09-08 PROCEDURE — 25000132 ZZH RX MED GY IP 250 OP 250 PS 637: Mod: GY | Performed by: INTERNAL MEDICINE

## 2017-09-08 PROCEDURE — 92526 ORAL FUNCTION THERAPY: CPT | Mod: GN | Performed by: SPEECH-LANGUAGE PATHOLOGIST

## 2017-09-08 PROCEDURE — 80048 BASIC METABOLIC PNL TOTAL CA: CPT | Performed by: INTERNAL MEDICINE

## 2017-09-08 PROCEDURE — 36415 COLL VENOUS BLD VENIPUNCTURE: CPT | Performed by: INTERNAL MEDICINE

## 2017-09-08 PROCEDURE — 92610 EVALUATE SWALLOWING FUNCTION: CPT | Mod: GN | Performed by: SPEECH-LANGUAGE PATHOLOGIST

## 2017-09-08 PROCEDURE — 40000986 XR ABDOMEN PORT F1 VW

## 2017-09-08 PROCEDURE — 84132 ASSAY OF SERUM POTASSIUM: CPT | Performed by: INTERNAL MEDICINE

## 2017-09-08 PROCEDURE — 99232 SBSQ HOSP IP/OBS MODERATE 35: CPT | Performed by: INTERNAL MEDICINE

## 2017-09-08 PROCEDURE — 81001 URINALYSIS AUTO W/SCOPE: CPT | Performed by: INTERNAL MEDICINE

## 2017-09-08 PROCEDURE — 40000225 ZZH STATISTIC SLP WARD VISIT: Performed by: SPEECH-LANGUAGE PATHOLOGIST

## 2017-09-08 PROCEDURE — 25000128 H RX IP 250 OP 636: Performed by: PSYCHIATRY & NEUROLOGY

## 2017-09-08 PROCEDURE — 25000125 ZZHC RX 250: Performed by: INTERNAL MEDICINE

## 2017-09-08 PROCEDURE — 71010 XR CHEST PORT 1 VW: CPT

## 2017-09-08 PROCEDURE — 25000128 H RX IP 250 OP 636: Performed by: INTERNAL MEDICINE

## 2017-09-08 PROCEDURE — A9270 NON-COVERED ITEM OR SERVICE: HCPCS | Mod: GY | Performed by: INTERNAL MEDICINE

## 2017-09-08 PROCEDURE — 85027 COMPLETE CBC AUTOMATED: CPT | Performed by: INTERNAL MEDICINE

## 2017-09-08 PROCEDURE — 20000003 ZZH R&B ICU

## 2017-09-08 PROCEDURE — 70450 CT HEAD/BRAIN W/O DYE: CPT

## 2017-09-08 PROCEDURE — 99207 ZZC CDG-MDM COMPONENT: MEETS LOW - DOWN CODED: CPT | Performed by: INTERNAL MEDICINE

## 2017-09-08 RX ORDER — SODIUM CHLORIDE AND POTASSIUM CHLORIDE 150; 900 MG/100ML; MG/100ML
INJECTION, SOLUTION INTRAVENOUS CONTINUOUS
Status: DISCONTINUED | OUTPATIENT
Start: 2017-09-08 | End: 2017-09-11

## 2017-09-08 RX ORDER — MICONAZOLE NITRATE 20 MG/G
CREAM TOPICAL
Status: DISCONTINUED | OUTPATIENT
Start: 2017-09-08 | End: 2017-09-27 | Stop reason: HOSPADM

## 2017-09-08 RX ORDER — METOCLOPRAMIDE HYDROCHLORIDE 5 MG/ML
10 INJECTION INTRAMUSCULAR; INTRAVENOUS ONCE
Status: COMPLETED | OUTPATIENT
Start: 2017-09-08 | End: 2017-09-08

## 2017-09-08 RX ADMIN — LIDOCAINE HYDROCHLORIDE 10 ML: 20 JELLY TOPICAL at 18:46

## 2017-09-08 RX ADMIN — METOCLOPRAMIDE 10 MG: 5 INJECTION, SOLUTION INTRAMUSCULAR; INTRAVENOUS at 14:14

## 2017-09-08 RX ADMIN — LIDOCAINE HYDROCHLORIDE: 20 JELLY TOPICAL at 14:14

## 2017-09-08 RX ADMIN — LABETALOL HYDROCHLORIDE 10 MG: 5 INJECTION, SOLUTION INTRAVENOUS at 01:06

## 2017-09-08 RX ADMIN — Medication 2.5 MG/HR: at 03:08

## 2017-09-08 RX ADMIN — LEVOTHYROXINE SODIUM ANHYDROUS 25 MCG: 100 INJECTION, POWDER, LYOPHILIZED, FOR SOLUTION INTRAVENOUS at 08:54

## 2017-09-08 RX ADMIN — LABETALOL HYDROCHLORIDE 20 MG: 5 INJECTION, SOLUTION INTRAVENOUS at 09:34

## 2017-09-08 RX ADMIN — SODIUM CHLORIDE: 9 INJECTION, SOLUTION INTRAVENOUS at 11:09

## 2017-09-08 RX ADMIN — POTASSIUM CHLORIDE 40 MEQ: 1.5 POWDER, FOR SOLUTION ORAL at 18:46

## 2017-09-08 RX ADMIN — SODIUM CHLORIDE: 9 INJECTION, SOLUTION INTRAVENOUS at 00:11

## 2017-09-08 RX ADMIN — POTASSIUM CHLORIDE 40 MEQ: 1.5 POWDER, FOR SOLUTION ORAL at 16:35

## 2017-09-08 RX ADMIN — POTASSIUM CHLORIDE AND SODIUM CHLORIDE: 900; 150 INJECTION, SOLUTION INTRAVENOUS at 15:39

## 2017-09-08 RX ADMIN — LABETALOL HYDROCHLORIDE 20 MG: 5 INJECTION, SOLUTION INTRAVENOUS at 18:36

## 2017-09-08 RX ADMIN — PANTOPRAZOLE SODIUM 40 MG: 40 INJECTION, POWDER, FOR SOLUTION INTRAVENOUS at 08:54

## 2017-09-08 ASSESSMENT — VISUAL ACUITY: OU: NOT TESTABLE

## 2017-09-08 NOTE — PLAN OF CARE
Problem: Goal Outcome Summary  Goal: Goal Outcome Summary  OT and PT: SPoke with PA, pt with extension of brain bleed and not appropriate for therapies today.

## 2017-09-08 NOTE — PROGRESS NOTES
"   09/08/17 0949   General Information   Onset Date 09/06/17   Start of Care Date 09/08/17   Referring Physician Julia Villar PA-C   Patient Profile Review/OT: Additional Occupational Profile Info See Profile for full history and prior level of function   Patient/Family Goals Statement To try liquids   Swallowing Evaluation Bedside swallow evaluation   Behaviorial Observations Lethargic   Mode of current nutrition NPO   Respiratory Status Room air   Comments Ms. Barnett is a 89-year-old pretty healthy female with history of hypertension and hypothyroidism who presented with a fall and was found to have large right frontal lobe intracranial hemorrhage. Per neurosurgery, \"The pt was found to have a large non surgical right frontal hemorrhage per CT scan.  Today pt is lying in bed.  She is alert and oriented with left facial droop. She does not open her eyes much but able to follow other commands on the right. She has left sided hemiparesis and per nursing left field cut.  Her CT this AM is stable. No surgery indicated.\"   Clinical Swallow Evaluation   Oral Musculature anomalies present   Structural Abnormalities none present   Dentition present and adequate   Secretion Management (no difficulty during exam)   Mucosal Quality good   Mandibular Strength and Mobility (generalized weakness)   Oral Labial Strength and Mobility impaired retraction;impaired pursing;impaired seal;impaired coordination   Lingual Strength and Mobility impaired protrusion;impaired anterior elevation;impaired left lateral movement;impaired right lateral movement;impaired coordination   Velar Elevation (unable)   Buccal Strength and Mobility impaired  (generalized weakness)   Laryngeal Function Cough;Throat clear;Swallow;Voicing initiated;Dry swallow palpated   Oral Musculature Comments Moderate generalized weakness; left droop; impaired coordination   Additional Documentation Yes   Clinical Swallow Eval: Thin Liquid Texture Trial   Mode of " Presentation, Thin Liquids (1 ice chip)   Oral Phase of Swallow Premature pharyngeal entry   Pharyngeal Phase of Swallow coughing/choking;throat clearing   Diagnostic Statement Adequate mastication; timely swallow; delayed coughing   Clinical Swallow Eval: Nectar Thick Liquid Texture Trial   Mode of Presentation, Nectar spoon   Volume of Nectar Presented 1/2 tsp   Oral Phase, Nectar Premature pharyngeal entry   Pharyngeal Phase, Nectar impaired;coughing/choking;throat clearing   Diagnostic Statement improved oral control; cough with suspect pharyngeal retention   Esophageal Phase of Swallow   Patient reports or presents with symptoms of esophageal dysphagia No   General Therapy Interventions   Planned Therapy Interventions Dysphagia Treatment   Dysphagia treatment Oropharyngeal exercise training;Instruction of safe swallow strategies   Intervention Comments assess for PO readiness   Swallow Eval: Clinical Impressions   Skilled Criteria for Therapy Intervention Skilled criteria met.  Treatment indicated.   Functional Assessment Scale (FAS) 2   Treatment Diagnosis Moderate to severe dysphagia   Diet texture recommendations NPO   Demonstrates Need for Referral to Another Service dietitian   Therapy Frequency daily   Predicted Duration of Therapy Intervention (days/wks) 3 weeks   Anticipated Discharge Disposition extended care facility   Risks and Benefits of Treatment have been explained. Yes   Patient, family and/or staff in agreement with Plan of Care Yes   Clinical Impression Comments SLP: Pt presents with moderate to severe dysphagia on bedside swallow evaluation. Daughter supportive at bedside. Pt able to follow commands given extended time and appropriately responded to questions in conversation. Left facial droop with moderately impaired oral motor strength and ROM. Pt able to form seal around teaspoon. Oral cavity clear. Small ice chip x1 trialed with functional mastication and timely swallow. Delayed coughing  noted. Pt required cues for strong cough. Suction used to clear. Vocal quality remained clear and o2 stats remained at baseline after coughing. Suspect premature spillage with pharyngeal residue. Improved oral control noted with   tsp of nectar thick liquids x2 trials. Overt s/sx of aspiration noted. Educated pt and daughter on recommended: strict NPO with lightly moistened swabs ok for comfort and moisture. Encourage pt to swallow secretions and cue to swallow throughout the day. Daughter expressed concern with nutrition. Discussed improvement in level of alertness today and continued daily ST to target PO readiness, however, if pt is able to improve to PO diet within the next few days, it is unlikely that she will eat/drink enough to meet nutritional needs. RN notified to consider TF/dietician consult. ST to follow for PO readiness. Discussed recommended video swallow if continued improvement at bedside to advance to oral diet.    Total Evaluation Time   Total Evaluation Time (Minutes) 31

## 2017-09-08 NOTE — PROGRESS NOTES
[facility]    Hospitalist Progress Note    Assessment & Plan   Ms. Barnett is a 89-year-old pretty healthy female with history of hypertension and hypothyroidism who presented with a fall and was found to have large right frontal lobe intracranial hemorrhage.     1.  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    -The patient presented after a fall and was found on the CT head to have a large intraparenchymal hemorrhage. 5.5 X 4.2 X 3.5  -Likely she sustained the hemorrhage prior to the fall  -Repeat CT this morning -stable  -Neurologically appears to be stable with unchanged complete left-sided hemiplegia  -Neuro critical care and neurosurgery following.  -she has severe dysphagia at this time  -D/w family who were concerned about her nutritional status. discussed that dysphagia might take few days to improve or we might not have a meaningful improvement in the next few weeks. Discussed options about NG tube versus G-tube.  At this time we'll proceed with NG tube placement today.     2.  Mild hyponatremia.    -Her sodium at presentation was 132  -Improved to normal with IV fluids    3.  Benign essential hypertension.   -PTA on lisinopril.    -Hold lisinopril due to dysphagia  -nicardipine drip for blood pressure control with a systolic goal of less than 140.   -plan restart oral antihypertensives once NG tube placed    4.  Hypothyroidism.   -PTA on levothyroxine 50 mcg daily.    -I will place on levothyroxine 25  g IV daily until her swallowing improves    5. Mild leukocytosis:  -Afebrile  -Chest x-ray without any pneumonia  -We'll check a UA with microscopic    D/W: RN  DVT Prophylaxis: Pneumatic Compression Devices  Code Status: No Order    Disposition: Expected discharge in 2+ days    Humza Townsend MD    Interval History   Patient denies new complaints.  Neurologically has been stable.  Still on nicardipine drip for blood pressure control    -Data reviewed today: I  reviewed all new labs and imaging results over the last 24 hours. I personally reviewed no images or EKG's today.    Physical Exam   Temp: 98.3  F (36.8  C) Temp src: Oral BP: 124/61   Heart Rate: 77 Resp: 13 SpO2: 96 % O2 Device: (P) None (Room air)    Vitals:    09/06/17 1530 09/07/17 0415 09/08/17 0000   Weight: 42 kg (92 lb 9.5 oz) 40.4 kg (89 lb 1.1 oz) 40.3 kg (88 lb 13.5 oz)     Vital Signs with Ranges  Temp:  [97.9  F (36.6  C)-98.3  F (36.8  C)] 98.3  F (36.8  C)  Heart Rate:  [] 77  Resp:  [9-24] 13  BP: (109-153)/() 124/61  SpO2:  [91 %-98 %] 96 %  I/O last 3 completed shifts:  In: 2430.42 [I.V.:2430.42]  Out: -     Constitutional: AAO, answers simple questions appropriately  HEENT: Moist oral mucosa, no oral lesions, No pallor or icterus  Neck- Supple, Good ROM, No JVD  Respiratory:  No crackles, No wheezes, CTA B/L, Normal WOB  Cardiovascular: RRR, No murmur  GI: Soft, Non- tender, BS- normoactive  Skin/Integument: Warm and dry, no rashes  MSK: No joint deformity or swelling, no edema  Neuro: Right-sided preferential gaze, complete hemiplegia on the left side with 0/5 power-unchanged from yesterday    Medications     0.9% sodium chloride + KCl 20 mEq/L       niCARdipine 40 mg in 200 mL 0.9% NaCl 5 mg/hr (09/08/17 1150)       pantoprazole  40 mg Intravenous QAM AC     levothyroxine  25 mcg Intravenous Daily     sodium chloride (PF)  3 mL Intracatheter Q8H     pneumococcal vaccine  0.5 mL Intramuscular Prior to discharge       Data     Recent Labs  Lab 09/08/17  1155 09/07/17  1303 09/07/17  1128 09/07/17  0425 09/06/17  1310   WBC 13.1*  --   --  14.6* 8.6   HGB 14.0  --   --  13.4 13.6   MCV 89  --   --  90 91     --   --  355 364   INR  --   --   --   --  0.99     --   --  135 132*   POTASSIUM 2.8* 3.8  --  3.2* 3.9   CHLORIDE 102  --   --  103 101   CO2 23  --   --  22 25   BUN 12  --   --  9 13   CR 0.46*  --   --  0.47* 0.71   ANIONGAP 10  --   --  10 6   ANTHONY 9.1  --   --   8.9 9.7   GLC 99  --   --  129* 99   TROPI  --   --  0.015  --   --        Recent Results (from the past 24 hour(s))   CT Head w/o Contrast    Narrative    CT SCAN OF THE HEAD WITHOUT CONTRAST   9/8/2017 4:55 AM     HISTORY: Follow up head bleed.    TECHNIQUE:  Axial images of the head and coronal reformations without  IV contrast material.  Radiation dose for this scan was reduced using  automated exposure control, adjustment of the mA and/or kV according  to patient size, or iterative reconstruction technique.    COMPARISON: 9/7/2017.    FINDINGS: There is a heterogeneous right frontal parenchymal  hemorrhage with some surrounding low density and some localized mass  effect. This is similar in appearance to the prior day's exam. There  is also intraventricular extension of hemorrhage with blood layering  in the lateral ventricles dependent portions. This is also unchanged.  Cerebral atrophy and patchy white matter changes are noted. There is  no evidence for any acute ischemic infarcts, midline shift, or skull  fracture.      Impression    IMPRESSION: Stable right frontal parenchymal and intraventricular  hemorrhage. There is no evidence for any progressive mass effect or  any new hemorrhage.    GUMARO JAMES MD   XR Chest Port 1 View    Narrative    XR CHEST PORT 1 VW 9/8/2017 11:55 AM    HISTORY: Evaluate for pneumonia.    COMPARISON: None.    FINDINGS: No consolidation, effusion or pneumothorax. Normal heart  size.      Impression    IMPRESSION: No evidence of pneumonia.    CARMEN BHAKTA MD

## 2017-09-08 NOTE — PROGRESS NOTES
"[facility]    Neurosurgery Progress Note    Date of Service (when I saw the patient): 09/08/2017     Assessment & Plan   Sury Barnett is a 89 year old female who was admitted on 9/6/2017. We were asked to the see the pt for right parenchymal hemorrhage.  The pt was found to have a large non surgical right frontal hemorrhage per CT scan.  Today pt is lying in bed.  She is alert and oriented with left facial droop. She does no open her eyes much but able to follow other commands on the right. She has left sided hemiparesis and per nursing left field cut.  Her CT this AM is stable. No surgery indicated.  Recommendations per NCC       Active Problems:    ICH (intracerebral hemorrhage) (H)    Assessment: stabe    Plan: Per NCC    Non surgical   Will continue to monitor       I have discussed the following assessment and plan Dr. Yony Russell  who is in agreement with initial plan and will follow up with further consultation recommendations.    Dionna Hartley [facility]  [facility]  [facility]  [address]  [address] [place] [id number]    Tel [phone]  Pager [phone]      Interval History   Stable no change     Physical Exam   Temp: 97.9  F (36.6  C) Temp src: Axillary BP: 124/61   Heart Rate: 77 Resp: 13 SpO2: 96 % O2 Device: None (Room air) Oxygen Delivery: 2 LPM  Vitals:    09/06/17 1530 09/07/17 0415 09/08/17 0000   Weight: 92 lb 9.5 oz (42 kg) 89 lb 1.1 oz (40.4 kg) 88 lb 13.5 oz (40.3 kg)     Vital Signs with Ranges  Temp:  [97.9  F (36.6  C)-98.4  F (36.9  C)] 97.9  F (36.6  C)  Heart Rate:  [] 77  Resp:  [9-24] 13  BP: (109-153)/() 124/61  SpO2:  [91 %-98 %] 96 %  I/O last 3 completed shifts:  In: 2430.42 [I.V.:2430.42]  Out: -     Heart Rate: 77, Blood pressure 124/61, temperature 97.9  F (36.6  C), temperature source Axillary, resp. rate 13, height 4' 11\" (1.499 m), weight 88 lb 13.5 oz (40.3 kg), SpO2 96 %, not currently breastfeeding.  88 lbs " 13.53 oz  HEENT:  Normocephalic, atraumatic.  Sleepy, left field cut   Neck:  Supple, non-tender, without lymphadenopathy.  Heart:  No peripheral edema  Lungs:  No SOB  Abdomen:  Soft, non-tender, non-distended.   Skin:  Warm and dry, good capillary refill.  Extremities:  Good radial and dorsalis pedis pulses bilaterally, no edema, cyanosis or clubbing.    NEUROLOGICAL EXAMINATION:     Mental status:  Alert and Oriented x 3, speech is fluent.  Left sided hemiparesis   Sensation:  intact  Gait:  Deferred        Medications     niCARdipine 40 mg in 200 mL 0.9% NaCl Stopped (09/08/17 0610)     NaCl 100 mL/hr at 09/08/17 0855       pantoprazole  40 mg Intravenous QAM AC     levothyroxine  25 mcg Intravenous Daily     sodium chloride (PF)  3 mL Intracatheter Q8H     pneumococcal vaccine  0.5 mL Intramuscular Prior to discharge       Data     All new lab and imaging data was personally reviewed by me.  CT:IMPRESSION: Stable right frontal parenchymal and intraventricular  hemorrhage. There is no evidence for any progressive mass effect or  any new hemorrhage.       CBC RESULTS:   Recent Labs   Lab Test  09/07/17   0425   WBC  14.6*   RBC  4.27   HGB  13.4   HCT  38.4   MCV  90   MCH  31.4   MCHC  34.9   RDW  12.3   PLT  355     Basic Metabolic Panel:  Lab Results   Component Value Date     09/07/2017      Lab Results   Component Value Date    POTASSIUM 3.8 09/07/2017     Lab Results   Component Value Date    CHLORIDE 103 09/07/2017     Lab Results   Component Value Date    ANTHONY 8.9 09/07/2017     Lab Results   Component Value Date    CO2 22 09/07/2017     Lab Results   Component Value Date    BUN 9 09/07/2017     Lab Results   Component Value Date    CR 0.47 09/07/2017     Lab Results   Component Value Date     09/07/2017     INR:  Lab Results   Component Value Date    INR 0.99 09/06/2017     .35 minutes were spent with patient and on the floor.

## 2017-09-08 NOTE — PROGRESS NOTES
ICU Multi-Disciplinary Note  89-year-old pretty healthy female with history of hypertension and hypothyroidism who presents with a fall and was found to have large right frontal lobe intracranial hemorrhage, now extending.  Patient condition reviewed and discussed while on multidisciplinary rounds today.   Please note these minor interventions that were initiated:  1. Swallow evaluation   The Critical Care service will continue to follow peripherally while patient is within the ICU. We are readily available should issues arise.  Please feel free to contact us for anything with which we may be of assistance.    Julia Villar

## 2017-09-08 NOTE — PROGRESS NOTES
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  ---One microhemorrhage was located in the area of present hemorrhage  (I10) Essential hypertension  --Keep BP <140 mm Hg  --Nicardipine infusion  --Start oral medications per hospitalists.   #. Cardiovascular  --no active issues.   #. Infection   --Elevated WBC  --Normal procalcitonin  --Management per hospitalists.   #. Endocrine:   --Insulin protocol to keep blood sugars 100-150.   #. Heme/Onc:   --no issues   #. Renal  --Sodium goal 140-155  #. Skin/Musculoskeletal:  -- No issues   #. PT/OT/Speech  --continue  evaluations   #.Nutrition  --Per speech therapy evaluation   #. ICU prophylaxis:   --Stress ulcer prophylaxis Protonix   --DVT prophylaxis: Mechanical only due to ICH    CODE STATUS: Full Code    Family update by me today: yes.     Interval History   Slightly more awake, quicker to respond. Still not able to open eyes, still with significant neglect and weakness on the left.     Physical Exam       Vitals:  Vitals:    09/06/17 1309 09/06/17 1530 09/07/17 0415 09/08/17 0000   Weight: 40.5 kg (89 lb 4.6 oz) 42 kg (92 lb 9.5 oz) 40.4 kg (89 lb 1.1 oz) 40.3 kg (88 lb 13.5 oz)     Temp: 97.9  F (36.6  C) Temp src: Axillary BP: 124/61   Heart Rate: 77 Resp: 13 SpO2: 96 % O2 Device: None (Room air) Oxygen Delivery: 2 LPM Height: 149.9 cm (4' 11\") " Weight: 40.3 kg (88 lb 13.5 oz)        Tmax: Temp (24hrs), Av.1  F (36.7  C), Min:97.9  F (36.6  C), Max:98.4  F (36.9  C)    BP - Mean:  [] 86   I&O:    Intake/Output Summary (Last 24 hours) at 17 0758  Last data filed at 17 0600   Gross per 24 hour   Intake          2230.42 ml   Output                0 ml   Net          2230.42 ml    I/O since admission: 4130.63  Bowel movement: --     General Appearance:   No acute distress  Neuro:       Mental Status Exam:   Awake, slightly more alert, disoriented. Speech and language are very minimal and slurred. Mental status is decreased       Cranial Nerves:  apraxia of eye opening. Pupils 3 mm, reactive. EOMI. Face sensation is normal. Face is symmetric.Tongue and uvula are midline. Other CN are normal           Motor: left sided severe weakness. Tone and bulk are normal           Reflexes:  Normal DTR.Toes downgoing.        Sensory: left sided neglect              Coordination:  Untestable         Gait: Untestable    Cardiovascular: Regular rate and rhythm, no m/r/g  Lungs: Resp: 13  Both hemithoraces are symmetrical, auscultation of lungs revealed no bronchovesicular sounds, expirium prolongation, wheezing, rhonci and crackles  Abdomen: Soft, not tender, not distended  Skin/Extremities: No clubbing, cyanosis, no edema     Medications   Infusions medications:    niCARdipine 40 mg in 200 mL 0.9% NaCl Stopped (17 0610)     NaCl 100 mL/hr at 17 0011     Schedule medications:    pantoprazole  40 mg Intravenous QAM AC     levothyroxine  25 mcg Intravenous Daily     sodium chloride (PF)  3 mL Intracatheter Q8H     pneumococcal vaccine  0.5 mL Intramuscular Prior to discharge     PRN medications:labetalol, lidocaine (buffered or not buffered), lidocaine 4%, sodium chloride (PF), potassium chloride, potassium chloride, potassium chloride, potassium chloride with lidocaine, potassium chloride, acetaminophen, naloxone, melatonin, senna-docusate,  polyethylene glycol, bisacodyl, ondansetron **OR** ondansetron  Data       Labotary Data:   All data was reviewed by me personally  CBC RESULTS:  Recent Labs   Lab Test  09/07/17   0425  09/06/17   1310  07/24/17   0945   WBC  14.6*  8.6  6.9   RBC  4.27  4.32  4.42   HGB  13.4  13.6  13.6   HCT  38.4  39.5  40.9   PLT  355  364  280     Basic Metabolic Panel:  Recent Labs   Lab Test  09/07/17   1303  09/07/17   0425  09/06/17   1310  07/24/17   0945   NA   --   135  132*  135   POTASSIUM  3.8  3.2*  3.9  4.1   CHLORIDE   --   103  101  103   CO2   --   22  25  28   BUN   --   9  13  19   CR   --   0.47*  0.71  0.63   GLC   --   129*  99  84   ANTHONY   --   8.9  9.7  9.2     ABG:No lab results found.  Liver panel:  No lab results found.  Procalcitonin:   Recent Labs   Lab Test  09/07/17   1128   PCAL  <0.05     Coagulation  Recent Labs   Lab Test  09/06/17   1310   INR  0.99   PTT  29      Lipid Profile:No lab results found.  Thyroid Panel:No lab results found.   Vitamin B12: No lab results found.   Vitamin D:No lab results found.  A1C: No lab results found.  Troponin I:   Recent Labs   Lab Test  09/07/17   1128   TROPI  0.015     CPK: No lab results found.  Ammonia: No lab results found.  Serum Osmolality:No lab results found.  Most Recent 6 Bacteria Isolates From Any Culture (See EPIC Reports for Culture Details):No lab results found.  Alcohol level:No lab results found.  Drug Screen: No lab results found.  UA Results:  No lab results found.       Cardiac US:   --     Neurophysiology:   --       Imaging:   All imaging studies were reviewed personally  CT head:   1. New large hyperdense mass in the right frontal lobe measuring 5.5 x  4.2 x 3.5 cm consistent with a large parenchymal hematoma. This does  not result in any significant midline shift or effacement of the basal  cisterns.  2. Diffuse cerebral volume loss and cerebral white matter changes  consistent with chronic small vessel ischemic disease.   CT spine:    Degenerative changes of the cervical spine. No evidence  for fracture or traumatic malalignment.  MRI brain 7/24/17:   1. No acute pathology is identified. No bleed, mass, or acute infarcts  are seen.  2. Age-related changes including generalized atrophy of the brain.  White matter changes in the cerebral hemispheres consistent with small  vessel ischemic disease in this age patient.  3. Tiny incidental lipoma along the right tentorium.  4. 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   CT head 9/7/17  1. Increased size of parenchymal hematoma and surrounding edema centered in the right frontal lobe. There is new intraventricular  extension of the hematoma. No significant midline shift or herniation. No hydrocephalus.  2. Diffuse parenchymal volume loss and fairly extensive white matter changes likely due to chronic microvascular ischemic disease.    CT head 9/8/17  Stable right frontal parenchymal and intraventricular hemorrhage. There is no evidence for any progressive mass effect or any new hemorrhage.        Time Spent on this Encounter      Time:   Neurocritical care time:  I spent 40 minutes bedside and on the inpatient unit today managing the neurocritical care of uSry Barnett in relation to the issues listed in this note. Patient is critically ill / has very high risk of deterioration

## 2017-09-08 NOTE — PLAN OF CARE
Problem: Goal Outcome Summary  Goal: Goal Outcome Summary  Outcome: No Change  Nuero's unchanged, see charting previous notes. VSS, nicardipine titrated for BP. Continues to soak briefs, changed multiple times.

## 2017-09-08 NOTE — PLAN OF CARE
Problem: Stroke (Hemorrhagic) (Adult)  Goal: Signs and Symptoms of Listed Potential Problems Will be Absent or Manageable (Stroke)  Signs and symptoms of listed potential problems will be absent or manageable by discharge/transition of care (reference Stroke (Hemorrhagic) (Adult) CPG).   No neuro changes,  Placed keofeed with multiple attempts awaiting final report.  Un able to place han, noted small amt of yeast and slight excoriation rt. Inner labia.  Inc. Of urine q 1 - 2 h.  Family at bedside updated frequently.  Nicardipine on again, plan to replace potasium orally after keofeed  Xray confirmed.  May start antihypertensives also.

## 2017-09-08 NOTE — PLAN OF CARE
Problem: Goal Outcome Summary  Goal: Goal Outcome Summary  Discharge Planner SLP   Patient plan for discharge: Did not state  Current status: SLP: Pt presents with moderate to severe dysphagia on bedside swallow evaluation. Daughter supportive at bedside. Pt able to follow commands given extended time and appropriately responded to questions in conversation. Left facial droop with moderately impaired oral motor strength and ROM. Pt able to form seal around teaspoon. Oral cavity clear. Small ice chip x1 trialed with functional mastication and timely swallow. Delayed coughing noted. Pt required cues for strong cough. Suction used to clear. Vocal quality remained clear and o2 stats remained at baseline after coughing. Suspect premature spillage with pharyngeal residue. Improved oral control noted with   tsp of nectar thick liquids x2 trials. Overt s/sx of aspiration noted. Educated pt and daughter on recommended: strict NPO with lightly moistened swabs ok for comfort and moisture. Encourage pt to swallow secretions and cue to swallow throughout the day. Daughter expressed concern with nutrition. Discussed improvement in level of alertness today and continued daily ST to target PO readiness, however, if pt is able to improve to PO diet within the next few days, it is unlikely that she will eat/drink enough to meet nutritional needs. RN notified to consider TF/dietician consult.   Barriers to return to prior living situation: Dysphagia; left hemiparesis  Recommendations for discharge: Pending pt progress/medical status/further work up  Rationale for recommendations: ST to follow for PO readiness. Discussed recommended video swallow if continued improvement at bedside to advance to oral diet.        Entered by: Coco Fonseca 09/08/2017 9:59 AM

## 2017-09-09 ENCOUNTER — APPOINTMENT (OUTPATIENT)
Dept: OCCUPATIONAL THERAPY | Facility: CLINIC | Age: 82
DRG: 064 | End: 2017-09-09
Attending: INTERNAL MEDICINE
Payer: MEDICARE

## 2017-09-09 LAB
ANION GAP SERPL CALCULATED.3IONS-SCNC: 11 MMOL/L (ref 3–14)
BUN SERPL-MCNC: 15 MG/DL (ref 7–30)
CALCIUM SERPL-MCNC: 9.2 MG/DL (ref 8.5–10.1)
CHLORIDE SERPL-SCNC: 104 MMOL/L (ref 94–109)
CO2 SERPL-SCNC: 22 MMOL/L (ref 20–32)
CREAT SERPL-MCNC: 0.47 MG/DL (ref 0.52–1.04)
GFR SERPL CREATININE-BSD FRML MDRD: >90 ML/MIN/1.7M2
GLUCOSE SERPL-MCNC: 85 MG/DL (ref 70–99)
MAGNESIUM SERPL-MCNC: 2.3 MG/DL (ref 1.6–2.3)
PHOSPHATE SERPL-MCNC: 1.9 MG/DL (ref 2.5–4.5)
POTASSIUM SERPL-SCNC: 3.6 MMOL/L (ref 3.4–5.3)
SODIUM SERPL-SCNC: 137 MMOL/L (ref 133–144)

## 2017-09-09 PROCEDURE — 97166 OT EVAL MOD COMPLEX 45 MIN: CPT | Mod: GO | Performed by: OCCUPATIONAL THERAPIST

## 2017-09-09 PROCEDURE — 36415 COLL VENOUS BLD VENIPUNCTURE: CPT | Performed by: INTERNAL MEDICINE

## 2017-09-09 PROCEDURE — 25000125 ZZHC RX 250: Performed by: INTERNAL MEDICINE

## 2017-09-09 PROCEDURE — 99231 SBSQ HOSP IP/OBS SF/LOW 25: CPT | Performed by: PHYSICIAN ASSISTANT

## 2017-09-09 PROCEDURE — 25000128 H RX IP 250 OP 636: Performed by: PSYCHIATRY & NEUROLOGY

## 2017-09-09 PROCEDURE — 80048 BASIC METABOLIC PNL TOTAL CA: CPT | Performed by: INTERNAL MEDICINE

## 2017-09-09 PROCEDURE — 40000895 ZZH STATISTIC SLP IP EVAL DEFER: Performed by: SPEECH-LANGUAGE PATHOLOGIST

## 2017-09-09 PROCEDURE — 99232 SBSQ HOSP IP/OBS MODERATE 35: CPT | Performed by: INTERNAL MEDICINE

## 2017-09-09 PROCEDURE — 12000000 ZZH R&B MED SURG/OB

## 2017-09-09 PROCEDURE — A9270 NON-COVERED ITEM OR SERVICE: HCPCS | Mod: GY | Performed by: PSYCHIATRY & NEUROLOGY

## 2017-09-09 PROCEDURE — 25000132 ZZH RX MED GY IP 250 OP 250 PS 637: Mod: GY | Performed by: PSYCHIATRY & NEUROLOGY

## 2017-09-09 PROCEDURE — 40000133 ZZH STATISTIC OT WARD VISIT: Performed by: OCCUPATIONAL THERAPIST

## 2017-09-09 PROCEDURE — 25000128 H RX IP 250 OP 636: Performed by: INTERNAL MEDICINE

## 2017-09-09 PROCEDURE — 97535 SELF CARE MNGMENT TRAINING: CPT | Mod: GO | Performed by: OCCUPATIONAL THERAPIST

## 2017-09-09 PROCEDURE — 83735 ASSAY OF MAGNESIUM: CPT | Performed by: INTERNAL MEDICINE

## 2017-09-09 PROCEDURE — 84100 ASSAY OF PHOSPHORUS: CPT | Performed by: INTERNAL MEDICINE

## 2017-09-09 RX ORDER — RAMIPRIL 2.5 MG/1
2.5 CAPSULE ORAL DAILY
Status: DISCONTINUED | OUTPATIENT
Start: 2017-09-10 | End: 2017-09-11

## 2017-09-09 RX ORDER — RAMIPRIL 2.5 MG/1
2.5 CAPSULE ORAL DAILY
Status: DISCONTINUED | OUTPATIENT
Start: 2017-09-09 | End: 2017-09-09

## 2017-09-09 RX ORDER — HYDRALAZINE HYDROCHLORIDE 20 MG/ML
10 INJECTION INTRAMUSCULAR; INTRAVENOUS EVERY 4 HOURS PRN
Status: DISCONTINUED | OUTPATIENT
Start: 2017-09-09 | End: 2017-09-10

## 2017-09-09 RX ADMIN — PANTOPRAZOLE SODIUM 40 MG: 40 INJECTION, POWDER, FOR SOLUTION INTRAVENOUS at 06:59

## 2017-09-09 RX ADMIN — HYDRALAZINE HYDROCHLORIDE 10 MG: 20 INJECTION INTRAMUSCULAR; INTRAVENOUS at 21:38

## 2017-09-09 RX ADMIN — POTASSIUM CHLORIDE AND SODIUM CHLORIDE: 900; 150 INJECTION, SOLUTION INTRAVENOUS at 04:23

## 2017-09-09 RX ADMIN — RAMIPRIL 2.5 MG: 2.5 CAPSULE ORAL at 10:33

## 2017-09-09 RX ADMIN — LABETALOL HYDROCHLORIDE 10 MG: 5 INJECTION, SOLUTION INTRAVENOUS at 11:15

## 2017-09-09 RX ADMIN — LABETALOL HYDROCHLORIDE 20 MG: 5 INJECTION, SOLUTION INTRAVENOUS at 05:02

## 2017-09-09 RX ADMIN — POTASSIUM CHLORIDE AND SODIUM CHLORIDE: 900; 150 INJECTION, SOLUTION INTRAVENOUS at 17:49

## 2017-09-09 RX ADMIN — LEVOTHYROXINE SODIUM ANHYDROUS 25 MCG: 100 INJECTION, POWDER, LYOPHILIZED, FOR SOLUTION INTRAVENOUS at 10:33

## 2017-09-09 ASSESSMENT — VISUAL ACUITY
OU: NOT TESTABLE

## 2017-09-09 ASSESSMENT — ACTIVITIES OF DAILY LIVING (ADL): PREVIOUS_RESPONSIBILITIES: MEAL PREP;MEDICATION MANAGEMENT

## 2017-09-09 NOTE — PLAN OF CARE
Problem: Goal Outcome Summary  Goal: Goal Outcome Summary  PT: Orders received, chart reviewed, pt transferred up to station 73. Per discussion with RN pt with very limited interaction during assessment, remains very lethargic at this time this PM with limited command following. RN agrees pt more appropriate to hold eval at this time and attempt back in AM to assess mobility when more alert. Eval r/s for 9/10/17. PT met with family to introduce self and role, family also requesting PT hold today to allow pt to rest, educated on PT role and POC, family verbalized understanding.

## 2017-09-09 NOTE — PROGRESS NOTES
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  ---One microhemorrhage was located in the area of present hemorrhage  (I10) Essential hypertension  --Keep BP <140 mm Hg  --Nicardipine infusion  --Start oral medications per hospitalists.  --Ramipril 2.5 mg   #. Cardiovascular  --no active issues.   #. Infection   --Elevated WBC  --Normal procalcitonin  --Management per hospitalists.   #. Endocrine:   --Insulin protocol to keep blood sugars 100-150.   #. Heme/Onc:   --no issues   #. Renal  --Sodium goal 140-155  #. Skin/Musculoskeletal:  -- No issues   #. PT/OT/Speech  --continue  evaluations   #.Nutrition  --Per speech therapy evaluation   #. ICU prophylaxis:   --Stress ulcer prophylaxis Protonix   --DVT prophylaxis: Mechanical only due to ICH    CODE STATUS: Full Code    Family update by me today: yes.     Interval History   Slightly more awake, quicker to respond. Still not able to open eyes, still with significant neglect and weakness on the left.     Physical Exam       Vitals:  Vitals:    09/06/17 1309 09/06/17 1530 09/07/17 0415 09/08/17 0000   Weight: 40.5 kg (89 lb 4.6 oz) 42 kg (92 lb 9.5 oz) 40.4 kg (89 lb 1.1 oz) 40.3 kg (88 lb 13.5 oz)    09/09/17 0600   Weight: 40.3 kg (88 lb 13.5 oz)     Temp: 99  F (37.2  C) Temp src: Bladder BP: 116/56   Heart Rate: 80 Resp: 16 SpO2: 97 % O2 Device:  "None (Room air) Oxygen Delivery: 2 LPM Height: 149.9 cm (4' 11\") Weight: 40.3 kg (88 lb 13.5 oz)        Tmax: Temp (24hrs), Av.1  F (37.3  C), Min:98  F (36.7  C), Max:99.7  F (37.6  C)    BP - Mean:  [] 78   I&O:    Intake/Output Summary (Last 24 hours) at 17 0745  Last data filed at 17 0600   Gross per 24 hour   Intake          1733.54 ml   Output             2175 ml   Net          -441.46 ml    I/O since admission: 3689.17  Bowel movement: 1x       General Appearance:   No acute distress  Neuro:       Mental Status Exam:   Awake, slightly more alert, disoriented. Speech and language are very minimal and slurred. Mental status is decreased       Cranial Nerves:  apraxia of eye opening. Pupils 3 mm, reactive. EOMI. Face sensation is normal. Face is symmetric.Tongue and uvula are midline. Other CN are normal           Motor: left sided severe weakness. Tone and bulk are normal           Reflexes:  Normal DTR.Toes downgoing.        Sensory: left sided neglect              Coordination:  Untestable         Gait: Untestable    Cardiovascular: Regular rate and rhythm, no m/r/g  Lungs: Resp: 16  Both hemithoraces are symmetrical, auscultation of lungs revealed no bronchovesicular sounds, expirium prolongation, wheezing, rhonci and crackles  Abdomen: Soft, not tender, not distended  Skin/Extremities: No clubbing, cyanosis, no edema     Medications   Infusions medications:    0.9% sodium chloride + KCl 20 mEq/L 75 mL/hr at 17 0423     niCARdipine 40 mg in 200 mL 0.9% NaCl Stopped (17 0405)     Schedule medications:    pantoprazole  40 mg Intravenous QAM AC     levothyroxine  25 mcg Intravenous Daily     sodium chloride (PF)  3 mL Intracatheter Q8H     pneumococcal vaccine  0.5 mL Intramuscular Prior to discharge     PRN medications:miconazole, hypromellose-dextran, labetalol, lidocaine (buffered or not buffered), lidocaine 4%, sodium chloride (PF), potassium chloride, potassium chloride, " potassium chloride, potassium chloride with lidocaine, potassium chloride, acetaminophen, naloxone, melatonin, senna-docusate, polyethylene glycol, bisacodyl, ondansetron **OR** ondansetron  Data       Labotary Data:   All data was reviewed by me personally  CBC RESULTS:  Recent Labs   Lab Test  09/08/17   1155  09/07/17   0425  09/06/17   1310  07/24/17   0945   WBC  13.1*  14.6*  8.6  6.9   RBC  4.49  4.27  4.32  4.42   HGB  14.0  13.4  13.6  13.6   HCT  40.1  38.4  39.5  40.9   PLT  339  355  364  280     Basic Metabolic Panel:  Recent Labs   Lab Test  09/08/17   2145  09/08/17   1155  09/07/17   1303  09/07/17   0425  09/06/17   1310  07/24/17   0945   NA   --   135   --   135  132*  135   POTASSIUM  4.2  2.8*  3.8  3.2*  3.9  4.1   CHLORIDE   --   102   --   103  101  103   CO2   --   23   --   22  25  28   BUN   --   12   --   9  13  19   CR   --   0.46*   --   0.47*  0.71  0.63   GLC   --   99   --   129*  99  84   ANTHONY   --   9.1   --   8.9  9.7  9.2     ABG:No lab results found.  Liver panel:  No lab results found.  Procalcitonin:   Recent Labs   Lab Test  09/07/17   1128   PCAL  <0.05     Coagulation  Recent Labs   Lab Test  09/06/17   1310   INR  0.99   PTT  29      Lipid Profile:No lab results found.  Thyroid Panel:No lab results found.   Vitamin B12: No lab results found.   Vitamin D:No lab results found.  A1C: No lab results found.  Troponin I:   Recent Labs   Lab Test  09/07/17   1128   TROPI  0.015     CPK: No lab results found.  Ammonia: No lab results found.  Serum Osmolality:No lab results found.  Most Recent 6 Bacteria Isolates From Any Culture (See EPIC Reports for Culture Details):No lab results found.  Alcohol level:No lab results found.  Drug Screen: No lab results found.  UA Results:  Recent Labs   Lab Test  09/08/17   1710   COLOR  Light Yellow   APPEARANCE  Clear   URINEGLC  Negative   URINEBILI  Negative   URINEKETONE  80*   SG  1.008   UBLD  Small*   URINEPH  5.5   PROTEIN  Negative    NITRITE  Negative   LEUKEST  Negative   RBCU  3*   WBCU  1          Cardiac US:   --     Neurophysiology:   --       Imaging:   All imaging studies were reviewed personally  CT head:   1. New large hyperdense mass in the right frontal lobe measuring 5.5 x 4.2 x 3.5 cm consistent with a large parenchymal hematoma. This does not result in any significant midline shift or effacement of the basal cisterns.  2. Diffuse cerebral volume loss and cerebral white matter changes consistent with chronic small vessel ischemic disease.   CT spine:   Degenerative changes of the cervical spine. No evidence for fracture or traumatic malalignment.  MRI brain 7/24/17:   1. No acute pathology is identified. No bleed, mass, or acute infarcts are seen.  2. Age-related changes including generalized atrophy of the brain. White matter changes in the cerebral hemispheres consistent with small vessel ischemic disease in this age patient.  3. Tiny incidental lipoma along the right tentorium.  4. 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   CT head 9/7/17  1. Increased size of parenchymal hematoma and surrounding edema centered in the right frontal lobe. There is new intraventricular  extension of the hematoma. No significant midline shift or herniation. No hydrocephalus.  2. Diffuse parenchymal volume loss and fairly extensive white matter changes likely due to chronic microvascular ischemic disease.    CT head 9/8/17  Stable right frontal parenchymal and intraventricular hemorrhage. There is no evidence for any progressive mass effect or any new hemorrhage.        Time Spent on this Encounter      Time:   Neurocritical care time:  I spent 35 minutes bedside and on the inpatient unit today managing the neurocritical care of Sury GILMORE Ericka in relation to the issues listed in this note. Patient is critically ill / has very high risk of deterioration

## 2017-09-09 NOTE — PLAN OF CARE
Problem: Goal Outcome Summary  Goal: Goal Outcome Summary  Neuro: checks stable no changes as charted  Pulm: RA able to cough secretions up LS clear DIM  CV: SR  GI/: WNL  Report called to [facility] 73     Ivelisse Styles RN

## 2017-09-09 NOTE — PROVIDER NOTIFICATION
Brief update:    Pt with secretions successfully removed with oral suctioning, but complaints of deeper secretions she is unable to clear.    Nasopharyngeal suctioning trial, though pt also w/ feeding tube in place which may be cause of patient's persistent discomfort. If deep suctioning unsuccessful at relieving this sensation, question if feeding tube is cause of discomfort and would discontinue deep suctioning. No increased work of breathing currently. This is for comfort.     Mickey Marrero MD  2:51 AM

## 2017-09-09 NOTE — PLAN OF CARE
Problem: Goal Outcome Summary  Goal: Goal Outcome Summary  SLP: RN reported decreased level of alertness today and not appropriate for ST. Family present at bedside and educated on several questions re: prognosis, swallowing mechanism, secretion management, neuro anatomy. Family expressed hope for a fast recovery, but understands severity of hemorrhage. Discussed occasional difficulty managing secretions with family providing suction. Pt did perform dry swallows x3 during session. Family in agreement that even if swallow improves for PO diet over the next few days/weeks, pt will not likely meet nutritional needs. Family reported discussing PEG tube with MD and plan to place early next week. Family instructed to cue pt for effortful swallow when alert and provide suction and lightly moistened swabs for comfort. Will attempt tomorrow.

## 2017-09-09 NOTE — CONSULTS
"CLINICAL NUTRITION SERVICES  -  ASSESSMENT NOTE      Recommendations Ordered by Registered Dietitian (RD):   Begin TF Isosource 1.5 at 15 mL/hr;  Increase by 10 mL every 24 hrs to goal 35 mL/hr = 1260 kcals (31 kcal/kg), 57 gm pro (1.4 gm/kg), 638 mL H20, 13 gm fiber, 148 gm CHO  Free H20 30 mL every 4 hrs (minimal flushes while on IVF)   Future/Additional Recommendations:    When IVF off, recommend increase H20 flushes via FT, ie 150 mL 4x/day = 600 mL for total fluid 1238 mL (31 mL/kg).   Malnutrition:  Non-Severe malnutrition  In Context of:  Acute illness or injury        REASON FOR ASSESSMENT  Sury Barnett is a 89 year old female seen by Registered Dietitian for Provider Order - Registered Dietitian to Assess and Order TF per Medical Nutrition protocol      NUTRITION HISTORY  - Information obtained from daughter Crystal.  - Patient is on a regular diet at home.  - Typical food/fluid intake is good.  - Diet history:  Pt eats 3 small meals per day + 2-3 snacks in between.  She eats small amounts often.   - Supplements:  None.   - Allergic to mangos (difficulty breathing).  Daughter was surprised that her weight was so low.  She said her mom actually complained of her clothes fitting tighter recently.  She has noticed her mom stooping more lately (poor posture).    CURRENT NUTRITION ORDERS  Diet Order:     NPO   SLP completed bedside swallow eval yesterday and deemed moderate-severe dysphagia.  Was asked to initiate TF for pt.  9/8:  FT = The feeding tube tip is looped in the stomach. The distal tip may be within the proximal duodenum    Current Intake/Tolerance:  NPO x 2 days.    PHYSICAL FINDINGS  Observed  Muscle Wasting - temporal, clavicle  Obtained from Chart/Interdisciplinary Team  None noted    ANTHROPOMETRICS  Height: 4' 11\"  Admit Wt:  40.5 kg  Current Wt:  40.3 kg (88 lbs 13.53 oz) - same  Body mass index is 17.94 kg/(m^2).  Weight Status:  Underweight BMI <18.5  IBW:  44.7 kg  % IBW:  90%  Weight History: "  Weight loss 5.1 kg recently (11%) over the past 1.5 months    Wt Readings from Last 20 Encounters:   09/09/17 40.3 kg (88 lb 13.5 oz)   07/24/17 45.4 kg (100 lb)   11/07/16 43.5 kg (96 lb)   07/15/13 43.1 kg (95 lb)       LABS  Labs reviewed  Na 135 (NL, improved from being low on admit)  K 4.2 (NL, improved from 2.8)    MEDICATIONS  Medications reviewed  IVF NaCl + 20 KCl at 75 mL/hr - for fluid delivery  Nicardipine off    Dosing Weight:  40.3 kg (current wt)     ASSESSED NUTRITION NEEDS PER APPROVED PRACTICE GUIDELINES:  Estimated Energy Needs: 4539-6643 kcals (30-35 Kcal/Kg)  Justification: underweight  Estimated Protein Needs:  48-60 grams protein (1.2-1.5 g pro/Kg)  Justification: Repletion  Estimated Fluid Needs:  1566-7862 mL (1 mL/Kcal)  Justification: maintenance    MALNUTRITION:  % Weight Loss:  > 5% in 1 month (severe malnutrition)  % Intake:  No decreased intake noted  Subcutaneous Fat Loss:  None observed  Muscle Loss:  Temporal region mild depletion and Clavicle bone region mild depletion  Fluid Retention:  None noted    Malnutrition Diagnosis: Non-Severe malnutrition  In Context of:  Acute illness or injury    NUTRITION DIAGNOSIS:  Inadequate protein-energy intake related to mod-severe dysphagia s/p ICH as evidenced by NPO status, meeting 0% estimated needs and TF planned.    NUTRITION INTERVENTIONS  Recommendations / Nutrition Prescription  Begin TF Isosource 1.5 at 15 mL/hr;  Increase by 10 mL every 24 hrs to goal 35 mL/hr = 1260 kcals (31 kcal/kg), 57 gm pro (1.4 gm/kg), 638 mL H20, 13 gm fiber, 148 gm CHO  Free H20 30 mL every 4 hrs (minimal flushes while on IVF)    - When IVF off, recommend increase H20 flushes via FT, ie 150 mL 4x/day = 600 mL for total fluid 1238 mL (31 mL/kg).    Implementation  Nutrition education: Not appropriate at this time due to patient condition   Provided education to daughter Alina on TF process and components.  She had questions about the TF schedule and product if  the FT gets switched to a G-tube.  Briefly explained possible future bolus TF schedule.    EN Composition, EN Schedule and Feeding Tube Flush - Entered TF and H20 flush orders as above.    Nutrition Goals  Pt will tolerate TF without refeeding syndrome.  TF goal Isosource 1.5 at 35 mL/hr will meet % estimated needs.    MONITORING AND EVALUATION:  Progress towards goals will be monitored and evaluated per protocol and Practice Guidelines    Dotty Rodriguez, MERT, LD, CNSC

## 2017-09-09 NOTE — PROGRESS NOTES
"[facility]    Neurosurgery Progress Note    Date of Service (when I saw the patient): 09/09/2017     Assessment & Plan   Sury Barnett is a 89 year old female who was admitted on 9/6/2017. We were asked to the see the pt for right parenchymal hemorrhage.  The pt was found to have a large non surgical right frontal hemorrhage per CT scan. Repeat CT from yesterday stable. Today, patient is sleeping in bed. Family member present at bedside states she had a restless night and is now finally sleeping. She opens her eyes intermittently on exam and moves right side. Per nursing she has left sided hemiparesis and left field cut. No surgery indicated.         Active Problems:    ICH (intracerebral hemorrhage) (H)    Assessment: stabe    Plan: Per NCC   Repeat CT stable; remains non surgical   Will continue to monitor      I have discussed the following assessment and plan with Dr. Russell who is in agreement with initial plan and will follow up with further consultation recommendations.      Nga Fung PA-C  [facility]  [address]  [address] [place] [address]    Tel [phone]  Pager [phone]      Interval History   Stable.    Physical Exam   Temp: 99  F (37.2  C) Temp src: Bladder BP: 116/56   Heart Rate: 80 Resp: 16 SpO2: 97 % O2 Device: None (Room air)    Vitals:    09/07/17 0415 09/08/17 0000 09/09/17 0600   Weight: 89 lb 1.1 oz (40.4 kg) 88 lb 13.5 oz (40.3 kg) 88 lb 13.5 oz (40.3 kg)     Vital Signs with Ranges  Temp:  [98  F (36.7  C)-99.7  F (37.6  C)] 99  F (37.2  C)  Heart Rate:  [] 80  Resp:  [9-30] 16  BP: (110-148)/(52-88) 116/56  SpO2:  [94 %-100 %] 97 %  I/O last 3 completed shifts:  In: 1733.54 [I.V.:1253.54; NG/GT:480]  Out: 2175 [Urine:2175]    Heart Rate: 80, Blood pressure 116/56, temperature 99  F (37.2  C), resp. rate 16, height 4' 11\" (1.499 m), weight 88 lb 13.5 oz (40.3 kg), SpO2 97 %, not currently breastfeeding.  88 lbs " 13.53 oz  HEENT:  Normocephalic, atraumatic.  PERRLA.   Neck:  Supple, non-tender, without lymphadenopathy.  Heart:  No peripheral edema  Lungs:  No SOB  Skin:  Warm and dry.  Extremities:  N edema, cyanosis or clubbing.    NEUROLOGICAL EXAMINATION:   Mental status:  Asleep during exam, but opened eyes intermittently and moved right side.  Motor:  Left sided hemiparesis  Sensation:  intact      Medications     0.9% sodium chloride + KCl 20 mEq/L 75 mL/hr at 09/09/17 0423     niCARdipine 40 mg in 200 mL 0.9% NaCl Stopped (09/09/17 0405)       ramipril  2.5 mg Oral Daily     pantoprazole  40 mg Intravenous QAM AC     levothyroxine  25 mcg Intravenous Daily     sodium chloride (PF)  3 mL Intracatheter Q8H     pneumococcal vaccine  0.5 mL Intramuscular Prior to discharge       Data   CBC RESULTS:   Recent Labs   Lab Test  09/08/17   1155   WBC  13.1*   RBC  4.49   HGB  14.0   HCT  40.1   MCV  89   MCH  31.2   MCHC  34.9   RDW  12.3   PLT  339     Basic Metabolic Panel:  Lab Results   Component Value Date     09/08/2017      Lab Results   Component Value Date    POTASSIUM 4.2 09/08/2017     Lab Results   Component Value Date    CHLORIDE 102 09/08/2017     Lab Results   Component Value Date    ANTHONY 9.1 09/08/2017     Lab Results   Component Value Date    CO2 23 09/08/2017     Lab Results   Component Value Date    BUN 12 09/08/2017     Lab Results   Component Value Date    CR 0.46 09/08/2017     Lab Results   Component Value Date    GLC 99 09/08/2017     INR:  Lab Results   Component Value Date    INR 0.99 09/06/2017

## 2017-09-09 NOTE — PLAN OF CARE
Problem: Goal Outcome Summary  Goal: Goal Outcome Summary  Outcome: Therapy, progress towards functional goals is fair  OT seen for OT eval and initiation of treatment. Pt at baseline lives with daughter in the lower level of a walk out basement. She is I with stairs several times a day. She is I with cooking, ADL's and managing medications. She walks with a walking stick.   Discharge Planner OT   Patient plan for discharge: none stated yet  Current status: currently pt does not open eyes, she follows commands 80% of the time. Perseverates on task. L neglect noted. No movement noted in L UE.   Barriers to return to prior living situation: stairs increased level of assist needed  Recommendations for discharge: may be a acute rehab candidate  Rationale for recommendations: pt was very I prior to admit       Entered by: Milla Duenas 09/09/2017 11:12 AM

## 2017-09-09 NOTE — PROGRESS NOTES
09/09/17 1000   Quick Adds   Type of Visit Initial Occupational Therapy Evaluation   Living Environment   Lives With child(kaila), adult   Living Arrangements house   Home Accessibility bed and bath are not on the first floor;tub/shower is not walk in   Number of Stairs Within Home 14   Living Environment Comment pt lives in the walk out level of her daughters basement.     Self-Care   Dominant Hand right   Usual Activity Tolerance good   Current Activity Tolerance good   Regular Exercise yes   Activity/Exercise Type (stretching)   Exercise Amount/Frequency daily   Equipment Currently Used at Home (walking stick)   Activity/Exercise/Self-Care Comment pt stretched daily, very flexible per shannon. would get off the floor   Functional Level Prior   Fall history within last six months yes   Number of times patient has fallen within last six months 1   Prior Functional Level Comment pt was I with ADL's and IADL's   General Information   Onset of Illness/Injury or Date of Surgery - Date 09/06/17   Patient/Family Goals Statement none stated   Additional Occupational Profile Info/Pertinent History of Current Problem pt admitted 9/6 secondary to fall found to have a large non surgical R frontal hemorrhage. Delta on L UE/LE with L neglect   Precautions/Limitations fall precautions   General Observations pt sleeping, agreeable to OT eval. daughter present.    Cognitive Status Examination   Orientation person;place  (year)   Level of Consciousness lethargic/somnolent   Able to Follow Commands moderate impairment   Personal Safety (Cognitive) unable/difficult to assess   Cognitive Comment pt very sleepy,  not able to fully assess cogntion   Visual Perception   Visual Attention L neglect   Visual Perception Comments head positioned turned to the R.    Sensory Examination   Sensory Comments states she has feeling in L, but difficulty to asess due to lack of following commands   Pain Assessment   Patient Currently in Pain No    Integumentary/Edema   Integumentary/Edema no deficits were identifed   Range of Motion (ROM)   ROM Comment R UE AROM WFL, L UE PROM WFL   Strength   Strength Comments unable to assess due to lack of following directions   Coordination   Upper Extremity Coordination Left UE impaired   Gross Motor Coordination L UE impaired   Fine Motor Coordination L UE eimpaired   Mobility   Bed Mobility Comments dependent   Transfer Skills   Transfer Comments pt not following commands well enough to attempt OOB this morning   Transfer Skill: Bed to Chair/Chair to Bed   Level of [place]: Bed to Chair unable to perform   Transfer Skill: Sit to Stand   Level of [place]: Sit/Stand unable to perform   Grooming   Level of [place]: Grooming minimum assist (75% patients effort)   Physical Assist/Nonphysical Assist: Grooming verbal cues;nonverbal cues (demo/gestures)   Instrumental Activities of Daily Living (IADL)   Previous Responsibilities meal prep;medication management   Activities of Daily Living Analysis   Impairments Contributing to Impaired Activities of Daily Living balance impaired;cognition impaired;ROM decreased;sensation decreased;strength decreased;motor control impaired;coordination impaired   General Therapy Interventions   Planned Therapy Interventions ADL retraining;IADL retraining;balance training;bed mobility training;cognition;fine motor coordination training;neuromuscular re-education;motor coordination training;ROM;strengthening;transfer training;progressive activity/exercise;home program guidelines   Clinical Impression   Criteria for Skilled Therapeutic Interventions Met yes, treatment indicated   OT Diagnosis pt demo's decresaed I with ADL's and functional mobility   Influenced by the following impairments decreased strength, decreased motor control, decreased cognition   Assessment of Occupational Performance 3-5 Performance Deficits   Identified Performance Deficits pt demo's difficulty with  "dressing, home mgmt, medication management, social skills   Clinical Decision Making (Complexity) Moderate complexity   Therapy Frequency daily  (increase to 2 times daily when she can tolerate)   Predicted Duration of Therapy Intervention (days/wks) 2 weeks   Anticipated Discharge Disposition Acute Rehabilitation Facility   Risks and Benefits of Treatment have been explained. Yes   Patient, Family & other staff in agreement with plan of care Yes   [facility] TM \"6 Clicks\"   2016, Trustees of [facility], under license to [employer].  All rights reserved.   6 Clicks Short Forms Daily Activity Inpatient Short Form   [facility]  \"6 Clicks\" Daily Activity Inpatient Short Form   1. Putting on and taking off regular lower body clothing? 1 - Total   2. Bathing (including washing, rinsing, drying)? 1 - Total   3. Toileting, which includes using toilet, bedpan or urinal? 1 - Total   4. Putting on and taking off regular upper body clothing? 1 - Total   5. Taking care of personal grooming such as brushing teeth? 2 - A Lot   6. Eating meals? 1 - Total   Daily Activity Raw Score (Score out of 24.Lower scores equate to lower levels of function) 7   Total Evaluation Time   Total Evaluation Time (Minutes) 10     "

## 2017-09-09 NOTE — PROGRESS NOTES
[facility]    Hospitalist Progress Note    Assessment & Plan   Ms. Barnett is a 89-year-old pretty healthy female with history of hypertension and hypothyroidism who presented with a fall and was found to have large right frontal lobe intracranial hemorrhage.     1.  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    -The patient presented after a fall and was found on the CT head to have a large intraparenchymal hemorrhage. 5.5 X 4.2 X 3.5  -Likely she sustained the hemorrhage prior to the fall  -Repeat CT-stable  -Neurologically appears to be stable with unchanged complete left-sided hemiplegia  -Neuro critical care and neurosurgery following.  -she has severe dysphagia at this time  -s/p NGT placement yesterday  -Likely will need PEG- early next week     2.  Benign essential hypertension.   -PTA on lisinopril.    -Started on Lisinopril this AM  -labetalol and hydralazine prn    3.  Hypothyroidism.   -PTA on levothyroxine 50 mcg daily.    -Resume PTA levothyroxine    4. Mild leukocytosis:  -Afebrile  -Chest x-ray without any pneumonia  -UA-unremarkable    D/W: RN  DVT Prophylaxis: Pneumatic Compression Devices  Code Status: No Order    Disposition: Expected discharge in 2+ days; transfer out of ICU today    Humza Townsend MD    Interval History   Patient denies new complaints.  Neurologically has been stable.  S/p NGT placement    -Data reviewed today: I reviewed all new labs and imaging results over the last 24 hours. I personally reviewed no images or EKG's today.    Physical Exam   Temp: 99  F (37.2  C) Temp src: Bladder BP: 116/56   Heart Rate: 80 Resp: 16 SpO2: 97 % O2 Device: None (Room air)    Vitals:    09/07/17 0415 09/08/17 0000 09/09/17 0600   Weight: 40.4 kg (89 lb 1.1 oz) 40.3 kg (88 lb 13.5 oz) 40.3 kg (88 lb 13.5 oz)     Vital Signs with Ranges  Temp:  [98  F (36.7  C)-99.7  F (37.6  C)] 99  F (37.2  C)  Heart Rate:  [] 80  Resp:  [9-30] 16  BP:  (110-148)/(52-88) 116/56  SpO2:  [94 %-100 %] 97 %  I/O last 3 completed shifts:  In: 1733.54 [I.V.:1253.54; NG/GT:480]  Out: 2175 [Urine:2175]    Constitutional: AAO, answers simple questions appropriately  HEENT: Moist oral mucosa, no oral lesions, No pallor or icterus  Neck- Supple, Good ROM, No JVD  Respiratory:  No crackles, No wheezes, CTA B/L, Normal WOB  Cardiovascular: RRR, No murmur  GI: Soft, Non- tender, BS- normoactive  Skin/Integument: Warm and dry, no rashes  MSK: No joint deformity or swelling, no edema  Neuro: complete hemiplegia on the left side with 0/5 power-unchanged    Medications     0.9% sodium chloride + KCl 20 mEq/L 75 mL/hr at 09/09/17 0423     niCARdipine 40 mg in 200 mL 0.9% NaCl Stopped (09/09/17 0405)       ramipril  2.5 mg Oral Daily     pantoprazole  40 mg Intravenous QAM AC     levothyroxine  25 mcg Intravenous Daily     sodium chloride (PF)  3 mL Intracatheter Q8H     pneumococcal vaccine  0.5 mL Intramuscular Prior to discharge       Data     Recent Labs  Lab 09/08/17  2145 09/08/17  1155 09/07/17  1303 09/07/17  1128 09/07/17  0425 09/06/17  1310   WBC  --  13.1*  --   --  14.6* 8.6   HGB  --  14.0  --   --  13.4 13.6   MCV  --  89  --   --  90 91   PLT  --  339  --   --  355 364   INR  --   --   --   --   --  0.99   NA  --  135  --   --  135 132*   POTASSIUM 4.2 2.8* 3.8  --  3.2* 3.9   CHLORIDE  --  102  --   --  103 101   CO2  --  23  --   --  22 25   BUN  --  12  --   --  9 13   CR  --  0.46*  --   --  0.47* 0.71   ANIONGAP  --  10  --   --  10 6   ANTHONY  --  9.1  --   --  8.9 9.7   GLC  --  99  --   --  129* 99   TROPI  --   --   --  0.015  --   --        Recent Results (from the past 24 hour(s))   XR Chest Port 1 View    Narrative    XR CHEST PORT 1 VW 9/8/2017 11:55 AM    HISTORY: Evaluate for pneumonia.    COMPARISON: None.    FINDINGS: No consolidation, effusion or pneumothorax. Normal heart  size.      Impression    IMPRESSION: No evidence of pneumonia.    CARMEN BHAKTA,  MD   XR Abdomen Port 1 View    Narrative    XR ABDOMEN PORT F1 VW 9/8/2017 3:24 PM    HISTORY: keofeed placement.    COMPARISON: None.    FINDINGS: The feeding tube is looped in the stomach. The distal tip  may be within the proximal duodenum. The bowel gas pattern is  nonobstructive.      Impression    IMPRESSION: Distal tip of the feeding tube may be in the proximal  duodenum. A lateral view might be helpful.    CARMEN BHAKTA MD

## 2017-09-10 ENCOUNTER — APPOINTMENT (OUTPATIENT)
Dept: SPEECH THERAPY | Facility: CLINIC | Age: 82
DRG: 064 | End: 2017-09-10
Payer: MEDICARE

## 2017-09-10 ENCOUNTER — APPOINTMENT (OUTPATIENT)
Dept: OCCUPATIONAL THERAPY | Facility: CLINIC | Age: 82
DRG: 064 | End: 2017-09-10
Payer: MEDICARE

## 2017-09-10 ENCOUNTER — APPOINTMENT (OUTPATIENT)
Dept: PHYSICAL THERAPY | Facility: CLINIC | Age: 82
DRG: 064 | End: 2017-09-10
Payer: MEDICARE

## 2017-09-10 LAB
ANION GAP SERPL CALCULATED.3IONS-SCNC: 12 MMOL/L (ref 3–14)
BUN SERPL-MCNC: 15 MG/DL (ref 7–30)
CALCIUM SERPL-MCNC: 9.2 MG/DL (ref 8.5–10.1)
CHLORIDE SERPL-SCNC: 105 MMOL/L (ref 94–109)
CO2 SERPL-SCNC: 20 MMOL/L (ref 20–32)
CREAT SERPL-MCNC: 0.42 MG/DL (ref 0.52–1.04)
GFR SERPL CREATININE-BSD FRML MDRD: >90 ML/MIN/1.7M2
GLUCOSE SERPL-MCNC: 94 MG/DL (ref 70–99)
LACTATE BLD-SCNC: 0.9 MMOL/L (ref 0.7–2)
MAGNESIUM SERPL-MCNC: 2.3 MG/DL (ref 1.6–2.3)
PHOSPHATE SERPL-MCNC: 1.8 MG/DL (ref 2.5–4.5)
PHOSPHATE SERPL-MCNC: 2.2 MG/DL (ref 2.5–4.5)
POTASSIUM SERPL-SCNC: 3.5 MMOL/L (ref 3.4–5.3)
SODIUM SERPL-SCNC: 137 MMOL/L (ref 133–144)

## 2017-09-10 PROCEDURE — 97530 THERAPEUTIC ACTIVITIES: CPT | Mod: GO | Performed by: OCCUPATIONAL THERAPIST

## 2017-09-10 PROCEDURE — 83605 ASSAY OF LACTIC ACID: CPT | Performed by: INTERNAL MEDICINE

## 2017-09-10 PROCEDURE — A9270 NON-COVERED ITEM OR SERVICE: HCPCS | Mod: GY | Performed by: INTERNAL MEDICINE

## 2017-09-10 PROCEDURE — 36415 COLL VENOUS BLD VENIPUNCTURE: CPT | Performed by: INTERNAL MEDICINE

## 2017-09-10 PROCEDURE — 84100 ASSAY OF PHOSPHORUS: CPT | Performed by: INTERNAL MEDICINE

## 2017-09-10 PROCEDURE — 25000128 H RX IP 250 OP 636: Performed by: INTERNAL MEDICINE

## 2017-09-10 PROCEDURE — 97110 THERAPEUTIC EXERCISES: CPT | Mod: GP | Performed by: PHYSICAL THERAPIST

## 2017-09-10 PROCEDURE — 25000128 H RX IP 250 OP 636: Performed by: PSYCHIATRY & NEUROLOGY

## 2017-09-10 PROCEDURE — 40000133 ZZH STATISTIC OT WARD VISIT: Performed by: OCCUPATIONAL THERAPIST

## 2017-09-10 PROCEDURE — 99232 SBSQ HOSP IP/OBS MODERATE 35: CPT | Performed by: INTERNAL MEDICINE

## 2017-09-10 PROCEDURE — 80048 BASIC METABOLIC PNL TOTAL CA: CPT | Performed by: INTERNAL MEDICINE

## 2017-09-10 PROCEDURE — 97161 PT EVAL LOW COMPLEX 20 MIN: CPT | Mod: GP | Performed by: PHYSICAL THERAPIST

## 2017-09-10 PROCEDURE — 92526 ORAL FUNCTION THERAPY: CPT | Mod: GN | Performed by: SPEECH-LANGUAGE PATHOLOGIST

## 2017-09-10 PROCEDURE — 99231 SBSQ HOSP IP/OBS SF/LOW 25: CPT | Performed by: PHYSICIAN ASSISTANT

## 2017-09-10 PROCEDURE — 25000132 ZZH RX MED GY IP 250 OP 250 PS 637: Mod: GY | Performed by: INTERNAL MEDICINE

## 2017-09-10 PROCEDURE — 40000225 ZZH STATISTIC SLP WARD VISIT: Performed by: SPEECH-LANGUAGE PATHOLOGIST

## 2017-09-10 PROCEDURE — 83735 ASSAY OF MAGNESIUM: CPT | Performed by: INTERNAL MEDICINE

## 2017-09-10 PROCEDURE — 25000125 ZZHC RX 250: Performed by: INTERNAL MEDICINE

## 2017-09-10 PROCEDURE — 40000193 ZZH STATISTIC PT WARD VISIT: Performed by: PHYSICAL THERAPIST

## 2017-09-10 PROCEDURE — 12000000 ZZH R&B MED SURG/OB

## 2017-09-10 RX ORDER — HYDRALAZINE HYDROCHLORIDE 20 MG/ML
10 INJECTION INTRAMUSCULAR; INTRAVENOUS EVERY 4 HOURS PRN
Status: DISCONTINUED | OUTPATIENT
Start: 2017-09-10 | End: 2017-09-27 | Stop reason: HOSPADM

## 2017-09-10 RX ORDER — LEVOTHYROXINE SODIUM 50 UG/1
50 TABLET ORAL
Status: DISCONTINUED | OUTPATIENT
Start: 2017-09-11 | End: 2017-09-10

## 2017-09-10 RX ORDER — LEVOTHYROXINE SODIUM ANHYDROUS 100 UG/5ML
50 INJECTION, POWDER, LYOPHILIZED, FOR SOLUTION INTRAVENOUS DAILY
Status: DISCONTINUED | OUTPATIENT
Start: 2017-09-11 | End: 2017-09-12 | Stop reason: CLARIF

## 2017-09-10 RX ADMIN — HYDRALAZINE HYDROCHLORIDE 10 MG: 20 INJECTION INTRAMUSCULAR; INTRAVENOUS at 04:02

## 2017-09-10 RX ADMIN — HYDRALAZINE HYDROCHLORIDE 10 MG: 20 INJECTION INTRAMUSCULAR; INTRAVENOUS at 13:52

## 2017-09-10 RX ADMIN — RAMIPRIL 2.5 MG: 2.5 CAPSULE ORAL at 10:18

## 2017-09-10 RX ADMIN — ACETAMINOPHEN 650 MG: 325 TABLET, FILM COATED ORAL at 04:51

## 2017-09-10 RX ADMIN — LABETALOL HYDROCHLORIDE 20 MG: 5 INJECTION, SOLUTION INTRAVENOUS at 12:38

## 2017-09-10 RX ADMIN — POTASSIUM CHLORIDE AND SODIUM CHLORIDE: 900; 150 INJECTION, SOLUTION INTRAVENOUS at 06:14

## 2017-09-10 RX ADMIN — HYDRALAZINE HYDROCHLORIDE 10 MG: 20 INJECTION INTRAMUSCULAR; INTRAVENOUS at 21:01

## 2017-09-10 RX ADMIN — POTASSIUM PHOSPHATE, MONOBASIC AND POTASSIUM PHOSPHATE, DIBASIC 15 MMOL: 224; 236 INJECTION, SOLUTION INTRAVENOUS at 21:13

## 2017-09-10 RX ADMIN — POTASSIUM PHOSPHATE, MONOBASIC AND POTASSIUM PHOSPHATE, DIBASIC 20 MMOL: 224; 236 INJECTION, SOLUTION INTRAVENOUS at 14:22

## 2017-09-10 RX ADMIN — PANTOPRAZOLE SODIUM 40 MG: 40 INJECTION, POWDER, FOR SOLUTION INTRAVENOUS at 06:14

## 2017-09-10 RX ADMIN — LEVOTHYROXINE SODIUM ANHYDROUS 25 MCG: 100 INJECTION, POWDER, LYOPHILIZED, FOR SOLUTION INTRAVENOUS at 10:18

## 2017-09-10 ASSESSMENT — VISUAL ACUITY
OU: BLINKS TO THREAT
OU: BLINKS TO THREAT
OU: OTHER (SEE COMMENT)
OU: BLINKS TO THREAT

## 2017-09-10 NOTE — PLAN OF CARE
"Problem: Goal Outcome Summary  Goal: Goal Outcome Summary  Outcome: No Change  Lethargic. Vocalizing very little, able to state name and date of birth. Keeps eyes closed, but gives thumbs up or mouths \"no\" when asking questions. SBP >140 at times, Labetalol and Hydralazine given x1 with improvement. Slight L facial droop, LUE hemiplegia and LLE hemiparesis. Denies pain. Productive cough with thick yellow sputum, suctioning provided as needed. Phos. Replaced for value of of 1.8, recheck at 2030. Ramsay patent. Labia pink, improving. T&R every 2 hrs. Up with lift. NPO. TF stopped and NG clamped at 1400 due to clogging, MD aware. PIV infusing. Daughter at bedside. Plan for PEG placement tomorrow AM. Continue to monitor.      "

## 2017-09-10 NOTE — PLAN OF CARE
Problem: Goal Outcome Summary  Goal: Goal Outcome Summary  Discharge Planner SLP   Patient plan for discharge: Family considering ARU options  Current status: SLP:. Pt able to follow commands for oral motor exam and performed dry swallow x3 with cues. Strong vocal quality noted with sustained  ahh.    tsp of nectar thick liquids trial x3. Prolonged oral management with max verbal and tactile cues used to initiate swallow. Suspect premature spillage and delayed swallow reflex with delayed cough noted. Vocal quality cleared with additional cues for dry swallow. PO trials ended due to overt s/sx of aspiration. Family educated on performance and recommended: NPO with TF with excellent oral cares.  Barriers to return to prior living situation: Dysphagia; suspected dysarthria/apraxia  Recommendations for discharge: ARU  Rationale for recommendations: Continued intensive ST to target PO readiness, oropharyngeal exercises, video swallow study as appropriate and speech/language evaluation/treat. Family supportive and reported pt being motivated       Entered by: Coco Fonseca 09/10/2017 12:47 PM

## 2017-09-10 NOTE — PROGRESS NOTES
"[facility]    Neurosurgery Progress Note    Date of Service (when I saw the patient): 09/10/2017     Assessment & Plan   Sury Barnett is a 89 year old female who was admitted on 9/6/2017. We were asked to the see the pt for right parenchymal hemorrhage.  The pt was found to have a large non surgical right frontal hemorrhage per CT scan. Repeat CT from yesterday stable. Today, patient is lying in bed working with therapy. OT notes spontaneous movement to LLE. Continues to have LUE hemiparesis. Moves right side. Will continue to monitor.           Active Problems:    ICH (intracerebral hemorrhage) (H)    Assessment: stabe    Plan: Per NCC   Repeat CT stable; remains non surgical   Will continue to monitor      I have discussed the following assessment and plan with Dr. Rusesll who is in agreement with initial plan and will follow up with further consultation recommendations.      Nga Fung PA-C  [facility]  [facility]  [address] [place] [address]  [address]  [place] [address]    Tel [phone]  Pager [phone]      Interval History   Stable.    Physical Exam   Temp: 97.6  F (36.4  C) Temp src: Axillary BP: 137/83 Pulse: 89 Heart Rate: 89 Resp: 18 SpO2: 98 % O2 Device: None (Room air)    Vitals:    09/07/17 0415 09/08/17 0000 09/09/17 0600   Weight: 89 lb 1.1 oz (40.4 kg) 88 lb 13.5 oz (40.3 kg) 88 lb 13.5 oz (40.3 kg)     Vital Signs with Ranges  Temp:  [97.6  F (36.4  C)-99.5  F (37.5  C)] 97.6  F (36.4  C)  Pulse:  [89] 89  Heart Rate:  [] 89  Resp:  [13-18] 18  BP: (111-163)/(63-85) 137/83  SpO2:  [93 %-98 %] 98 %  I/O last 3 completed shifts:  In: 510 [I.V.:450; NG/GT:60]  Out: 1674 [Urine:1674]    Heart Rate: 89, Blood pressure 137/83, pulse 89, temperature 97.6  F (36.4  C), temperature source Axillary, resp. rate 18, height 4' 11\" (1.499 m), weight 88 lb 13.5 oz (40.3 kg), SpO2 98 %, not currently breastfeeding.  88 lbs 13.53 oz  HEENT:  Normocephalic, " atraumatic.  PERRLA. L facial droop.  Neck:  Supple, non-tender, without lymphadenopathy.  Heart:  No peripheral edema  Lungs:  No SOB  Skin:  Warm and dry.  Extremities:  No edema, cyanosis or clubbing.     NEUROLOGICAL EXAMINATION:   Mental status:  Lethargic  Motor:  Left sided hemiparesis, per PT noted spontaneous movement to LLE.  Sensation:  intact   Medications     0.9% sodium chloride + KCl 20 mEq/L 75 mL/hr at 09/10/17 0614       ramipril  2.5 mg Oral or Feeding Tube Daily     pantoprazole  40 mg Intravenous QAM AC     levothyroxine  25 mcg Intravenous Daily     sodium chloride (PF)  3 mL Intracatheter Q8H     pneumococcal vaccine  0.5 mL Intramuscular Prior to discharge       Data   CBC RESULTS:   Recent Labs   Lab Test  09/08/17   1155   WBC  13.1*   RBC  4.49   HGB  14.0   HCT  40.1   MCV  89   MCH  31.2   MCHC  34.9   RDW  12.3   PLT  339     Basic Metabolic Panel:  Lab Results   Component Value Date     09/10/2017      Lab Results   Component Value Date    POTASSIUM 3.5 09/10/2017     Lab Results   Component Value Date    CHLORIDE 105 09/10/2017     Lab Results   Component Value Date    ANTHONY 9.2 09/10/2017     Lab Results   Component Value Date    CO2 20 09/10/2017     Lab Results   Component Value Date    BUN 15 09/10/2017     Lab Results   Component Value Date    CR 0.42 09/10/2017     Lab Results   Component Value Date    GLC 94 09/10/2017     INR:  Lab Results   Component Value Date    INR 0.99 09/06/2017

## 2017-09-10 NOTE — PLAN OF CARE
Problem: Goal Outcome Summary  Goal: Goal Outcome Summary  Outcome: Improving  A/o x3. HTN gave PRN x1 recheck WDL. RA. IVF. NPO. Neuros left side facial droop, LUE, hemiplegia. LLE hemiparesis more movement noted tonight. Unable to follow dors/plant flex commands on LLE. Ramsay patent. Suction secretions as needed, productive cough. LS clear.Tube feeding on hold for clogged tube.Turn repo q2hr, incontinent of bowel. Family at bedside supportive.

## 2017-09-10 NOTE — PLAN OF CARE
Problem: Goal Outcome Summary  Goal: Goal Outcome Summary  PT: PT orders received, evaluation completed, treatment initiated. Pt is a 89yo female who presented with a fall and was found to have large right frontal lobe intracranial hemorrhage. Pt transferred out of ICU 9/9. PLOF information gathered from family. Per report, pt lives with her daughter in a house however pt has basement apartment; has 2 steps to enter, full flight with rail to basement. Pt was IND with functional mobility within house, used walking stick for distances, has family push her in w/c for long distance (mall).      Discharge Planner PT   Patient plan for discharge: Rehab  Current status: /83 at rest. Pt follows >75% of single step commands. No volitional movement to command on LLE/UE, however, noting spontaneous movement of LLE at ankle and knee. Pt did open eyes x2 during session. Pt able to participate in AAROM of RLE, PROM of LLE. Multiple providers in during session, pt fatigued and sleeping by end of LE ex. Defer mobility until pt more alert and able to participate. Recommend B rooke boots to protect integument and preserve ROM. RN updated.  Barriers to return to prior living situation: Total assist for mobility at this time  Recommendations for discharge: ARU  Rationale for recommendations: Pt would benefit from intensive therapies at ARU to progress functional strength, activity tolerance, and IND with mobility prior to return home, pt was IND at baseline and family is very involved and supportive.       Entered by: Yeimy Gamino 09/10/2017 10:19 AM

## 2017-09-10 NOTE — PLAN OF CARE
Problem: Goal Outcome Summary  Goal: Goal Outcome Summary  Discharge Planner OT   Patient plan for discharge: Family asking about ARU vs TCU  Current status: Dependent for all cares; less perseveration than yesterday; eyes closed throughout session; HAF[employer] MAN patient not yet participating actively; Pt turns to voice on L when commanded to  Barriers to return to prior living situation: High level of care needed with ADLs  Recommendations for discharge: ARU vs TCU  Rationale for recommendations: family very involved; pt was very independent prior to admit       Entered by: APPLE WEINER 09/10/2017 9:25 AM

## 2017-09-10 NOTE — PROGRESS NOTES
[facility]  Neuroscience and [facility]ine [place]  Neurology Daily Note      Admission Date:9/6/2017   Date of service: 09/10/2017   Hospital Day: 5      Assessment & Plan   _______________________________  #. 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 (E85.4,  I68.0) Cerebral amyloid angiopathy (H)  --MRI from July 2017 consistent with amyloid angiopathy.  ---One microhemorrhage was located in the area of present hemorrhage  #. (I10) Essential hypertension  --Keep BP <140 mm Hg  --management per hospitalists.  --Ramipril 2.5 mg   #. PT/OT/Speech  --continue evaluations  #. Nutrition  --Per speech therapy evaluation   #. DVT Prophylaxis  --Mechanical only due to hemorrhage    Code Status: No Order    Disposition: pending     Interval History   _______________________________  Patient presented after a fall with left sided weakness. Patient was getting out of wheelchair, fell backwards and hit her head. No lightheadedness or dizziness prior to the fall. EMS was called and found her weaker on the left side. In the ED was found to have right gaze preference. CT identified new large intracerebral hemorrhage without midline shift.   Slowly waking but still with neglect and weakness on the left. Follows commands for thumbs up on the right. Does not open eyes. Minimal intermittent speech.     Review of Systems   _______________________________  Review of systems is limited by patient factors - mental status  Physical Exam   _______________________________  Vitals: Temp: 97.6  F (36.4  C) Temp src: Axillary BP: 137/83 Pulse: 89 Heart Rate: 89 Resp: 18 SpO2: 98 % O2 Device: None (Room air)    Vital Signs with Ranges: Temp:  [97.6  F (36.4  C)-99.5  F  (37.5  C)] 97.6  F (36.4  C)  Pulse:  [89] 89  Heart Rate:  [] 89  Resp:  [13-18] 18  BP: (111-163)/(63-85) 137/83  SpO2:  [93 %-98 %] 98 %    General Appearance:  No acute distress  Neuro:       Mental Status Exam:   Lethargic, minimally arouses, unable to assess orientation. Minimal intermittent speech. Mental status is decreased       Cranial Nerves:  Pupils 3 mm, reactive. Does not open eyes. Unable to assess facial sensation. Face is symmetric. Tongue and uvula are midline. Other CN are normal           Motor:  Left plegia, right sided weakness, follows command to give thumbs up on the right. Tone and bulk are normal           Reflexes:  Normal DTR. Toes equivocal.        Sensory:  Unable to assess                 Coordination:   Unable to assess due to weakness       Gait:  Unable to assess due to weakness  Cardiovascular: Regular rate and rhythm, no m/r/g  Lungs: Clear to auscultation  Abdomen: Soft, not tender, not distended  Extremities: No clubbing, no cyanosis, no edema    Medications   _______________________________    0.9% sodium chloride + KCl 20 mEq/L 75 mL/hr at 09/10/17 0614       ramipril  2.5 mg Oral or Feeding Tube Daily     pantoprazole  40 mg Intravenous QAM AC     levothyroxine  25 mcg Intravenous Daily     sodium chloride (PF)  3 mL Intracatheter Q8H     pneumococcal vaccine  0.5 mL Intramuscular Prior to discharge       Data   _______________________________      Lab Data:   All data was reviewed by me personally  CBC RESULTS:  Recent Labs   Lab Test  09/08/17   1155  09/07/17   0425  09/06/17   1310   WBC  13.1*  14.6*  8.6   RBC  4.49  4.27  4.32   HGB  14.0  13.4  13.6   HCT  40.1  38.4  39.5   PLT  339  355  364     Basic Metabolic Panel:  Recent Labs   Lab Test  09/10/17   0450  09/09/17   1256  09/08/17   2145  09/08/17   1155   NA  137  137   --   135   POTASSIUM  3.5  3.6  4.2  2.8*   CHLORIDE  105  104   --   102   CO2  20  22   --   23   BUN  15  15   --   12   CR  0.42*   0.47*   --   0.46*   GLC  94  85   --   99   ANTHONY  9.2  9.2   --   9.1     Coagulation  Recent Labs   Lab Test  09/06/17   1310   INR  0.99   PTT  29      Troponin I:   Recent Labs   Lab Test  09/07/17   1128   TROPI  0.015     UA Results:  Recent Labs   Lab Test  09/08/17   1710   COLOR  Light Yellow   APPEARANCE  Clear   URINEGLC  Negative   URINEBILI  Negative   URINEKETONE  80*   SG  1.008   UBLD  Small*   URINEPH  5.5   PROTEIN  Negative   NITRITE  Negative   LEUKEST  Negative   RBCU  3*   WBCU  1        Cardiac US:   --     Neurophysiology:   --     Imaging:   All imaging studies were reviewed personally  CT head 9/6/17:   1. New large hyperdense mass in the right frontal lobe measuring 5.5 x 4.2 x 3.5 cm consistent with a large parenchymal hematoma. This does not result in any significant midline shift or effacement of the basal cisterns.  2. Diffuse cerebral volume loss and cerebral white matter changes consistent with chronic small vessel ischemic disease.     CT cervical spine 9/6/17:   --Degenerative changes of the cervical spine. No evidence for fracture or traumatic malalignment.    MRI brain 7/24/17: (prior to admission)  1. No acute pathology is identified. No bleed, mass, or acute infarcts are seen.  2. Age-related changes including generalized atrophy of the brain. White matter changes in the cerebral hemispheres consistent with small vessel ischemic disease in this age patient.  3. Tiny incidental lipoma along the right tentorium.  4. 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     CT head 9/7/17:  1. Increased size of parenchymal hematoma and surrounding edema centered in the right frontal lobe. There is new intraventricular extension of the hematoma. No significant midline shift or herniation. No hydrocephalus.  2. Diffuse parenchymal volume loss and fairly extensive white matter changes likely due to chronic microvascular ischemic disease.     CT head 9/8/17:  --Stable  right frontal parenchymal and intraventricular hemorrhage. There is no evidence for any progressive mass effect or any new hemorrhage.      Alecia Garcia, FNP-BC

## 2017-09-10 NOTE — PROVIDER NOTIFICATION
MD Notification    Notified Person:  Dr. Townsend    Notification Date/Time: 9/10/17 at 1400    Notification Interaction:  Paged and then Talked with Physician    Purpose of Notification: Patients NG continues to get clogged. Flying squad assisted with unclogging in AM, yet TF pump error that tubing is clogged. Meeting great resistance when flushing.     Orders Received: Okay to pull NG back a little bit and get xray placement verification or clamp NG, as patient is to receive PEG tube tomorrow AM.    Comments: NG clamped per patient and family wishes.

## 2017-09-10 NOTE — PLAN OF CARE
Problem: Goal Outcome Summary  Goal: Goal Outcome Summary  Outcome: No Change  Lethargic, MONICA orientation. L facial droop, LUE hemiplegia, LLE hemiparesis. VSS. NPO- TF restarted at 0430 running at 15 mL/hr. Up with A2/lift. T&R q2h. Incontinent bowel. Possible PEG placement early this week. Discharge plan pending.

## 2017-09-10 NOTE — PROGRESS NOTES
09/10/17 0938   Quick Adds   Type of Visit Initial PT Evaluation   Living Environment   Lives With child(kaila), adult   Living Arrangements house   Home Accessibility bed and bath are not on the first floor;tub/shower is not walk in   Number of Stairs to Enter Home 2   Number of Stairs Within Home 15   Transportation Available family or friend will provide   Living Environment Comment pt lives in the walk out level of her daughters basement.     Self-Care   Dominant Hand right   Usual Activity Tolerance good   Current Activity Tolerance poor   Regular Exercise yes   Activity/Exercise Type walking;strength training   Exercise Amount/Frequency daily   Equipment Currently Used at Home (walking stick)   Activity/Exercise/Self-Care Comment For past few months, pt would take w/c for errands/shopping and family would push   Functional Level Prior   Ambulation 0-->independent   Transferring 0-->independent   Toileting 0-->independent   Bathing 0-->independent   Dressing 0-->independent   Eating 0-->independent   Communication 0-->understands/communicates without difficulty   Swallowing 0-->swallows foods/liquids without difficulty   Cognition 0 - no cognition issues reported   Fall history within last six months yes   Number of times patient has fallen within last six months 1   Which of the above functional risks had a recent onset or change? ambulation;transferring;toileting;bathing;dressing;eating;swallowing;communication/speech   Prior Functional Level Comment Per report, pt lives with her daughter in a house however pt has basement apartment; has 2 steps to enter, full flight with rail to basement. Pt was IND with functional mobility within house, used walking stick for distances, has family push her in w/c for long distance (mall).   General Information   Onset of Illness/Injury or Date of Surgery - Date 09/06/17   Referring Physician Humza Townsend MD   Patient/Family Goals Statement None stated   Pertinent  "History of Current Problem (include personal factors and/or comorbidities that impact the POC) Pt is a 89yo female who presented with a fall and was found to have large right frontal lobe intracranial hemorrhage. Pt transferred out of ICU 9/9.   Precautions/Limitations fall precautions   General Observations Pt resting supine, family at bedside. Pt with IV, NG, han, PCDs, tele   General Info Comments Activity: up ad kelvin   Cognitive Status Examination   Orientation (unable to assess d/t aphasia)   Level of Consciousness lethargic/somnolent   Follows Commands and Answers Questions 75% of the time;able to follow single-step instructions   Personal Safety and Judgment (unable to assess)   Memory (previously intact per family; unable to assess currently)   Pain Assessment   Patient Currently in Pain No  (\"thumbs down\")   Integumentary/Edema   Integumentary/Edema Comments Pt appears to have fragile skin   Posture    Posture Forward head position;Protracted shoulders   Range of Motion (ROM)   ROM Comment RLE WNL with AAROM, LLE WNL with PROM except L dorsiflexion to neutral only   Strength   Strength Comments RLE appears grossly 3-/5, no volitional movement noted on LLE but spontaneously moving at ankle/knee during evaluation   Bed Mobility   Bed Mobility Comments total assist for bed mobility, unable to further assess d/t lethargy   Transfer Skills   Transfer Comments unable to assess, anticipate total assist at this time   Gait   Gait Comments unable to assess, anticipate total assist at this time   Balance   Balance Comments unable to assess sitting balance, pt not appropriate for EOB actvity at this time d/t lethargy   Sensory Examination   Sensory Perception Comments pt unable to state   Coordination   Coordination Comments unable to formally assess, unable to assess with functional mobility   Muscle Tone   Muscle Tone Comments noting clonus at R achilles (2-3 beats)   General Therapy Interventions   Planned Therapy " "Interventions balance training;bed mobility training;gait training;neuromuscular re-education;ROM;strengthening;stretching;transfer training;home program guidelines;progressive activity/exercise   Clinical Impression   Criteria for Skilled Therapeutic Intervention yes, treatment indicated   PT Diagnosis Difficulty with gait   Influenced by the following impairments Weakness, fatigue, aphasia, impaired balance, decreased activity tolerance, hemiplegia   Functional limitations due to impairments Decreased safety and independence with functional transfers, gait, stairs   Clinical Presentation Stable/Uncomplicated   Clinical Presentation Rationale clinically improving   Clinical Decision Making (Complexity) Low complexity   Therapy Frequency` 2 times/day   Predicted Duration of Therapy Intervention (days/wks) 5 days   Anticipated Discharge Disposition Acute Rehabilitation Facility;Transitional Care Facility   Risk & Benefits of therapy have been explained Yes   Patient, Family & other staff in agreement with plan of care Yes   [facility] [employer]yl[employer] TM \"6 Clicks\"   2016, Trustees of [facility], under license to [employer].  All rights reserved.   6 Clicks Short Forms Basic Mobility Inpatient Short Form   [facility] AM-PAC  \"6 Clicks\" V.2 Basic Mobility Inpatient Short Form   1. Turning from your back to your side while in a flat bed without using bedrails? 1 - Total   2. Moving from lying on your back to sitting on the side of a flat bed without using bedrails? 1 - Total   3. Moving to and from a bed to a chair (including a wheelchair)? 1 - Total   4. Standing up from a chair using your arms (e.g., wheelchair, or bedside chair)? 1 - Total   5. To walk in hospital room? 1 - Total   6. Climbing 3-5 steps with a railing? 1 - Total   Basic Mobility Raw Score (Score out of 24.Lower scores equate to lower levels of function) 6   Total Evaluation Time   Total Evaluation Time (Minutes) 8     "

## 2017-09-10 NOTE — PROGRESS NOTES
[facility]    Hospitalist Progress Note    Assessment & Plan   Ms. Barnett is a 89-year-old pretty healthy female with history of hypertension and hypothyroidism who presented with a fall and was found to have large right frontal lobe intracranial hemorrhage.     1.  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    -The patient presented after a fall and was found on the CT head to have a large intraparenchymal hemorrhage. 5.5 X 4.2 X 3.5  -Repeat CT-stable  -Neurologically appears to be stable with unchanged complete left-sided hemiplegia  -Neuro critical care and neurosurgery following.  -she has severe dysphagia at this time  -s/p NGT placement 9/8  -Discussed with family members including daughter and granddaughter; they're agreeable to proceeding with PEG tube placement in the morning.     2.  Benign essential hypertension.   -PTA on lisinopril.    -Started on Ramipril 9/9  -P.r.n. labetalol and hydralazine, target blood pressure less than 140    3.  Hypothyroidism.   -PTA on levothyroxine 50 mcg daily.    -Resume PTA levothyroxine    D/W: RN  DVT Prophylaxis: Pneumatic Compression Devices  Code Status: No Order    Disposition: Expected discharge 9/12  Humza Townsend MD    Interval History   Slightly more interactive today.  Continues to have dense left-sided hemiplegia.  No acute events overnight .    -Data reviewed today: I reviewed all new labs and imaging results over the last 24 hours. I personally reviewed no images or EKG's today.    Physical Exam   Temp: 97.6  F (36.4  C) Temp src: Axillary BP: 137/83 Pulse: 89 Heart Rate: 89 Resp: 18 SpO2: 98 % O2 Device: None (Room air)    Vitals:    09/07/17 0415 09/08/17 0000 09/09/17 0600   Weight: 40.4 kg (89 lb 1.1 oz) 40.3 kg (88 lb 13.5 oz) 40.3 kg (88 lb 13.5 oz)     Vital Signs with Ranges  Temp:  [97.6  F (36.4  C)-99.1  F (37.3  C)] 97.6  F (36.4  C)  Pulse:  [89] 89  Heart Rate:  [] 89  Resp:  [13-18]  18  BP: (111-163)/(63-85) 137/83  SpO2:  [93 %-98 %] 98 %  I/O last 3 completed shifts:  In: 510 [I.V.:450; NG/GT:60]  Out: 1674 [Urine:1674]    Constitutional: AAO, answers simple questions appropriately  Respiratory:  No crackles, No wheezes, CTA B/L, Normal WOB  Cardiovascular: RRR, No murmur  GI: Soft, Non- tender, BS- normoactive  Skin/Integument: Warm and dry, no rashes  MSK: No joint deformity or swelling, no edema  Neuro: complete hemiplegia on the left side with 0/5 power-unchanged    Medications     0.9% sodium chloride + KCl 20 mEq/L 75 mL/hr at 09/10/17 0614       ramipril  2.5 mg Oral or Feeding Tube Daily     pantoprazole  40 mg Intravenous QAM AC     levothyroxine  25 mcg Intravenous Daily     sodium chloride (PF)  3 mL Intracatheter Q8H     pneumococcal vaccine  0.5 mL Intramuscular Prior to discharge       Data     Recent Labs  Lab 09/10/17  0450 09/09/17  1256 09/08/17  2145 09/08/17  1155  09/07/17  1128 09/07/17  0425 09/06/17  1310   WBC  --   --   --  13.1*  --   --  14.6* 8.6   HGB  --   --   --  14.0  --   --  13.4 13.6   MCV  --   --   --  89  --   --  90 91   PLT  --   --   --  339  --   --  355 364   INR  --   --   --   --   --   --   --  0.99    137  --  135  --   --  135 132*   POTASSIUM 3.5 3.6 4.2 2.8*  < >  --  3.2* 3.9   CHLORIDE 105 104  --  102  --   --  103 101   CO2 20 22  --  23  --   --  22 25   BUN 15 15  --  12  --   --  9 13   CR 0.42* 0.47*  --  0.46*  --   --  0.47* 0.71   ANIONGAP 12 11  --  10  --   --  10 6   ANTHONY 9.2 9.2  --  9.1  --   --  8.9 9.7   GLC 94 85  --  99  --   --  129* 99   TROPI  --   --   --   --   --  0.015  --   --    < > = values in this interval not displayed.    No results found for this or any previous visit (from the past 24 hour(s)).

## 2017-09-10 NOTE — PROGRESS NOTES
Notified Pily at  ARU regarding recommendation from therapy for ARU at discharge.  Will continue to follow for discharge needs and plans.

## 2017-09-11 ENCOUNTER — APPOINTMENT (OUTPATIENT)
Dept: CT IMAGING | Facility: CLINIC | Age: 82
DRG: 064 | End: 2017-09-11
Attending: INTERNAL MEDICINE
Payer: MEDICARE

## 2017-09-11 ENCOUNTER — APPOINTMENT (OUTPATIENT)
Dept: INTERVENTIONAL RADIOLOGY/VASCULAR | Facility: CLINIC | Age: 82
DRG: 064 | End: 2017-09-11
Attending: INTERNAL MEDICINE
Payer: MEDICARE

## 2017-09-11 LAB
ANION GAP SERPL CALCULATED.3IONS-SCNC: 9 MMOL/L (ref 3–14)
APTT PPP: 30 SEC (ref 22–37)
BUN SERPL-MCNC: 10 MG/DL (ref 7–30)
CALCIUM SERPL-MCNC: 9.1 MG/DL (ref 8.5–10.1)
CHLORIDE SERPL-SCNC: 102 MMOL/L (ref 94–109)
CO2 SERPL-SCNC: 24 MMOL/L (ref 20–32)
CREAT SERPL-MCNC: 0.41 MG/DL (ref 0.52–1.04)
ERYTHROCYTE [DISTWIDTH] IN BLOOD BY AUTOMATED COUNT: 12.5 % (ref 10–15)
GFR SERPL CREATININE-BSD FRML MDRD: >90 ML/MIN/1.7M2
GLUCOSE BLDC GLUCOMTR-MCNC: 124 MG/DL (ref 70–99)
GLUCOSE BLDC GLUCOMTR-MCNC: 147 MG/DL (ref 70–99)
GLUCOSE SERPL-MCNC: 114 MG/DL (ref 70–99)
HCT VFR BLD AUTO: 38.4 % (ref 35–47)
HGB BLD-MCNC: 13.5 G/DL (ref 11.7–15.7)
INR PPP: 1.11 (ref 0.86–1.14)
MAGNESIUM SERPL-MCNC: 2.1 MG/DL (ref 1.6–2.3)
MCH RBC QN AUTO: 31.3 PG (ref 26.5–33)
MCHC RBC AUTO-ENTMCNC: 35.2 G/DL (ref 31.5–36.5)
MCV RBC AUTO: 89 FL (ref 78–100)
PHOSPHATE SERPL-MCNC: 2 MG/DL (ref 2.5–4.5)
PLATELET # BLD AUTO: 297 10E9/L (ref 150–450)
POTASSIUM SERPL-SCNC: 3.6 MMOL/L (ref 3.4–5.3)
PREALB SERPL IA-MCNC: 9 MG/DL (ref 15–45)
RBC # BLD AUTO: 4.32 10E12/L (ref 3.8–5.2)
SODIUM SERPL-SCNC: 135 MMOL/L (ref 133–144)
WBC # BLD AUTO: 13.1 10E9/L (ref 4–11)

## 2017-09-11 PROCEDURE — 99232 SBSQ HOSP IP/OBS MODERATE 35: CPT | Performed by: INTERNAL MEDICINE

## 2017-09-11 PROCEDURE — 85610 PROTHROMBIN TIME: CPT | Performed by: RADIOLOGY

## 2017-09-11 PROCEDURE — 25000125 ZZHC RX 250: Performed by: INTERNAL MEDICINE

## 2017-09-11 PROCEDURE — 80048 BASIC METABOLIC PNL TOTAL CA: CPT | Performed by: INTERNAL MEDICINE

## 2017-09-11 PROCEDURE — 49440 PLACE GASTROSTOMY TUBE PERC: CPT

## 2017-09-11 PROCEDURE — 36415 COLL VENOUS BLD VENIPUNCTURE: CPT | Performed by: INTERNAL MEDICINE

## 2017-09-11 PROCEDURE — 25000128 H RX IP 250 OP 636

## 2017-09-11 PROCEDURE — 00000146 ZZHCL STATISTIC GLUCOSE BY METER IP

## 2017-09-11 PROCEDURE — 84134 ASSAY OF PREALBUMIN: CPT | Performed by: INTERNAL MEDICINE

## 2017-09-11 PROCEDURE — 25000132 ZZH RX MED GY IP 250 OP 250 PS 637: Mod: GY | Performed by: INTERNAL MEDICINE

## 2017-09-11 PROCEDURE — 27210735 ZZH ACCESSORY CR12

## 2017-09-11 PROCEDURE — 83735 ASSAY OF MAGNESIUM: CPT | Performed by: INTERNAL MEDICINE

## 2017-09-11 PROCEDURE — 85027 COMPLETE CBC AUTOMATED: CPT | Performed by: RADIOLOGY

## 2017-09-11 PROCEDURE — 27210905 ZZH KIT CR7

## 2017-09-11 PROCEDURE — 27210429 ZZH NUTRITION PRODUCT INTERMEDIATE LITER

## 2017-09-11 PROCEDURE — 25000128 H RX IP 250 OP 636: Performed by: RADIOLOGY

## 2017-09-11 PROCEDURE — 84100 ASSAY OF PHOSPHORUS: CPT | Performed by: INTERNAL MEDICINE

## 2017-09-11 PROCEDURE — 70450 CT HEAD/BRAIN W/O DYE: CPT

## 2017-09-11 PROCEDURE — 0DH63UZ INSERTION OF FEEDING DEVICE INTO STOMACH, PERCUTANEOUS APPROACH: ICD-10-PCS | Performed by: RADIOLOGY

## 2017-09-11 PROCEDURE — 25000125 ZZHC RX 250

## 2017-09-11 PROCEDURE — 25000128 H RX IP 250 OP 636: Performed by: PSYCHIATRY & NEUROLOGY

## 2017-09-11 PROCEDURE — 85730 THROMBOPLASTIN TIME PARTIAL: CPT | Performed by: RADIOLOGY

## 2017-09-11 PROCEDURE — 25000128 H RX IP 250 OP 636: Performed by: INTERNAL MEDICINE

## 2017-09-11 PROCEDURE — 12000000 ZZH R&B MED SURG/OB

## 2017-09-11 PROCEDURE — 27210915 ZZ H TUBE GASTRO CR4

## 2017-09-11 PROCEDURE — A9270 NON-COVERED ITEM OR SERVICE: HCPCS | Mod: GY | Performed by: INTERNAL MEDICINE

## 2017-09-11 RX ORDER — RAMIPRIL 2.5 MG/1
5 CAPSULE ORAL DAILY
Status: DISCONTINUED | OUTPATIENT
Start: 2017-09-11 | End: 2017-09-13

## 2017-09-11 RX ORDER — LIDOCAINE 40 MG/G
CREAM TOPICAL
Status: DISCONTINUED | OUTPATIENT
Start: 2017-09-11 | End: 2017-09-27 | Stop reason: HOSPADM

## 2017-09-11 RX ORDER — LIDOCAINE HYDROCHLORIDE 10 MG/ML
INJECTION, SOLUTION INFILTRATION; PERINEURAL
Status: COMPLETED
Start: 2017-09-11 | End: 2017-09-11

## 2017-09-11 RX ORDER — LIDOCAINE HYDROCHLORIDE 10 MG/ML
1-30 INJECTION, SOLUTION EPIDURAL; INFILTRATION; INTRACAUDAL; PERINEURAL
Status: COMPLETED | OUTPATIENT
Start: 2017-09-11 | End: 2017-09-11

## 2017-09-11 RX ADMIN — POTASSIUM CHLORIDE AND SODIUM CHLORIDE: 900; 150 INJECTION, SOLUTION INTRAVENOUS at 04:03

## 2017-09-11 RX ADMIN — LABETALOL HYDROCHLORIDE 10 MG: 5 INJECTION, SOLUTION INTRAVENOUS at 15:56

## 2017-09-11 RX ADMIN — DEXTROSE AND SODIUM CHLORIDE: 5; 900 INJECTION, SOLUTION INTRAVENOUS at 13:57

## 2017-09-11 RX ADMIN — POTASSIUM PHOSPHATE, MONOBASIC AND POTASSIUM PHOSPHATE, DIBASIC 15 MMOL: 224; 236 INJECTION, SOLUTION INTRAVENOUS at 10:23

## 2017-09-11 RX ADMIN — LABETALOL HYDROCHLORIDE 10 MG: 5 INJECTION, SOLUTION INTRAVENOUS at 09:14

## 2017-09-11 RX ADMIN — HYDRALAZINE HYDROCHLORIDE 10 MG: 20 INJECTION INTRAMUSCULAR; INTRAVENOUS at 17:31

## 2017-09-11 RX ADMIN — LABETALOL HYDROCHLORIDE 10 MG: 5 INJECTION, SOLUTION INTRAVENOUS at 16:52

## 2017-09-11 RX ADMIN — PANTOPRAZOLE SODIUM 40 MG: 40 INJECTION, POWDER, FOR SOLUTION INTRAVENOUS at 06:43

## 2017-09-11 RX ADMIN — LABETALOL HYDROCHLORIDE 10 MG: 5 INJECTION, SOLUTION INTRAVENOUS at 08:37

## 2017-09-11 RX ADMIN — GLUCAGON HYDROCHLORIDE 1 MG: 1 INJECTION, POWDER, FOR SOLUTION INTRAMUSCULAR; INTRAVENOUS; SUBCUTANEOUS at 13:23

## 2017-09-11 RX ADMIN — LABETALOL HYDROCHLORIDE 10 MG: 5 INJECTION, SOLUTION INTRAVENOUS at 04:04

## 2017-09-11 RX ADMIN — LIDOCAINE HYDROCHLORIDE 100 MG: 10 INJECTION, SOLUTION EPIDURAL; INFILTRATION; INTRACAUDAL; PERINEURAL at 13:34

## 2017-09-11 RX ADMIN — RAMIPRIL 5 MG: 2.5 CAPSULE ORAL at 19:31

## 2017-09-11 RX ADMIN — LIDOCAINE HYDROCHLORIDE 100 MG: 10 INJECTION, SOLUTION INFILTRATION; PERINEURAL at 13:34

## 2017-09-11 RX ADMIN — LEVOTHYROXINE SODIUM ANHYDROUS 50 MCG: 100 INJECTION, POWDER, LYOPHILIZED, FOR SOLUTION INTRAVENOUS at 08:32

## 2017-09-11 RX ADMIN — GLUCAGON HYDROCHLORIDE 1 MG: KIT at 15:44

## 2017-09-11 ASSESSMENT — VISUAL ACUITY
OU: OTHER (SEE COMMENT)

## 2017-09-11 NOTE — PLAN OF CARE
Problem: Goal Outcome Summary  Goal: Goal Outcome Summary  Outcome: No Change  Lethargic, turn & repo Q2h, han in place. NPO for PEG tube placement, current NG kinked, not in use. MONICA orientation, L droop, LUE hemiplegia, LLE hemiparesis, generalized weakness, minimal speech, able to state name. Daughter concerned about LOC, repeat CT done, no change per radiologist. Phos replacing, off the floor to IR for PEG tube.     Returned from IR, PEG tube placed, CDI, okay to use after 6 hours post procedure, 1930.

## 2017-09-11 NOTE — PROGRESS NOTES
SPIRITUAL [facility] Progress Note  [facility]    I visited with pt's daughter, Crystal, per length of stay. Crystal is having difficulty processing pt's condition and plan of care. Crystal expressed a desire for some time to process anger before she discusses the situation further. Crystal mentioned that pt is [employer].  offered [employer] resources and Crystal accepted them. Crystal appreciated 's offer of support.  will follow up with pt's family as available and by request.      Monica Eaton  Chaplain Resident

## 2017-09-11 NOTE — PROGRESS NOTES
G-TUBE PLACEMENT 9/11/2017 1:37 PM     HISTORY: Requires nutritional support. Unable to take adequate oral intake.    DESCRIPTION OF PROCEDURE: After obtaining informed consent, the patient was placed in a supine position on the fluoroscopy table. The liver edge was marked. A nasogastric tube was placed into the stomach. 1 mg glucagon was given intravenously. The stomach was inflated with air.     Two T-TAC suture anchors were used to enter the stomach. A small skin incision was made in between the T-TAC entry sites. An 18-gauge needle was used to enter the stomach through the incision. A wire was passed and curled in the stomach. A tract for the G-tube was dilated. An 18 Gabonese peel-away sheath was placed. Through the peel-away sheath, a 14 Gabonese G-tube was placed. The balloon was inflated with a mixture of 1 mL contrast and 9 mL saline. Balloon was pulled back so that it was against the anterior stomach wall. Contrast was injected to document adequate positioning. A fluoroscopic image was saved to document tube position.     The patient tolerated the procedure well. There were no immediate postprocedure complications. The patient's vital signs were monitored by radiology nursing staff under my supervision and remained stable throughout the study.    MEDICATIONS: 1 mg glucagon    SEDATION TIME: None    FLUOROSCOPY TIME: 2.3 minutes    RADIATION DOSE: 9 mg    IMPRESSION: G-tube placed as above

## 2017-09-11 NOTE — PROGRESS NOTES
Interventional Radiology Intra-procedural Nursing Note    Patient Name: Sury Barnett  Medical Record Number: [id number]  Today's Date: September 11, 2017    Start Time:  1302  End of procedure time:  1335  Procedure:  Gastrostomy tube placement  Report given to:  Leia [place] [id number] RN  Time pt departs:   1340  :  N/A    Other Notes:   Patient into IR#2 at 1245.  Informed consent obtained by MD Fabian and signed by daughter.  Patient not responsive.  VSS.  O2 sats 97% on RA.   Patient prepped and draped in supine position.  1mg IV Glucagon given.  Gastrostomy tube placed by MD, no sedation used.  NG tube discontinued.  Instructed not to use gastrostomy tube for 6 hours.  Transported back to station 77 by transport, daughter updated by ROSMERY IRIZARRY and staff.      Fiona Iverson, RN, BSN, CCRN

## 2017-09-11 NOTE — PROGRESS NOTES
[facility]  [facility] [place]  Neurology Daily Note      Admission Date:9/6/2017   Date of service: 09/11/2017   Hospital Day: 6      Assessment & Plan   _______________________________  #. 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 (E85.4,  I68.0) Cerebral amyloid angiopathy (H)  --MRI from July 2017 consistent with amyloid angiopathy.  ---One microhemorrhage was located in the area of present hemorrhage  #. (I10) Essential hypertension  --Keep BP <140 mm Hg  --management per hospitalists.  --Ramipril 2.5 mg   #. PT/OT/Speech  --continue evaluations  ---G-tube placement today  #. Nutrition  --Per speech therapy evaluation   #. DVT Prophylaxis  --Mechanical only due to hemorrhage      Disposition: pending     Interval History   _______________________________  Patient presented after a fall with left sided weakness. Patient was getting out of wheelchair, fell backwards and hit her head. No lightheadedness or dizziness prior to the fall. EMS was called and found her weaker on the left side. In the ED was found to have right gaze preference. CT identified new large intracerebral hemorrhage without midline shift.   Slowly waking but still with neglect and weakness on the left. Follows commands for thumbs up on the right. Does not open eyes. Minimal intermittent speech. G-tube placement today.    Review of Systems   _______________________________  Review of systems is limited by patient factors - mental status  Physical Exam   _______________________________  Vitals: Temp: 97.9  F (36.6  C) Temp src: Axillary BP: 139/83 Pulse: 80 Heart Rate: 73 Resp: 16 SpO2: 97 % O2 Device: None (Room air)    Vital Signs with Ranges: Temp:   [97.6  F (36.4  C)-98.6  F (37  C)] 97.9  F (36.6  C)  Pulse:  [80] 80  Heart Rate:  [] 73  Resp:  [16-20] 16  BP: (111-160)/(70-92) 139/83  SpO2:  [96 %-99 %] 97 %    General Appearance:  No acute distress  Neuro:       Mental Status Exam:   Lethargic, minimally arouses, unable to assess orientation. Minimal intermittent speech. Mental status is decreased       Cranial Nerves:  Pupils 3 mm, reactive. Does not open eyes. Unable to assess facial sensation. Face is symmetric. Tongue and uvula are midline. Other CN are normal           Motor:  Left plegia, right sided weakness, follows command to give thumbs up on the right. Tone and bulk are normal           Reflexes:  Normal DTR. Toes equivocal.        Sensory:  Unable to assess                 Coordination:   Unable to assess due to weakness       Gait:  Unable to assess due to weakness  Cardiovascular: Regular rate and rhythm, no m/r/g  Lungs: Clear to auscultation  Abdomen: Soft, not tender, not distended  Extremities: No clubbing, no cyanosis, no edema    Medications   _______________________________    - MEDICATION INSTRUCTIONS -       0.9% sodium chloride + KCl 20 mEq/L 75 mL/hr at 09/11/17 0403       sodium chloride (PF)  3 mL Intracatheter Q8H     [START ON 9/12/2017] pantoprazole  40 mg Per Feeding Tube Daily     levothyroxine  50 mcg Intravenous Daily     ramipril  2.5 mg Oral or Feeding Tube Daily     sodium chloride (PF)  3 mL Intracatheter Q8H     pneumococcal vaccine  0.5 mL Intramuscular Prior to discharge       Data   _______________________________      Lab Data:   All data was reviewed by me personally  CBC RESULTS:  Recent Labs   Lab Test  09/11/17   0716  09/08/17   1155  09/07/17   0425   WBC  13.1*  13.1*  14.6*   RBC  4.32  4.49  4.27   HGB  13.5  14.0  13.4   HCT  38.4  40.1  38.4   PLT  297  339  355     Basic Metabolic Panel:  Recent Labs   Lab Test  09/11/17   0716  09/10/17   0450  09/09/17   1256   NA  135  137  137   POTASSIUM  3.6   3.5  3.6   CHLORIDE  102  105  104   CO2  24  20  22   BUN  10  15  15   CR  0.41*  0.42*  0.47*   GLC  114*  94  85   ANTHONY  9.1  9.2  9.2     Coagulation  Recent Labs   Lab Test  09/11/17   0716  09/06/17   1310   INR  1.11  0.99   PTT  30  29      Troponin I:   Recent Labs   Lab Test  09/07/17   1128   TROPI  0.015     UA Results:  Recent Labs   Lab Test  09/08/17   1710   COLOR  Light Yellow   APPEARANCE  Clear   URINEGLC  Negative   URINEBILI  Negative   URINEKETONE  80*   SG  1.008   UBLD  Small*   URINEPH  5.5   PROTEIN  Negative   NITRITE  Negative   LEUKEST  Negative   RBCU  3*   WBCU  1        Cardiac US:   --     Neurophysiology:   --     Imaging:   All imaging studies were reviewed personally  CT head 9/6/17:   1. New large hyperdense mass in the right frontal lobe measuring 5.5 x 4.2 x 3.5 cm consistent with a large parenchymal hematoma. This does not result in any significant midline shift or effacement of the basal cisterns.  2. Diffuse cerebral volume loss and cerebral white matter changes consistent with chronic small vessel ischemic disease.     CT cervical spine 9/6/17:   --Degenerative changes of the cervical spine. No evidence for fracture or traumatic malalignment.    MRI brain 7/24/17: (prior to admission)  1. No acute pathology is identified. No bleed, mass, or acute infarcts are seen.  2. Age-related changes including generalized atrophy of the brain. White matter changes in the cerebral hemispheres consistent with small vessel ischemic disease in this age patient.  3. Tiny incidental lipoma along the right tentorium.  4. 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     CT head 9/7/17:  1. Increased size of parenchymal hematoma and surrounding edema centered in the right frontal lobe. There is new intraventricular extension of the hematoma. No significant midline shift or herniation. No hydrocephalus.  2. Diffuse parenchymal volume loss and fairly extensive white  matter changes likely due to chronic microvascular ischemic disease.     CT head 9/8/17:  --Stable right frontal parenchymal and intraventricular hemorrhage. There is no evidence for any progressive mass effect or any new hemorrhage.      Alecia Garcia, FNP-BC

## 2017-09-11 NOTE — PROGRESS NOTES
[facility]    Hospitalist Progress Note    Assessment & Plan   Ms. Barnett is a 89-year-old pretty healthy female with history of hypertension and hypothyroidism who presented with a fall and was found to have large right frontal lobe intracranial hemorrhage.     1.  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    -The patient presented after a fall and was found on the CT head to have a large intraparenchymal hemorrhage. 5.5 X 4.2 X 3.5  -Likely she sustained the hemorrhage prior to the fall  -Repeat CT-stable  -Neurologically appears to be stable with unchanged complete left-sided hemiplegia; although slightly more lethargic this morning  -Neuro critical care and neurosurgery following.  -she has severe dysphagia at this time  -Plans for G-tube placement today     2.  Benign essential hypertension.   -PTA on lisinopril.    -Started on Ramipril 9/9; increase to 5 mg today  -P.r.n. labetalol and hydralazine, target blood pressure less than 140    3.  Hypothyroidism.   -PTA on levothyroxine 50 mcg daily.    -Continue PTA levothyroxine    D/W: RN  DVT Prophylaxis: Pneumatic Compression Devices  Code Status: No Order    Disposition: Expected discharge 9/12; to TCU versus ARU    Humza Townsend MD    Interval History   More sleepy and lethargic this morning.  Denies any pain.  Getting G-tube later today    -Data reviewed today: I reviewed all new labs and imaging results over the last 24 hours. I personally reviewed no images or EKG's today.    Physical Exam   Temp: 97.9  F (36.6  C) Temp src: Axillary BP: 143/77 Pulse: 80 Heart Rate: 80 Resp: 18 SpO2: 97 % O2 Device: None (Room air)    Vitals:    09/08/17 0000 09/09/17 0600 09/11/17 0540   Weight: 40.3 kg (88 lb 13.5 oz) 40.3 kg (88 lb 13.5 oz) 41 kg (90 lb 6.2 oz)     Vital Signs with Ranges  Temp:  [97.8  F (36.6  C)-98.6  F (37  C)] 97.9  F (36.6  C)  Pulse:  [80] 80  Heart Rate:  [] 80  Resp:  [16-20] 18  BP:  (132-160)/(70-92) 143/77  SpO2:  [96 %-99 %] 97 %  I/O last 3 completed shifts:  In: 1391 [I.V.:1331; NG/GT:60]  Out: 1110 [Urine:1110]    Constitutional: AAO, answers simple questions by giving thumbs up  Respiratory:  No crackles, No wheezes, CTA B/L, Normal WOB  Cardiovascular: RRR, No murmur  GI: Soft, Non- tender, BS- normoactive  Skin/Integument: Warm and dry, no rashes  MSK: No joint deformity or swelling, no edema  Neuro: complete hemiplegia on the left side with 0/5 power-unchanged    Medications     - MEDICATION INSTRUCTIONS -       0.9% sodium chloride + KCl 20 mEq/L 75 mL/hr at 09/11/17 0907       sodium chloride (PF)  3 mL Intracatheter Q8H     [START ON 9/12/2017] pantoprazole  40 mg Per Feeding Tube Daily     levothyroxine  50 mcg Intravenous Daily     ramipril  2.5 mg Oral or Feeding Tube Daily     sodium chloride (PF)  3 mL Intracatheter Q8H     pneumococcal vaccine  0.5 mL Intramuscular Prior to discharge       Data     Recent Labs  Lab 09/11/17  0716 09/10/17  0450 09/09/17  1256  09/08/17  1155  09/07/17  1128 09/07/17  0425 09/06/17  1310   WBC 13.1*  --   --   --  13.1*  --   --  14.6* 8.6   HGB 13.5  --   --   --  14.0  --   --  13.4 13.6   MCV 89  --   --   --  89  --   --  90 91     --   --   --  339  --   --  355 364   INR 1.11  --   --   --   --   --   --   --  0.99    137 137  --  135  --   --  135 132*   POTASSIUM 3.6 3.5 3.6  < > 2.8*  < >  --  3.2* 3.9   CHLORIDE 102 105 104  --  102  --   --  103 101   CO2 24 20 22  --  23  --   --  22 25   BUN 10 15 15  --  12  --   --  9 13   CR 0.41* 0.42* 0.47*  --  0.46*  --   --  0.47* 0.71   ANIONGAP 9 12 11  --  10  --   --  10 6   ANTHONY 9.1 9.2 9.2  --  9.1  --   --  8.9 9.7   * 94 85  --  99  --   --  129* 99   TROPI  --   --   --   --   --   --  0.015  --   --    < > = values in this interval not displayed.    No results found for this or any previous visit (from the past 24 hour(s)).

## 2017-09-11 NOTE — PLAN OF CARE
Problem: Goal Outcome Summary  Goal: Goal Outcome Summary  PT: Therapist paged by [id number] with family of patient wanting PT services held today. Patient rescheduled for 9/12/17 per family request.

## 2017-09-11 NOTE — PLAN OF CARE
Problem: Goal Outcome Summary  Goal: Goal Outcome Summary  Outcome: Improving  Lethargic, MONICA orientation, pt able to state name. Slurred speech. L facial droop, LUE hemiplegia, LLE hemiparesis. VSS. Phos replaced, lab recheck this am. NPO, NG clamped d/t difficulties with becoming kinked, pt to have PEG placed tomorrow. T&R q2h. Incontinent bowel. Possible d/c to ARU once stable.

## 2017-09-11 NOTE — PLAN OF CARE
Problem: Goal Outcome Summary  Goal: Goal Outcome Summary  Physical Therapy Discharge Summary     Reason for therapy discharge:    Patient/family request discontinuation of services.     Progress towards therapy goal(s). See goals on Care Plan in [facility] electronic health record for goal details.  Goals not met.  Barriers to achieving goals:   family requesting PT services to be discontinued.     Therapy recommendation(s):    No further therapy is recommended.

## 2017-09-11 NOTE — PLAN OF CARE
Problem: Goal Outcome Summary  Goal: Goal Outcome Summary  SLP: Attempted to see patient for swallow treatment with her daughter present. Patient very lethargic with daughter at bedside limited responses. Patient having G-tube placed today. Will cancel session today and reschedule for 9/12/17.

## 2017-09-11 NOTE — PLAN OF CARE
Problem: Goal Outcome Summary  Goal: Goal Outcome Summary  OT: Family requesting to hold OT/PT this date due to procedure and pt's lethargy.

## 2017-09-12 ENCOUNTER — APPOINTMENT (OUTPATIENT)
Dept: GENERAL RADIOLOGY | Facility: CLINIC | Age: 82
DRG: 064 | End: 2017-09-12
Attending: NURSE PRACTITIONER
Payer: MEDICARE

## 2017-09-12 ENCOUNTER — APPOINTMENT (OUTPATIENT)
Dept: OCCUPATIONAL THERAPY | Facility: CLINIC | Age: 82
DRG: 064 | End: 2017-09-12
Payer: MEDICARE

## 2017-09-12 LAB — PHOSPHATE SERPL-MCNC: 2 MG/DL (ref 2.5–4.5)

## 2017-09-12 PROCEDURE — 84100 ASSAY OF PHOSPHORUS: CPT | Performed by: INTERNAL MEDICINE

## 2017-09-12 PROCEDURE — 25000128 H RX IP 250 OP 636: Performed by: INTERNAL MEDICINE

## 2017-09-12 PROCEDURE — 27210429 ZZH NUTRITION PRODUCT INTERMEDIATE LITER

## 2017-09-12 PROCEDURE — 25000125 ZZHC RX 250: Performed by: INTERNAL MEDICINE

## 2017-09-12 PROCEDURE — 97110 THERAPEUTIC EXERCISES: CPT | Mod: GO | Performed by: OCCUPATIONAL THERAPY ASSISTANT

## 2017-09-12 PROCEDURE — 25000132 ZZH RX MED GY IP 250 OP 250 PS 637: Mod: GY | Performed by: INTERNAL MEDICINE

## 2017-09-12 PROCEDURE — 40000133 ZZH STATISTIC OT WARD VISIT: Performed by: OCCUPATIONAL THERAPY ASSISTANT

## 2017-09-12 PROCEDURE — 25000128 H RX IP 250 OP 636: Performed by: PSYCHIATRY & NEUROLOGY

## 2017-09-12 PROCEDURE — 99233 SBSQ HOSP IP/OBS HIGH 50: CPT | Performed by: INTERNAL MEDICINE

## 2017-09-12 PROCEDURE — 97535 SELF CARE MNGMENT TRAINING: CPT | Mod: GO | Performed by: OCCUPATIONAL THERAPY ASSISTANT

## 2017-09-12 PROCEDURE — 36415 COLL VENOUS BLD VENIPUNCTURE: CPT | Performed by: INTERNAL MEDICINE

## 2017-09-12 PROCEDURE — A9270 NON-COVERED ITEM OR SERVICE: HCPCS | Mod: GY | Performed by: INTERNAL MEDICINE

## 2017-09-12 PROCEDURE — 71010 XR CHEST PORT 1 VW: CPT

## 2017-09-12 PROCEDURE — 12000000 ZZH R&B MED SURG/OB

## 2017-09-12 RX ORDER — FUROSEMIDE 10 MG/ML
20 INJECTION INTRAMUSCULAR; INTRAVENOUS ONCE
Status: COMPLETED | OUTPATIENT
Start: 2017-09-12 | End: 2017-09-12

## 2017-09-12 RX ORDER — LEVOTHYROXINE SODIUM 50 UG/1
50 TABLET ORAL
Status: DISCONTINUED | OUTPATIENT
Start: 2017-09-13 | End: 2017-09-27 | Stop reason: HOSPADM

## 2017-09-12 RX ORDER — LEVOTHYROXINE SODIUM 50 UG/1
50 TABLET ORAL
Status: DISCONTINUED | OUTPATIENT
Start: 2017-09-13 | End: 2017-09-12

## 2017-09-12 RX ORDER — LEVOFLOXACIN 5 MG/ML
500 INJECTION, SOLUTION INTRAVENOUS EVERY 24 HOURS
Status: DISCONTINUED | OUTPATIENT
Start: 2017-09-12 | End: 2017-09-13

## 2017-09-12 RX ADMIN — LABETALOL HYDROCHLORIDE 10 MG: 5 INJECTION, SOLUTION INTRAVENOUS at 05:16

## 2017-09-12 RX ADMIN — Medication 40 MG: at 09:36

## 2017-09-12 RX ADMIN — ACETAMINOPHEN 650 MG: 325 SOLUTION ORAL at 09:36

## 2017-09-12 RX ADMIN — HYDRALAZINE HYDROCHLORIDE 10 MG: 20 INJECTION INTRAMUSCULAR; INTRAVENOUS at 00:34

## 2017-09-12 RX ADMIN — ACETAMINOPHEN 650 MG: 325 SOLUTION ORAL at 05:16

## 2017-09-12 RX ADMIN — FUROSEMIDE 20 MG: 10 INJECTION, SOLUTION INTRAVENOUS at 13:04

## 2017-09-12 RX ADMIN — POTASSIUM PHOSPHATE, MONOBASIC AND POTASSIUM PHOSPHATE, DIBASIC 15 MMOL: 224; 236 INJECTION, SOLUTION INTRAVENOUS at 10:08

## 2017-09-12 RX ADMIN — RAMIPRIL 5 MG: 2.5 CAPSULE ORAL at 20:23

## 2017-09-12 RX ADMIN — LEVOFLOXACIN 500 MG: 5 INJECTION, SOLUTION INTRAVENOUS at 13:12

## 2017-09-12 RX ADMIN — DEXTROSE AND SODIUM CHLORIDE: 5; 900 INJECTION, SOLUTION INTRAVENOUS at 02:07

## 2017-09-12 RX ADMIN — ACETAMINOPHEN 650 MG: 325 SOLUTION ORAL at 18:45

## 2017-09-12 RX ADMIN — ACETAMINOPHEN 650 MG: 325 SOLUTION ORAL at 13:41

## 2017-09-12 ASSESSMENT — VISUAL ACUITY
OU: OTHER (SEE COMMENT)

## 2017-09-12 NOTE — PLAN OF CARE
Problem: Goal Outcome Summary  Goal: Goal Outcome Summary  PT: Attempted to see patient for a treatment session but patient unable to stay awake to participate; Daughter requesting PT hold session today and reschedule tomorrow as she wants to see if a full day of tube feeding and increased nutrition with change patient's ability to participate.  Patient doesn't appear appropriate for ARU at the current time. May benefit from TCU if she becomes alert enough to participate, otherwise may need a LTC placement.

## 2017-09-12 NOTE — PROGRESS NOTES
CLINICAL NUTRITION SERVICES - REASSESSMENT NOTE      EVALUATION OF PROGRESS TOWARD GOALS   Diet:  NPO. SLP is following    Nutrition Support:  Pt began TF via ND tube on 9/9, Isosource 1.5  at 15 mL/hr (goal is 35 mL/hr) but tube became clogged and TF stopped on 9/10. It never increased beyond 15 mL/hr.    Pt had a 14 Fr G-tube placed in IR yesterday and resumed feeding at 15 mL/hr at 7:30 pm. It is to increase 10 mL q 24 hrs to goal of 35 mL/hr to provided 1260 kcals (31 kcal/kg), 57 gm pro (1.4 gm/kg), 638 mL H20, 13 gm fiber, 148 gm CHO  Free H20 30 mL every 4 hrs (minimal flushes while on IVF).    Intake/tolerance: Pt is at risk of refeeding syndrome so TF advancing slowly.  K and Mag have been WNL but Phos has been low and pt is on replacement protocol. Phos is currently low at 2 this morning.  RN states pt is tolerating 15 mL/hr. Last documented BM was 9/9.        ASSESSED NUTRITION: Dosing Weight:  40.3 kg (9/9)   Estimated Energy Needs: 0156-6317 kcals (30-35 Kcal/Kg) - underweight  Estimated Protein Needs:  48-60 grams protein (1.2-1.5 g pro/Kg) - Repletion  Estimated Fluid Needs:  3044-2655 mL (1 mL/Kcal) - maintenance      NEW FINDINGS:   9/11: G-tube placed, 14 Fr  D5 @ 100 ml/hr (408 calories)  , 147    Previous Goals:   Pt will tolerate TF without refeeding syndrome.  TF goal Isosource 1.5 at 35 mL/hr will meet % estimated needs.  Evaluation: Not met    Previous Nutrition Diagnosis:   Inadequate protein-energy intake related to mod-severe dysphagia s/p ICH as evidenced by NPO status, meeting 0% estimated needs and TF planned.   Evaluation: Improving      CURRENT NUTRITION DIAGNOSIS  Inadequate protein-energy intake related to TF held 2 days and now low infusion rate, as evidenced by currently meeting only 40% assessed needs     INTERVENTIONS  Recommendations / Nutrition Prescription  Diet per SLP  Continue to advance TF by 10 mL/hr q 24 hours to goal of 35  mL/hr    Implementation  Collaboration and Referral of Nutrition care - Discussed TF tolerance, goal with YRN Reyes.    Goals  Pt will reach TF goal of 35 mL/hr in the next 48 hrs      MONITORING AND EVALUATION:  Progress towards goals will be monitored and evaluated per protocol and Practice Guidelines

## 2017-09-12 NOTE — PLAN OF CARE
Problem: Goal Outcome Summary  Goal: Goal Outcome Summary  Outcome: No Change  [id number]  MONICA orientation d/t lethargy, opens eyes to voice. No speech noted this shift. L facial droop, LUE hemiplegia, LLE hemiparesis 1/5 strength, RUE 3/5, RLE 2/5. Up with lift, frequent oral cares and repo. G tube intact with dried drainage. TF at 15 mls/hr, increase to 25 ml/hr at 1930. VSS, SBP <140. No s/sx of pain. Ramsay patent. Phos recheck in AM. D/c pending.    Addendum: PRN Tylenol given, pt appeared to be in discomfort per daughter.

## 2017-09-12 NOTE — PROGRESS NOTES
[facility]  Ne[facility] [place]  Neurology Daily Note      Admission Date:9/6/2017   Date of service: 09/12/2017   Hospital Day: 7      Assessment & Plan   _______________________________  #. 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 (E85.4,  I68.0) Cerebral amyloid angiopathy (H)  --MRI from July 2017 consistent with amyloid angiopathy.  ---One microhemorrhage was located in the area of present hemorrhage  #. (I10) Essential hypertension  --Keep BP <140 mm Hg  --management per hospitalists.  --continue Ramipril 2.5 mg   #. PT/OT/Speech  --continue evaluations  ---G-tube placed 9/11/17  ----to ARU when ready  #. Nutrition  --Per speech therapy evaluation   #. DVT Prophylaxis  --Mechanical only due to hemorrhage      Disposition: pending   No further neurological workup recommended. Will sign off.    Interval History   _______________________________  Patient presented after a fall with left sided weakness. Patient was getting out of wheelchair, fell backwards and hit her head. No lightheadedness or dizziness prior to the fall. EMS was called and found her weaker on the left side. In the ED was found to have right gaze preference. CT identified new large intracerebral hemorrhage without midline shift.   Slowly waking but still with neglect and weakness on the left. Follows commands for thumbs up on the right. Does not open eyes. Minimal intermittent speech. G-tube placed 9/11/17.    Review of Systems   _______________________________  Review of systems is limited by patient factors - mental status  Physical Exam   _______________________________  Vitals: Temp: 98  F  (36.7  C) Temp src: Axillary BP: 113/63 Pulse: 92 Heart Rate: 90 Resp: 16 SpO2: 96 % O2 Device: None (Room air)    Vital Signs with Ranges: Temp:  [97.2  F (36.2  C)-99.6  F (37.6  C)] 98  F (36.7  C)  Pulse:  [82-94] 92  Heart Rate:  [] 90  Resp:  [14-18] 16  BP: (113-180)/() 113/63  SpO2:  [92 %-98 %] 96 %    General Appearance:  No acute distress  Neuro:       Mental Status Exam:   Lethargic, minimally arouses, unable to assess orientation. Minimal intermittent speech. Mental status is decreased       Cranial Nerves:  Pupils 3 mm, reactive. Minimally opens eyes. Unable to assess facial sensation. Face is symmetric. Tongue and uvula are midline. Other CN are normal           Motor:  Left plegia, right sided weakness, follows command to give thumbs up on the right intermittently. Tone and bulk are normal           Reflexes:  Normal DTR. Toes equivocal.        Sensory:  Unable to assess                 Coordination:   Unable to assess due to weakness       Gait:  Unable to assess due to weakness  Cardiovascular: Regular rate and rhythm, no m/r/g  Lungs: Clear to auscultation  Abdomen: Soft, not tender, not distended  Extremities: No clubbing, no cyanosis, no edema    Medications   _______________________________    dextrose 5% and 0.9% NaCl 100 mL/hr at 09/12/17 0207       [START ON 9/13/2017] levothyroxine  50 mcg Oral or Feeding Tube QAM AC     sodium chloride (PF)  3 mL Intracatheter Q8H     pantoprazole  40 mg Per Feeding Tube Daily     ramipril  5 mg Oral or Feeding Tube Daily     sodium chloride (PF)  3 mL Intracatheter Q8H     pneumococcal vaccine  0.5 mL Intramuscular Prior to discharge       Data   _______________________________      Lab Data:   All data was reviewed by me personally  CBC RESULTS:  Recent Labs   Lab Test  09/11/17   0716  09/08/17   1155  09/07/17   0425   WBC  13.1*  13.1*  14.6*   RBC  4.32  4.49  4.27   HGB  13.5  14.0  13.4   HCT  38.4  40.1  38.4   PLT  297  339  355      Basic Metabolic Panel:  Recent Labs   Lab Test  09/11/17   0716  09/10/17   0450  09/09/17   1256   NA  135  137  137   POTASSIUM  3.6  3.5  3.6   CHLORIDE  102  105  104   CO2  24  20  22   BUN  10  15  15   CR  0.41*  0.42*  0.47*   GLC  114*  94  85   ANTHONY  9.1  9.2  9.2     Coagulation  Recent Labs   Lab Test  09/11/17   0716  09/06/17   1310   INR  1.11  0.99   PTT  30  29      Troponin I:   Recent Labs   Lab Test  09/07/17   1128   TROPI  0.015     UA Results:  Recent Labs   Lab Test  09/08/17   1710   COLOR  Light Yellow   APPEARANCE  Clear   URINEGLC  Negative   URINEBILI  Negative   URINEKETONE  80*   SG  1.008   UBLD  Small*   URINEPH  5.5   PROTEIN  Negative   NITRITE  Negative   LEUKEST  Negative   RBCU  3*   WBCU  1        Cardiac US:   --     Neurophysiology:   --     Imaging:   All imaging studies were reviewed personally  CT head 9/6/17:   1. New large hyperdense mass in the right frontal lobe measuring 5.5 x 4.2 x 3.5 cm consistent with a large parenchymal hematoma. This does not result in any significant midline shift or effacement of the basal cisterns.  2. Diffuse cerebral volume loss and cerebral white matter changes consistent with chronic small vessel ischemic disease.     CT cervical spine 9/6/17:   --Degenerative changes of the cervical spine. No evidence for fracture or traumatic malalignment.    MRI brain 7/24/17: (prior to admission)  1. No acute pathology is identified. No bleed, mass, or acute infarcts are seen.  2. Age-related changes including generalized atrophy of the brain. White matter changes in the cerebral hemispheres consistent with small vessel ischemic disease in this age patient.  3. Tiny incidental lipoma along the right tentorium.  4. 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     CT head 9/7/17:  1. Increased size of parenchymal hematoma and surrounding edema centered in the right frontal lobe. There is new intraventricular extension of the  hematoma. No significant midline shift or herniation. No hydrocephalus.  2. Diffuse parenchymal volume loss and fairly extensive white matter changes likely due to chronic microvascular ischemic disease.     CT head 9/8/17:  --Stable right frontal parenchymal and intraventricular hemorrhage. There is no evidence for any progressive mass effect or any new hemorrhage.    CT head 9/11/17:  --Stable intraparenchymal hemorrhage in the right cerebral hemisphere as well as intraventricular hemorrhage. No appreciable change. Please note that the scanning angle on the axial study is slightly different today.      Alecia Garcia, FNP-BC

## 2017-09-12 NOTE — PLAN OF CARE
Problem: Goal Outcome Summary  Goal: Goal Outcome Summary  Outcome: No Change  Somnolent this morning. Beginning to show signs of increased alertness this afternoon (turning her head toward voices, opening her eyes briefly), but is still unable to follow any commands other than to squeeze RNs fingers with R hand. LUE hemiplegia, slight movement of foot only in LLE. Diminished LS at bilateral bases, no crackles. Required suctioning, has an infrequent productive cough but a poor swallow. Tolerated IV lasix dose this afternoon, VS including BP have been stable. Ramsay with 750 mL output of clear yellow urine. Pt will be saline locked once phosphorus replacement is completed. Turned, oral care q2 hours. Family at bedside. Bedrest, family declined participation in therapies and declined an increase in activity.

## 2017-09-12 NOTE — PLAN OF CARE
Problem: Goal Outcome Summary  Goal: Goal Outcome Summary  Outcome: No Change  Pt is lethargic and not able to assess orientation. Pt does moan and respond to stimulation and verbal commands but is un able to follow many neuro commands. Pt has been tachycardia with elevated BP  Over 140 given IV labetalol and hydralazine. Pt also had a temp of 99.2 and given PRN tylenol.  Pt has been a G tube feed 15 ml/hr and 30 ml Flushes Q4. Pt is bed rest Turn and repositioned. Pt does have significant mouth secretions and was suctioned multiple times through the NOC. Pt is able to cough on her own. Pt's PEG tube dressing has some small drainage. Plan to DC to ARU when stable.

## 2017-09-12 NOTE — PROGRESS NOTES
SPIRITUAL HEALTH SERVICES Progress Note  [facility]    I followed up with pt's family to ask if there were any Christian resources I could put them in touch with. Pt's other daughter Dotty was present with granddaughter and another family member. Dotty stated that there were a variety of beliefs with in the family - Dotty is Buddhist, granddaughter is Jain, and they have members who are Temple and Synagogue. Pt did not belong to a Christian community and did not practice chanting. Pt is Khmer American from [place]. 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 Family felt that she has been better today.  offered blessing and family declined.  will follow up if requested.      Monica Eaton  Chaplain Resident

## 2017-09-12 NOTE — PROGRESS NOTES
[facility]    Hospitalist Progress Note    Assessment & Plan   Ms. Barnett is a 89-year-old pretty healthy female with history of hypertension and hypothyroidism who presented with a fall and was found to have large right frontal lobe intracranial hemorrhage.     1.  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    -The patient presented after a fall and was found on the CT head to have a large intraparenchymal hemorrhage. 5.5 X 4.2 X 3.5  -Likely she sustained the hemorrhage prior to the fall  -Repeat CT-stable  -Neurologically appears to be stable with unchanged complete left-sided hemiplegia; although slightly more lethargic today . Will repeat CT head today   -Neuro critical care and neurosurgery following.  -G tube placed and tolerating tube feeds due to dysphagia   2. POssible aspiration pneumonia and fluid overload:  Her chest xray showed b/l interstitial infiltrates consistent with Pneumonia and fluid overload as she has been on IVF since admission  One dose of lasix 20mg IV today   Start her on levaquin IV for possible pneumonia  Pt has penicillin allergy so avoid zosyn        2.  Benign essential hypertension.   -PTA on lisinopril.    -Started on Ramipril 9/9; increase to 5 mg today  -P.r.n. labetalol and hydralazine, target blood pressure less than 140  bp well controlled currently. Continue to monitor for now     3.  Hypothyroidism.   -PTA on levothyroxine 50 mcg daily.    -Continue PTA levothyroxine    D/W: RN  DVT Prophylaxis: Pneumatic Compression Devices  Code Status: Full Code    Disposition: Expected discharge in the next 2-3days when mental status is better     Lucy Zazueta MD    Interval History   More sleepy and lethargic this morning.  Denies any pain.  Getting G-tube later today    -Data reviewed today: I reviewed all new labs and imaging results over the last 24 hours. I personally reviewed no images or EKG's today.    Physical Exam   Temp: 97.8  F  (36.6  C) Temp src: Axillary BP: 115/58 Pulse: 92 Heart Rate: 92 Resp: 16 SpO2: 97 % O2 Device: None (Room air)    Vitals:    09/09/17 0600 09/11/17 0540 09/12/17 0300   Weight: 40.3 kg (88 lb 13.5 oz) 41 kg (90 lb 6.2 oz) 41.6 kg (91 lb 11.4 oz)     Vital Signs with Ranges  Temp:  [97.2  F (36.2  C)-99.6  F (37.6  C)] 97.8  F (36.6  C)  Pulse:  [82-94] 92  Heart Rate:  [] 92  Resp:  [16-18] 16  BP: (113-179)/() 115/58  SpO2:  [92 %-98 %] 97 %  I/O last 3 completed shifts:  In: 507.5 [I.V.:382.5; NG/GT:125]  Out: 2100 [Urine:2100]    Constitutional: Somnolent.opens her eyes to voice   Respiratory:  No crackles, No wheezes, CTA B/L, Normal WOB  Cardiovascular: RRR, No murmur  GI: Soft, Non- tender, BS- normoactive  Skin/Integument: Warm and dry, no rashes  MSK: No joint deformity or swelling, no edema  Neuro: complete hemiplegia on the left side with 0/5 power-unchanged    Medications        [START ON 9/13/2017] levothyroxine  50 mcg Oral or Feeding Tube QAM AC     levofloxacin  500 mg Intravenous Q24H     sodium chloride (PF)  3 mL Intracatheter Q8H     pantoprazole  40 mg Per Feeding Tube Daily     ramipril  5 mg Oral or Feeding Tube Daily     sodium chloride (PF)  3 mL Intracatheter Q8H     pneumococcal vaccine  0.5 mL Intramuscular Prior to discharge       Data     Recent Labs  Lab 09/11/17  0716 09/10/17  0450 09/09/17  1256  09/08/17  1155  09/07/17  1128 09/07/17  0425 09/06/17  1310   WBC 13.1*  --   --   --  13.1*  --   --  14.6* 8.6   HGB 13.5  --   --   --  14.0  --   --  13.4 13.6   MCV 89  --   --   --  89  --   --  90 91     --   --   --  339  --   --  355 364   INR 1.11  --   --   --   --   --   --   --  0.99    137 137  --  135  --   --  135 132*   POTASSIUM 3.6 3.5 3.6  < > 2.8*  < >  --  3.2* 3.9   CHLORIDE 102 105 104  --  102  --   --  103 101   CO2 24 20 22  --  23  --   --  22 25   BUN 10 15 15  --  12  --   --  9 13   CR 0.41* 0.42* 0.47*  --  0.46*  --   --  0.47* 0.71    ANIONGAP 9 12 11  --  10  --   --  10 6   ANTHONY 9.1 9.2 9.2  --  9.1  --   --  8.9 9.7   * 94 85  --  99  --   --  129* 99   TROPI  --   --   --   --   --   --  0.015  --   --    < > = values in this interval not displayed.    Recent Results (from the past 24 hour(s))   IR Gastrostomy Tube Percutaneous Plcmnt    Narrative    G-TUBE PLACEMENT 9/11/2017 1:37 PM     HISTORY: Requires nutritional support. Unable to take adequate oral  intake.    DESCRIPTION OF PROCEDURE: After obtaining informed consent, the  patient was placed in a supine position on the fluoroscopy table. The  liver edge was marked. A nasogastric tube was placed into the stomach.  1 mg glucagon was given intravenously. The stomach was inflated with  air.     Two T-TAC suture anchors were used to enter the stomach. A small skin  incision was made in between the T-TAC entry sites. An 18-gauge needle  was used to enter the stomach through the incision. A wire was passed  and curled in the stomach. A tract for the G-tube was dilated. An 18  Mauritanian peel-away sheath was placed. Through the peel-away sheath, a 14  Mauritanian G-tube was placed. The balloon was inflated with a mixture of 1  mL contrast and 9 mL saline. Balloon was pulled back so that it was  against the anterior stomach wall. Contrast was injected to document  adequate positioning. A fluoroscopic image was saved to document tube  position.     The patient tolerated the procedure well. There were no immediate  postprocedure complications. The patient's vital signs were monitored  by radiology nursing staff under my supervision and remained stable  throughout the study.    MEDICATIONS: 1 mg glucagon    SEDATION TIME: None    FLUOROSCOPY TIME: 2.3 minutes    RADIATION DOSE: 9 mg      Impression    IMPRESSION: G-tube placed as above    BILLIE HYMAN MD   XR Chest Port 1 View    Narrative    CHEST ONE VIEW PORTABLE   9/12/2017 9:13 AM     HISTORY: Poor cough, increased white blood count,  fever.    COMPARISON: 9/8/2017.      Impression    IMPRESSION: Increased interstitial markings at the right lung base  laterally may represent an acute infiltrate. This is new since  previous exam. Left lung is clear. Normal heart size and pulmonary  vascularity. Gastrostomy.    AUBREE CERVANTES MD

## 2017-09-12 NOTE — PLAN OF CARE
Problem: Goal Outcome Summary  Goal: Goal Outcome Summary  Discharge Planner OT   Patient plan for discharge: rehab  Current status: Pt lethargic,not following any commands to participate with simple hygiene task, pt dependent with hand over hand assist.  Completed LUE PROM all planes, flaccid  Barriers to return to prior living situation: current level of assist  Recommendations for discharge: ARU per plan established by the Occupational Therapist  Rationale for recommendations: pt would benefit from daily therapy to return to PLOF       Entered by: Aria Diaz 09/12/2017 8:33 AM

## 2017-09-12 NOTE — PLAN OF CARE
Problem: Goal Outcome Summary  Goal: Goal Outcome Summary  SLP: Attempted to see patient at scheduled time but, was getting cleaned up by nursing.

## 2017-09-12 NOTE — PROGRESS NOTES
SPIRITUAL HEALTH SERVICES Progress Note  [facility]     came by per follow-up with pt's daughter. Pt's granddaughter was present who politely and firmly declined  services.  briefly followed up with pt's daughter, who stated that she was pleased that pt received a GI tube and was concerned about discharge plans to a rehab facility. Pt's daughter and granddaughter both stated a desire for pt to remain in the hospital.  is available for follow-up upon request and may follow-up with daughter if appropriate.       Monica Eaton  Chaplain Resident

## 2017-09-12 NOTE — PLAN OF CARE
Problem: Goal Outcome Summary  Goal: Goal Outcome Summary  SLP: Attempted to see patient for swallow treatment, but patient was not able to participate secondary to lethargy. Daughter present at bedside and agrees with rescheduling for tomorrow.

## 2017-09-12 NOTE — PLAN OF CARE
"Problem: Goal Outcome Summary  Goal: Goal Outcome Summary  Outcome: No Change  Continues lethargic. Opened eyes only briefly to loud voice/gentle touch. Whispered \"yes\", \"no\" but no other intelligible speech. LS diminished, BS hypoactive. PEG site with CDI dressing, clamped and OK to use for meds and feeding at 1930. Bedrest. Ramsay patent. Daughter at bedside.      "

## 2017-09-12 NOTE — PROVIDER NOTIFICATION
Prescriber Notification Note    The pharmacist has communicated with this patient's provider regarding a concern or therapy recommendation.    Notified Person: Dr. Zazueta  Date/Time of Notification: September 12, 2017    Interaction: text page  Concern/Recommendation: Please note pt received double dose of Levothyroxine IV x 1 yesterday. Do you want to hold dose today?     Comments/Additional Details:    .

## 2017-09-12 NOTE — PLAN OF CARE
Reviewed chart and spoke with nurses from weekend coverage.  This pt received 100ml of Isosource 1.5 down NG keofeed.on 9/9/17,  And 135ml of Isosource 1.5 on 9/10/17.  The NG feeding tube was placed late in the day on 9/8.  Xray proved placement but  over the next 2 days it repeatedly kinked.  Feedings were stopped intermittantly because of this.  Tube feed was turned off on 9/10 at 1400 and not resumed because of the kink.  Nurse offered to address kink but family asked nurse to leave it alone because pt was due to get PEG the next day.  A PEG tube was ordered and pt was NPO overnight of 9/11 for placement that day of 9/11.  Isosource 1.5 resumed running into PEG at 1930 on 9/11.

## 2017-09-12 NOTE — PROGRESS NOTES
Discussed with bedside RN, Alecia Garcia NP, Dr Long and therapists.  Pt is not awake and able to participate today nor is she able to get up in chair d/t lethargy.  She required freqent suctioning overnight and CXR is pending.   Met w/ pt, dtr Crystal and charlie to introduce self and role in dc planning.  Pt was nonverbal and minimally responsive and didn't participate in discussion.  Family feels that pt is a little bit better today since she has been getting TF.  She stated a single word a few times.  Crystal feels that once pt gets a day of TF she will perk up and be able to participate in therapies again.  She has expressed disappointment that pt didn't get TF over the weekend.  Offered to have her speak with the pt representative and she thinks that she would like this but not today as she wants to focus on her mother.  Gave her pt rep Perlita's phone number and Crystal will call her when she is ready.  Updated Perlita as well.   Crystal states that pt has never been to a rehab facility before.  They live in [place] and charlie lives in [place].  Will see how pt does in therapy in the next 24 hours to make recommendations.  At this point pt is not appropriate for ARU or TCU so hopefully her LOC will improve and she will be appropriate for rehab.  Updated SW.  CM will continue to assist w/ dc planning.

## 2017-09-13 LAB
ANION GAP SERPL CALCULATED.3IONS-SCNC: 6 MMOL/L (ref 3–14)
BUN SERPL-MCNC: 17 MG/DL (ref 7–30)
C DIFF TOX B STL QL: NEGATIVE
CALCIUM SERPL-MCNC: 9.3 MG/DL (ref 8.5–10.1)
CHLORIDE SERPL-SCNC: 102 MMOL/L (ref 94–109)
CO2 SERPL-SCNC: 30 MMOL/L (ref 20–32)
CREAT SERPL-MCNC: 0.5 MG/DL (ref 0.52–1.04)
ERYTHROCYTE [DISTWIDTH] IN BLOOD BY AUTOMATED COUNT: 12.7 % (ref 10–15)
GFR SERPL CREATININE-BSD FRML MDRD: >90 ML/MIN/1.7M2
GLUCOSE SERPL-MCNC: 145 MG/DL (ref 70–99)
HCT VFR BLD AUTO: 39.1 % (ref 35–47)
HGB BLD-MCNC: 13.4 G/DL (ref 11.7–15.7)
LACTATE BLD-SCNC: 1.2 MMOL/L (ref 0.7–2)
MCH RBC QN AUTO: 31.2 PG (ref 26.5–33)
MCHC RBC AUTO-ENTMCNC: 34.3 G/DL (ref 31.5–36.5)
MCV RBC AUTO: 91 FL (ref 78–100)
PHOSPHATE SERPL-MCNC: 1.8 MG/DL (ref 2.5–4.5)
PHOSPHATE SERPL-MCNC: 2.6 MG/DL (ref 2.5–4.5)
PLATELET # BLD AUTO: 278 10E9/L (ref 150–450)
POTASSIUM SERPL-SCNC: 3.3 MMOL/L (ref 3.4–5.3)
POTASSIUM SERPL-SCNC: 4.3 MMOL/L (ref 3.4–5.3)
RBC # BLD AUTO: 4.3 10E12/L (ref 3.8–5.2)
SODIUM SERPL-SCNC: 138 MMOL/L (ref 133–144)
SPECIMEN SOURCE: NORMAL
WBC # BLD AUTO: 11.7 10E9/L (ref 4–11)

## 2017-09-13 PROCEDURE — 12000000 ZZH R&B MED SURG/OB

## 2017-09-13 PROCEDURE — 25000128 H RX IP 250 OP 636: Performed by: INTERNAL MEDICINE

## 2017-09-13 PROCEDURE — 87493 C DIFF AMPLIFIED PROBE: CPT | Performed by: INTERNAL MEDICINE

## 2017-09-13 PROCEDURE — 85027 COMPLETE CBC AUTOMATED: CPT | Performed by: INTERNAL MEDICINE

## 2017-09-13 PROCEDURE — 36415 COLL VENOUS BLD VENIPUNCTURE: CPT | Performed by: INTERNAL MEDICINE

## 2017-09-13 PROCEDURE — 83605 ASSAY OF LACTIC ACID: CPT | Performed by: INTERNAL MEDICINE

## 2017-09-13 PROCEDURE — 27210429 ZZH NUTRITION PRODUCT INTERMEDIATE LITER

## 2017-09-13 PROCEDURE — 25000132 ZZH RX MED GY IP 250 OP 250 PS 637: Mod: GY | Performed by: PSYCHIATRY & NEUROLOGY

## 2017-09-13 PROCEDURE — 25000132 ZZH RX MED GY IP 250 OP 250 PS 637: Mod: GY | Performed by: INTERNAL MEDICINE

## 2017-09-13 PROCEDURE — 25000125 ZZHC RX 250: Performed by: INTERNAL MEDICINE

## 2017-09-13 PROCEDURE — 25000128 H RX IP 250 OP 636: Performed by: PSYCHIATRY & NEUROLOGY

## 2017-09-13 PROCEDURE — A9270 NON-COVERED ITEM OR SERVICE: HCPCS | Mod: GY | Performed by: PSYCHIATRY & NEUROLOGY

## 2017-09-13 PROCEDURE — A9270 NON-COVERED ITEM OR SERVICE: HCPCS | Mod: GY | Performed by: INTERNAL MEDICINE

## 2017-09-13 PROCEDURE — 84100 ASSAY OF PHOSPHORUS: CPT | Performed by: INTERNAL MEDICINE

## 2017-09-13 PROCEDURE — 80048 BASIC METABOLIC PNL TOTAL CA: CPT | Performed by: INTERNAL MEDICINE

## 2017-09-13 PROCEDURE — 84132 ASSAY OF SERUM POTASSIUM: CPT | Performed by: INTERNAL MEDICINE

## 2017-09-13 PROCEDURE — 99232 SBSQ HOSP IP/OBS MODERATE 35: CPT | Performed by: INTERNAL MEDICINE

## 2017-09-13 RX ORDER — FUROSEMIDE 20 MG
20 TABLET ORAL DAILY
Status: DISCONTINUED | OUTPATIENT
Start: 2017-09-13 | End: 2017-09-17

## 2017-09-13 RX ORDER — RAMIPRIL 10 MG/1
10 CAPSULE ORAL DAILY
Status: DISCONTINUED | OUTPATIENT
Start: 2017-09-13 | End: 2017-09-27 | Stop reason: HOSPADM

## 2017-09-13 RX ORDER — GUAR GUM
1 PACKET (EA) ORAL 2 TIMES DAILY
Status: DISCONTINUED | OUTPATIENT
Start: 2017-09-13 | End: 2017-09-27 | Stop reason: HOSPADM

## 2017-09-13 RX ORDER — AMLODIPINE BESYLATE 2.5 MG/1
2.5 TABLET ORAL DAILY
Status: DISCONTINUED | OUTPATIENT
Start: 2017-09-13 | End: 2017-09-14

## 2017-09-13 RX ORDER — LEVOFLOXACIN 500 MG/1
500 TABLET, FILM COATED ORAL DAILY
Status: DISCONTINUED | OUTPATIENT
Start: 2017-09-14 | End: 2017-09-18

## 2017-09-13 RX ADMIN — POTASSIUM CHLORIDE 20 MEQ: 1.5 POWDER, FOR SOLUTION ORAL at 09:13

## 2017-09-13 RX ADMIN — ACETAMINOPHEN 650 MG: 325 SOLUTION ORAL at 14:25

## 2017-09-13 RX ADMIN — LABETALOL HYDROCHLORIDE 10 MG: 5 INJECTION, SOLUTION INTRAVENOUS at 04:15

## 2017-09-13 RX ADMIN — FUROSEMIDE 20 MG: 20 TABLET ORAL at 14:27

## 2017-09-13 RX ADMIN — LEVOTHYROXINE SODIUM 50 MCG: 50 TABLET ORAL at 06:49

## 2017-09-13 RX ADMIN — Medication 1 PACKET: at 21:11

## 2017-09-13 RX ADMIN — POTASSIUM CHLORIDE 40 MEQ: 1.5 POWDER, FOR SOLUTION ORAL at 06:49

## 2017-09-13 RX ADMIN — LEVOFLOXACIN 500 MG: 5 INJECTION, SOLUTION INTRAVENOUS at 12:03

## 2017-09-13 RX ADMIN — HYDRALAZINE HYDROCHLORIDE 10 MG: 20 INJECTION INTRAMUSCULAR; INTRAVENOUS at 03:11

## 2017-09-13 RX ADMIN — POTASSIUM PHOSPHATE, MONOBASIC AND POTASSIUM PHOSPHATE, DIBASIC 20 MMOL: 224; 236 INJECTION, SOLUTION INTRAVENOUS at 09:37

## 2017-09-13 RX ADMIN — Medication 40 MG: at 09:13

## 2017-09-13 RX ADMIN — HYDRALAZINE HYDROCHLORIDE 10 MG: 20 INJECTION INTRAMUSCULAR; INTRAVENOUS at 06:50

## 2017-09-13 RX ADMIN — Medication 1 PACKET: at 14:27

## 2017-09-13 RX ADMIN — AMLODIPINE BESYLATE 2.5 MG: 2.5 TABLET ORAL at 14:26

## 2017-09-13 RX ADMIN — ACETAMINOPHEN 650 MG: 325 SOLUTION ORAL at 09:14

## 2017-09-13 RX ADMIN — RAMIPRIL 10 MG: 10 CAPSULE ORAL at 21:55

## 2017-09-13 ASSESSMENT — VISUAL ACUITY
OU: OTHER (SEE COMMENT)
OU: OTHER (SEE COMMENT)

## 2017-09-13 NOTE — PROGRESS NOTES
"Care Transition Initial Assessment - REGAN  Reason For Consult: discharge planning  Met with: Patient's daughters    Active Problems:    ICH (intracerebral hemorrhage) (H)         DATA  Lives With: child(kaila), adult  Living Arrangements: house  Description of Support System: Supportive, Involved  Who is your support system?: Children  Support Assessment: Adequate family and caregiver support, Adequate social supports.   Identified issues/concerns regarding health management: SW was consulted for discharge planning. Patient is Sury, a 89 year-old female who was admitted for an intracerebral hemorrhage on 9/6/17. REGAN met with patient's daughter, Crystal and  and reviewed medical record. Patient had been living with her daughter full-time, though she was independent with her cares. Per physical therapy, the recommendation is long-term care. REGAN informed patient's daughter of this recommendation, and explained that it is private pay. Crystal explained that she feels her mother's physical condition cannot be adequately assessed due to not being fed for so long. Crystal expressed frustration and anger at how the staff at [facility] have treated her and her mother. She stated \"the doctor screwed up\" [when she did not receive food for 6 days]. She also said that she felt no one had been listening to her. SW validated her concerns, and encouraged her to contact patient relations. REGAN also explained that it is easiest to put in referrals for LTC today, and re-assess later if needed. REGAN also explained that the discharge is up to the doctor, and is tentatively planned for Friday, 9/15. Crystal agreed, and asked if referrals could be sent to [facility], [facility], and [facility]. REGAN put in these referrals via [facility].            Quality Of Family Relationships: supportive, helpful, involved  Transportation Available: family or friend will provide    ASSESSMENT  Cognitive Status:  non-responsive  Concerns to be addressed: discharge " planning.       PLAN  Financial costs for the patient includes private pay for LTC.  Patient given options and choices for discharge LTC options.  Patient/family is agreeable to the plan?  Yes  Patient Goals and Preferences: Unsure at this time.  Patient anticipates discharging to:  Unsure at this time.    Danielle Sheridan, CATHY, LGSW

## 2017-09-13 NOTE — PLAN OF CARE
Problem: Goal Outcome Summary  Goal: Goal Outcome Summary  Outcome: No Change  Pt is Lethargic and not able to assess orientation. Pt does moan to verbal direction and gives a thumbs up, however unable to follow many neuro commands. Pt has hemiplegia  LUE and LLE . BP has been elevated given hydralazine x2 and labetalol. Lactic drawn this morning 1.2 . Potassium was 3.3 this morning and phosphorus was 2. Remains on replacement protocol.  Pt was incontinent x 2 of watery brown stool. TF 25 ml with 30 ml flushes. T/R Q 2 hrs, suctioned performed throughout he night with diminished LS . DC to TCU or LTC when stable.

## 2017-09-13 NOTE — PROGRESS NOTES
TF was increased last evening, Isosource 1.5 now at 25 mL/hr. Scheduled to increase to goal of 35 mL/hr tonight.   Note maintenance IVF d/c'd, will increase H2O flushes to 100 mL q 4 hours.    Gissell Doe, RD  Pager [phone]

## 2017-09-13 NOTE — PLAN OF CARE
Problem: Goal Outcome Summary  Goal: Goal Outcome Summary  OT: pt was busy with nursing in the middle of self cares when OT attempted for session.

## 2017-09-13 NOTE — PROGRESS NOTES
REGAN    D: Spoke to Perlita, patient relations, about the Crystal's concerns. Perlita said she will come speak to Crystal about the issue.     P: Continue to follow.

## 2017-09-13 NOTE — PLAN OF CARE
Problem: Goal Outcome Summary  Goal: Goal Outcome Summary  PT: Attempted PT session. Daughter present. Pt was sleeping soundly in bed. Unable to arouse pt with voice, touch, sternal rub. Pt does not open her eyes or respond to any commands. No visual signs of pain or discomfort. Discussed with pt's daughter that pt may best be served by LTC at this time due to decreased level of arousal and limited ability to meaningfully participate with therapies. Once at LTC, if pt does demonstrate improved ability to participate, therapies could be initiated at that time. Daughter voiced understanding. Will schedule PT/OT to come in am/pm tomorrow in hopes of pt being more alert during one of those sessions. Rec LTC.

## 2017-09-13 NOTE — PROGRESS NOTES
CM    I:  SW received a call back from [facility] admissions stating they do not have a LTC bed available today, but may tomorrow.  REGAN discussed with admissions that patient may be TCU appropriate if lethargy improves, however, it is appearing unlikely.  [facility] asks SW to check with them 9/14 for status of LTC bed if still being recommended for anticipated discharge 9/15.     P:  Continue to assist with discharge planning as needed.    SHAYY Ward

## 2017-09-13 NOTE — PROGRESS NOTES
[facility] [place]  Neurology Daily Note      Admission Date:9/6/2017   Date of service: 09/13/2017   Hospital Day: 8      Assessment & Plan   _______________________________  #. 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 (E85.4,  I68.0) Cerebral amyloid angiopathy (H)  --MRI from July 2017 consistent with amyloid angiopathy.  ---One microhemorrhage was located in the area of present hemorrhage  #. (I10) Essential hypertension  --Keep BP <140 mm Hg  --management per hospitalists.  --continue Ramipril 2.5 mg   #. (J69.0) Aspiration pneumonia of right lower lobe, unspecified aspiration pneumonia type (H)  --new right lung base consolidation  --started on abx per hospitalist  #. PT/OT/Speech  --continue evaluations  ---G-tube placed 9/11/17  ----to ARU when ready  #. Nutrition  --Per speech therapy evaluation   #. DVT Prophylaxis  --Mechanical only due to hemorrhage      Disposition: pending     Interval History   _______________________________  Patient presented after a fall with left sided weakness. Patient was getting out of wheelchair, fell backwards and hit her head. No lightheadedness or dizziness prior to the fall. EMS was called and found her weaker on the left side. In the ED was found to have right gaze preference. CT identified new large intracerebral hemorrhage without midline shift.   Slowly waking but still with neglect and weakness on the left. Follows commands for thumbs up on the right. Does not open eyes. Minimal intermittent speech. G-tube placed 9/11/17. Continues with decreased mentation. Elevated WBC. Chest xray on 9/12 identified new right lung base consolidation, likely pneumonia,  started on abx.     Review of Systems   _______________________________  Review of systems is limited by patient factors - mental status    Physical Exam   _______________________________  Vitals: Temp: 97.9  F (36.6  C) Temp src: Axillary BP: 131/68   Heart Rate: 100 Resp: 16 SpO2: 94 % O2 Device: None (Room air)    Vital Signs with Ranges: Temp:  [97.2  F (36.2  C)-98.4  F (36.9  C)] 97.9  F (36.6  C)  Heart Rate:  [] 100  Resp:  [16] 16  BP: (115-170)/(58-89) 131/68  SpO2:  [94 %-98 %] 94 %    General Appearance:  No acute distress  Neuro:       Mental Status Exam:   Somnolent, minimally arouses, unable to assess orientation. Minimal intermittent speech. Mental status is decreased       Cranial Nerves:  Pupils 3 mm, reactive. Minimally opens eyes. Unable to assess facial sensation. Face is symmetric. Tongue and uvula are midline. Other CN are normal           Motor:  Left plegia, right sided weakness, follows command to give thumbs up on the right intermittently. Tone and bulk are normal           Reflexes:  Normal DTR. Toes equivocal.        Sensory:  Unable to assess                 Coordination:   Unable to assess due to weakness       Gait:  Unable to assess due to weakness  Cardiovascular: Regular rate and rhythm, no m/r/g  Lungs: Clear to auscultation  Abdomen: Soft, not tender, not distended  Extremities: No clubbing, no cyanosis, no edema    Medications   _______________________________       ramipril  10 mg Oral or Feeding Tube Daily     levothyroxine  50 mcg Oral or Feeding Tube QAM AC     levofloxacin  500 mg Intravenous Q24H     sodium chloride (PF)  3 mL Intracatheter Q8H     pantoprazole  40 mg Per Feeding Tube Daily     sodium chloride (PF)  3 mL Intracatheter Q8H     pneumococcal vaccine  0.5 mL Intramuscular Prior to discharge       Data   _______________________________      Lab Data:   All data was reviewed by me personally  CBC RESULTS:  Recent Labs   Lab Test  09/13/17   0500   09/11/17   0716  09/08/17   1155   WBC  11.7*  13.1*  13.1*   RBC  4.30  4.32  4.49   HGB  13.4  13.5  14.0   HCT  39.1  38.4  40.1   PLT  278  297  339     Basic Metabolic Panel:  Recent Labs   Lab Test  09/13/17   0505  09/11/17   0716  09/10/17   0450   NA  138  135  137   POTASSIUM  3.3*  3.6  3.5   CHLORIDE  102  102  105   CO2  30  24  20   BUN  17  10  15   CR  0.50*  0.41*  0.42*   GLC  145*  114*  94   ANTHONY  9.3  9.1  9.2     Coagulation  Recent Labs   Lab Test  09/11/17   0716  09/06/17   1310   INR  1.11  0.99   PTT  30  29      Troponin I:   Recent Labs   Lab Test  09/07/17   1128   TROPI  0.015     UA Results:  Recent Labs   Lab Test  09/08/17   1710   COLOR  Light Yellow   APPEARANCE  Clear   URINEGLC  Negative   URINEBILI  Negative   URINEKETONE  80*   SG  1.008   UBLD  Small*   URINEPH  5.5   PROTEIN  Negative   NITRITE  Negative   LEUKEST  Negative   RBCU  3*   WBCU  1        Cardiac US:   --     Neurophysiology:   --     Imaging:   All imaging studies were reviewed personally  CT head 9/6/17:   1. New large hyperdense mass in the right frontal lobe measuring 5.5 x 4.2 x 3.5 cm consistent with a large parenchymal hematoma. This does not result in any significant midline shift or effacement of the basal cisterns.  2. Diffuse cerebral volume loss and cerebral white matter changes consistent with chronic small vessel ischemic disease.     CT cervical spine 9/6/17:   --Degenerative changes of the cervical spine. No evidence for fracture or traumatic malalignment.    MRI brain 7/24/17: (prior to admission)  1. No acute pathology is identified. No bleed, mass, or acute infarcts are seen.  2. Age-related changes including generalized atrophy of the brain. White matter changes in the cerebral hemispheres consistent with small vessel ischemic disease in this age patient.  3. Tiny incidental lipoma along the right tentorium.  4. 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     CT head  9/7/17:  1. Increased size of parenchymal hematoma and surrounding edema centered in the right frontal lobe. There is new intraventricular extension of the hematoma. No significant midline shift or herniation. No hydrocephalus.  2. Diffuse parenchymal volume loss and fairly extensive white matter changes likely due to chronic microvascular ischemic disease.     CT head 9/8/17:  --Stable right frontal parenchymal and intraventricular hemorrhage. There is no evidence for any progressive mass effect or any new hemorrhage.    CT head 9/11/17:  --Stable intraparenchymal hemorrhage in the right cerebral hemisphere as well as intraventricular hemorrhage. No appreciable change. Please note that the scanning angle on the axial study is slightly different today.        Alecia Garcia, FNP-BC

## 2017-09-13 NOTE — PROGRESS NOTES
[facility]    Hospitalist Progress Note    Assessment & Plan   Ms. Barnett is a 89-year-old pretty healthy female with history of hypertension and hypothyroidism who presented with a fall and was found to have large right frontal lobe intracranial hemorrhage.     1.  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    -The patient presented after a fall and was found on the CT head to have a large intraparenchymal hemorrhage. 5.5 X 4.2 X 3.5  -Likely she sustained the hemorrhage prior to the fall  -Repeat CT-stable  -Neurologically appears to be stable with unchanged complete left-sided hemiplegia; more lethargic last few days   Repeat CT from 9/11 remained stable   -Neuro critical care and neurosurgery following.  -G tube placed and tolerating tube feeds due to dysphagia   2. POssible aspiration pneumonia and fluid overload:  Her chest xray showed b/l interstitial infiltrates consistent with Pneumonia and fluid overload as she has been on IVF since admission  One dose of lasix 20mg IV given on 9/12  Will change lasix to 20mg PO daily today given via tube   Start her on levaquin IV for possible pneumonia. Will switch to be given via tube today  Respiratory status stable  Pt has penicillin allergy so avoid zosyn        2.  Benign essential hypertension.   -PTA on lisinopril.    -Started on Ramipril 9/9; increased to 10mg today  Needing -P.r.n. labetalol and hydralazine, target blood pressure less than 140  Will add norvasc 2.5mg PO daily for betetr BP control   3.  Hypothyroidism.   -PTA on levothyroxine 50 mcg daily.    -Continue PTA levothyroxine    D/W: RN  DVT Prophylaxis: Pneumatic Compression Devices  Code Status: Full Code    Disposition: Expected discharge in the next 2-3days when LTC is available per PT     Lucy Zazueta MD    Interval History    sleepy and lethargic this morning.wakes up and says yes and no per family.    -Data reviewed today: I reviewed all new labs  and imaging results over the last 24 hours. I personally reviewed no images or EKG's today.    Physical Exam   Temp: 97.8  F (36.6  C) Temp src: Axillary BP: 128/79   Heart Rate: 107 Resp: 16 SpO2: 98 % O2 Device: None (Room air)    Vitals:    09/11/17 0540 09/12/17 0300 09/13/17 0704   Weight: 41 kg (90 lb 6.2 oz) 41.6 kg (91 lb 11.4 oz) 41.6 kg (91 lb 11.4 oz)     Vital Signs with Ranges  Temp:  [97.2  F (36.2  C)-98.4  F (36.9  C)] 97.8  F (36.6  C)  Heart Rate:  [] 107  Resp:  [16] 16  BP: (115-170)/(58-89) 128/79  SpO2:  [94 %-98 %] 98 %  I/O last 3 completed shifts:  In: 3083 [I.V.:2029; NG/GT:856]  Out: 750 [Urine:750]    Constitutional: Somnolent.opens her eyes to voice   Respiratory:  No crackles, No wheezes, CTA B/L, Normal WOB  Cardiovascular: RRR, No murmur  GI: Soft, Non- tender, BS- normoactive  Skin/Integument: Warm and dry, no rashes  MSK: No joint deformity or swelling, no edema  Neuro: complete hemiplegia on the left side with 0/5 power-unchanged. Not following any commands     Medications        ramipril  10 mg Oral or Feeding Tube Daily     furosemide  20 mg Oral or Feeding Tube Daily     amLODIPine  2.5 mg Oral or Feeding Tube Daily     fiber modular  1 packet Per Feeding Tube BID     levofloxacin  500 mg Per Feeding Tube Q24H     levothyroxine  50 mcg Oral or Feeding Tube QAM AC     sodium chloride (PF)  3 mL Intracatheter Q8H     pantoprazole  40 mg Per Feeding Tube Daily     sodium chloride (PF)  3 mL Intracatheter Q8H       Data     Recent Labs  Lab 09/13/17  0505 09/11/17  0716 09/10/17  0450  09/08/17  1155  09/07/17  1128  09/06/17  1310   WBC 11.7* 13.1*  --   --  13.1*  --   --   < > 8.6   HGB 13.4 13.5  --   --  14.0  --   --   < > 13.6   MCV 91 89  --   --  89  --   --   < > 91    297  --   --  339  --   --   < > 364   INR  --  1.11  --   --   --   --   --   --  0.99    135 137  < > 135  --   --   < > 132*   POTASSIUM 3.3* 3.6 3.5  < > 2.8*  < >  --   < > 3.9    CHLORIDE 102 102 105  < > 102  --   --   < > 101   CO2 30 24 20  < > 23  --   --   < > 25   BUN 17 10 15  < > 12  --   --   < > 13   CR 0.50* 0.41* 0.42*  < > 0.46*  --   --   < > 0.71   ANIONGAP 6 9 12  < > 10  --   --   < > 6   ANTHONY 9.3 9.1 9.2  < > 9.1  --   --   < > 9.7   * 114* 94  < > 99  --   --   < > 99   TROPI  --   --   --   --   --   --  0.015  --   --    < > = values in this interval not displayed.    No results found for this or any previous visit (from the past 24 hour(s)).

## 2017-09-14 ENCOUNTER — APPOINTMENT (OUTPATIENT)
Dept: PHYSICAL THERAPY | Facility: CLINIC | Age: 82
DRG: 064 | End: 2017-09-14
Payer: MEDICARE

## 2017-09-14 ENCOUNTER — APPOINTMENT (OUTPATIENT)
Dept: SPEECH THERAPY | Facility: CLINIC | Age: 82
DRG: 064 | End: 2017-09-14
Payer: MEDICARE

## 2017-09-14 ENCOUNTER — APPOINTMENT (OUTPATIENT)
Dept: OCCUPATIONAL THERAPY | Facility: CLINIC | Age: 82
DRG: 064 | End: 2017-09-14
Payer: MEDICARE

## 2017-09-14 LAB
ANION GAP SERPL CALCULATED.3IONS-SCNC: 9 MMOL/L (ref 3–14)
BUN SERPL-MCNC: 21 MG/DL (ref 7–30)
CALCIUM SERPL-MCNC: 9.7 MG/DL (ref 8.5–10.1)
CHLORIDE SERPL-SCNC: 103 MMOL/L (ref 94–109)
CO2 SERPL-SCNC: 28 MMOL/L (ref 20–32)
CREAT SERPL-MCNC: 0.54 MG/DL (ref 0.52–1.04)
GFR SERPL CREATININE-BSD FRML MDRD: >90 ML/MIN/1.7M2
GLUCOSE SERPL-MCNC: 152 MG/DL (ref 70–99)
PHOSPHATE SERPL-MCNC: 2.4 MG/DL (ref 2.5–4.5)
POTASSIUM SERPL-SCNC: 3.7 MMOL/L (ref 3.4–5.3)
SODIUM SERPL-SCNC: 140 MMOL/L (ref 133–144)

## 2017-09-14 PROCEDURE — 92526 ORAL FUNCTION THERAPY: CPT | Mod: GN

## 2017-09-14 PROCEDURE — 99232 SBSQ HOSP IP/OBS MODERATE 35: CPT | Performed by: INTERNAL MEDICINE

## 2017-09-14 PROCEDURE — 36415 COLL VENOUS BLD VENIPUNCTURE: CPT | Performed by: INTERNAL MEDICINE

## 2017-09-14 PROCEDURE — 97535 SELF CARE MNGMENT TRAINING: CPT | Mod: GO

## 2017-09-14 PROCEDURE — 25000128 H RX IP 250 OP 636: Performed by: PSYCHIATRY & NEUROLOGY

## 2017-09-14 PROCEDURE — 12000000 ZZH R&B MED SURG/OB

## 2017-09-14 PROCEDURE — A9270 NON-COVERED ITEM OR SERVICE: HCPCS | Mod: GY | Performed by: INTERNAL MEDICINE

## 2017-09-14 PROCEDURE — 97530 THERAPEUTIC ACTIVITIES: CPT | Mod: GP | Performed by: PHYSICAL THERAPIST

## 2017-09-14 PROCEDURE — 25000125 ZZHC RX 250: Performed by: INTERNAL MEDICINE

## 2017-09-14 PROCEDURE — 25000132 ZZH RX MED GY IP 250 OP 250 PS 637: Mod: GY | Performed by: PSYCHIATRY & NEUROLOGY

## 2017-09-14 PROCEDURE — 25000128 H RX IP 250 OP 636: Performed by: INTERNAL MEDICINE

## 2017-09-14 PROCEDURE — 25000132 ZZH RX MED GY IP 250 OP 250 PS 637: Mod: GY | Performed by: INTERNAL MEDICINE

## 2017-09-14 PROCEDURE — 40000225 ZZH STATISTIC SLP WARD VISIT

## 2017-09-14 PROCEDURE — A9270 NON-COVERED ITEM OR SERVICE: HCPCS | Mod: GY | Performed by: PSYCHIATRY & NEUROLOGY

## 2017-09-14 PROCEDURE — 40000193 ZZH STATISTIC PT WARD VISIT: Performed by: PHYSICAL THERAPIST

## 2017-09-14 PROCEDURE — 40000133 ZZH STATISTIC OT WARD VISIT

## 2017-09-14 PROCEDURE — 84100 ASSAY OF PHOSPHORUS: CPT | Performed by: INTERNAL MEDICINE

## 2017-09-14 PROCEDURE — 80048 BASIC METABOLIC PNL TOTAL CA: CPT | Performed by: INTERNAL MEDICINE

## 2017-09-14 RX ORDER — AMLODIPINE BESYLATE 2.5 MG/1
2.5 TABLET ORAL ONCE
Status: COMPLETED | OUTPATIENT
Start: 2017-09-14 | End: 2017-09-14

## 2017-09-14 RX ORDER — AMLODIPINE BESYLATE 5 MG/1
5 TABLET ORAL DAILY
Status: DISCONTINUED | OUTPATIENT
Start: 2017-09-15 | End: 2017-09-15

## 2017-09-14 RX ADMIN — AMLODIPINE BESYLATE 2.5 MG: 2.5 TABLET ORAL at 08:36

## 2017-09-14 RX ADMIN — LEVOFLOXACIN 500 MG: 500 TABLET, FILM COATED ORAL at 08:36

## 2017-09-14 RX ADMIN — AMLODIPINE BESYLATE 2.5 MG: 2.5 TABLET ORAL at 11:51

## 2017-09-14 RX ADMIN — LABETALOL HYDROCHLORIDE 10 MG: 5 INJECTION, SOLUTION INTRAVENOUS at 10:13

## 2017-09-14 RX ADMIN — POTASSIUM PHOSPHATE, MONOBASIC AND POTASSIUM PHOSPHATE, DIBASIC 15 MMOL: 224; 236 INJECTION, SOLUTION INTRAVENOUS at 11:51

## 2017-09-14 RX ADMIN — LABETALOL HYDROCHLORIDE 10 MG: 5 INJECTION, SOLUTION INTRAVENOUS at 00:27

## 2017-09-14 RX ADMIN — ACETAMINOPHEN 650 MG: 325 SOLUTION ORAL at 10:30

## 2017-09-14 RX ADMIN — LABETALOL HYDROCHLORIDE 10 MG: 5 INJECTION, SOLUTION INTRAVENOUS at 20:25

## 2017-09-14 RX ADMIN — Medication 1 PACKET: at 20:10

## 2017-09-14 RX ADMIN — LABETALOL HYDROCHLORIDE 10 MG: 5 INJECTION, SOLUTION INTRAVENOUS at 20:02

## 2017-09-14 RX ADMIN — HYDRALAZINE HYDROCHLORIDE 10 MG: 20 INJECTION INTRAMUSCULAR; INTRAVENOUS at 01:12

## 2017-09-14 RX ADMIN — FUROSEMIDE 20 MG: 20 TABLET ORAL at 08:36

## 2017-09-14 RX ADMIN — ACETAMINOPHEN 650 MG: 325 SOLUTION ORAL at 00:28

## 2017-09-14 RX ADMIN — LEVOTHYROXINE SODIUM 50 MCG: 50 TABLET ORAL at 06:51

## 2017-09-14 RX ADMIN — Medication 1 PACKET: at 08:38

## 2017-09-14 RX ADMIN — ACETAMINOPHEN 650 MG: 325 SOLUTION ORAL at 20:10

## 2017-09-14 RX ADMIN — RAMIPRIL 10 MG: 10 CAPSULE ORAL at 20:09

## 2017-09-14 RX ADMIN — Medication 40 MG: at 08:36

## 2017-09-14 ASSESSMENT — VISUAL ACUITY
OU: OTHER (SEE COMMENT)
OU: OTHER (SEE COMMENT)

## 2017-09-14 NOTE — PROVIDER NOTIFICATION
"Dr. Zazueta paged, \"OK to do nasotracheal suctioning by respiratory therapy? Thanks\"    New orders placed.  "

## 2017-09-14 NOTE — PROGRESS NOTES
"Met w/ pt's dtr Crystal to discuss if she wanted a care conference.  She wondered what that would consist of.  Explained that typically it involved pt, family members, MDs and anyone else felt to be important to pt's plan of care.  Crystal expressed being tired of hearing explanations and reviewing things that have happened in the past.  She really only wants to hear \"in your expert opinion\" what pt's plan is moving forward to improve her health.  She states she doesn't know what pt should be doing and needs the medical team input.  She has expressed frustration at times that she feels medical staff is not doing enough to get her mother back to health.  CC confirmed that this is Crystal and her mother's care conference and it can be what she needs it to be, which in this case is just the plan going forward.  Discussed who Crystal would like to have at the conference and when she would like it.  Her brother just flew in from [place] and she would like him and maybe another sister to be there.  She would like to do this tomorrow morning.  Asked her who she would like to have at the conference.  She doesn't want any of the providers who are taking care of her mother to attend.  She states that they can and should participate in making the plan but she would rather have someone else present it.  Offered to have our nurse clinician Jaylyn Cotto meet with them if Jaylyn is agreeable and Crystal would like to do that.   Contacted Jaylyn who is agreement.  Updated Dr Zazueta who will see pt today.  Spoke with neuro this am.     Addendum:  Spoke further with Dr Zazueta who is not on tomorrow.  She feels that hospitalist should be at care conference and she spoke with Crystal who said it would be okay. Dr Zazueta also offered palliative consult and pt agreed.      Met w/ Crystal again to confirm plan.  Her brother will be attending with her and she states she doesn't want to be \"outnumbered\".  She was okay with Palliative NP \"sitting in\" on conference but " reiterated that she only wanted the plan going forward to be discussed.  She also stated she wanted a conversation and to have the reasons why the plan is recommended.  Shared with her that Jayyln Cotto had a palliative care background.  Given that she is leery of too many people at the conference and not being heard herself, offered that it may be more productive to have her meet with the hospitalist and Jaylyn initially, and then if she felt she wanted to meet with palliative care, she could do so.  She is more happy with that plan.  Discussed with Dr Zazueta, Jaylyn Cotto, and Migdalia Sanders NP from palliative.  Migdalia will connect w/ Jaylyn after the conference to see if she should see family.     Addendum:  Per Dr Zazueta, she is not able to communicate that pt has a care conference tomorrow at 1045.  She states nursing should page hospitalist in am once they are assigned. Will enter patient care order to do so.

## 2017-09-14 NOTE — PROGRESS NOTES
RICHARD    I:  SW received call back from [facility] admissions stating they have no LTC or TCU beds thru 9/15 but will update SW if there are any changes    P:  Continue to assist with discharge planning as needed.    SHAYY Ward    UPDATE@9:04:  SW left  for [facility] admissions regarding status of LTC bed.  Asked for call back.      UPDATE@9:54:  SW received call back from Aliyah, admissions at the [facility], who confirmed they have a LTC bed for patient and will hold this opening until 9/15.  Aliyah stated even though patient will be in LTC they will consider providing therapy for patient if the goal is to return home and patient is considered a rehab candidate.  REGAN will update family to confirm they are aware this LTC stay will be private pay.  Aliyah did state to  they would bill Medicare A as long as patient is a rehab candidate and meets criteria.

## 2017-09-14 NOTE — PLAN OF CARE
Problem: Goal Outcome Summary  Goal: Goal Outcome Summary  Outcome: No Change  Somnolent. MONICA orientation. Neuro assessment difficult to assess, only able to squeeze rt hand, thumbs up after suctioned, BLE 1/5. VSS except BP elevated and Tmax 99.7. Labetalol x1, Norvasc increased, improved, goal to keep SBP less than 140. Tylenol for temp, improved. FLACC 0. Oral Suctioning completed x3 this shift. Ramsay intact. +loose brown BMs. Phosphorus 2.4, replaced, next recheck tomorrow AM. NPO, frequent oral care provided. PEG intact, dressing CDI, Isosource 1.5 running at 35 cc/hr (goal), flushed every 4 with 100 cc, residuals 5 cc. Bedrest. Turned and repositioned every 2 hours. Plan for Palliative Care consult, Care Conference tomorrow with Jaylyn Cotto at 1045. Hospitalist, Neurologist, PT, OT, and SLP following along.

## 2017-09-14 NOTE — PROGRESS NOTES
[facility]  [facility] [place]  Neurology Daily Note      Admission Date:9/6/2017   Date of service: 09/14/2017   Hospital Day: 9      Assessment & Plan   _______________________________  #. 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 (E85.4,  I68.0) Cerebral amyloid angiopathy (H)  --MRI from July 2017 consistent with amyloid angiopathy.  ---One microhemorrhage was located in the area of present hemorrhage  #. (I10) Essential hypertension  --Keep BP <140 mm Hg  --management per hospitalists.  --continue Ramipril 2.5 mg   #. (J69.0) Aspiration pneumonia of right lower lobe, unspecified aspiration pneumonia type (H)  --new right lung base consolidation  --started on abx per hospitalist  #. PT/OT/Speech  --continue evaluations  ---G-tube placed 9/11/17  ----LTC vs TCU at [facility]  #. Nutrition  --Per speech therapy evaluation   #. DVT Prophylaxis  --Mechanical only due to hemorrhage      Disposition: pending     Interval History   _______________________________  Patient presented after a fall with left sided weakness. Patient was getting out of wheelchair, fell backwards and hit her head. No lightheadedness or dizziness prior to the fall. EMS was called and found her weaker on the left side. In the ED was found to have right gaze preference. CT identified new large intracerebral hemorrhage without midline shift.   Follows commands intermittently for thumbs up on the right. Occasionally will open eyes. Minimal intermittent speech. G-tube placed 9/11/17. Chest xray on 9/12 identified new right lung base consolidation, likely pneumonia, started on abx. No significant change at this time. Still somnolent, minimal interaction.  Awaiting placement.    Review of Systems   _______________________________  Review of systems is limited by patient factors - mental status    Physical Exam   _______________________________  Vitals: Temp: 97.8  F (36.6  C) Temp src: Axillary BP: 133/76   Heart Rate: 109 Resp: 18 SpO2: 97 % O2 Device: None (Room air)    Vital Signs with Ranges: Temp:  [97.8  F (36.6  C)-98.7  F (37.1  C)] 97.8  F (36.6  C)  Heart Rate:  [] 109  Resp:  [16-20] 18  BP: (128-169)/() 133/76  SpO2:  [94 %-99 %] 97 %    General Appearance:  No acute distress  Neuro:       Mental Status Exam:   Somnolent, minimally arouses, unable to assess orientation. Minimal intermittent speech. Mental status is decreased       Cranial Nerves:  Pupils 3 mm, reactive. Minimally opens eyes. Unable to assess facial sensation. Face is symmetric. Tongue and uvula are midline. Other CN are normal           Motor:  Left plegia, RUE weakness, no movement BLE, follows command to give thumbs up on the right intermittently. Tone and bulk are normal           Reflexes:  Normal DTR. Toes equivocal.        Sensory:  Unable to assess                 Coordination:   Unable to assess due to weakness       Gait:  Unable to assess due to weakness  Cardiovascular: Regular rate and rhythm, no m/r/g  Lungs: Clear to auscultation  Abdomen: Soft, not tender, not distended  Extremities: No clubbing, no cyanosis, no edema    Medications   _______________________________       ramipril  10 mg Oral or Feeding Tube Daily     furosemide  20 mg Oral or Feeding Tube Daily     amLODIPine  2.5 mg Oral or Feeding Tube Daily     fiber modular  1 packet Per Feeding Tube BID     levofloxacin  500 mg Per Feeding Tube Daily     levothyroxine  50 mcg Oral or Feeding Tube QAM AC     sodium chloride (PF)  3 mL Intracatheter Q8H     pantoprazole  40 mg Per Feeding Tube Daily     sodium chloride (PF)  3 mL Intracatheter Q8H       Data   _______________________________      Lab Data:    All data was reviewed by me personally  CBC RESULTS:  Recent Labs   Lab Test  09/13/17   0505  09/11/17   0716  09/08/17   1155   WBC  11.7*  13.1*  13.1*   RBC  4.30  4.32  4.49   HGB  13.4  13.5  14.0   HCT  39.1  38.4  40.1   PLT  278  297  339     Basic Metabolic Panel:  Recent Labs   Lab Test  09/13/17   1155  09/13/17   0505  09/11/17   0716  09/10/17   0450   NA   --   138  135  137   POTASSIUM  4.3  3.3*  3.6  3.5   CHLORIDE   --   102  102  105   CO2   --   30  24  20   BUN   --   17  10  15   CR   --   0.50*  0.41*  0.42*   GLC   --   145*  114*  94   ANTHONY   --   9.3  9.1  9.2     Coagulation  Recent Labs   Lab Test  09/11/17   0716  09/06/17   1310   INR  1.11  0.99   PTT  30  29      Troponin I:   Recent Labs   Lab Test  09/07/17   1128   TROPI  0.015     UA Results:  Recent Labs   Lab Test  09/08/17   1710   COLOR  Light Yellow   APPEARANCE  Clear   URINEGLC  Negative   URINEBILI  Negative   URINEKETONE  80*   SG  1.008   UBLD  Small*   URINEPH  5.5   PROTEIN  Negative   NITRITE  Negative   LEUKEST  Negative   RBCU  3*   WBCU  1        Cardiac US:   --     Neurophysiology:   --     Imaging:   All imaging studies were reviewed personally  CT head 9/6/17:   1. New large hyperdense mass in the right frontal lobe measuring 5.5 x 4.2 x 3.5 cm consistent with a large parenchymal hematoma. This does not result in any significant midline shift or effacement of the basal cisterns.  2. Diffuse cerebral volume loss and cerebral white matter changes consistent with chronic small vessel ischemic disease.     CT cervical spine 9/6/17:   --Degenerative changes of the cervical spine. No evidence for fracture or traumatic malalignment.    MRI brain 7/24/17: (prior to admission)  1. No acute pathology is identified. No bleed, mass, or acute infarcts are seen.  2. Age-related changes including generalized atrophy of the brain. White matter changes in the cerebral hemispheres consistent with small vessel ischemic disease  in this age patient.  3. Tiny incidental lipoma along the right tentorium.  4. 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     CT head 9/7/17:  1. Increased size of parenchymal hematoma and surrounding edema centered in the right frontal lobe. There is new intraventricular extension of the hematoma. No significant midline shift or herniation. No hydrocephalus.  2. Diffuse parenchymal volume loss and fairly extensive white matter changes likely due to chronic microvascular ischemic disease.     CT head 9/8/17:  --Stable right frontal parenchymal and intraventricular hemorrhage. There is no evidence for any progressive mass effect or any new hemorrhage.    CT head 9/11/17:  --Stable intraparenchymal hemorrhage in the right cerebral hemisphere as well as intraventricular hemorrhage. No appreciable change. Please note that the scanning angle on the axial study is slightly different today.        Alecia Garcia, FNP-BC

## 2017-09-14 NOTE — PLAN OF CARE
Problem: Goal Outcome Summary  Goal: Goal Outcome Summary  Discharge Planner OT   Patient plan for discharge: Rehab  Current status: Performed hygiene task with hand-over-hand technique. Limited participation from pt with eyes closed for session. Daughter present and supportive.   Barriers to return to prior living situation: Current level of A required  Recommendations for discharge: LTC  Rationale for recommendations: Pt continues to be limited by impaired cognition and safety, decreased activity tolerance, and L UE weakness.        Entered by: Cesar Fischer 09/14/2017 2:22 PM

## 2017-09-14 NOTE — PLAN OF CARE
"Problem: Goal Outcome Summary  Goal: Goal Outcome Summary  Outcome: No Change  Bedrest. Squeezes hand with R when asked but not following any other commands. Nodded \"yes\" and shook head \"no\", no intelligible speech. Flushed skin, afebrile. Did not require IV medications for blood pressure control. Tachycardic. Loose stools, placed in enteric isolation and stool sample sent to rule out c. Diff. Ramsay with adequate output of urine. Tolerating TF at 25 mL/hr and free water flushes of 100 mL q 4 hours with residuals less than 10 mL, advanced to goal rate of 35 mL/hr at 1930. LS diminished at bilateral bases. Received potassium and phosphorus replacement. Turned q2, frequent oral care and suction.       "

## 2017-09-14 NOTE — PROGRESS NOTES
Palliative consult received. Case reviewed with Shey Thurman, unit CC. Pt's daughter and son will have a care conference with the hospitalist and Jaylyn DAVENPORT tomorrow at 1045. Pt's daughter is requesting palliative only be involved peripherally. I will thus check in with Jaylyn tomorrow afternoon, after conference is completed, to determine how our team can be of support to pt and family. Thanks.    Migdalia MALDONADO, [facility]  Palliative Medicine   Pager: [phone]

## 2017-09-14 NOTE — PLAN OF CARE
"Problem: Goal Outcome Summary  Goal: Goal Outcome Summary     Discharge Planner SLP   Patient plan for discharge: LTC  Current status: Pt seen with family present at bedside. Pt with poor secretion management, requiring oral suction with family assist. Baseline wet/congested cough. No PO trials given poor secretion management. Largely, today's session consisted of education re: current swallow status (i.e., poor secretion management, inconsistent pharyngeal swallow responses, lethargy). Daughter asking about \"bites of ice cream\" or \"ice chips\" and possible VFSS capabilities. Educated daughter that until swallow function has clinically improved at bedside with improved secretion management and ability to safely participate (i.e., sitting balance, alertness, following commands), pt is not appropriate to participate. Comprehension verbalized with education provided. Continue to recommend NPO with frequent oral cares and enteral support. Recommend elevate HOB during times of poor secretion management with oral suction as needed.  Barriers to return to prior living situation: dysphagia, weakness, lethargy, safety concerns  Recommendations for discharge: LTC with ongoing SLP, consider VFSS pending progress (can be done as OP)  Rationale for recommendations: dysphagia, aspiration risk, below baseline.       Entered by: Isela Faulkner 09/14/2017 11:26 AM             "

## 2017-09-14 NOTE — PLAN OF CARE
Problem: Goal Outcome Summary  Goal: Goal Outcome Summary  Outcome: No Change  Lethargic, somnolent, MONICA orientation. L facial droop, LUE hemiplegia, LLE hemiparesis. BP elevated, needed PRN meds, tachy, otherwise VSS. Frequent, productive cough, using suction at bedside. NPO, TF running at goal 35 mL/hr. 100 mL free water flushes q4h. T&R q2h. Incontinent bowel. Discharge plan pending, possible LTC placement.

## 2017-09-14 NOTE — PLAN OF CARE
Problem: Goal Outcome Summary  Goal: Goal Outcome Summary  Discharge Planner PT   Patient plan for discharge: LTC  Current status: Patient's daughter wanting therapy to attempt sitting patient at EOB to see if level of alertness would change or patient would be able to meaningfully participate. Dependent assist x 2 to sit at EOB; no increased alertness when sitting at EOB; keeps eyes closed; no attempt to maintain sitting balance at EOB or attempt to assist in any way; Cues to attempt to move arm or leg on R side but no movement noted to command. No attempt to assist with bed mobility during cares. Educated daughter on PROM she could do for patient in assisting for comfort. Extra time spent in positioning patient with pillows at end of session.  Barriers to return to prior living situation: dependent assist x 2  Recommendations for discharge: LTC; not able to meaningfully participate in PT currently  Rationale for recommendations: no active participation; weakness; impaired activity tolerance; impaired functional mobility; needs total cares       Entered by: Ashley Garcia 09/14/2017 10:23 AM     **FREQUENCY CHANGED TO 3X/WEEK; DAUGHTER EDUCATED AND IS IN AGREEMENT.

## 2017-09-14 NOTE — PLAN OF CARE
Problem: Goal Outcome Summary  Goal: Goal Outcome Summary  SLP: Attempted to see patient for treatment, but was unable to participate.

## 2017-09-14 NOTE — PROGRESS NOTES
RICHARD    I:  REGAN met with patient's daughter, Crystal, to update her on the LTC [facility] bed that is being held for patient.  Daughter asks how long [facility] will hold LTC bed as she does not anticipate patient will be ready to discharge 9/15.  REGAN reviewed current plan of care in place stating to daughter that the anticipated discharge date told to SW would be tomorrow, Friday. Daughter then stated she was told by therapy patient will remain at [facility] until next week.  Daughter also stated she is awaiting Care Conference tomorrow where she hopes there will be an acceptable outcome per her standards.  REGAN left  for  [facility] admissions asking for call back with how long LTC bed will be held beyond Friday, if applicable.    P:  Continue to assist with discharge planning as needed.    SHAYY Ward

## 2017-09-14 NOTE — PROGRESS NOTES
[facility]    Hospitalist Progress Note    Assessment & Plan   Ms. Barnett is a 89-year-old pretty healthy female with history of hypertension and hypothyroidism who presented with a fall and was found to have large right frontal lobe intracranial hemorrhage.     1.  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    -The patient presented after a fall and was found on the CT head to have a large intraparenchymal hemorrhage. 5.5 X 4.2 X 3.5  -Likely she sustained the hemorrhage prior to the fall  -Repeat head CT-stable  -Neurologically appears to be stable with unchanged complete left-sided hemiplegia; more lethargic last few days   Pt sayed yes to the son as per daughter   Repeat CT from 9/11 remained stable   -Neuro critical care  following.  -G tube placed and tolerating tube feeds due to dysphagia   Prognosis poor. Multiple providers had goals of care discussions with daughters and they are not willing to discuss about hospice care and wants to continue current cares including full code as pt was independent and active PTA and they think she will bounce back to PTA status   Family willing to meet with palliative care so palliative care consult placed today   2. POssible aspiration pneumonia and fluid overload:  Her chest xray showed b/l interstitial infiltrates consistent with Pneumonia and fluid overload as she has been on IVF since admission  One dose of lasix 20mg IV given on 9/12   changed lasix to 20mg PO daily on 9/14  given via tube   Start her on levaquin IV for possible pneumonia. Switched to via G tube on 9/13. To continue for 1week of antibiotics   Respiratory status stable  Pt has penicillin allergy so avoid zosyn        2.  Benign essential hypertension.   -PTA on lisinopril.    -Started on Ramipril 9/9; increased to 10mg today  Needing -P.r.n. labetalol and hydralazine, target blood pressure less than 140  Added norvasc on 9/13 still reamins hypertensive so  will increase to 5mg PO daily via G tube  today   3.  Hypothyroidism.   -PTA on levothyroxine 50 mcg daily.    -Continue PTA levothyroxine    D/W: RN  DVT Prophylaxis: Pneumatic Compression Devices  Code Status: Full Code    Disposition: Expected discharge in the next 2-3days when TCU with  LTC is available per PT     Lucy Zazueta MD    Interval History   Remains  sleepy and lethargic .wakes up and says yes and no per family.    -Data reviewed today: I reviewed all new labs and imaging results over the last 24 hours. I personally reviewed no images or EKG's today.    Physical Exam   Temp: 98  F (36.7  C) Temp src: Oral BP: 132/78   Heart Rate: 97 Resp: 18 SpO2: 96 % O2 Device: None (Room air)    Vitals:    09/12/17 0300 09/13/17 0704 09/14/17 0545   Weight: 41.6 kg (91 lb 11.4 oz) 41.6 kg (91 lb 11.4 oz) 41.2 kg (90 lb 13.3 oz)     Vital Signs with Ranges  Temp:  [97.8  F (36.6  C)-99.7  F (37.6  C)] 98  F (36.7  C)  Heart Rate:  [] 97  Resp:  [16-20] 18  BP: (132-169)/() 132/78  SpO2:  [95 %-98 %] 96 %  I/O last 3 completed shifts:  In: 1566 [I.V.:605; NG/GT:680]  Out: 1875 [Urine:1875]    Constitutional: Somnolent.opens her eyes to voice   Respiratory:  No crackles, No wheezes, CTA B/L, Normal WOB  Cardiovascular: RRR, No murmur  GI: Soft, Non- tender, BS- normoactive  Skin/Integument: Warm and dry, no rashes  MSK: No joint deformity or swelling, no edema  Neuro: complete hemiplegia on the left side with 0/5 power-unchanged. Not following any commands     Medications        [START ON 9/15/2017] amLODIPine  5 mg Oral or Feeding Tube Daily     ramipril  10 mg Oral or Feeding Tube Daily     furosemide  20 mg Oral or Feeding Tube Daily     fiber modular  1 packet Per Feeding Tube BID     levofloxacin  500 mg Per Feeding Tube Daily     levothyroxine  50 mcg Oral or Feeding Tube QAM AC     sodium chloride (PF)  3 mL Intracatheter Q8H     pantoprazole  40 mg Per Feeding Tube Daily     sodium chloride (PF)  3  mL Intracatheter Q8H       Data     Recent Labs  Lab 09/14/17  0813 09/13/17  1155 09/13/17  0505 09/11/17  0716  09/08/17  1155   WBC  --   --  11.7* 13.1*  --  13.1*   HGB  --   --  13.4 13.5  --  14.0   MCV  --   --  91 89  --  89   PLT  --   --  278 297  --  339   INR  --   --   --  1.11  --   --      --  138 135  < > 135   POTASSIUM 3.7 4.3 3.3* 3.6  < > 2.8*   CHLORIDE 103  --  102 102  < > 102   CO2 28  --  30 24  < > 23   BUN 21  --  17 10  < > 12   CR 0.54  --  0.50* 0.41*  < > 0.46*   ANIONGAP 9  --  6 9  < > 10   ANTHONY 9.7  --  9.3 9.1  < > 9.1   *  --  145* 114*  < > 99   < > = values in this interval not displayed.    No results found for this or any previous visit (from the past 24 hour(s)).

## 2017-09-15 LAB — PHOSPHATE SERPL-MCNC: 2.5 MG/DL (ref 2.5–4.5)

## 2017-09-15 PROCEDURE — 84100 ASSAY OF PHOSPHORUS: CPT | Performed by: INTERNAL MEDICINE

## 2017-09-15 PROCEDURE — A9270 NON-COVERED ITEM OR SERVICE: HCPCS | Mod: GY | Performed by: HOSPITALIST

## 2017-09-15 PROCEDURE — A9270 NON-COVERED ITEM OR SERVICE: HCPCS | Mod: GY | Performed by: PSYCHIATRY & NEUROLOGY

## 2017-09-15 PROCEDURE — 99233 SBSQ HOSP IP/OBS HIGH 50: CPT | Performed by: HOSPITALIST

## 2017-09-15 PROCEDURE — 25000132 ZZH RX MED GY IP 250 OP 250 PS 637: Mod: GY | Performed by: PSYCHIATRY & NEUROLOGY

## 2017-09-15 PROCEDURE — 36415 COLL VENOUS BLD VENIPUNCTURE: CPT | Performed by: INTERNAL MEDICINE

## 2017-09-15 PROCEDURE — A9270 NON-COVERED ITEM OR SERVICE: HCPCS | Mod: GY | Performed by: INTERNAL MEDICINE

## 2017-09-15 PROCEDURE — 25000132 ZZH RX MED GY IP 250 OP 250 PS 637: Mod: GY | Performed by: INTERNAL MEDICINE

## 2017-09-15 PROCEDURE — 25000132 ZZH RX MED GY IP 250 OP 250 PS 637: Mod: GY | Performed by: HOSPITALIST

## 2017-09-15 PROCEDURE — 25000128 H RX IP 250 OP 636: Performed by: PSYCHIATRY & NEUROLOGY

## 2017-09-15 PROCEDURE — 25000128 H RX IP 250 OP 636: Performed by: INTERNAL MEDICINE

## 2017-09-15 PROCEDURE — 12000000 ZZH R&B MED SURG/OB

## 2017-09-15 RX ORDER — AMLODIPINE BESYLATE 10 MG/1
10 TABLET ORAL DAILY
Status: DISCONTINUED | OUTPATIENT
Start: 2017-09-15 | End: 2017-09-23

## 2017-09-15 RX ADMIN — Medication 1 PACKET: at 08:24

## 2017-09-15 RX ADMIN — Medication 40 MG: at 08:16

## 2017-09-15 RX ADMIN — LEVOFLOXACIN 500 MG: 500 TABLET, FILM COATED ORAL at 08:14

## 2017-09-15 RX ADMIN — RAMIPRIL 10 MG: 10 CAPSULE ORAL at 20:44

## 2017-09-15 RX ADMIN — AMLODIPINE BESYLATE 10 MG: 10 TABLET ORAL at 08:14

## 2017-09-15 RX ADMIN — LABETALOL HYDROCHLORIDE 10 MG: 5 INJECTION, SOLUTION INTRAVENOUS at 04:07

## 2017-09-15 RX ADMIN — FUROSEMIDE 20 MG: 20 TABLET ORAL at 08:14

## 2017-09-15 RX ADMIN — Medication 1 PACKET: at 20:44

## 2017-09-15 RX ADMIN — LEVOTHYROXINE SODIUM 50 MCG: 50 TABLET ORAL at 06:35

## 2017-09-15 RX ADMIN — HYDRALAZINE HYDROCHLORIDE 10 MG: 20 INJECTION INTRAMUSCULAR; INTRAVENOUS at 09:54

## 2017-09-15 RX ADMIN — ACETAMINOPHEN 650 MG: 325 SOLUTION ORAL at 11:56

## 2017-09-15 RX ADMIN — LABETALOL HYDROCHLORIDE 10 MG: 5 INJECTION, SOLUTION INTRAVENOUS at 08:10

## 2017-09-15 ASSESSMENT — VISUAL ACUITY
OU: OTHER (SEE COMMENT)

## 2017-09-15 NOTE — PLAN OF CARE
Problem: Goal Outcome Summary  Goal: Goal Outcome Summary  SLP: ST session attempted with family present. Pt unable to be aroused with clinician and family cues. Family expressed that she has never been this sleepy and would like MD assessment. RN notified and hospitalist is rounding. Per care team, family would like continued, aggressive therapy. Discussed personalized SLP goals with the family with goal of pt being able to manage sections and perform 5-10 dry,effortful swallows prior to PO trials. When appropriate, will then attempt ice chip. Discussed guarded prognosis due to limited ability to participate. Pt did independently swallow secretions x1 during session and family greatly encouraged by this. Pt unable to follow cues for additional swallows. Family verbalized understanding of aspiration risk with PO trials and appeared satisfied ST POC. OT notified to also further educate pt and family on specific goals.     Session rescheduled to tomorrow as pt is unable to be aroused and family requesting MD/RN assessment.

## 2017-09-15 NOTE — PROGRESS NOTES
[facility]    Hospitalist Progress Note    Assessment & Plan   Ms. Barnett is a 89-year-old pretty healthy female with history of hypertension and hypothyroidism who presented with a fall and was found to have large right frontal lobe intracranial hemorrhage.     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 Likely the hemorrhage caused fall. CT next day showed increased size but then multiple subsequent CTs with stable hematoma, last one 9/11  - Neurologically appears to be stable with unchanged complete left-sided hemiplegia; remains lethargic   - Family reporting initial improvement but feel deteriorated since 10th. Repeat CT from 9/11 remained stable  - Deemed non surgical, Neuro critical care following.  - G tube placed 9/11 and tolerating tube feeds, at goal.  - Goal SBP<140    Multiple providers had goals of care discussions with daughters and son, daughter today mentioned no further discussion be made regarding palliative care/hospice. She wants the best care be given to patient with all restorative effort. She mentioned this may or may not help but feels enough time effort has not been put yet, and specially showed concern about the lack of nutrition for days. Again does not like providers to discuss if nutrition vs underlying illness is causing her current medical condition. She is hoping that her mom will recover with best care if delivered, and requests a definitive plan of care be outlined. Again not yet ready to talk about discharge planning.     Possible aspiration pneumonia and fluid overload: Chest xray 9/12 showed b/l interstitial infiltrates consistent with Pneumonia and fluid overload as she has been on IVF since admission. One dose of lasix 20mg IV given on 9/12 then maintained on lasix to 20mg PO daily on 9/14  given via tube   -  "Started her on levaquin IV (pnc allergy) for possible pneumonia. Will continue for 1week of antibiotics   - Respiratory status stable  - Tmax 100, if >100.5, will get UA, CXR procal.      Benign essential hypertension.   -PTA is on lisinopril.    -Started on Ramipril 9/9; increased to 10mg   - Needing -P.r.n. labetalol and hydralazine, target blood pressure less than 140  - Added norvasc on 9/13 still reamins hypertensive, increased to 10 mg PO daily via G tube this am, BP better    Hypothyroidism.   -PTA on levothyroxine 50 mcg daily.    -Continue PTA levothyroxine    D/W: RN  DVT Prophylaxis: Pneumatic Compression Devices    Code Status: Full Code    Disposition: Expected discharge: unclear at this time, as patient neurologically is stable but daughter expecting more recovery before discharge, an outlined plan, probably written. Discussed prognosis as explained by various providers before. She feels with continued best effort patient will do better.   - Complications like risk of further bleed, infections-UTI/PNA, DVT explained with her. Prognosis grim.  - dc planning unclear, likely several family meeting needed prior to dc. Peripherally followed by palliative and patient relation.    Spent >65 min on overall care.    Michelle Manzano MD  Hospitalist    Interval History    Chart reviewed, discussed with RN. Had at length conversation with patient's daughter and son >35 min. Patient was lethargic all morning, no movements noted, and then noticed to have some RUE spontaneous movement. Son and daughter at bedside saying \"180 degrees turn\" from before.   - BP was elevated frequently needing PRN IV med at night, and has improved since amlodipine was increased this am.   - T max 100  - Unable to obtain ROS from patient given her mentation.    -Data reviewed today: I reviewed all new labs and imaging results over the last 24 hours. I personally reviewed no images or EKG's today.    Physical Exam   Temp: 98  F (36.7  C) " Temp src: Oral BP: 115/73   Heart Rate: 100 Resp: 16 SpO2: 98 % O2 Device: None (Room air)    Vitals:    09/13/17 0704 09/14/17 0545 09/15/17 0619   Weight: 41.6 kg (91 lb 11.4 oz) 41.2 kg (90 lb 13.3 oz) 40.9 kg (90 lb 2.7 oz)     Vital Signs with Ranges  Temp:  [98  F (36.7  C)-100  F (37.8  C)] 98  F (36.7  C)  Heart Rate:  [] 100  Resp:  [16-20] 16  BP: ()/() 115/73  SpO2:  [95 %-98 %] 98 %  I/O last 3 completed shifts:  In: 960 [NG/GT:680]  Out: 1790 [Urine:1790]    Constitutional: Somnolent. blinks her eyes to voice, no tracking.  HEENT: pupils symmetrical about 2 mm. Did not open mouth.  Respiratory: clear, Normal WOB  Cardiovascular: RRR, No murmur  GI: Soft, Non- tender, BS- normoactive. Feeding tube in place.  Skin/Integument: Warm and dry, no rashes  MSK: No joint deformity or swelling, no edema  Neuro: complete hemiplegia on the left side with 0/5 power. Not following any commands. Some spontaneous hand movement noted on RUE.    Medications        amLODIPine  10 mg Oral or Feeding Tube Daily     ramipril  10 mg Oral or Feeding Tube Daily     furosemide  20 mg Oral or Feeding Tube Daily     fiber modular  1 packet Per Feeding Tube BID     levofloxacin  500 mg Per Feeding Tube Daily     levothyroxine  50 mcg Oral or Feeding Tube QAM AC     sodium chloride (PF)  3 mL Intracatheter Q8H     pantoprazole  40 mg Per Feeding Tube Daily       Data     Recent Labs  Lab 09/14/17  0813 09/13/17  1155 09/13/17  0505 09/11/17  0716   WBC  --   --  11.7* 13.1*   HGB  --   --  13.4 13.5   MCV  --   --  91 89   PLT  --   --  278 297   INR  --   --   --  1.11     --  138 135   POTASSIUM 3.7 4.3 3.3* 3.6   CHLORIDE 103  --  102 102   CO2 28  --  30 24   BUN 21  --  17 10   CR 0.54  --  0.50* 0.41*   ANIONGAP 9  --  6 9   ANTHONY 9.7  --  9.3 9.1   *  --  145* 114*       No results found for this or any previous visit (from the past 24 hour(s)).

## 2017-09-15 NOTE — PROGRESS NOTES
"Met with patients 2 daughters and a son to discuss goals of care.  They are desiring restorative care for their mother to get her as close to her baseline function as possible. They are aware that it is unlikely she will return to prior level of function but they feel her \"new normal\" is going to be much better than her current status. Family expects that she will be in the hospital until she starts improving.     Discussed that once she is medically stable and the rehab plan is determined, patient will be discharged to the next level of care as appropriate.  They have many questions regarding levels of rehab and how to qualify for each level.  Asked [facility] to provide them with printed materials as available. The family prefers to have as much information as writing as possible related to medical and rehab goals.  I spoke with rehab and asked them to keep family informed of progress. The family  would like printed rehab plans if possible and this was also conveyed to the rehab team.      Family would like to feel the personalization of care for their mother and would like extras that include hand massages and touch. \"Treat our mother like you would want your mother treated.\"  Patient is [place] so making sure her hearing aides are in with functioning batteries is important.  (Family bringing in batteries).    Case was discussed with care management team, rehab, Dr. Long, Dr. Manzano, nursing and Perlita Anderson from patient relations.     MARCE Hernandez, [facility]    "

## 2017-09-15 NOTE — PROGRESS NOTES
Pt somnolent. Non verbal.  MONICA orientation, opened eyes for very brief period of time. VSS except T-100.0 but down to 98.5 after Liquid Tylenol via PEG tube. Latest BP-92/53.  Received PRN BP meds earlier in AM for elevated BP.  No nonverbal indicators of pain noted.  Neuros stable, pt not following commands except for opening mouth for oral care and slight squeeze RUE hand at command. LUE/LLE hemiplegia, RUE/RLE hemiparesis, L facial droop. T&R q2hrs. Freq oral care, having cough at times, unable to fully clear throat, suction given per family. NPO TF going at goal rate 35mL/hr. Incont of bowel. Had BM x1.  Appears comfortable.  Ramsay patent. Had Care conference this morning.  Family at bedside.

## 2017-09-15 NOTE — PROGRESS NOTES
Palliative Care Sign-off Note  9/15/2017    Discussed patient with JOEL Hernandez. She had a care conference with the family today. Their goals for [employer] are ongoing restorative measures.     Our team will sign-off for now. Please do not hesitate to consult us for future questions.     Erika Urbano MD  Palliative Medicine  Pager [phone]

## 2017-09-15 NOTE — PLAN OF CARE
Problem: Goal Outcome Summary  Goal: Goal Outcome Summary  Outcome: No Change  Neuros stable.  Somnulent this shift.  Able to squeeze right hand just slightly at second assessment.  Unable to assess neuro, does not follow commands or open eyes.  Noted rubbing of right hip, heat applied.  BP elevated x 1- 175/95, labatolol 20mg given, down to 135/75.  GTube feeding at goal, residual x 1 of 10cc.  Incontinent of bowel x 1.  Ramsay. Tylenol given for slight fever of 99.5.  Turned & repo q 2 hours.  Plan for care conference tomorrow, see orders.

## 2017-09-15 NOTE — PLAN OF CARE
Problem: Goal Outcome Summary  Goal: Goal Outcome Summary  Outcome: No Change  Somnolent, will occasionally shake head and squeeze w/ R hand, MONICA orientation. Neuros: HOMERO LL hemiplegia, RU and RL hemiparesis, L facial droop. Tachy, BP elevated above parameters x1, IV labetalol effective. NPO diet, TF at goal rate 35mL/hr, tolerating.  Ramsay patent, soft BM x1.  Up with lift, q2 T/R. Denies pain, no nonverbal indicators present. Plan for care conference today, d/c to LTC vs TCU pending, nrsng cont to monitor.

## 2017-09-15 NOTE — PLAN OF CARE
Problem: Goal Outcome Summary  Goal: Goal Outcome Summary  Outcome: No Change  Somnolent, MONICA orientation, does not open eye, non verbal, non verbal signs indicate no pain. Elevated BP, IV labetalol given x1, and hydralazine given x1, decreased 126/72. Norvac ordered, given per G-tube. Neuros stable, pt not following commands except for opening mouth for oral care and slight squeeze RUE hand at command. LUE/LLE hemiplegia, RUE/RLE hemiparesis, L facial droop. T&R q2hrs. Freq oral care, freq cough, unable to clear throat, suction given per family. NPO TF going at goal rate 35mL/hr. Incont of bowel. Ramsay patent. Care conference at 1045. Family at bedside.

## 2017-09-15 NOTE — PROGRESS NOTES
[facility]  Neuro[facility]  Neurology Daily Note      Admission Date:9/6/2017   Date of service: 09/15/2017   Hospital Day: 10      Assessment & Plan   _______________________________  #. 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 (E85.4,  I68.0) Cerebral amyloid angiopathy (H)  --MRI from July 2017 consistent with amyloid angiopathy.  ---One microhemorrhage was located in the area of present hemorrhage  #. (I10) Essential hypertension  --Keep BP <140 mm Hg  --management per hospitalists.  --continue Ramipril 2.5 mg   #. (J69.0) Aspiration pneumonia of right lower lobe, unspecified aspiration pneumonia type (H)  --new right lung base consolidation  --started on abx per hospitalist  #. PT/OT/Speech  --continue evaluations  ---G-tube placed 9/11/17  ----LTC vs TCU at [facility]  #. Nutrition  --Per speech therapy evaluation   #. DVT Prophylaxis  --Mechanical only due to hemorrhage      Disposition: pending   Family discussion today     Interval History   _______________________________  Patient presented after a fall with left sided weakness. Patient was getting out of wheelchair, fell backwards and hit her head. No lightheadedness or dizziness prior to the fall. EMS was called and found her weaker on the left side. In the ED was found to have right gaze preference. CT identified new large intracerebral hemorrhage without midline shift.   Follows commands intermittently for thumbs up on the right. Occasionally will open eyes. Minimal intermittent speech. G-tube placed 9/11/17. Chest xray on 9/12 identified new right lung base consolidation, likely pneumonia, started on abx. No significant change at  this time. Still somnolent, minimal interaction. Awaiting placement.  Family want to continue with full cares, but prognosis is poor. Family does not want formal palliative care consult, but willing to have family discussion today.    Review of Systems   _______________________________  Review of systems is limited by patient factors - mental status    Physical Exam   _______________________________  Vitals: Temp: 98.5  F (36.9  C) Temp src: Axillary BP: (!) 161/99   Heart Rate: 92 Resp: 16 SpO2: 97 % O2 Device: None (Room air)    Vital Signs with Ranges: Temp:  [97.3  F (36.3  C)-99.7  F (37.6  C)] 98.5  F (36.9  C)  Heart Rate:  [] 92  Resp:  [16-20] 16  BP: (126-178)/() 161/99  SpO2:  [96 %-98 %] 97 %    General Appearance:  No acute distress  Neuro:       Mental Status Exam:   Somnolent, minimally arouses, unable to assess orientation. Minimal intermittent speech. Mental status is decreased       Cranial Nerves:  Pupils 3 mm, reactive. Minimally opens eyes. Unable to assess facial sensation. Face is symmetric. Tongue and uvula are midline. Other CN are normal           Motor:  Left plegia, RUE weakness, no movement BLE, follows command to give thumbs up on the right intermittently. Tone and bulk are normal           Reflexes:  Normal DTR. Toes equivocal.        Sensory:  Unable to assess                 Coordination:   Unable to assess due to weakness       Gait:  Unable to assess due to weakness  Cardiovascular: Regular rate and rhythm, no m/r/g  Lungs: Clear to auscultation  Abdomen: Soft, not tender, not distended  Extremities: No clubbing, no cyanosis, no edema    Medications   _______________________________       amLODIPine  10 mg Oral or Feeding Tube Daily     ramipril  10 mg Oral or Feeding Tube Daily     furosemide  20 mg Oral or Feeding Tube Daily     fiber modular  1 packet Per Feeding Tube BID     levofloxacin  500 mg Per Feeding Tube Daily     levothyroxine  50 mcg Oral or Feeding Tube  QAM AC     sodium chloride (PF)  3 mL Intracatheter Q8H     pantoprazole  40 mg Per Feeding Tube Daily       Data   _______________________________      Lab Data:   All data was reviewed by me personally  CBC RESULTS:  Recent Labs   Lab Test  09/13/17   0505  09/11/17   0716  09/08/17   1155   WBC  11.7*  13.1*  13.1*   RBC  4.30  4.32  4.49   HGB  13.4  13.5  14.0   HCT  39.1  38.4  40.1   PLT  278  297  339     Basic Metabolic Panel:  Recent Labs   Lab Test  09/14/17   0813  09/13/17   1155  09/13/17   0505  09/11/17   0716   NA  140   --   138  135   POTASSIUM  3.7  4.3  3.3*  3.6   CHLORIDE  103   --   102  102   CO2  28   --   30  24   BUN  21   --   17  10   CR  0.54   --   0.50*  0.41*   GLC  152*   --   145*  114*   ANTHONY  9.7   --   9.3  9.1     Coagulation  Recent Labs   Lab Test  09/11/17   0716  09/06/17   1310   INR  1.11  0.99   PTT  30  29      Troponin I:   Recent Labs   Lab Test  09/07/17   1128   TROPI  0.015     UA Results:  Recent Labs   Lab Test  09/08/17   1710   COLOR  Light Yellow   APPEARANCE  Clear   URINEGLC  Negative   URINEBILI  Negative   URINEKETONE  80*   SG  1.008   UBLD  Small*   URINEPH  5.5   PROTEIN  Negative   NITRITE  Negative   LEUKEST  Negative   RBCU  3*   WBCU  1        Cardiac US:   --     Neurophysiology:   --     Imaging:   All imaging studies were reviewed personally  CT head 9/6/17:   1. New large hyperdense mass in the right frontal lobe measuring 5.5 x 4.2 x 3.5 cm consistent with a large parenchymal hematoma. This does not result in any significant midline shift or effacement of the basal cisterns.  2. Diffuse cerebral volume loss and cerebral white matter changes consistent with chronic small vessel ischemic disease.     CT cervical spine 9/6/17:   --Degenerative changes of the cervical spine. No evidence for fracture or traumatic malalignment.    MRI brain 7/24/17: (prior to admission)  1. No acute pathology is identified. No bleed, mass, or acute infarcts are  seen.  2. Age-related changes including generalized atrophy of the brain. White matter changes in the cerebral hemispheres consistent with small vessel ischemic disease in this age patient.  3. Tiny incidental lipoma along the right tentorium.  4. 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     CT head 9/7/17:  1. Increased size of parenchymal hematoma and surrounding edema centered in the right frontal lobe. There is new intraventricular extension of the hematoma. No significant midline shift or herniation. No hydrocephalus.  2. Diffuse parenchymal volume loss and fairly extensive white matter changes likely due to chronic microvascular ischemic disease.     CT head 9/8/17:  --Stable right frontal parenchymal and intraventricular hemorrhage. There is no evidence for any progressive mass effect or any new hemorrhage.    CT head 9/11/17:  --Stable intraparenchymal hemorrhage in the right cerebral hemisphere as well as intraventricular hemorrhage. No appreciable change. Please note that the scanning angle on the axial study is slightly different today.        Alecia Garcia, FNP-BC

## 2017-09-15 NOTE — PROGRESS NOTES
CLINICAL NUTRITION SERVICES - REASSESSMENT NOTE      EVALUATION OF PROGRESS TOWARD GOALS   Diet:  NPO, SLP is following.    Nutrition Support Enteral:  Type of Feeding Tube: G-tube (placed 9/11)  Enteral Frequency:  Continuous  Enteral Regimen: Isosource 1.5 at goal of 35 ml/hr  Total Enteral Provisions: 1260 kcals (31 kcal/kg), 57 gm pro (1.4 gm/kg), 638 mL H20, 13 gm fiber, 148 gm CHO  Nutrisource Fiber, 1 packet BID: Add'l 6 gm fiber. Total fiber = 19 gm fiber/day  Free Water Flush: 100 mL every 4 hours. Total fluid (TF + flushes) = 1250 mL/day    Intake/tolerance:  Pt was slow to reach goal due to refeeding syndrome but did reach goal of 35 mL/hr on 9/13.  Phos has been low, replacing. K was also replaced.  Pt had some loose stools so the fiber was added 9/13, stools are now soft.  BS </= 150.      ASSESSED NUTRITION: Dosing Weight:  40.3 kg (9/9)   Estimated Energy Needs: 2113-0859 kcals (30-35 Kcal/Kg) - underweight  Estimated Protein Needs:  48-60 grams protein (1.2-1.5 g pro/Kg) - Repletion  Estimated Fluid Needs:  1720-0609 mL (1 mL/Kcal) - maintenance      NEW FINDINGS:   Vitals:    09/06/17 1309 09/06/17 1530 09/07/17 0415 09/08/17 0000   Weight: 40.5 kg (89 lb 4.6 oz) 42 kg (92 lb 9.5 oz) 40.4 kg (89 lb 1.1 oz) 40.3 kg (88 lb 13.5 oz)    09/09/17 0600 09/11/17 0540 09/12/17 0300 09/13/17 0704   Weight: 40.3 kg (88 lb 13.5 oz) 41 kg (90 lb 6.2 oz) 41.6 kg (91 lb 11.4 oz) 41.6 kg (91 lb 11.4 oz)    09/14/17 0545 09/15/17 0619   Weight: 41.2 kg (90 lb 13.3 oz) 40.9 kg (90 lb 2.7 oz)       Previous Goals (9/12):   Pt will reach TF goal of 35 mL/hr in the next 48 hrs    Evaluation: Met    Previous Nutrition Diagnosis:   Inadequate protein-energy intake related to TF held 2 days and now low infusion rate, as evidenced by currently meeting only 40% assessed needs   Evaluation: Resolved      CURRENT NUTRITION DIAGNOSIS  No nutrition diagnosis identified at this time     INTERVENTIONS  Recommendations / Nutrition  Prescription  Continue current TF and H2O flushes    Implementation  No further interventions at this time    Goals  Isosource 1.5 @ 35 mL/hr will continue to meet needs      MONITORING AND EVALUATION:  Progress towards goals will be monitored and evaluated per protocol and Practice Guidelines    Gissell Doe RD  Pager [phone] (M-F)            [phone] (W/E & Hol)

## 2017-09-15 NOTE — PROGRESS NOTES
SPIRITUAL HEALTH SERVICES Progress Note  [facility] 73    SH stopped in to follow up with family. Pt's daughters Crystal and Dotty were present and welcomed a visit. Pt's son was also present at bedside. Pt's children emphasized that they want to keep up a positive attitude around pt because she is listening and they want to stay encouraging. Pt's children feel optimistic about meeting with staff to discuss care plan. Pt's children also felt optimistic about pt's outlook. SH offered support for upcoming care meeting and pt's children declined.  is available to follow up at request or need.    Monica Eaton  Chaplain Resident

## 2017-09-16 ENCOUNTER — APPOINTMENT (OUTPATIENT)
Dept: CT IMAGING | Facility: CLINIC | Age: 82
DRG: 064 | End: 2017-09-16
Payer: MEDICARE

## 2017-09-16 ENCOUNTER — APPOINTMENT (OUTPATIENT)
Dept: GENERAL RADIOLOGY | Facility: CLINIC | Age: 82
DRG: 064 | End: 2017-09-16
Attending: INTERNAL MEDICINE
Payer: MEDICARE

## 2017-09-16 ENCOUNTER — APPOINTMENT (OUTPATIENT)
Dept: SPEECH THERAPY | Facility: CLINIC | Age: 82
DRG: 064 | End: 2017-09-16
Payer: MEDICARE

## 2017-09-16 ENCOUNTER — APPOINTMENT (OUTPATIENT)
Dept: OCCUPATIONAL THERAPY | Facility: CLINIC | Age: 82
DRG: 064 | End: 2017-09-16
Payer: MEDICARE

## 2017-09-16 LAB
ANION GAP SERPL CALCULATED.3IONS-SCNC: 7 MMOL/L (ref 3–14)
BASOPHILS # BLD AUTO: 0 10E9/L (ref 0–0.2)
BASOPHILS NFR BLD AUTO: 0.1 %
BUN SERPL-MCNC: 29 MG/DL (ref 7–30)
CALCIUM SERPL-MCNC: 9.9 MG/DL (ref 8.5–10.1)
CHLORIDE SERPL-SCNC: 98 MMOL/L (ref 94–109)
CO2 SERPL-SCNC: 32 MMOL/L (ref 20–32)
CREAT SERPL-MCNC: 0.62 MG/DL (ref 0.52–1.04)
DIFFERENTIAL METHOD BLD: ABNORMAL
EOSINOPHIL # BLD AUTO: 0 10E9/L (ref 0–0.7)
EOSINOPHIL NFR BLD AUTO: 0.1 %
ERYTHROCYTE [DISTWIDTH] IN BLOOD BY AUTOMATED COUNT: 13 % (ref 10–15)
GFR SERPL CREATININE-BSD FRML MDRD: 90 ML/MIN/1.7M2
GLUCOSE SERPL-MCNC: 138 MG/DL (ref 70–99)
HCT VFR BLD AUTO: 41.3 % (ref 35–47)
HGB BLD-MCNC: 13.9 G/DL (ref 11.7–15.7)
IMM GRANULOCYTES # BLD: 0.3 10E9/L (ref 0–0.4)
IMM GRANULOCYTES NFR BLD: 1.8 %
LACTATE BLD-SCNC: 1.5 MMOL/L (ref 0.7–2)
LYMPHOCYTES # BLD AUTO: 1.3 10E9/L (ref 0.8–5.3)
LYMPHOCYTES NFR BLD AUTO: 9 %
MCH RBC QN AUTO: 31.3 PG (ref 26.5–33)
MCHC RBC AUTO-ENTMCNC: 33.7 G/DL (ref 31.5–36.5)
MCV RBC AUTO: 93 FL (ref 78–100)
MONOCYTES # BLD AUTO: 1.3 10E9/L (ref 0–1.3)
MONOCYTES NFR BLD AUTO: 8.9 %
NEUTROPHILS # BLD AUTO: 11.9 10E9/L (ref 1.6–8.3)
NEUTROPHILS NFR BLD AUTO: 80.1 %
NRBC # BLD AUTO: 0 10*3/UL
NRBC BLD AUTO-RTO: 0 /100
PLATELET # BLD AUTO: 353 10E9/L (ref 150–450)
POTASSIUM SERPL-SCNC: 3.1 MMOL/L (ref 3.4–5.3)
POTASSIUM SERPL-SCNC: 4.1 MMOL/L (ref 3.4–5.3)
PROCALCITONIN SERPL-MCNC: 0.07 NG/ML
RBC # BLD AUTO: 4.44 10E12/L (ref 3.8–5.2)
SODIUM SERPL-SCNC: 137 MMOL/L (ref 133–144)
WBC # BLD AUTO: 14.8 10E9/L (ref 4–11)

## 2017-09-16 PROCEDURE — 27210429 ZZH NUTRITION PRODUCT INTERMEDIATE LITER

## 2017-09-16 PROCEDURE — 36415 COLL VENOUS BLD VENIPUNCTURE: CPT | Performed by: INTERNAL MEDICINE

## 2017-09-16 PROCEDURE — 99233 SBSQ HOSP IP/OBS HIGH 50: CPT | Performed by: INTERNAL MEDICINE

## 2017-09-16 PROCEDURE — 85025 COMPLETE CBC W/AUTO DIFF WBC: CPT | Performed by: HOSPITALIST

## 2017-09-16 PROCEDURE — A9270 NON-COVERED ITEM OR SERVICE: HCPCS | Mod: GY | Performed by: INTERNAL MEDICINE

## 2017-09-16 PROCEDURE — 25000132 ZZH RX MED GY IP 250 OP 250 PS 637: Mod: GY | Performed by: HOSPITALIST

## 2017-09-16 PROCEDURE — 40000225 ZZH STATISTIC SLP WARD VISIT

## 2017-09-16 PROCEDURE — 84132 ASSAY OF SERUM POTASSIUM: CPT | Performed by: INTERNAL MEDICINE

## 2017-09-16 PROCEDURE — 25000132 ZZH RX MED GY IP 250 OP 250 PS 637: Mod: GY | Performed by: INTERNAL MEDICINE

## 2017-09-16 PROCEDURE — 40000133 ZZH STATISTIC OT WARD VISIT: Performed by: OCCUPATIONAL THERAPY ASSISTANT

## 2017-09-16 PROCEDURE — A9270 NON-COVERED ITEM OR SERVICE: HCPCS | Mod: GY | Performed by: PSYCHIATRY & NEUROLOGY

## 2017-09-16 PROCEDURE — 70450 CT HEAD/BRAIN W/O DYE: CPT

## 2017-09-16 PROCEDURE — 36415 COLL VENOUS BLD VENIPUNCTURE: CPT | Performed by: HOSPITALIST

## 2017-09-16 PROCEDURE — 71010 XR CHEST PORT 1 VW: CPT

## 2017-09-16 PROCEDURE — 83605 ASSAY OF LACTIC ACID: CPT | Performed by: INTERNAL MEDICINE

## 2017-09-16 PROCEDURE — 80048 BASIC METABOLIC PNL TOTAL CA: CPT | Performed by: HOSPITALIST

## 2017-09-16 PROCEDURE — 97110 THERAPEUTIC EXERCISES: CPT | Mod: GO | Performed by: OCCUPATIONAL THERAPY ASSISTANT

## 2017-09-16 PROCEDURE — 25000132 ZZH RX MED GY IP 250 OP 250 PS 637: Mod: GY | Performed by: PSYCHIATRY & NEUROLOGY

## 2017-09-16 PROCEDURE — 97535 SELF CARE MNGMENT TRAINING: CPT | Mod: GO | Performed by: OCCUPATIONAL THERAPY ASSISTANT

## 2017-09-16 PROCEDURE — 84145 PROCALCITONIN (PCT): CPT | Performed by: INTERNAL MEDICINE

## 2017-09-16 PROCEDURE — 92526 ORAL FUNCTION THERAPY: CPT | Mod: GN

## 2017-09-16 PROCEDURE — 12000000 ZZH R&B MED SURG/OB

## 2017-09-16 PROCEDURE — A9270 NON-COVERED ITEM OR SERVICE: HCPCS | Mod: GY | Performed by: HOSPITALIST

## 2017-09-16 PROCEDURE — 99231 SBSQ HOSP IP/OBS SF/LOW 25: CPT | Performed by: PSYCHIATRY & NEUROLOGY

## 2017-09-16 RX ORDER — METOPROLOL TARTRATE 25 MG/1
25 TABLET, FILM COATED ORAL 2 TIMES DAILY
Status: DISCONTINUED | OUTPATIENT
Start: 2017-09-16 | End: 2017-09-18

## 2017-09-16 RX ADMIN — LEVOFLOXACIN 500 MG: 500 TABLET, FILM COATED ORAL at 10:04

## 2017-09-16 RX ADMIN — FUROSEMIDE 20 MG: 20 TABLET ORAL at 10:04

## 2017-09-16 RX ADMIN — ACETAMINOPHEN 650 MG: 325 SOLUTION ORAL at 03:25

## 2017-09-16 RX ADMIN — POTASSIUM CHLORIDE 40 MEQ: 1.5 POWDER, FOR SOLUTION ORAL at 10:04

## 2017-09-16 RX ADMIN — LEVOTHYROXINE SODIUM 50 MCG: 50 TABLET ORAL at 06:34

## 2017-09-16 RX ADMIN — Medication 40 MG: at 10:04

## 2017-09-16 RX ADMIN — ACETAMINOPHEN 650 MG: 325 SOLUTION ORAL at 13:06

## 2017-09-16 RX ADMIN — Medication 1 PACKET: at 10:04

## 2017-09-16 RX ADMIN — POTASSIUM CHLORIDE 20 MEQ: 1.5 POWDER, FOR SOLUTION ORAL at 14:14

## 2017-09-16 RX ADMIN — Medication 1 PACKET: at 21:07

## 2017-09-16 RX ADMIN — METOPROLOL TARTRATE 25 MG: 25 TABLET ORAL at 21:07

## 2017-09-16 RX ADMIN — AMLODIPINE BESYLATE 10 MG: 10 TABLET ORAL at 10:04

## 2017-09-16 RX ADMIN — RAMIPRIL 10 MG: 10 CAPSULE ORAL at 21:06

## 2017-09-16 ASSESSMENT — VISUAL ACUITY
OU: OTHER (SEE COMMENT)

## 2017-09-16 NOTE — PLAN OF CARE
Problem: Goal Outcome Summary  Goal: Goal Outcome Summary  Discharge Planner OT   Patient plan for discharge: rehab  Current status: Pt lethargic, not following any commands to participate with hygiene task, pt dependent hand over hand assist.  Completed LUE PROM, flaccid, no active movement.    Barriers to return to prior living situation: current level of assist  Recommendations for discharge: LTC  Rationale for recommendations: no active participation; weakness; impaired activity tolerance; impaired functional mobility; needs total cares       Entered by: Aria Diaz 09/16/2017 10:29 AM       Per review with OTR will decrease pt frequency to 3x/wk until more alert and able to participate with functional activity.

## 2017-09-16 NOTE — PROGRESS NOTES
[facility]    Hospitalist Progress Note    Assessment & Plan   Sury Barnett is a 89 year old female with PMHx of hypertension and hypothyroidism who was admitted on 9/6/2017 with a large R intracranial hemorrhage.      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 Seen by neurosurgery, bleed nonsurgical. Likely the hemorrhage caused fall. CT next day showed increased size but then multiple subsequent CTs with stable hematoma, last one 9/11  -- remains stable from neuro standpoint -- ongoing left sided hemiplegia, remains lethargic  -- repeat head CT 9/16 per neuro showed slight decrease in hemorrhage  -- had G-tube placed on 9/11, tolerating TFs at goal  -- goal SBP <140 -- mgmt as below    Multiple providers have had goals of care discussions with daughters and son this stay. Family wishes for restorative cares, does not want to talk about pall care or hospice. Continue to express concern over lack of nutrition during initial stay. Family does not wish to engage in discussions regarding current status and whether it is due to nutrition (which they feel) vs underlying hemorrhage (which providers believe). Family hopes that patient will recover. Not yet ready to discuss discharge planning.     Possible aspiration pneumonia and fluid overload:   CXR on 9/12 showed b/l interstitial infiltrates consistent with pneumonia and fluid overload, had been on IVF since admission on 9/6. One dose of lasix 20mg IV given on 9/12 then maintained on lasix to 20mg PO daily on 9/14  given via tube. IV Levaquin started on 9/12 (given PCV allergy)  -- cont Levaquin  -- remains stable from respiratory standpoint but now w/worsening leukocytosis (11.7 -- 24.8) and mildly tachycardic despite 5d of abx, remains afebrile   -- will check procalcitonin, repeat CXR and UA to  ensure no infection elsewhere -- may need to add anaerobic coverage      Benign essential hypertension.   On lisinopril prior to admission.   -- meds adjusted this stay -- now on Ramipril 10mg daily and Norvasc 10mg daily  -- will add Metoprolol 25mg BID today  -- goal BP <140 systolic      Hypothyroidism.   Chronic and stable on levothyroxine     FEN: no IVFs, lytes stable, on TFs via PEG as above  DVT Prophylaxis: PCDs  Code Status: Full Code    Disposition: Discharge date unclear -- likely 2-3d still. Likely looking at [facility]    Updated patient's 3 children at bedside today.    Whitney Rich     Time Spent on this Encounter   I spent 35 minutes on the unit/floor managing the care of Sury S Lines. Over 50% of my time was spent counseling the patient and/or coordinating care regarding services listed in this note.    Interval History   Chart reviewed, including discussions with Jaylyn Cotto yesterday. Mostly non-responsive, occasionally attempts to move hand. Afebrile, mildly tachycardic. Family asking if patient can have a pedicure in the hospital. Met with patient's family this afternoon -- let them guide the conversation. They are not yet on board with discussing discharge. Updated on plans for additional labs today.    -Data reviewed today: I reviewed all new labs and imaging results over the last 24 hours. I personally reviewed no images or EKG's today.    Physical Exam   Temp: 98.1  F (36.7  C) Temp src: Oral BP: 143/76   Heart Rate: 109 Resp: 16 SpO2: 94 % O2 Device: None (Room air)    Vitals:    09/14/17 0545 09/15/17 0619 09/16/17 0300   Weight: 41.2 kg (90 lb 13.3 oz) 40.9 kg (90 lb 2.7 oz) 39.9 kg (87 lb 15.4 oz)     Vital Signs with Ranges  Temp:  [97.7  F (36.5  C)-99.2  F (37.3  C)] 98.1  F (36.7  C)  Heart Rate:  [100-109] 109  Resp:  [16] 16  BP: (115-143)/(73-82) 143/76  SpO2:  [93 %-100 %] 94 %  I/O last 3 completed shifts:  In: 910 [NG/GT:910]  Out: 1275  [Urine:1275]    Constitutional: Resting comfortably, essenitally non-responsive, did not follow commands during my visit  Respiratory: CTA through anterior fields, no wheeze or coarse BS  Cardiovascular: tachycardic with RRR, no MGR, no LE edema  GI: S, NT, ND, +BS, +PEG  Skin/Integumen: warm/dry  Other:      Medications        amLODIPine  10 mg Oral or Feeding Tube Daily     ramipril  10 mg Oral or Feeding Tube Daily     furosemide  20 mg Oral or Feeding Tube Daily     fiber modular  1 packet Per Feeding Tube BID     levofloxacin  500 mg Per Feeding Tube Daily     levothyroxine  50 mcg Oral or Feeding Tube QAM AC     sodium chloride (PF)  3 mL Intracatheter Q8H     pantoprazole  40 mg Per Feeding Tube Daily       Data     Recent Labs  Lab 09/16/17  0740 09/14/17  0813 09/13/17  1155 09/13/17  0505 09/11/17  0716   WBC 14.8*  --   --  11.7* 13.1*   HGB 13.9  --   --  13.4 13.5   MCV 93  --   --  91 89     --   --  278 297   INR  --   --   --   --  1.11    140  --  138 135   POTASSIUM 3.1* 3.7 4.3 3.3* 3.6   CHLORIDE 98 103  --  102 102   CO2 32 28  --  30 24   BUN 29 21  --  17 10   CR 0.62 0.54  --  0.50* 0.41*   ANIONGAP 7 9  --  6 9   ANTHONY 9.9 9.7  --  9.3 9.1   * 152*  --  145* 114*       Recent Results (from the past 24 hour(s))   CT Head w/o Contrast    Narrative    CT HEAD W/O CONTRAST   9/16/2017 1:35 PM     HISTORY: follow up ICH, r/o HCP    TECHNIQUE: Axial images of the head without IV contrast material.  Radiation dose for this scan was reduced using automated exposure  control, adjustment of the mA and/or kV according to patient size, or  iterative reconstruction technique.    COMPARISON: CT head dated 9/11/2017    FINDINGS: The area of high density in the right frontal region  consistent with a parenchymal hemorrhage has decreased in size when  compared to the previous scan. Currently it measures about 4.6 cm in  AP dimension by 3.4 cm in transverse dimension and 3.7 cm  in  cephalocaudad dimension. The volume is currently approximately 29 mL.  There is still mild mass effect on the frontal horn of the right  lateral ventricle. No shift of midline structures. A small amount of  intraventricular blood is still present. This has decreased slightly.  There is generalized atrophy of the brain.  Areas of low attenuation  are present in the white matter of the cerebral hemispheres that are  consistent with small vessel ischemic disease in this age patient. .  The visualized portions of the sinuses and mastoids appear normal.  There is no evidence of trauma.      Impression    IMPRESSION:   1. Slight decrease in the size of the right frontal parenchymal  hemorrhage. No new hemorrhage.  2. Slight decrease in the amount of intraventricular hemorrhage.  3. Age related changes including diffuse brain atrophy.  White matter  changes consistent with small vessel ischemic disease.    MORENO MANZANO MD

## 2017-09-16 NOTE — PLAN OF CARE
Problem: Goal Outcome Summary  Goal: Goal Outcome Summary  Outcome: Improving  Patient here due to ICH. Somnolent and unable to assess orientation. Neuros include left facial droop, left hemiplegic, unable to assess rest of neuros. VSS except tachy. Potassium replaced. Tolerating tube feed diet well. Up with A2 + lift. Tylenol for expression of pain. Continue to monitor and follow POC.

## 2017-09-16 NOTE — PLAN OF CARE
Problem: Goal Outcome Summary  Goal: Goal Outcome Summary  Physical Therapy: Attempted physical therapy, however, patient off the floor at a test.

## 2017-09-16 NOTE — PROGRESS NOTES
Chief complaint: s/p lobar ICH    Subjective: No salient clinical neurological events overnight.      Objective: B/P: B/P: 141/74, T: 98, P: 92, R: 16  No spont eye opg, tracking, or reponse to VC.   Localizes withdrawal LLE, no UE or RLE/UE  Pupils postsurgical OU.     Alvarado/Romo: na  NIHSS: na    Scheduled medications:   Current Facility-Administered Medications   Medication Dose Route Frequency     amLODIPine  10 mg Oral or Feeding Tube Daily     ramipril  10 mg Oral or Feeding Tube Daily     furosemide  20 mg Oral or Feeding Tube Daily     fiber modular  1 packet Per Feeding Tube BID     levofloxacin  500 mg Per Feeding Tube Daily     levothyroxine  50 mcg Oral or Feeding Tube QAM AC     sodium chloride (PF)  3 mL Intracatheter Q8H     pantoprazole  40 mg Per Feeding Tube Daily       Infusions:       PRN medications:   Current Facility-Administered Medications   Medication Dose Route Frequency     lidocaine 4%   Topical Q1H PRN     acetaminophen  650 mg Per Feeding Tube Q4H PRN     potassium phosphate (KPHOS) in D5W IV  15 mmol Intravenous Daily PRN     potassium phosphate (KPHOS) in D5W IV  20 mmol Intravenous Q6H PRN     potassium phosphate (KPHOS) in D5W IV  20 mmol Intravenous Q6H PRN     potassium phosphate (KPHOS) in D5W IV  25 mmol Intravenous Q8H PRN     hydrALAZINE  10 mg Intravenous Q4H PRN     miconazole   Topical Q1H PRN     hypromellose-dextran  2-3 drop Ophthalmic Q1H PRN     labetalol  10-20 mg Intravenous Q4H PRN     lidocaine (buffered or not buffered)  1 mL Other Q1H PRN     potassium chloride  20-40 mEq Oral Q2H PRN     potassium chloride  20-40 mEq Oral or Feeding Tube Q2H PRN     potassium chloride  10 mEq Intravenous Q1H PRN     potassium chloride with lidocaine  10 mEq Intravenous Q1H PRN     potassium chloride  20 mEq Intravenous Q1H PRN     naloxone  0.1-0.4 mg Intravenous Q2 Min PRN     melatonin  1 mg Oral At Bedtime PRN     senna-docusate  1-2 tablet Oral BID PRN     polyethylene  glycol  17 g Oral Daily PRN     bisacodyl  10 mg Rectal Daily PRN     ondansetron  4 mg Oral Q6H PRN    Or     ondansetron  4 mg Intravenous Q6H PRN     Previous imaging reviewed:     CTHs 9/6 thru 9/11: large right frontal lobar ICH with interventricular decompression; 9/11 subacute blood products layering posteriorly, ? Interval HCP      IMP:       S/p large right frontal lobar ICH secondary to CAA, query early HCP last HCT 9/1    RECOMMENDATIONS/INTERVENTIONS:      No antiplatelets    Check f/u HCT r/o HCP (ordered)

## 2017-09-16 NOTE — PLAN OF CARE
Problem: Goal Outcome Summary  Goal: Goal Outcome Summary  Outcome: No Change  Somnolent. MONICA orientation. MONICA full neuro. Pt unable to speak and follow minimal commands. L droop and L side hemiplegic. VSS, HR tachy and T 99.2. Bedrest, repo q2h with oral cares. NPO. TF at goal rate of 35ml/hr with 100ml flushes q4h. Tylenol given for grimacing and slight temp. Discharge pending, will continue to monitor.

## 2017-09-16 NOTE — PLAN OF CARE
Problem: Goal Outcome Summary  Goal: Goal Outcome Summary  Discharge Planner SLP   Patient plan for discharge: none stated  Current status: Pt with reduced ELENA and only briefly opened eyes for oral cares with maximum encouragement from SLP and family. ST session limited by pts inability to remain alert and suspected inability to manage own secretions. Educated family on ongoing high risk for aspiration. Recommend continue NPO with frequent oral cares and enteral support. Recommend elevate HOB during times of poor secretion management with oral suction as needed.  Barriers to return to prior living situation: severe dysphagia requiring TF; reduced ELENA  Recommendations for discharge: ongoing ST for dysphagia   Rationale for recommendations: pt currently unable to safely take PO; severe dysphagia        Entered by: Sara Montejo 09/16/2017 9:12 AM

## 2017-09-17 ENCOUNTER — APPOINTMENT (OUTPATIENT)
Dept: SPEECH THERAPY | Facility: CLINIC | Age: 82
DRG: 064 | End: 2017-09-17
Payer: MEDICARE

## 2017-09-17 LAB
ALBUMIN UR-MCNC: 30 MG/DL
APPEARANCE UR: CLEAR
BACTERIA #/AREA URNS HPF: ABNORMAL /HPF
BILIRUB UR QL STRIP: NEGATIVE
COLOR UR AUTO: YELLOW
ERYTHROCYTE [DISTWIDTH] IN BLOOD BY AUTOMATED COUNT: 13.2 % (ref 10–15)
GLUCOSE UR STRIP-MCNC: NEGATIVE MG/DL
HCT VFR BLD AUTO: 42.5 % (ref 35–47)
HGB BLD-MCNC: 14 G/DL (ref 11.7–15.7)
HGB UR QL STRIP: ABNORMAL
KETONES UR STRIP-MCNC: NEGATIVE MG/DL
LACTATE BLD-SCNC: 1.4 MMOL/L (ref 0.7–2)
LEUKOCYTE ESTERASE UR QL STRIP: NEGATIVE
MCH RBC QN AUTO: 31.2 PG (ref 26.5–33)
MCHC RBC AUTO-ENTMCNC: 32.9 G/DL (ref 31.5–36.5)
MCV RBC AUTO: 95 FL (ref 78–100)
MUCOUS THREADS #/AREA URNS LPF: PRESENT /LPF
NITRATE UR QL: NEGATIVE
PH UR STRIP: 6 PH (ref 5–7)
PLATELET # BLD AUTO: 344 10E9/L (ref 150–450)
PROCALCITONIN SERPL-MCNC: 0.08 NG/ML
RBC # BLD AUTO: 4.49 10E12/L (ref 3.8–5.2)
RBC #/AREA URNS AUTO: 16 /HPF (ref 0–2)
SOURCE: ABNORMAL
SP GR UR STRIP: 1.01 (ref 1–1.03)
UROBILINOGEN UR STRIP-MCNC: NORMAL MG/DL (ref 0–2)
WBC # BLD AUTO: 18.9 10E9/L (ref 4–11)
WBC #/AREA URNS AUTO: 0 /HPF (ref 0–2)

## 2017-09-17 PROCEDURE — 87088 URINE BACTERIA CULTURE: CPT | Performed by: INTERNAL MEDICINE

## 2017-09-17 PROCEDURE — 40000225 ZZH STATISTIC SLP WARD VISIT: Performed by: SPEECH-LANGUAGE PATHOLOGIST

## 2017-09-17 PROCEDURE — 25000132 ZZH RX MED GY IP 250 OP 250 PS 637: Mod: GY | Performed by: INTERNAL MEDICINE

## 2017-09-17 PROCEDURE — 99231 SBSQ HOSP IP/OBS SF/LOW 25: CPT | Performed by: PSYCHIATRY & NEUROLOGY

## 2017-09-17 PROCEDURE — 12000000 ZZH R&B MED SURG/OB

## 2017-09-17 PROCEDURE — 92526 ORAL FUNCTION THERAPY: CPT | Mod: GN | Performed by: SPEECH-LANGUAGE PATHOLOGIST

## 2017-09-17 PROCEDURE — 85027 COMPLETE CBC AUTOMATED: CPT | Performed by: INTERNAL MEDICINE

## 2017-09-17 PROCEDURE — 25000132 ZZH RX MED GY IP 250 OP 250 PS 637: Mod: GY | Performed by: HOSPITALIST

## 2017-09-17 PROCEDURE — 27210429 ZZH NUTRITION PRODUCT INTERMEDIATE LITER

## 2017-09-17 PROCEDURE — 99233 SBSQ HOSP IP/OBS HIGH 50: CPT | Performed by: INTERNAL MEDICINE

## 2017-09-17 PROCEDURE — A9270 NON-COVERED ITEM OR SERVICE: HCPCS | Mod: GY | Performed by: INTERNAL MEDICINE

## 2017-09-17 PROCEDURE — 84145 PROCALCITONIN (PCT): CPT | Performed by: INTERNAL MEDICINE

## 2017-09-17 PROCEDURE — 81001 URINALYSIS AUTO W/SCOPE: CPT | Performed by: INTERNAL MEDICINE

## 2017-09-17 PROCEDURE — A9270 NON-COVERED ITEM OR SERVICE: HCPCS | Mod: GY | Performed by: PSYCHIATRY & NEUROLOGY

## 2017-09-17 PROCEDURE — 87086 URINE CULTURE/COLONY COUNT: CPT | Performed by: INTERNAL MEDICINE

## 2017-09-17 PROCEDURE — 36415 COLL VENOUS BLD VENIPUNCTURE: CPT | Performed by: INTERNAL MEDICINE

## 2017-09-17 PROCEDURE — 25000132 ZZH RX MED GY IP 250 OP 250 PS 637: Mod: GY | Performed by: PSYCHIATRY & NEUROLOGY

## 2017-09-17 PROCEDURE — A9270 NON-COVERED ITEM OR SERVICE: HCPCS | Mod: GY | Performed by: HOSPITALIST

## 2017-09-17 PROCEDURE — 83605 ASSAY OF LACTIC ACID: CPT | Performed by: INTERNAL MEDICINE

## 2017-09-17 PROCEDURE — 87186 SC STD MICRODIL/AGAR DIL: CPT | Performed by: INTERNAL MEDICINE

## 2017-09-17 PROCEDURE — 87040 BLOOD CULTURE FOR BACTERIA: CPT | Performed by: INTERNAL MEDICINE

## 2017-09-17 RX ADMIN — Medication 1 PACKET: at 20:42

## 2017-09-17 RX ADMIN — FUROSEMIDE 20 MG: 20 TABLET ORAL at 09:06

## 2017-09-17 RX ADMIN — AMLODIPINE BESYLATE 10 MG: 10 TABLET ORAL at 09:05

## 2017-09-17 RX ADMIN — METOPROLOL TARTRATE 25 MG: 25 TABLET ORAL at 09:06

## 2017-09-17 RX ADMIN — LEVOFLOXACIN 500 MG: 500 TABLET, FILM COATED ORAL at 09:06

## 2017-09-17 RX ADMIN — LEVOTHYROXINE SODIUM 50 MCG: 50 TABLET ORAL at 06:59

## 2017-09-17 RX ADMIN — Medication 1 PACKET: at 09:06

## 2017-09-17 RX ADMIN — Medication 40 MG: at 09:06

## 2017-09-17 RX ADMIN — ACETAMINOPHEN 650 MG: 325 SOLUTION ORAL at 09:10

## 2017-09-17 RX ADMIN — RAMIPRIL 10 MG: 10 CAPSULE ORAL at 20:42

## 2017-09-17 RX ADMIN — METOPROLOL TARTRATE 25 MG: 25 TABLET ORAL at 20:42

## 2017-09-17 RX ADMIN — ACETAMINOPHEN 650 MG: 325 SOLUTION ORAL at 04:04

## 2017-09-17 ASSESSMENT — VISUAL ACUITY
OU: OTHER (SEE COMMENT)

## 2017-09-17 NOTE — PLAN OF CARE
Problem: Goal Outcome Summary  Goal: Goal Outcome Summary  Outcome: Improving  Neuro/CMS: Somnolent, L hemiplegia, L facial droop  CV: Tachy- PO scheduled beta blocker given at HS  Resp/O2: Coughing up clear sputum, occasionally suctioning, lungs coarse  GI/Diet: NPO, TF at goal rate 35 cc  : Ramsay patent, good u/o  Skin/Incisions: WNL  Activity: T/R Q2H,  Lines: PIV SL  VS/Pain: BP WNL, appears comfortable  D/C Plan: [facility] bed hold, 2-3 days per MD note

## 2017-09-17 NOTE — PROGRESS NOTES
Social Work Progress Note  Pt chart reviewed. Pt discussed in interdisciplinary rounds.     Intervention: Writer left [facility] admission two voice messages re: pt's bed status. Writer is awaiting a call back from them.     Team members notified: Charge RN,& CC     Plan:  Anticipated Discharge Placement: [facility]    Follow-up plan: Sw to continue to follow.     CATHY Bender, [facility]  [phone]  2:18 PM

## 2017-09-17 NOTE — PLAN OF CARE
Problem: Goal Outcome Summary  Goal: Goal Outcome Summary  Outcome: Improving  Patient here due to ICH. Somnolent and unable to assess orientation. Neuros include left facial droop, left hemiplegic, unable to assess rest of neuros - pt will give hand gesture of thumbs up to answer some questions. VSS except tachy. Tolerating tube feed diet well. Up with A2 + lift. Tylenol for expression of pain. Continue to monitor and follow POC.

## 2017-09-17 NOTE — PLAN OF CARE
Problem: Goal Outcome Summary  Goal: Goal Outcome Summary  Outcome: No Change  Somnolent and unable to assess orientation. Neuros include left facial droop and left hemiplegia. Unable to assess other neuros. Tachy and max temp 101.1 axillary, decreased with tylenol. Suctioning with clear secretions and oral cares done. Changed and repositioned.  Ramsay patent. UA collected. Tolerating tube feeding. Tube site CDI. Up with A2 + lift. Loose BM during shift. Son at bedside. Will continue to monitor.

## 2017-09-17 NOTE — PROGRESS NOTES
Chief complaint:s/p lobar ICH    Subjective: No salient clinical neurological events overnight. Family reports what sounds like spontaneous roving eye movements.       Objective: B/P: B/P: 103/65, T: 98.4, P: 92, R: 18  Positive spontaneous eye opening. Follows left UE and oculobulbar complaints, dense right UMN facial paresis. Smiles and interacts weakly. No involuntary mvmts. Spastic tone in RUE and LE but full range of motion.         Scheduled medications:   Current Facility-Administered Medications   Medication Dose Route Frequency     metoprolol  25 mg Oral BID     amLODIPine  10 mg Oral or Feeding Tube Daily     ramipril  10 mg Oral or Feeding Tube Daily     furosemide  20 mg Oral or Feeding Tube Daily     fiber modular  1 packet Per Feeding Tube BID     levofloxacin  500 mg Per Feeding Tube Daily     levothyroxine  50 mcg Oral or Feeding Tube QAM AC     sodium chloride (PF)  3 mL Intracatheter Q8H     pantoprazole  40 mg Per Feeding Tube Daily       Infusions:       PRN medications:   Current Facility-Administered Medications   Medication Dose Route Frequency     lidocaine 4%   Topical Q1H PRN     acetaminophen  650 mg Per Feeding Tube Q4H PRN     potassium phosphate (KPHOS) in D5W IV  15 mmol Intravenous Daily PRN     potassium phosphate (KPHOS) in D5W IV  20 mmol Intravenous Q6H PRN     potassium phosphate (KPHOS) in D5W IV  20 mmol Intravenous Q6H PRN     potassium phosphate (KPHOS) in D5W IV  25 mmol Intravenous Q8H PRN     hydrALAZINE  10 mg Intravenous Q4H PRN     miconazole   Topical Q1H PRN     hypromellose-dextran  2-3 drop Ophthalmic Q1H PRN     labetalol  10-20 mg Intravenous Q4H PRN     lidocaine (buffered or not buffered)  1 mL Other Q1H PRN     potassium chloride  20-40 mEq Oral Q2H PRN     potassium chloride  20-40 mEq Oral or Feeding Tube Q2H PRN     potassium chloride  10 mEq Intravenous Q1H PRN     potassium chloride with lidocaine  10 mEq Intravenous Q1H PRN     potassium chloride  20 mEq  Intravenous Q1H PRN     naloxone  0.1-0.4 mg Intravenous Q2 Min PRN     melatonin  1 mg Oral At Bedtime PRN     senna-docusate  1-2 tablet Oral BID PRN     polyethylene glycol  17 g Oral Daily PRN     bisacodyl  10 mg Rectal Daily PRN     ondansetron  4 mg Oral Q6H PRN    Or     ondansetron  4 mg Intravenous Q6H PRN         Salient interval testing/personally reviewed imaging:      CTH no convincing evidence of interval change in VM. No additional intraventricular blood products.         IMP:       S/p large right frontal lobar ICH secondary to CAA, query equivocal delayed HCP, but no change on follow up CT yesterday indicates if present, certainly nothing clinically significant     RECOMMENDATIONS/INTERVENTIONS:       No antiplatelets indefinitely.     Medical DVTP ok while inpatient but SCDs onced transferred to long term care facility.    Check f/u HCT r/o HCP in 5 -7 days; if no change, no further imaging indicated. CTH may be done from LTCF.     No concerns neurologically for transfer from acute care at any time. Follow up with Dr. De La Torre in 3-4 weeks.    Continued PT for passive range of motion in RUE and LE.

## 2017-09-17 NOTE — PROGRESS NOTES
[facility]    Hospitalist Progress Note    Assessment & Plan   Sury Barnett is a 89 year old female with PMHx of hypertension and hypothyroidism who was admitted on 9/6/2017 with a large R intracranial hemorrhage.      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 Seen by neurosurgery, bleed nonsurgical. Likely the hemorrhage caused fall. CT next day showed increased size but then multiple subsequent CTs with stable hematoma, last one 9/11  -- remains stable from neuro standpoint -- ongoing left sided hemiplegia, remains lethargic  -- repeat head CT 9/16 per neuro showed slight decrease in hemorrhage  -- had G-tube placed on 9/11, tolerating TFs at goal  -- goal SBP <140 -- mgmt as below    Multiple providers have had goals of care discussions with patient's daughters and son this stay. Family wishes for restorative cares, does not want to talk about pall care or hospice. Family continues to express concern over lack of nutrition during initial days of stay. Family does not wish to engage in discussions regarding patient's current status and whether it is due to nutrition (which they feel) vs underlying hemorrhage (which providers believe). Family hopes that patient will recover to a similar functional status as per prior to admission. They are not yet ready to discuss discharge planning, though once issues with fever and leukocytosis resolve she will be medically stable to discharge.     Fever / Leukocytosis:  Has had lingering leukocytosis this stay. Improved and was down to 11.7 on 9/13, has since been trending up: 14.8 on 9/16 -- 18.9 on 9/17. Developed fever 9/17 PM with Tmax 101.1. Further infectious workup pursued.  -- 9/16 UA bland and w/o evidence of UTI, 9/16 CXR showed resolution of infiltrate  -- blood cultures drawn 9/17   -- serial  procalcitonins neg  -- serial lactates remain nl  -- no evidence for infection around PEG tube or peripheral IVs (has no central IV access)  -- will completed 7d course of Levaquin for pna as below  -- ?if fever/leukocytosis dt above neuro issues    Possible aspiration pneumonia and fluid overload: Improved  CXR on 9/12 showed bilateral interstitial infiltrates consistent with pneumonia and fluid overload -- had been on IVF since admission on 9/6. IVFs dc'd. One dose of lasix 20mg IV given on 9/12 then maintained on lasix to 20mg PO daily on 9/14 given via tube. IV Levaquin started on 9/12 (given PCV allergy).  -- on Lasix 20mg po daily -- no evidence of ongoing infiltrate/edema on 9/16 CXR so Lasix stopped 9/17  -- cont Levaquin -- will complete 7d course of treatmenton 9/18  -- remains stable form a respiratory standpoint -- maintaining sats on RA     Benign essential hypertension.   On lisinopril prior to admission.   -- meds adjusted this stay -- now on Ramipril 10mg daily and Norvasc 10mg daily, Metoprolol 25mg BID added 9/16 given tachycardia and hypertension  -- goal BP <140 systolic      Hypothyroidism.   Chronic and stable on levothyroxine     FEN: no IVFs, lytes stable, on TFs via PEG as above  DVT Prophylaxis: PCDs  Code Status: Full Code    Disposition: Discharge date unclear, pending she remains afebrile and infectious workup neg -- likely 2-3d. Likely looking at [facility]    Updated patient's son who was at bedside today.    Whitney Rich     Interval History   Overnight events noted -- had fever with Tmax 101.1. Treated with Tylenol and has remained afebrile the rest of the day. Was able to participate with some oral cares with SLP earlier today. Seen this afternoon -- son Jovani at bedside. Patient lethargic and minimally responsive during my visit -- not following commands but was moving fingers on R hand.    -Data reviewed today: I reviewed all new labs and imaging results over the last 24 hours.  I personally reviewed no images or EKG's today.    Physical Exam   Temp: 97.9  F (36.6  C) Temp src: Axillary BP: 101/62   Heart Rate: 80 Resp: 16 SpO2: 95 % O2 Device: None (Room air)    Vitals:    09/15/17 0619 09/16/17 0300 09/17/17 0400   Weight: 40.9 kg (90 lb 2.7 oz) 39.9 kg (87 lb 15.4 oz) 40 kg (88 lb 2.9 oz)     Vital Signs with Ranges  Temp:  [97.9  F (36.6  C)-101.1  F (38.4  C)] 97.9  F (36.6  C)  Heart Rate:  [] 80  Resp:  [16-18] 16  BP: (101-137)/(62-84) 101/62  SpO2:  [94 %-95 %] 95 %  I/O last 3 completed shifts:  In: 380 [NG/GT:380]  Out: 1375 [Urine:1375]    Constitutional: Resting comfortably, essenitally non-responsive, not following commands during my visit  Respiratory: CTA through anterior fields, no wheeze or coarse BS  Cardiovascular: HRRR, no MGR, no LE edema  GI: S, NT, ND, +BS, +PEG  Skin/Integumen: warm/dry  Other:      Medications        metoprolol  25 mg Oral BID     amLODIPine  10 mg Oral or Feeding Tube Daily     ramipril  10 mg Oral or Feeding Tube Daily     furosemide  20 mg Oral or Feeding Tube Daily     fiber modular  1 packet Per Feeding Tube BID     levofloxacin  500 mg Per Feeding Tube Daily     levothyroxine  50 mcg Oral or Feeding Tube QAM AC     sodium chloride (PF)  3 mL Intracatheter Q8H     pantoprazole  40 mg Per Feeding Tube Daily       Data     Recent Labs  Lab 09/17/17  0805 09/16/17  1842 09/16/17  0740 09/14/17  0813  09/13/17  0505 09/11/17  0716   WBC 18.9*  --  14.8*  --   --  11.7* 13.1*   HGB 14.0  --  13.9  --   --  13.4 13.5   MCV 95  --  93  --   --  91 89     --  353  --   --  278 297   INR  --   --   --   --   --   --  1.11   NA  --   --  137 140  --  138 135   POTASSIUM  --  4.1 3.1* 3.7  < > 3.3* 3.6   CHLORIDE  --   --  98 103  --  102 102   CO2  --   --  32 28  --  30 24   BUN  --   --  29 21  --  17 10   CR  --   --  0.62 0.54  --  0.50* 0.41*   ANIONGAP  --   --  7 9  --  6 9   ANTHONY  --   --  9.9 9.7  --  9.3 9.1   GLC  --   --  138*  152*  --  145* 114*   < > = values in this interval not displayed.    Recent Results (from the past 24 hour(s))   XR Chest Port 1 View    Narrative    XR CHEST PORT 1 VW  9/16/2017 3:47 PM     HISTORY:  evaluate for new/worsening infiltrate    COMPARISON: Film dated 9/12/2017    FINDINGS:  The heart is normal in size. Left basilar opacity has  improved since the previous film. The left lung base is now  essentially clear. Aorta is tortuous. Right lung is clear.      Impression    IMPRESSION: There is been clearing of the left basilar opacity. No  significant residual infiltrate is seen.    MORENO MANZANO MD

## 2017-09-18 ENCOUNTER — APPOINTMENT (OUTPATIENT)
Dept: SPEECH THERAPY | Facility: CLINIC | Age: 82
DRG: 064 | End: 2017-09-18
Payer: MEDICARE

## 2017-09-18 ENCOUNTER — APPOINTMENT (OUTPATIENT)
Dept: PHYSICAL THERAPY | Facility: CLINIC | Age: 82
DRG: 064 | End: 2017-09-18
Payer: MEDICARE

## 2017-09-18 LAB
ANION GAP SERPL CALCULATED.3IONS-SCNC: 1 MMOL/L (ref 3–14)
BACTERIA SPEC CULT: ABNORMAL
BUN SERPL-MCNC: 39 MG/DL (ref 7–30)
CALCIUM SERPL-MCNC: 9.7 MG/DL (ref 8.5–10.1)
CHLORIDE SERPL-SCNC: 113 MMOL/L (ref 94–109)
CO2 SERPL-SCNC: 35 MMOL/L (ref 20–32)
CREAT SERPL-MCNC: 0.67 MG/DL (ref 0.52–1.04)
ERYTHROCYTE [DISTWIDTH] IN BLOOD BY AUTOMATED COUNT: 13.5 % (ref 10–15)
GFR SERPL CREATININE-BSD FRML MDRD: 83 ML/MIN/1.7M2
GLUCOSE SERPL-MCNC: 137 MG/DL (ref 70–99)
HCT VFR BLD AUTO: 44.9 % (ref 35–47)
HGB BLD-MCNC: 14.6 G/DL (ref 11.7–15.7)
LACTATE BLD-SCNC: 1.6 MMOL/L (ref 0.7–2)
Lab: ABNORMAL
MAGNESIUM SERPL-MCNC: 3.2 MG/DL (ref 1.6–2.3)
MCH RBC QN AUTO: 31.3 PG (ref 26.5–33)
MCHC RBC AUTO-ENTMCNC: 32.5 G/DL (ref 31.5–36.5)
MCV RBC AUTO: 96 FL (ref 78–100)
PHOSPHATE SERPL-MCNC: 2.6 MG/DL (ref 2.5–4.5)
PLATELET # BLD AUTO: 387 10E9/L (ref 150–450)
POTASSIUM SERPL-SCNC: 3.9 MMOL/L (ref 3.4–5.3)
PREALB SERPL IA-MCNC: 32 MG/DL (ref 15–45)
PROCALCITONIN SERPL-MCNC: 0.08 NG/ML
RBC # BLD AUTO: 4.67 10E12/L (ref 3.8–5.2)
SODIUM SERPL-SCNC: 149 MMOL/L (ref 133–144)
SPECIMEN SOURCE: ABNORMAL
WBC # BLD AUTO: 19.2 10E9/L (ref 4–11)

## 2017-09-18 PROCEDURE — 25000132 ZZH RX MED GY IP 250 OP 250 PS 637: Mod: GY | Performed by: HOSPITALIST

## 2017-09-18 PROCEDURE — 25000125 ZZHC RX 250

## 2017-09-18 PROCEDURE — 80048 BASIC METABOLIC PNL TOTAL CA: CPT | Performed by: INTERNAL MEDICINE

## 2017-09-18 PROCEDURE — 97530 THERAPEUTIC ACTIVITIES: CPT | Mod: GP | Performed by: PHYSICAL THERAPIST

## 2017-09-18 PROCEDURE — A9270 NON-COVERED ITEM OR SERVICE: HCPCS | Mod: GY | Performed by: PSYCHIATRY & NEUROLOGY

## 2017-09-18 PROCEDURE — A9270 NON-COVERED ITEM OR SERVICE: HCPCS | Mod: GY | Performed by: HOSPITALIST

## 2017-09-18 PROCEDURE — 40000193 ZZH STATISTIC PT WARD VISIT: Performed by: PHYSICAL THERAPIST

## 2017-09-18 PROCEDURE — A9270 NON-COVERED ITEM OR SERVICE: HCPCS | Mod: GY

## 2017-09-18 PROCEDURE — A9270 NON-COVERED ITEM OR SERVICE: HCPCS | Mod: GY | Performed by: INTERNAL MEDICINE

## 2017-09-18 PROCEDURE — 83735 ASSAY OF MAGNESIUM: CPT | Performed by: INTERNAL MEDICINE

## 2017-09-18 PROCEDURE — 84134 ASSAY OF PREALBUMIN: CPT | Performed by: INTERNAL MEDICINE

## 2017-09-18 PROCEDURE — 99233 SBSQ HOSP IP/OBS HIGH 50: CPT | Performed by: INTERNAL MEDICINE

## 2017-09-18 PROCEDURE — 25000132 ZZH RX MED GY IP 250 OP 250 PS 637: Mod: GY | Performed by: INTERNAL MEDICINE

## 2017-09-18 PROCEDURE — 25000132 ZZH RX MED GY IP 250 OP 250 PS 637: Mod: GY | Performed by: PSYCHIATRY & NEUROLOGY

## 2017-09-18 PROCEDURE — 12000000 ZZH R&B MED SURG/OB

## 2017-09-18 PROCEDURE — 85027 COMPLETE CBC AUTOMATED: CPT | Performed by: INTERNAL MEDICINE

## 2017-09-18 PROCEDURE — 83605 ASSAY OF LACTIC ACID: CPT | Performed by: INTERNAL MEDICINE

## 2017-09-18 PROCEDURE — 92526 ORAL FUNCTION THERAPY: CPT | Mod: GN | Performed by: SPEECH-LANGUAGE PATHOLOGIST

## 2017-09-18 PROCEDURE — 40000225 ZZH STATISTIC SLP WARD VISIT: Performed by: SPEECH-LANGUAGE PATHOLOGIST

## 2017-09-18 PROCEDURE — 84145 PROCALCITONIN (PCT): CPT | Performed by: INTERNAL MEDICINE

## 2017-09-18 PROCEDURE — 99231 SBSQ HOSP IP/OBS SF/LOW 25: CPT | Performed by: PSYCHIATRY & NEUROLOGY

## 2017-09-18 PROCEDURE — 36415 COLL VENOUS BLD VENIPUNCTURE: CPT | Performed by: INTERNAL MEDICINE

## 2017-09-18 PROCEDURE — 84100 ASSAY OF PHOSPHORUS: CPT | Performed by: INTERNAL MEDICINE

## 2017-09-18 PROCEDURE — 25000132 ZZH RX MED GY IP 250 OP 250 PS 637: Mod: GY

## 2017-09-18 RX ADMIN — Medication 1 PACKET: at 20:17

## 2017-09-18 RX ADMIN — LEVOFLOXACIN 500 MG: 500 TABLET, FILM COATED ORAL at 08:57

## 2017-09-18 RX ADMIN — METOPROLOL TARTRATE 25 MG: 100 TABLET ORAL at 20:16

## 2017-09-18 RX ADMIN — LEVOTHYROXINE SODIUM 50 MCG: 50 TABLET ORAL at 06:40

## 2017-09-18 RX ADMIN — RAMIPRIL 10 MG: 10 CAPSULE ORAL at 20:17

## 2017-09-18 RX ADMIN — AMLODIPINE BESYLATE 10 MG: 10 TABLET ORAL at 08:58

## 2017-09-18 RX ADMIN — METOPROLOL TARTRATE 25 MG: 100 TABLET ORAL at 10:00

## 2017-09-18 RX ADMIN — Medication 1 PACKET: at 08:58

## 2017-09-18 RX ADMIN — Medication 40 MG: at 08:58

## 2017-09-18 ASSESSMENT — VISUAL ACUITY
OU: NOT TESTABLE

## 2017-09-18 NOTE — PLAN OF CARE
Problem: Goal Outcome Summary  Goal: Goal Outcome Summary  Outcome: No Change  lethargic. Left facial droop, LUE/LLE hemiplegia. MONICA most neuro due to not following command. HR tachy. NPO diet. Tube feeding running at 35ml/hr. 100ml water flush q 4hrs. Up with lift but was bedrest. Turned and repositioned q 2hrs. Incontinent of stool. Oral care done by daughter at bedside. MONICA pain but appears comfortable. Ramsay o/p 300cc.  Plan d/c to [facility] pending.

## 2017-09-18 NOTE — PROGRESS NOTES
CLINICAL NUTRITION SERVICES - REASSESSMENT NOTE      EVALUATION OF PROGRESS TOWARD GOALS   Diet:  NPO, SLP is following.     Nutrition Support Enteral:  Type of Feeding Tube: G-tube (placed 9/11)  Enteral Frequency:  Continuous  Enteral Regimen: Isosource 1.5 at goal of 35 ml/hr  Total Enteral Provisions: 1260 kcals (31 kcal/kg), 57 gm pro (1.4 gm/kg), 638 mL H20, 13 gm fiber, 148 gm CHO  Nutrisource Fiber, 1 packet BID: Add'l 6 gm fiber. Total fiber = 19 gm fiber/day  Free Water Flush: 100 mL every 4 hours. Total fluid (TF + flushes) = 1250 mL/day    Intake/tolerance:  BM x 2-3 daily, loose. Per nsg notes, tolerate TF well.    9/8:  ND FT placed = Looped in the stomach, feeding not initiated  9/9:  Begin TF via ND  9/10: FT clogged and feeding stopped  9/11: G-tube placed, 14 FR. Feeding resumed at 15/hr  9/13: Reached goal of 35/hr.      ASSESSED NUTRITION: Dosing Weight:  40.3 kg (9/9)   Estimated Energy Needs: 5315-7027 kcals (30-35 Kcal/Kg) - underweight  Estimated Protein Needs:  48-60 grams protein (1.2-1.5 g pro/Kg) - Repletion  Estimated Fluid Needs:  7242-4726 mL (1 mL/Kcal) - maintenance      NEW FINDINGS:   Family conference held 9/15, they wish for restorative cares.  Plan d/c when bed available at [facility].  \  Vitals:    09/06/17 1309 09/06/17 1530 09/07/17 0415 09/08/17 0000   Weight: 40.5 kg (89 lb 4.6 oz) 42 kg (92 lb 9.5 oz) 40.4 kg (89 lb 1.1 oz) 40.3 kg (88 lb 13.5 oz)    09/09/17 0600 09/11/17 0540 09/12/17 0300 09/13/17 0704   Weight: 40.3 kg (88 lb 13.5 oz) 41 kg (90 lb 6.2 oz) 41.6 kg (91 lb 11.4 oz) 41.6 kg (91 lb 11.4 oz)    09/14/17 0545 09/15/17 0619 09/16/17 0300 09/17/17 0400   Weight: 41.2 kg (90 lb 13.3 oz) 40.9 kg (90 lb 2.7 oz) 39.9 kg (87 lb 15.4 oz) 40 kg (88 lb 2.9 oz)    09/18/17 0300   Weight: 40.2 kg (88 lb 10 oz)         Previous Goals:   Isosource 1.5 @ 35 mL/hr will continue to meet needs  Evaluation: Met    Previous Nutrition Diagnosis:   None identified      CURRENT  NUTRITION DIAGNOSIS  No nutrition diagnosis identified at this time     INTERVENTIONS  Recommendations / Nutrition Prescription  Continue current TF and H2O flushes    Implementation  No interventions at this time    Goals  Isosource 1.5 @ 35 mL/hr will continue to meet needs      MONITORING AND EVALUATION:  Progress towards goals will be monitored and evaluated per protocol and Practice Guidelines    Gissell Doe RD  Pager [phone] (M-F)            [phone] (W/E & Hol)      '

## 2017-09-18 NOTE — PLAN OF CARE
Problem: Goal Outcome Summary  Goal: Goal Outcome Summary  Outcome: No Change  Neuros unchanged.  Does not follow commands, no speech.  Eye movement noted center to right.  Moves RUE spontaneously, no movement noted in other extremities.  TF at goal rate, flushes increased per MD for elevated NA.  Incontinent of bowel.  Ramsay in place, leave in per MD.  Turned & repositioned Q 2 hours, oral cares.  Family involved and updated.  Plan for LTC when ready.

## 2017-09-18 NOTE — PLAN OF CARE
Problem: Goal Outcome Summary  Goal: Goal Outcome Summary  Outcome: No Change  Pt somnolent, non-verbal. Unable to assess full neuro's. Left facial droop, LUE/LLE hemiplegia, RUE/RLE hemiparesis. Able to squeeze right hand at times but not on command, does not open eyes. Afebrile and BP within parameters, tachycardic with 's-110's. LS diminished with crackles to bilateral lower lobes. BR, turned and repositioned every 2 hours. NPO, frequent oral cares completed. TF infusing at goal rate of 35 ml/hr with 100ml flushes q4hrs, gtube site c/d/i. Sobia patent. D/c pending, bed on hold at [facility]. Will continue to monitor.

## 2017-09-18 NOTE — PLAN OF CARE
Problem: Goal Outcome Summary  Goal: Goal Outcome Summary  Outcome: No Change  Lethargic, non-verbal, not following commands. Able to move RUE. Not tracking with eyes. VSS. NPO, TF infusing. Flushes given as ordered. Turned and repositioned frequently. Ramsay catheter in place and patent. Continue to monitor.

## 2017-09-18 NOTE — PROGRESS NOTES
[facility]    Hospitalist Progress Note    Assessment & Plan   Sury Barnett is a 89 year old female with PMHx of hypertension and hypothyroidism who was admitted on 9/6/2017 with a large R intracranial hemorrhage.      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 Seen by neurosurgery, bleed nonsurgical. Likely the hemorrhage caused fall. CT next day showed increased size but then multiple subsequent CTs with stable hematoma, last one 9/11  -- repeat head CT 9/16 per neuro showed slight decrease in hemorrhage  -- clinical status has remained stable from neuro standpoint -- ongoing left sided hemiplegia, remains lethargic and has been only minimally able to participate with therapies  -- PT signed off on 9/18 as patient was unable to participate in therapy as she was not alert enought  -- had G-tube placed on 9/11, tolerating TFs at goal  -- goal SBP <140 -- mgmt as below     Multiple providers have had goals of care discussions with patient's daughters and son this stay. Family wishes for restorative cares, does not want to talk about pall care or hospice. Family continues to express concern over lack of nutrition during initial days of stay. Family does not wish to engage in discussions regarding patient's current status and whether it is due to nutrition (which they feel) vs underlying hemorrhage (which providers believe). Family hopes that patient will recover to a similar functional status as per prior to admission. They are not yet ready to discuss discharge planning, though once Na improves she will be medically stable to discharge.     Fever / Leukocytosis:  Has had lingering leukocytosis this stay. Improved and was down to 11.7 on 9/13, has since been trending up: 14.8 on 9/16 -- 18.9 on 9/17. Developed fever 9/17 PM with Tmax 101.1. Further  infectious workup pursued.  -- 9/16 UA bland and w/o evidence of UTI, 9/16 CXR showed resolution of infiltrate  -- blood cultures drawn 9/17 and remain neg thus far  -- serial procalcitonins neg  -- serial lactates remain nl  -- no evidence for infection around PEG tube or peripheral IVs (has no central IV access)  -- completed 7d course of Levaquin on 9/18 as below  -- no recurrence of fever though leukocytosis persists --  etiology not completely clear ??dt above    Possible aspiration pneumonia and fluid overload: Improved  CXR on 9/12 showed bilateral interstitial infiltrates consistent with pneumonia and fluid overload -- had been on IVF since admission on 9/6. IVFs dc'd. One dose of lasix 20mg IV given on 9/12 then maintained on lasix to 20mg PO daily on 9/14 given via tube. IV Levaquin started on 9/12 (given PCV allergy).  -- on Lasix 20mg po daily -- no evidence of ongoing infiltrate/edema on 9/16 CXR so Lasix stopped 9/17  -- cont Levaquin -- completed 7d course of treatment on 9/18  -- remains stable form a respiratory standpoint -- maintaining sats on RA     Benign essential hypertension.   On lisinopril prior to admission.   -- meds adjusted this stay -- now on Ramipril 10mg daily and Norvasc 10mg daily, Metoprolol 25mg BID added 9/16 given tachycardia and hypertension  -- goal BP <140 systolic      Hypothyroidism.   Chronic and stable on levothyroxine    Hypernatremia:  Na 149 on 9/18 while on TFs.  -- have increased free water flushes from 100ml q4h to 250ml q6h  -- repeat BMP in AM     FEN: no IVFs, lytes stable, on TFs via PEG as above  DVT Prophylaxis: PCDs  Code Status: Full Code    Disposition: Anticipate she will be medically ready to discharge in the next 1-3d, pending improvement in Na and no recurrence of fevers. Likely looking at LTC.     No family at beside at present -- will touch base with them when they return to hospital later this afternoon.     Whitney Grossman  History   Overnight events noted -- had fever with Tmax 101.1. Treated with Tylenol and has remained afebrile the rest of the day. Was able to participate with some oral cares with SLP earlier today. Seen this afternoon -- son Jovani at bedside. Patient lethargic and minimally responsive during my visit -- not following commands but was moving fingers on R hand.    -Data reviewed today: I reviewed all new labs and imaging results over the last 24 hours. I personally reviewed no images or EKG's today.    Physical Exam   Temp: 98  F (36.7  C) Temp src: Axillary BP: 113/68   Heart Rate: 91 Resp: 16 SpO2: 97 % O2 Device: None (Room air)    Vitals:    09/16/17 0300 09/17/17 0400 09/18/17 0300   Weight: 39.9 kg (87 lb 15.4 oz) 40 kg (88 lb 2.9 oz) 40.2 kg (88 lb 10 oz)     Vital Signs with Ranges  Temp:  [97.4  F (36.3  C)-98.2  F (36.8  C)] 98  F (36.7  C)  Heart Rate:  [] 91  Resp:  [16] 16  BP: (111-143)/(68-82) 113/68  SpO2:  [94 %-97 %] 97 %  I/O last 3 completed shifts:  In: 430 [NG/GT:430]  Out: 1050 [Urine:1050]    Constitutional: Resting comfortably, essenitally non-responsive, not following commands during my visit  Respiratory: CTA through anterior fields, no wheeze or coarse BS  Cardiovascular: HRRR, no MGR, no LE edema  GI: S, NT, ND, +BS, +PEG  Skin/Integumen: warm/dry  Other:      Medications        metoprolol  25 mg Oral or Feeding Tube BID     amLODIPine  10 mg Oral or Feeding Tube Daily     ramipril  10 mg Oral or Feeding Tube Daily     fiber modular  1 packet Per Feeding Tube BID     levofloxacin  500 mg Per Feeding Tube Daily     levothyroxine  50 mcg Oral or Feeding Tube QAM AC     sodium chloride (PF)  3 mL Intracatheter Q8H     pantoprazole  40 mg Per Feeding Tube Daily       Data     Recent Labs  Lab 09/18/17  0820 09/17/17  0805 09/16/17  1842 09/16/17  0740 09/14/17  0813   WBC 19.2* 18.9*  --  14.8*  --    HGB 14.6 14.0  --  13.9  --    MCV 96 95  --  93  --     344  --  353  --    NA  149*  --   --  137 140   POTASSIUM 3.9  --  4.1 3.1* 3.7   CHLORIDE 113*  --   --  98 103   CO2 35*  --   --  32 28   BUN 39*  --   --  29 21   CR 0.67  --   --  0.62 0.54   ANIONGAP 1*  --   --  7 9   ANTHONY 9.7  --   --  9.9 9.7   *  --   --  138* 152*       No results found for this or any previous visit (from the past 24 hour(s)).

## 2017-09-18 NOTE — PLAN OF CARE
Problem: Goal Outcome Summary  Goal: Goal Outcome Summary  Discharge Planner PT   Patient plan for discharge: LTC  Current status: Patient still unable to actively follow commands and stay alert enough to meaningfully participate in PT. Daughter educated about how to do AAROM for patient's R UE/LE as patient would tolerate and PROM for L UE/LE. Daughter also asking questions about positioning for comfort and education provided. Educated daughter that PT will no longer stay involved in patient's care currently as patient unable to participate. Educated daughter and nurse that MD could reorder PT if patient status changes and is following commands enough to partipate in the future. Daughter in agreement with new plan.  Barriers to return to prior living situation: dependent for all cares;  Recommendations for discharge: LTC  Rationale for recommendations: unable to participate actively in PT; unable to stay alert enough to participate; No current skilled needs; Patient only appropriate for nursing cares currently.       Entered by: Ashley Garcia 09/18/2017 11:05 AM

## 2017-09-18 NOTE — PROGRESS NOTES
Sw Progress Note  Chart Reviewed, Pt discussed in Interdisciplinary Rounds.   Calls placed and messages left with admissions dept over the weekend regarding bed for pt there.  Voice mail indicates that no admissions staff was on over the weekend.    Intervention:   SW called and left message to return call regarding bed availability regarding pt.      Plan: [facility] continuing  Anticipated discharge placement: [facility]  Barriers: bed confirmation

## 2017-09-18 NOTE — PLAN OF CARE
Problem: Goal Outcome Summary  Goal: Goal Outcome Summary  Discharge Planner SLP   Patient plan for discharge: Patient not able to state.   Current status: Patient continues to present with severe oral and pharyngeal dysphagia at bedside. Patient seen for swallow treatment with daughter and son at bedside. Patient with limited alertness. Poor neck and trunk control. Oral cares were provided and demonstrated a weak swallow response. Spoon was trialed with limited amount of nectar thick liquid on the spoon. Noted to have a bite reflex and decrease awareness of a spoon. So none given via th spoon. Very small amount of nectar thick liquids on a swab given. She demonstratrated signifcant delay in her swallow response and weak with reduced laryngeal elevation. Education provided to family on the continued high risk for silent aspiration and family should not give patient any trials or swabs with nectar thick liquids due to the high risk for aspiration. They verbalized understanding. Recommend: 1. Continue NPO with TF. 2. SLP will continue to follow daily for oral awareness and PO trials as indicated.   Barriers to return to prior living situation: Dysphagia and asphasia  Recommendations for discharge: LTC  Rationale for recommendations: Continue ST at the LTC for oral and PO readiness.        Entered by: Rosie gAuero 09/18/2017 10:31 AM

## 2017-09-18 NOTE — PLAN OF CARE
Problem: Goal Outcome Summary  Goal: Goal Outcome Summary  Physical Therapy Discharge Summary     Reason for therapy discharge:    No further expectations of functional progress.     Progress towards therapy goal(s). See goals on Care Plan in [facility] health record for goal details.  Goals not met.  Barriers to achieving goals:   unable to actively participate.     Therapy recommendation(s):    No further therapy is recommended.

## 2017-09-18 NOTE — PROGRESS NOTES
Chief complaint: s/p lobar ICH    Subjective: No salient clinical neurological events overnight.      Objective: B/P: B/P: 111/70, T: 97.4, P: 92, R: 16    Getting tube feed and repositioned so probably stimulated. Follows oculobulbar commands, eyes spontaneously half open, right gaze preference        Scheduled medications:   Current Facility-Administered Medications   Medication Dose Route Frequency     metoprolol  25 mg Oral or Feeding Tube BID     amLODIPine  10 mg Oral or Feeding Tube Daily     ramipril  10 mg Oral or Feeding Tube Daily     fiber modular  1 packet Per Feeding Tube BID     levofloxacin  500 mg Per Feeding Tube Daily     levothyroxine  50 mcg Oral or Feeding Tube QAM AC     sodium chloride (PF)  3 mL Intracatheter Q8H     pantoprazole  40 mg Per Feeding Tube Daily       Infusions:       PRN medications:   Current Facility-Administered Medications   Medication Dose Route Frequency     lidocaine 4%   Topical Q1H PRN     acetaminophen  650 mg Per Feeding Tube Q4H PRN     potassium phosphate (KPHOS) in D5W IV  15 mmol Intravenous Daily PRN     potassium phosphate (KPHOS) in D5W IV  20 mmol Intravenous Q6H PRN     potassium phosphate (KPHOS) in D5W IV  20 mmol Intravenous Q6H PRN     potassium phosphate (KPHOS) in D5W IV  25 mmol Intravenous Q8H PRN     hydrALAZINE  10 mg Intravenous Q4H PRN     miconazole   Topical Q1H PRN     hypromellose-dextran  2-3 drop Ophthalmic Q1H PRN     labetalol  10-20 mg Intravenous Q4H PRN     lidocaine (buffered or not buffered)  1 mL Other Q1H PRN     potassium chloride  20-40 mEq Oral Q2H PRN     potassium chloride  20-40 mEq Oral or Feeding Tube Q2H PRN     potassium chloride  10 mEq Intravenous Q1H PRN     potassium chloride with lidocaine  10 mEq Intravenous Q1H PRN     potassium chloride  20 mEq Intravenous Q1H PRN     naloxone  0.1-0.4 mg Intravenous Q2 Min PRN     melatonin  1 mg Oral At Bedtime PRN     senna-docusate  1-2 tablet Oral BID PRN      polyethylene glycol  17 g Oral Daily PRN     bisacodyl  10 mg Rectal Daily PRN     ondansetron  4 mg Oral Q6H PRN    Or     ondansetron  4 mg Intravenous Q6H PRN         Salient interval testing/personally reviewed imaging:      na        IMP:        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       RECOMMENDATIONS/INTERVENTIONS:        No antiplatelets indefinitely.     Medical DVTP ok while inpatient but SCDs onced transferred to long term care facility.    Check f/u HCT r/o HCP on Fri or Sat; if no change, no further imaging indicated. CTH may be done from LTCF.     No concerns neurologically for transfer from acute care at any time. Follow up with Dr. De La Torre in 4 weeks post discharge.    Continued PT for passive range of motion

## 2017-09-18 NOTE — PROGRESS NOTES
SW:  D: Pt has had a LTC bed with rehab services on hold for past few days.  I: REGAN received call from Aliyah in admissions at [facility] inquiring if pt is ready to discharge today.  Per IDT rounds, pt is not ready to discharge today. REGAN updated Aliyah. She indicates that they would be able to hold bed until tomorrow for admission but beyond that, they would need to offer the bed to other individuals on their waiting list.  P: REGAN continuing.    ADDENDUM: REGAN met with pt and son, James in pt's room this afternoon. REGAN updated him regarding LTC bed at [facility] and that if pt does not discharge Tuesday, the bed will need to be given up for other pt's on [facility] waiting list. Son states that pt is not likely to discharge tomorrow and that pt is due to have imaging on Wednesday per doctor. REGAN discussed LTC/TCU with son; criteria, length of stay and financial implications. TCU list and website information given to James for his review. James repeated that pt would not be discharging tomorrow and he would not agree to discharge until he has some questions answered by doctor first. REGAN will check in with pt's daughter in the am regarding discharge planning per family request. REGAN continuing for discharge planning.

## 2017-09-19 ENCOUNTER — APPOINTMENT (OUTPATIENT)
Dept: OCCUPATIONAL THERAPY | Facility: CLINIC | Age: 82
DRG: 064 | End: 2017-09-19
Payer: MEDICARE

## 2017-09-19 LAB
ANION GAP SERPL CALCULATED.3IONS-SCNC: 5 MMOL/L (ref 3–14)
BUN SERPL-MCNC: 40 MG/DL (ref 7–30)
C DIFF TOX B STL QL: NEGATIVE
CALCIUM SERPL-MCNC: 9.2 MG/DL (ref 8.5–10.1)
CHLORIDE SERPL-SCNC: 105 MMOL/L (ref 94–109)
CO2 SERPL-SCNC: 34 MMOL/L (ref 20–32)
CREAT SERPL-MCNC: 0.64 MG/DL (ref 0.52–1.04)
ERYTHROCYTE [DISTWIDTH] IN BLOOD BY AUTOMATED COUNT: 13.3 % (ref 10–15)
GFR SERPL CREATININE-BSD FRML MDRD: 88 ML/MIN/1.7M2
GLUCOSE SERPL-MCNC: 123 MG/DL (ref 70–99)
HCT VFR BLD AUTO: 43.1 % (ref 35–47)
HGB BLD-MCNC: 14.1 G/DL (ref 11.7–15.7)
MCH RBC QN AUTO: 31.2 PG (ref 26.5–33)
MCHC RBC AUTO-ENTMCNC: 32.7 G/DL (ref 31.5–36.5)
MCV RBC AUTO: 95 FL (ref 78–100)
PLATELET # BLD AUTO: 374 10E9/L (ref 150–450)
POTASSIUM SERPL-SCNC: 3.6 MMOL/L (ref 3.4–5.3)
PROCALCITONIN SERPL-MCNC: 0.08 NG/ML
RBC # BLD AUTO: 4.52 10E12/L (ref 3.8–5.2)
SODIUM SERPL-SCNC: 144 MMOL/L (ref 133–144)
SPECIMEN SOURCE: NORMAL
WBC # BLD AUTO: 20.2 10E9/L (ref 4–11)

## 2017-09-19 PROCEDURE — A9270 NON-COVERED ITEM OR SERVICE: HCPCS | Mod: GY | Performed by: PSYCHIATRY & NEUROLOGY

## 2017-09-19 PROCEDURE — 36415 COLL VENOUS BLD VENIPUNCTURE: CPT | Performed by: INTERNAL MEDICINE

## 2017-09-19 PROCEDURE — 84145 PROCALCITONIN (PCT): CPT | Performed by: INTERNAL MEDICINE

## 2017-09-19 PROCEDURE — 25000132 ZZH RX MED GY IP 250 OP 250 PS 637: Mod: GY | Performed by: HOSPITALIST

## 2017-09-19 PROCEDURE — A9270 NON-COVERED ITEM OR SERVICE: HCPCS | Mod: GY

## 2017-09-19 PROCEDURE — 40000133 ZZH STATISTIC OT WARD VISIT: Performed by: OCCUPATIONAL THERAPY ASSISTANT

## 2017-09-19 PROCEDURE — 25000132 ZZH RX MED GY IP 250 OP 250 PS 637: Mod: GY

## 2017-09-19 PROCEDURE — 99231 SBSQ HOSP IP/OBS SF/LOW 25: CPT | Performed by: PSYCHIATRY & NEUROLOGY

## 2017-09-19 PROCEDURE — A9270 NON-COVERED ITEM OR SERVICE: HCPCS | Mod: GY | Performed by: INTERNAL MEDICINE

## 2017-09-19 PROCEDURE — 12000000 ZZH R&B MED SURG/OB

## 2017-09-19 PROCEDURE — 25000132 ZZH RX MED GY IP 250 OP 250 PS 637: Mod: GY | Performed by: INTERNAL MEDICINE

## 2017-09-19 PROCEDURE — 25000125 ZZHC RX 250

## 2017-09-19 PROCEDURE — 97110 THERAPEUTIC EXERCISES: CPT | Mod: GO | Performed by: OCCUPATIONAL THERAPY ASSISTANT

## 2017-09-19 PROCEDURE — 99233 SBSQ HOSP IP/OBS HIGH 50: CPT | Performed by: INTERNAL MEDICINE

## 2017-09-19 PROCEDURE — 87493 C DIFF AMPLIFIED PROBE: CPT | Performed by: INTERNAL MEDICINE

## 2017-09-19 PROCEDURE — 27210429 ZZH NUTRITION PRODUCT INTERMEDIATE LITER

## 2017-09-19 PROCEDURE — 85027 COMPLETE CBC AUTOMATED: CPT | Performed by: INTERNAL MEDICINE

## 2017-09-19 PROCEDURE — 25000132 ZZH RX MED GY IP 250 OP 250 PS 637: Mod: GY | Performed by: PSYCHIATRY & NEUROLOGY

## 2017-09-19 PROCEDURE — A9270 NON-COVERED ITEM OR SERVICE: HCPCS | Mod: GY | Performed by: HOSPITALIST

## 2017-09-19 PROCEDURE — 97535 SELF CARE MNGMENT TRAINING: CPT | Mod: GO | Performed by: OCCUPATIONAL THERAPY ASSISTANT

## 2017-09-19 PROCEDURE — 80048 BASIC METABOLIC PNL TOTAL CA: CPT | Performed by: INTERNAL MEDICINE

## 2017-09-19 RX ORDER — POLYETHYLENE GLYCOL 3350 17 G/17G
17 POWDER, FOR SOLUTION ORAL DAILY PRN
Status: DISCONTINUED | OUTPATIENT
Start: 2017-09-19 | End: 2017-09-21

## 2017-09-19 RX ADMIN — Medication 1 PACKET: at 20:54

## 2017-09-19 RX ADMIN — METOPROLOL TARTRATE 25 MG: 100 TABLET ORAL at 09:01

## 2017-09-19 RX ADMIN — LEVOTHYROXINE SODIUM 50 MCG: 50 TABLET ORAL at 06:37

## 2017-09-19 RX ADMIN — Medication 40 MG: at 16:20

## 2017-09-19 RX ADMIN — ACETAMINOPHEN 650 MG: 325 SOLUTION ORAL at 09:00

## 2017-09-19 RX ADMIN — RAMIPRIL 10 MG: 10 CAPSULE ORAL at 20:54

## 2017-09-19 RX ADMIN — METOPROLOL TARTRATE 25 MG: 100 TABLET ORAL at 20:57

## 2017-09-19 RX ADMIN — Medication 1 PACKET: at 09:03

## 2017-09-19 RX ADMIN — AMLODIPINE BESYLATE 10 MG: 10 TABLET ORAL at 09:01

## 2017-09-19 ASSESSMENT — VISUAL ACUITY: OU: BLURRED VISION

## 2017-09-19 NOTE — PROGRESS NOTES
Met with patient's three children this afternoon.    Recapped hospital stay and workup thus far. No evidence of present infection. Plan for head CT in AM. Discussed that she will be medically ready to discharge tomorrow. All medical questions were answered and they seemed to be in agreement with plan, including discharge once placement found.    SHAYY To also present during this conversation -- answered family's questions in regards to long term care bed. Family planning to provide additional options for referrals to SW in AM.     Total time spent with patient's family in discussion was nearly 60mins.    Whitney Rich,   Internal Medicine - Hospitalist  9/19/2017  5:02 PM

## 2017-09-19 NOTE — PLAN OF CARE
Problem: Goal Outcome Summary  Goal: Goal Outcome Summary  SLP: Attempted to see patient for swallow treatment, but was not appropriate. Will reschedule for 9/20/17.

## 2017-09-19 NOTE — PROGRESS NOTES
SPIRITUAL HEALTH SERVICES Progress Note  [facility]     stopped in to follow up with pt's family. Son Jovani was present. Jovani noted pt has been sleeping more deeply lately, but yesterday she touched her nose on command. Jovani is concerned about pt's elevated white blood count. Jovani appreciated 's follow-up.  has no formal plans to follow but will continue to check in with family as available or upon request.      Monica Eaton  Chaplain Resident

## 2017-09-19 NOTE — PROGRESS NOTES
SW:  D: Pt has elevated white count and is expected to remain in hospital today. Physician has indicated that a CT will be done.  I: SW received voice message from last evening from pt's daughter Crystal. SW returned call and left v-message with Crystal to return call. Awaiting return call.   SW contacted [facility] and updated that pt would not be discharging today. They have indicated that they are unable to hold this particular placement for pt but encouraged pt and family to check back when ready for discharge and they may have something else available.  P: SW continuing to follow for discharge planning.    Addendum: Pt's two daughter's and son met with physician and SW to discuss TCU/LTC and transition to LTC.  Family had questions regarding staffing, care, therapy at care facilities.Transition process from hospital to care center reviewed. Family agrees to review list of facilities and provide several options for referrals; recognizing that LTC may be more difficult to secure placement than TCU. Daughter states that she will go tour [facility]. SW contact information and hours of work given to each family member. Family has been notified that pt is ready for discharge tomorrow. Family informed that every effort will be made to secure placement for pt.

## 2017-09-19 NOTE — PLAN OF CARE
Problem: Goal Outcome Summary  Goal: Goal Outcome Summary  Discharge Planner OT   Patient plan for discharge: rehab  Current status: Pt total assist for facial hygiene, does not follow any commands to participate with task. Educated family on completing LUE PROM, which complete x all planes, LUE flaccid  Barriers to return to prior living situation: current level of assist  Recommendations for discharge: LTC per plan established by the Occupational Therapist  Rationale for recommendations:  no active participation; weakness; impaired activity tolerance; impaired functional mobility; needs total cares       Entered by: Aria Diaz 09/19/2017 10:58 AM   Per discussion with OTR will decrease to 2x/wk until pt more alert to participate

## 2017-09-19 NOTE — PROVIDER NOTIFICATION
Paged on-call hospitalist regarding UC results.    Dr. Marrero stated to not start antibiotics d/t low amount of bacteria found.

## 2017-09-19 NOTE — PROGRESS NOTES
[facility]    Hospitalist Progress Note    Assessment & Plan   Sury Barnett is a 89 year old female with PMHx of hypertension and hypothyroidism who was admitted on 9/6/2017 with a large R intracranial hemorrhage.      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 Seen by neurosurgery, bleed nonsurgical. Likely the hemorrhage caused fall. CT next day showed increased size but then multiple subsequent CTs with stable hematoma, last one 9/11  -- repeat head CT 9/16 per neuro showed slight decrease in hemorrhage  -- clinical status has remained stable from neuro standpoint -- ongoing left sided hemiplegia, remains lethargic and has been only minimally able to participate with therapies  -- neurology planning to repeat head CT on 9/20  -- PT signed off on 9/18 as patient was unable to participate in therapy as she was not alert enought  -- had G-tube placed on 9/11, tolerating TFs at goal  -- goal SBP <140 -- mgmt as below     Multiple providers have had goals of care discussions with patient's daughters and son this stay. Family wishes for restorative cares, does not want to talk about pall care or hospice. Family continues to express concern over lack of nutrition during initial days of stay. Family does not wish to engage in discussions regarding patient's current status and whether it is due to perceived lack of nutrition (which they feel) vs underlying hemorrhage. Family hopes that patient will recover to a similar functional status as per prior to admission. They are not yet ready to discuss discharge planning, though it is anticipate she will be medically stable for discharge in the next 1-2d.    Fever / Leukocytosis:  Has had lingering leukocytosis this stay. Improved and was down to 11.7 on 9/13, has since been trending up: 14.8 on 9/16 -- 18.9  -- 19.2 -- 20.2  on 9/19. Developed fever 9/17 PM with Tmax 101.1. Further infectious workup pursued but has been unrevealing.   -- 9/16 UA bland and w/o evidence of UTI (culture grew <10k enterococcus), 9/16 CXR showed resolution of infiltrate  -- blood cultures drawn 9/17 and remain neg thus far  -- serial procalcitonins neg  -- serial lactates remain nl  -- no evidence for infection around PEG tube or peripheral IVs (has no central IV access)  -- cdiff was neg on 9/13, on TFs and stools occ semi-formed -- will repeat cdiff today  -- completed 7d course of Levaquin on 9/18 as below (WBC began to bump while on abx)  -- no recurrence of fever though leukocytosis persists --  etiology not completely clear ??stress response dt above    Possible aspiration pneumonia and fluid overload: Improved  CXR on 9/12 showed bilateral interstitial infiltrates consistent with pneumonia and fluid overload -- had been on IVF since admission on 9/6. IVFs dc'd. One dose of lasix 20mg IV given on 9/12 then maintained on lasix to 20mg PO daily on 9/14 given via tube. IV Levaquin started on 9/12 (given PCV allergy).  -- managed with Lasix 20mg po daily beginning 9/14 -- no evidence of ongoing infiltrate/edema on 9/16 CXR so Lasix stopped 9/17  -- cont Levaquin -- completed 7d course of treatment on 9/18  -- remains stable form a respiratory standpoint -- maintaining sats on RA     Benign essential hypertension.   On lisinopril prior to admission.   -- meds adjusted this stay -- now on Ramipril 10mg daily and Norvasc 10mg daily, Metoprolol 25mg BID added 9/16 given tachycardia and hypertension  -- goal BP <140 systolic      Hypothyroidism.   Chronic and stable on levothyroxine    Hypernatremia: Resolved  Na 149 on 9/18 while on TFs.  -- increased free water flushes from 100ml q4h to 250ml q6h -- Na improved to 144 on 9/19     FEN: no IVFs, lytes stable, on TFs via PEG as above  DVT Prophylaxis: PCDs  Code Status: Full Code    Disposition:  Anticipate she will be medically ready to discharge in the next 1-2d, pending stable white count and no recurrence of fevers. Likely looking at LTC.     Son Jovani at bedside today and updated.   Will plan to meet with family as a whole this afternoon when daughter Crystal visits.    Also discussed with bedside RN, CC and SW.    Whitney Rich     Interval History   Uneventful night. Seen this morning -- being bathed and repositioned by staff. Raises R arm and gives thumbs up but not able to follow commands, does not open eyes spontaneously.     -Data reviewed today: I reviewed all new labs and imaging results over the last 24 hours. I personally reviewed no images or EKG's today.    Physical Exam   Temp: 99.4  F (37.4  C) Temp src: Axillary BP: 111/69   Heart Rate: 105 Resp: 18 SpO2: 95 % O2 Device: None (Room air)    Vitals:    09/16/17 0300 09/17/17 0400 09/18/17 0300   Weight: 39.9 kg (87 lb 15.4 oz) 40 kg (88 lb 2.9 oz) 40.2 kg (88 lb 10 oz)     Vital Signs with Ranges  Temp:  [97.6  F (36.4  C)-99.4  F (37.4  C)] 99.4  F (37.4  C)  Heart Rate:  [] 105  Resp:  [16-18] 18  BP: (110-113)/(63-69) 111/69  SpO2:  [94 %-97 %] 95 %  I/O last 3 completed shifts:  In: 4230 [NG/GT:1350]  Out: 950 [Urine:950]    Constitutional: Resting comfortably, essenitally non-responsive, NAD  Neuro: Lethargic, does not open eyes to name, will give a thumbs up on the R and occ raise R arm but not presently following commands, no other spontaneous mvmt noted in extremities  Respiratory: CTA through anterior fields, no wheeze or coarse BS  Cardiovascular: HRRR, no MGR, no LE edema  GI: S, NT, ND, +BS, +PEG  Skin/Integumen: warm/dry  Other:      Medications        metoprolol  25 mg Oral or Feeding Tube BID     amLODIPine  10 mg Oral or Feeding Tube Daily     ramipril  10 mg Oral or Feeding Tube Daily     fiber modular  1 packet Per Feeding Tube BID     levothyroxine  50 mcg Oral or Feeding Tube QAM AC     sodium chloride (PF)   3 mL Intracatheter Q8H     pantoprazole  40 mg Per Feeding Tube Daily       Data     Recent Labs  Lab 09/19/17  0832 09/18/17  0820 09/17/17  0805 09/16/17  1842 09/16/17  0740 09/14/17  0813   WBC 20.2* 19.2* 18.9*  --  14.8*  --    HGB 14.1 14.6 14.0  --  13.9  --    MCV 95 96 95  --  93  --     387 344  --  353  --    NA  --  149*  --   --  137 140   POTASSIUM  --  3.9  --  4.1 3.1* 3.7   CHLORIDE  --  113*  --   --  98 103   CO2  --  35*  --   --  32 28   BUN  --  39*  --   --  29 21   CR  --  0.67  --   --  0.62 0.54   ANIONGAP  --  1*  --   --  7 9   ANTHONY  --  9.7  --   --  9.9 9.7   GLC  --  137*  --   --  138* 152*       No results found for this or any previous visit (from the past 24 hour(s)).

## 2017-09-19 NOTE — PLAN OF CARE
Problem: Goal Outcome Summary  Goal: Goal Outcome Summary  Outcome: No Change  Somnolent, will occasionally squeeze w/ R hand during assessments, aphasic. Neuros: L droop, HOMERO LL hemiplegia, generalized weakness.  VSS, SBP w/i parameters.  NPO, TF goal rate 35cc/hr, dressing CDI.  Mepilex to coccyx CDI.  Up with lift, q2 T/R.  Ramsay patent.  Denies pain.  Fam at bedside.  Plan for possible d/c today, nrsng cont to monitor.

## 2017-09-19 NOTE — PLAN OF CARE
Problem: Goal Outcome Summary  Goal: Goal Outcome Summary  Outcome: No Change  MONICA orientation, unable to speak. Left hemiplegia and BLE weakness, left facial droop. VSS. TF at goal, NPO with 250 flush q 4 hr. Turned and repositioned aq 2hr. Resting comfortably, family at bedside. Possible dc tomorrow pending family.

## 2017-09-19 NOTE — PLAN OF CARE
Problem: Goal Outcome Summary  Goal: Goal Outcome Summary  Outcome: No Change  Somnolent, MONICA orientation, does not open eyes, non verbal, intermittent grimacing, Tylenol given, otherwise non-verbal signs indicate patient comfortable and sleeping between cares. Intermittent tachy HR otherwise VSS, afebrile, sating mid to low 90's on RA. LS diminished, clear. Pt following very few commands, squeeze RUE hand and opens mouth with commands for oral care otherwise not following commands. Neuros stable, LUE/LLE hemiplegia, RUE/RLE hemiparesis, L facial droop. NPO, TF at goal rate 35mL/hr w/ 250mL/q4hr free h2o flushes, 15mL residual noted at noon. T&R, q2hrs, A2, lift. Freq oral care. Ramsay patent, adequate output. Loose med-large stool x2, Enteric precautions initiated, stool sample collected, r/o CDiff pending. Mepilex dressing changed to coccyx today, pink blanchable.  Na+ 144 down from 149. WBC 20.2 increase from 19.2. Bilateral hearing aids in place, family changed batteries today. Repeat CT scheduled tomorrow AM. TCU/LTC at time of discharge, possibly [facility] tomorrow???. Family support at bedside throughout shift. Nrsg will continue to monitor.

## 2017-09-19 NOTE — PROVIDER NOTIFICATION
Brief update:    <10K CFU enterococcus noted on UA.    No treatment at this time. Fever could be thermal dysregulation from intracranial hemorrhage.    Mickey Marrero MD  10:35 PM

## 2017-09-20 ENCOUNTER — APPOINTMENT (OUTPATIENT)
Dept: CT IMAGING | Facility: CLINIC | Age: 82
DRG: 064 | End: 2017-09-20
Attending: INTERNAL MEDICINE
Payer: MEDICARE

## 2017-09-20 LAB
ANION GAP SERPL CALCULATED.3IONS-SCNC: 4 MMOL/L (ref 3–14)
BUN SERPL-MCNC: 33 MG/DL (ref 7–30)
CALCIUM SERPL-MCNC: 9.2 MG/DL (ref 8.5–10.1)
CHLORIDE SERPL-SCNC: 107 MMOL/L (ref 94–109)
CO2 SERPL-SCNC: 34 MMOL/L (ref 20–32)
CREAT SERPL-MCNC: 0.59 MG/DL (ref 0.52–1.04)
ERYTHROCYTE [DISTWIDTH] IN BLOOD BY AUTOMATED COUNT: 13 % (ref 10–15)
GFR SERPL CREATININE-BSD FRML MDRD: >90 ML/MIN/1.7M2
GLUCOSE BLDC GLUCOMTR-MCNC: 141 MG/DL (ref 70–99)
GLUCOSE SERPL-MCNC: 134 MG/DL (ref 70–99)
HCT VFR BLD AUTO: 41.4 % (ref 35–47)
HGB BLD-MCNC: 13.8 G/DL (ref 11.7–15.7)
LACTATE BLD-SCNC: 1.5 MMOL/L (ref 0.7–2)
MCH RBC QN AUTO: 31.5 PG (ref 26.5–33)
MCHC RBC AUTO-ENTMCNC: 33.3 G/DL (ref 31.5–36.5)
MCV RBC AUTO: 95 FL (ref 78–100)
PLATELET # BLD AUTO: 394 10E9/L (ref 150–450)
POTASSIUM SERPL-SCNC: 3.6 MMOL/L (ref 3.4–5.3)
PROCALCITONIN SERPL-MCNC: 0.06 NG/ML
RBC # BLD AUTO: 4.38 10E12/L (ref 3.8–5.2)
SODIUM SERPL-SCNC: 145 MMOL/L (ref 133–144)
WBC # BLD AUTO: 22.5 10E9/L (ref 4–11)

## 2017-09-20 PROCEDURE — A9270 NON-COVERED ITEM OR SERVICE: HCPCS | Mod: GY | Performed by: HOSPITALIST

## 2017-09-20 PROCEDURE — 25000132 ZZH RX MED GY IP 250 OP 250 PS 637: Mod: GY | Performed by: INTERNAL MEDICINE

## 2017-09-20 PROCEDURE — 12000000 ZZH R&B MED SURG/OB

## 2017-09-20 PROCEDURE — A9270 NON-COVERED ITEM OR SERVICE: HCPCS | Mod: GY | Performed by: PSYCHIATRY & NEUROLOGY

## 2017-09-20 PROCEDURE — 25000125 ZZHC RX 250

## 2017-09-20 PROCEDURE — 36415 COLL VENOUS BLD VENIPUNCTURE: CPT | Performed by: INTERNAL MEDICINE

## 2017-09-20 PROCEDURE — 70450 CT HEAD/BRAIN W/O DYE: CPT

## 2017-09-20 PROCEDURE — 25000132 ZZH RX MED GY IP 250 OP 250 PS 637: Mod: GY | Performed by: PSYCHIATRY & NEUROLOGY

## 2017-09-20 PROCEDURE — 84145 PROCALCITONIN (PCT): CPT | Performed by: INTERNAL MEDICINE

## 2017-09-20 PROCEDURE — 80048 BASIC METABOLIC PNL TOTAL CA: CPT | Performed by: INTERNAL MEDICINE

## 2017-09-20 PROCEDURE — 85027 COMPLETE CBC AUTOMATED: CPT | Performed by: INTERNAL MEDICINE

## 2017-09-20 PROCEDURE — 27210429 ZZH NUTRITION PRODUCT INTERMEDIATE LITER

## 2017-09-20 PROCEDURE — 99232 SBSQ HOSP IP/OBS MODERATE 35: CPT | Performed by: HOSPITALIST

## 2017-09-20 PROCEDURE — A9270 NON-COVERED ITEM OR SERVICE: HCPCS | Mod: GY | Performed by: INTERNAL MEDICINE

## 2017-09-20 PROCEDURE — 83605 ASSAY OF LACTIC ACID: CPT | Performed by: HOSPITALIST

## 2017-09-20 PROCEDURE — 25000132 ZZH RX MED GY IP 250 OP 250 PS 637: Mod: GY | Performed by: HOSPITALIST

## 2017-09-20 PROCEDURE — 00000146 ZZHCL STATISTIC GLUCOSE BY METER IP

## 2017-09-20 PROCEDURE — A9270 NON-COVERED ITEM OR SERVICE: HCPCS | Mod: GY

## 2017-09-20 PROCEDURE — 25000132 ZZH RX MED GY IP 250 OP 250 PS 637: Mod: GY

## 2017-09-20 RX ADMIN — RAMIPRIL 10 MG: 10 CAPSULE ORAL at 20:10

## 2017-09-20 RX ADMIN — ACETAMINOPHEN 650 MG: 325 SOLUTION ORAL at 08:54

## 2017-09-20 RX ADMIN — METOPROLOL TARTRATE 25 MG: 100 TABLET ORAL at 08:54

## 2017-09-20 RX ADMIN — METOPROLOL TARTRATE 25 MG: 100 TABLET ORAL at 20:10

## 2017-09-20 RX ADMIN — LEVOTHYROXINE SODIUM 50 MCG: 50 TABLET ORAL at 06:20

## 2017-09-20 RX ADMIN — Medication 1 PACKET: at 20:10

## 2017-09-20 RX ADMIN — AMLODIPINE BESYLATE 10 MG: 10 TABLET ORAL at 08:54

## 2017-09-20 RX ADMIN — Medication 40 MG: at 08:54

## 2017-09-20 RX ADMIN — Medication 1 PACKET: at 09:04

## 2017-09-20 NOTE — PLAN OF CARE
Problem: Goal Outcome Summary  Goal: Goal Outcome Summary  Outcome: No Change  Somnolent, unable to check orientation,family by bed side. Bilateral hearing aids.  Unable to neuro, left facial drop, LUE/LLE hemiplegia, RUE/RLE hemiparesis. VSS, RA. NPO tube feeding @ 35 ml/hr, 20 ml residual noted this shift.  Bedrest, turn q 2hr. Unable to assess pain, patient sleeping with no sign of pain. C-diff results negative, off C-diff precaution.  Ramsay patent.  Mepilex dressing on coccyx intact. Possible d/c tomorrow, repeat CT schedule for tomorrow.

## 2017-09-20 NOTE — PLAN OF CARE
Problem: Goal Outcome Summary  Goal: Goal Outcome Summary  SLP: Attempted to see the patient for swallow treatment, but is unable to participate. Patient has had limited to no participation this past week so will decrease to 3x/wk.

## 2017-09-20 NOTE — PLAN OF CARE
Problem: Goal Outcome Summary  Goal: Goal Outcome Summary  Outcome: No Change  MONICA orientation d/t pt is nonverbal and somnolent. Arouses to touch. VSS. Neuros intact except L hemiplegia, L facial droop, R hemiparesis. NPO, TF running at 35. Repo Q2. No evidences of pain. Ramsay patent. Son at bedside. Will continue to monitor.

## 2017-09-20 NOTE — PLAN OF CARE
Problem: Goal Outcome Summary  Goal: Goal Outcome Summary  Outcome: No Change  Somnolent, MONICA orientation, does not open eyes, non verbal, non-verbal signs indicate patient comfortable and sleeping between cares, Tylenol given. Intermittent tachy HR otherwise VSS, afebrile, sating mid to low 90's on RA. LS diminished, clear. Not following commands. Neuros unchanged, LUE/LLE hemiplegia, RUE/RLE hemiparesis, L facial droop. NPO, TF at goal rate 35mL/hr w/ 250mL/q4hr free h2o flushes. T&R, q2hrs, A2, lift. Freq oral care. +BS, loose BM this AM. Mepilex dressing changed to coccyx today, barrier cream applied, pink blanchable.  Na+ 145. WBC 22.5. Bilateral hearing aids in place. Repeat CT today. Discharge pending. Family at bedside throughout shift. Nrsg will continue to monitor.

## 2017-09-20 NOTE — PROGRESS NOTES
[facility]    Hospitalist Progress Note    Assessment & Plan   Sury Barnett is a 89 year old female with PMHx of hypertension and hypothyroidism who was admitted on 9/6/2017 with a large R intracranial hemorrhage.      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 Seen by neurosurgery, bleed nonsurgical. Likely the hemorrhage caused fall. CT next day showed increased size but then multiple subsequent CTs with stable hematoma, last one 9/11  -- repeat head CTs 9/16 and then 9/20 showed slight decrease in hemorrhage   -- clinical status has remained stable from neuro standpoint -- ongoing left sided hemiplegia, remains lethargic, PT signed off on 9/18 as patient was unable to participate in therapy as she was not alert enough.  -- had G-tube placed on 9/11, tolerating TFs at goal  -- goal SBP <140 -- mgmt as below     Multiple providers have had goals of care discussions with patient's daughters and son this stay. Family wishes for restorative cares, does not want to talk about palliative care or hospice. Family continues to express concern over lack of nutrition during initial days of stay. Family does not wish to engage in discussions regarding patient's current status and whether it is due to perceived lack of nutrition (which they feel) vs underlying hemorrhage. Family hopes that patient will recover to a similar functional status as per prior to admission. They are not yet ready to discuss discharge planning, though it is anticipate she will be medically stable for discharge in the next 1-2days once leucocytosis trends down. Daughter requesting second opinion, and transfer to [facility], which I discussed with Dr Olguin, recommending continuing current care and no further recommendation at other facility.    Fever / Leukocytosis:  Has had lingering  leukocytosis this stay. Improved and was down to 11.7 on 9/13, has since been trending up: 14.8 on 9/16 -- 18.9 -- 19.2 -- 20.2  on 9/19. Developed fever 9/17 PM with Tmax 101.1. Further infectious workup pursued but has been unrevealing.   -- 9/16 UA bland and w/o evidence of UTI (culture grew <10k enterococcus), 9/16 CXR showed resolution of infiltrate  -- blood cultures drawn 9/17 and remain neg thus far  -- serial procalcitonins neg  -- serial lactates remain nl  -- no evidence for infection around PEG tube or peripheral IVs (has no central IV access)  -- cdiff was neg on 9/13, on TFs and stools occ semi-formed -- will repeat cdiff today  -- completed 7d course of Levaquin on 9/18 as below (WBC began to bump while on abx)  -- no recurrence of fever though leukocytosis persists --  etiology not completely clear ??stress response dt above.    Possible aspiration pneumonia and fluid overload: Improved  CXR on 9/12 showed bilateral interstitial infiltrates consistent with pneumonia and fluid overload -- had been on IVF since admission on 9/6. IVFs dc'd. One dose of lasix 20mg IV given on 9/12 then maintained on lasix to 20mg PO daily on 9/14 given via tube. IV Levaquin started on 9/12 (given PCV allergy).  -- managed with Lasix 20mg po daily beginning 9/14 -- no evidence of ongoing infiltrate/edema on 9/16 CXR so Lasix stopped 9/17  -- cont Levaquin -- completed 7d course of treatment on 9/18  -- remains stable form a respiratory standpoint -- maintaining sats on RA     Benign essential hypertension.   On lisinopril prior to admission.   -- meds adjusted this stay -- now on Ramipril 10mg daily and Norvasc 10mg daily, Metoprolol 25mg BID added 9/16 given tachycardia and hypertension  -- goal BP <140 systolic      Hypothyroidism.   Chronic and stable on levothyroxine    Hypernatremia: Resolved  Na 149 on 9/18 while on TFs.  -- increased free water flushes from 100ml q4h to 250ml q6h -- Na improved to 144 on 9/19 and  145 today. Monitor urine output to ensure it is not DI.     FEN: no IVFs, lytes stable, on TFs via PEG as above  DVT Prophylaxis: PCDs  Code Status: Full Code    Disposition: Anticipate she will be medically ready to discharge in the next 1-2d, pending stable white count and no recurrence of fevers. Likely looking at LTC.     Daughter Dotty at bedside today and updated. Called another Crystal from patient's room, no answer, left message, she will be calling back/likely coming back to discuss plan.     Also discussed with bedside RN, CC and SW.    Michelle Manzano MD  Hospitalist    Interval History   Remains afebrile, uneventful night. Less responsive this am per RN, but now moving RUE and RLE, and some spontaneous eye opening.     -Data reviewed today: I reviewed all new labs and imaging results over the last 24 hours. I personally reviewed no images or EKG's today.    Physical Exam   Temp: 97.7  F (36.5  C) Temp src: Axillary BP: 111/59 Pulse: 80 Heart Rate: 96 Resp: 16 SpO2: 92 % O2 Device: None (Room air)    Vitals:    09/17/17 0400 09/18/17 0300 09/20/17 0658   Weight: 40 kg (88 lb 2.9 oz) 40.2 kg (88 lb 10 oz) 41.5 kg (91 lb 7.9 oz)     Vital Signs with Ranges  Temp:  [97.5  F (36.4  C)-98.6  F (37  C)] 97.7  F (36.5  C)  Pulse:  [] 80  Heart Rate:  [90-96] 96  Resp:  [16-18] 16  BP: (111-134)/(59-77) 111/59  SpO2:  [92 %-96 %] 92 %  I/O last 3 completed shifts:  In: 1430 [NG/GT:1430]  Out: 1300 [Urine:1300]    Constitutional: Resting comfortably, NAD  Neuro: Lethargic, does not open eyes to name, will give a thumbs up on the R and occ raise R arm but not presently following commands, no other spontaneous mvmt noted in extremities  Respiratory: CTA through anterior fields, no wheeze or coarse BS  Cardiovascular: HRRR, no MGR, no LE edema  GI: S, NT, ND, +BS, +PEG  Skin/Integumen: warm/dry      Medications        metoprolol  25 mg Oral or Feeding Tube BID     amLODIPine  10 mg Oral or Feeding Tube Daily      ramipril  10 mg Oral or Feeding Tube Daily     fiber modular  1 packet Per Feeding Tube BID     levothyroxine  50 mcg Oral or Feeding Tube QAM AC     sodium chloride (PF)  3 mL Intracatheter Q8H     pantoprazole  40 mg Per Feeding Tube Daily       Data     Recent Labs  Lab 09/20/17  0814 09/19/17  0832 09/18/17  0820   WBC 22.5* 20.2* 19.2*   HGB 13.8 14.1 14.6   MCV 95 95 96    374 387   * 144 149*   POTASSIUM 3.6 3.6 3.9   CHLORIDE 107 105 113*   CO2 34* 34* 35*   BUN 33* 40* 39*   CR 0.59 0.64 0.67   ANIONGAP 4 5 1*   ANTHONY 9.2 9.2 9.7   * 123* 137*       No results found for this or any previous visit (from the past 24 hour(s)).

## 2017-09-20 NOTE — PROGRESS NOTES
SW:  I: REGAN received call from pt's daughter, Crystal, requesting that SW make arrangements to discharge pt to [facility] for second opinion. When asked who she spoke with at the hospital, she stated the  told her to speak with the hospital SW's to set up a hospital change. Crystal believes that she needs to get pt out of [facility] system. REGAN described process for hospital to hospital transfer and suggested that she speak with hospitalist and begin there because it is essential that there is a receiving doctor to take over pt's care at [facility].  REGAN offered pt relations services which she declined stating that they are not going to be helpful. Crystal requests that hospitalist call her on her cell after 12:30pm today.  REGAN paged information out to hospitalist.  When questioned regarding the tour of [facility], she states that she decided to take pt to a different hospital.  P: REGAN continuing.

## 2017-09-20 NOTE — PROGRESS NOTES
Chief complaint: ICH    Subjective: No salient clinical neurological events overnight.      Objective:   Somnolent; snoring intermittently. Aroused with mild stim. Plegic left. LUMN facial plegia.        Scheduled medications:   Current Facility-Administered Medications   Medication Dose Route Frequency     metoprolol  25 mg Oral or Feeding Tube BID     amLODIPine  10 mg Oral or Feeding Tube Daily     ramipril  10 mg Oral or Feeding Tube Daily     fiber modular  1 packet Per Feeding Tube BID     levothyroxine  50 mcg Oral or Feeding Tube QAM AC     sodium chloride (PF)  3 mL Intracatheter Q8H     pantoprazole  40 mg Per Feeding Tube Daily       Infusions:       PRN medications:   Current Facility-Administered Medications   Medication Dose Route Frequency     polyethylene glycol  17 g Oral or Feeding Tube Daily PRN     melatonin  1 mg Per G Tube At Bedtime PRN     lidocaine 4%   Topical Q1H PRN     acetaminophen  650 mg Per Feeding Tube Q4H PRN     potassium phosphate (KPHOS) in D5W IV  15 mmol Intravenous Daily PRN     potassium phosphate (KPHOS) in D5W IV  20 mmol Intravenous Q6H PRN     potassium phosphate (KPHOS) in D5W IV  20 mmol Intravenous Q6H PRN     potassium phosphate (KPHOS) in D5W IV  25 mmol Intravenous Q8H PRN     hydrALAZINE  10 mg Intravenous Q4H PRN     miconazole   Topical Q1H PRN     hypromellose-dextran  2-3 drop Ophthalmic Q1H PRN     labetalol  10-20 mg Intravenous Q4H PRN     lidocaine (buffered or not buffered)  1 mL Other Q1H PRN     potassium chloride  20-40 mEq Oral Q2H PRN     potassium chloride  20-40 mEq Oral or Feeding Tube Q2H PRN     potassium chloride  10 mEq Intravenous Q1H PRN     potassium chloride with lidocaine  10 mEq Intravenous Q1H PRN     potassium chloride  20 mEq Intravenous Q1H PRN     naloxone  0.1-0.4 mg Intravenous Q2 Min PRN     senna-docusate  1-2 tablet Oral BID PRN     bisacodyl  10 mg Rectal Daily PRN     ondansetron  4 mg Oral Q6H PRN    Or     ondansetron  4 mg  Intravenous Q6H PRN         Salient interval testing/personally reviewed imaging:      none        IMP:         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       RECOMMENDATIONS/INTERVENTIONS:        No antiplatelets indefinitely.     Medical DVTP ok while inpatient but SCDs onced transferred to long term care facility.    Check f/u HCT r/o HCP on Fri or Sat; if no change, no further imaging indicated. CTH may be done from LTCF.     No concerns neurologically for transfer from acute care at any time. Follow up with Dr. De La Torre in 4 weeks post discharge.    Continued PT for passive range of motion

## 2017-09-21 LAB
BASOPHILS # BLD AUTO: 0 10E9/L (ref 0–0.2)
BASOPHILS NFR BLD AUTO: 0.1 %
DIFFERENTIAL METHOD BLD: ABNORMAL
EOSINOPHIL # BLD AUTO: 0.1 10E9/L (ref 0–0.7)
EOSINOPHIL NFR BLD AUTO: 0.5 %
ERYTHROCYTE [DISTWIDTH] IN BLOOD BY AUTOMATED COUNT: 13.1 % (ref 10–15)
HCT VFR BLD AUTO: 39.8 % (ref 35–47)
HGB BLD-MCNC: 13 G/DL (ref 11.7–15.7)
IMM GRANULOCYTES # BLD: 0.2 10E9/L (ref 0–0.4)
IMM GRANULOCYTES NFR BLD: 1.2 %
LYMPHOCYTES # BLD AUTO: 1.2 10E9/L (ref 0.8–5.3)
LYMPHOCYTES NFR BLD AUTO: 6 %
MCH RBC QN AUTO: 30.6 PG (ref 26.5–33)
MCHC RBC AUTO-ENTMCNC: 32.7 G/DL (ref 31.5–36.5)
MCV RBC AUTO: 94 FL (ref 78–100)
MONOCYTES # BLD AUTO: 0.7 10E9/L (ref 0–1.3)
MONOCYTES NFR BLD AUTO: 3.7 %
NEUTROPHILS # BLD AUTO: 17.3 10E9/L (ref 1.6–8.3)
NEUTROPHILS NFR BLD AUTO: 88.5 %
NRBC # BLD AUTO: 0 10*3/UL
NRBC BLD AUTO-RTO: 0 /100
PLATELET # BLD AUTO: 370 10E9/L (ref 150–450)
RBC # BLD AUTO: 4.25 10E12/L (ref 3.8–5.2)
RETICS # AUTO: 73.1 10E9/L (ref 25–95)
RETICS/RBC NFR AUTO: 1.7 % (ref 0.5–2)
SODIUM SERPL-SCNC: 142 MMOL/L (ref 133–144)
WBC # BLD AUTO: 19.6 10E9/L (ref 4–11)

## 2017-09-21 PROCEDURE — A9270 NON-COVERED ITEM OR SERVICE: HCPCS | Mod: GY | Performed by: INTERNAL MEDICINE

## 2017-09-21 PROCEDURE — A9270 NON-COVERED ITEM OR SERVICE: HCPCS | Mod: GY

## 2017-09-21 PROCEDURE — 25000132 ZZH RX MED GY IP 250 OP 250 PS 637: Mod: GY | Performed by: INTERNAL MEDICINE

## 2017-09-21 PROCEDURE — A9270 NON-COVERED ITEM OR SERVICE: HCPCS | Mod: GY | Performed by: HOSPITALIST

## 2017-09-21 PROCEDURE — 85060 BLOOD SMEAR INTERPRETATION: CPT | Performed by: HOSPITALIST

## 2017-09-21 PROCEDURE — 12000000 ZZH R&B MED SURG/OB

## 2017-09-21 PROCEDURE — 25000125 ZZHC RX 250

## 2017-09-21 PROCEDURE — 25000132 ZZH RX MED GY IP 250 OP 250 PS 637: Mod: GY

## 2017-09-21 PROCEDURE — 25000132 ZZH RX MED GY IP 250 OP 250 PS 637: Mod: GY | Performed by: HOSPITALIST

## 2017-09-21 PROCEDURE — A9270 NON-COVERED ITEM OR SERVICE: HCPCS | Mod: GY | Performed by: PSYCHIATRY & NEUROLOGY

## 2017-09-21 PROCEDURE — 84295 ASSAY OF SERUM SODIUM: CPT | Performed by: HOSPITALIST

## 2017-09-21 PROCEDURE — 85025 COMPLETE CBC W/AUTO DIFF WBC: CPT | Performed by: HOSPITALIST

## 2017-09-21 PROCEDURE — 99232 SBSQ HOSP IP/OBS MODERATE 35: CPT | Performed by: HOSPITALIST

## 2017-09-21 PROCEDURE — 99231 SBSQ HOSP IP/OBS SF/LOW 25: CPT | Performed by: PSYCHIATRY & NEUROLOGY

## 2017-09-21 PROCEDURE — 36415 COLL VENOUS BLD VENIPUNCTURE: CPT | Performed by: HOSPITALIST

## 2017-09-21 PROCEDURE — 25000132 ZZH RX MED GY IP 250 OP 250 PS 637: Mod: GY | Performed by: PSYCHIATRY & NEUROLOGY

## 2017-09-21 PROCEDURE — 85045 AUTOMATED RETICULOCYTE COUNT: CPT | Performed by: HOSPITALIST

## 2017-09-21 PROCEDURE — 40000847 ZZHCL STATISTIC MORPHOLOGY W/INTERP HISTOLOGY TC 85060: Performed by: HOSPITALIST

## 2017-09-21 RX ADMIN — Medication 40 MG: at 09:34

## 2017-09-21 RX ADMIN — METOPROLOL TARTRATE 25 MG: 100 TABLET ORAL at 20:28

## 2017-09-21 RX ADMIN — METOPROLOL TARTRATE 25 MG: 100 TABLET ORAL at 09:34

## 2017-09-21 RX ADMIN — ACETAMINOPHEN 650 MG: 325 SOLUTION ORAL at 09:34

## 2017-09-21 RX ADMIN — Medication 1 PACKET: at 20:28

## 2017-09-21 RX ADMIN — AMLODIPINE BESYLATE 10 MG: 10 TABLET ORAL at 09:37

## 2017-09-21 RX ADMIN — Medication 1 PACKET: at 09:37

## 2017-09-21 RX ADMIN — RAMIPRIL 10 MG: 10 CAPSULE ORAL at 20:27

## 2017-09-21 RX ADMIN — LEVOTHYROXINE SODIUM 50 MCG: 50 TABLET ORAL at 06:49

## 2017-09-21 NOTE — PROGRESS NOTES
CLINICAL NUTRITION SERVICES - REASSESSMENT NOTE      EVALUATION OF PROGRESS TOWARD GOALS   Diet:  NPO. SLP continues to follow.    Nutrition Support Enteral:  Type of Feeding Tube: G-tube (placed 9/11)  Enteral Frequency:  Continuous  Enteral Regimen: Isosource 1.5 at goal of 35 ml/hr  Total Enteral Provisions: 1260 kcals (31 kcal/kg), 57 gm pro (1.4 gm/kg), 638 mL H20, 13 gm fiber, 148 gm CHO  Nutrisource Fiber, 1 packet BID: Add'l 6 gm fiber. Total fiber = 19 gm fiber/day  Free Water Flush increased 9/18: 250 mL every 4 hours. Total fluid (TF + flushes) = 2100 mL/day    Intake/tolerance:  Pt continues to tolerate TF without issues. Some loose stools, c diff negative.    9/8:  ND FT placed = Looped in the stomach, feeding not initiated  9/9:  Begin TF via ND  9/10: FT clogged and feeding stopped  9/11: G-tube placed, 14 FR. Feeding resumed at 15/hr  9/13: Reached goal of 35/hr.    ASSESSED NUTRITION: Dosing Weight:  40.3 kg (9/9)   Estimated Energy Needs: 0451-2431 kcals (30-35 Kcal/Kg) - underweight  Estimated Protein Needs:  48-60 grams protein (1.2-1.5 g pro/Kg) - Repletion  Estimated Fluid Needs:  4673-5077 mL (1 mL/Kcal) - maintenance    NEW FINDINGS:   Current wt 40.9 kg, admit wt 40.5 kg  Per RN notes, coccyx pink, blanchable    Previous Goals:   Isosource 1.5 @ goal of 35 mL/hr will continue to meet needs  Evaluation: Met    Previous Nutrition Diagnosis:   None identified      CURRENT NUTRITION DIAGNOSIS  No nutrition diagnosis identified at this time     INTERVENTIONS  Recommendations / Nutrition Prescription  Continue current TF and H2O flushes    Implementation  No interventions at this time    Goals  Isosource 1.5 @ goal of 35 mL/hr will continue to meet needs      MONITORING AND EVALUATION:  Progress towards goals will be monitored and evaluated per protocol and Practice Guidelines    Gissell Doe RD  Pager [phone] (M-F)            [phone] ([facility] Hol)

## 2017-09-21 NOTE — PROGRESS NOTES
Spoke w/ Perlita Lee pt representative who would like to meet in person with pt's dtr Crystal about dtr's request.  Perlita requests Dr Manzano be there as well for medical information.  Perlita is available at 1100 or 1400.  Left message for Crystal to see if she is available either of these times.  Pt's other dtr Dotty was just arriving.  Relayed above to her.  She is heading out again soon to tour facilities.  Encouraged her to look at LTC beds and she has a list of facilities that she has researched and will tour.  She relates that another family member had a good experience at a LTC facility where she lived for 2 years but that was in another part of [place].  There weren't many choices and she is finding that the number of facilities in the [facility] is a little overwhelming but she is doing a good job of evaluating choices.    Dotty states that Crystal will be in later this am but she will also talk with her about meeting at 2:00pm.  Will await confirmation of that.  Spoke with Dr Manzano and tentative plan is 1400.    Addendum:  Crystal met w/ Dr Manzano and Perlita.  Tentative plan is dc tomorrow if WBC continues to trend down although Crystal would still prefer pt transfer to [facility].  This was not felt to be medically necessary by Dr aMnzano and pt was refused yesterday by their hospitalist when he made referral.  Pt needs LTC bed placement.  SW updated and will pursue referrals. CM will continue to assist w/ dc planning.

## 2017-09-21 NOTE — PROGRESS NOTES
Chief complaint: ICH     Subjective: No salient clinical neurological events overnight.        Objective: B/P: 127/69, T: 97.7, P: 80, R: 16  Somnolent; snoring intermittently. Aroused with mild stim. Plegic left. LUMN facial plegia.           Scheduled medications:          Current Facility-Administered Medications   Medication Dose Route Frequency     metoprolol  25 mg Oral or Feeding Tube BID     amLODIPine  10 mg Oral or Feeding Tube Daily     ramipril  10 mg Oral or Feeding Tube Daily     fiber modular  1 packet Per Feeding Tube BID     levothyroxine  50 mcg Oral or Feeding Tube QAM AC     sodium chloride (PF)  3 mL Intracatheter Q8H     pantoprazole  40 mg Per Feeding Tube Daily         Infusions:        PRN medications:          Current Facility-Administered Medications   Medication Dose Route Frequency     polyethylene glycol  17 g Oral or Feeding Tube Daily PRN     melatonin  1 mg Per G Tube At Bedtime PRN     lidocaine 4%    Topical Q1H PRN     acetaminophen  650 mg Per Feeding Tube Q4H PRN     potassium phosphate (KPHOS) in D5W IV  15 mmol Intravenous Daily PRN     potassium phosphate (KPHOS) in D5W IV  20 mmol Intravenous Q6H PRN     potassium phosphate (KPHOS) in D5W IV  20 mmol Intravenous Q6H PRN     potassium phosphate (KPHOS) in D5W IV  25 mmol Intravenous Q8H PRN     hydrALAZINE  10 mg Intravenous Q4H PRN     miconazole    Topical Q1H PRN     hypromellose-dextran  2-3 drop Ophthalmic Q1H PRN     labetalol  10-20 mg Intravenous Q4H PRN     lidocaine (buffered or not buffered)  1 mL Other Q1H PRN     potassium chloride  20-40 mEq Oral Q2H PRN     potassium chloride  20-40 mEq Oral or Feeding Tube Q2H PRN     potassium chloride  10 mEq Intravenous Q1H PRN     potassium chloride with lidocaine  10 mEq Intravenous Q1H PRN     potassium chloride  20 mEq Intravenous Q1H PRN     naloxone  0.1-0.4 mg Intravenous Q2 Min PRN     senna-docusate  1-2 tablet Oral BID PRN     bisacodyl  10 mg Rectal Daily PRN      ondansetron  4 mg Oral Q6H PRN     Or     ondansetron  4 mg Intravenous Q6H PRN            Salient interval testing/personally reviewed imaging:       CTH no interval change           IMP:         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       RECOMMENDATIONS/INTERVENTIONS:        No antiplatelets indefinitely.     No further imaging necessary at this time.     No concerns neurologically for transfer from acute care at any time. Follow up with Dr. De La Torre in 4-8 weeks post discharge.

## 2017-09-21 NOTE — PROGRESS NOTES
[facility]    Hospitalist Progress Note    Assessment & Plan   Sury Barnett is a 89 year old female with PMHx of hypertension and hypothyroidism who was admitted on 9/6/2017 with a large R intracranial hemorrhage.      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 Seen by neurosurgery, bleed nonsurgical. Likely the hemorrhage caused fall. CT next day showed increased size but then multiple subsequent CTs with stable hematoma, last one 9/11  -- repeat head CTs 9/16 and then 9/20 showed slight decrease in hemorrhage   -- clinical status has remained stable from neuro standpoint -- ongoing left sided hemiplegia, remains lethargic, PT signed off on 9/18 as patient was unable to participate in therapy as she was not alert enough.  -- had G-tube placed on 9/11, tolerating TFs at goal  -- goal SBP <140 -- mgmt as below     Multiple providers have had goals of care discussions with patient's daughters and son this stay.  Family wishes for restorative cares, does not want to talk about palliative care or hospice. Family continues to express concern over lack of nutrition during initial days of stay. Family does not wish to engage in discussions regarding patient's current status whether it is due to perceived lack of nutrition (which they feel is the  cause) vs underlying hemorrhage. Family hopes that patient will recover to a similar functional status as per prior to admission. Daughter requesting second opinion, and transfer to [facility], which I discussed with Dr Olguin hospitalist at [facility] and reviewed her course so far, recommending continuing current care and no further recommendation at other facility.     Discussed with daughter 9/21 as well at great length (meeting family daily). I mentioned that patient is stable neurologically. I am following  leucocytosis, and if it continues to improve, then will likely discharge 9/22. She had questions about care plan after discharge. Discussed regarding post discharge care including tube feeding, follow ups (PCP/neurology/CT scans/Labs), catheter care if needed, position change, skin care, passive range of motion as therapy. She requested in writing, will print all info on AVS.    Fever / Leukocytosis:  Has had lingering leukocytosis this stay. Improved and was down to 11.7 on 9/13, has since been trending up: 14.8 on 9/16  Then upto 22.5 on 9/20. Developed fever 9/17 PM with Tmax 101.1. Further infectious workup pursued but has been unrevealing. WBC now 19.6.  -- 9/16 UA bland and w/o evidence of UTI (culture grew <10k enterococcus), 9/16 CXR showed resolution of infiltrate  -- blood cultures drawn 9/17 and remain neg thus far  -- serial procalcitonins neg  -- serial lactates remain nl  -- no evidence for infection around PEG tube or peripheral IVs (has no central IV access)  -- cdiff was neg on 9/13, on TFs and stools occ semi-formed, repeat c diff 9/19 negative  -- completed 7d course of Levaquin on 9/18 as below (WBC began to bump while on abx)  -- no recurrence of fever though leukocytosis persists --  etiology not completely clear but suspect stress response dt above and ICH. Follow trend. Ramsay catheter discontinued per daughter's request, will re place it if retains for Bladder scan>500.    Possible aspiration pneumonia and fluid overload: Improved  CXR on 9/12 showed bilateral interstitial infiltrates consistent with pneumonia and fluid overload -- had been on IVF since admission on 9/6. IVFs dc'd. One dose of lasix 20mg IV given on 9/12 then maintained on lasix to 20mg PO daily on 9/14 given via tube. IV Levaquin started on 9/12 (given PCV allergy).  -- managed with Lasix 20mg po daily beginning 9/14 -- no evidence of ongoing infiltrate/edema on 9/16 CXR so Lasix stopped 9/17  -- cont Levaquin -- completed  7d course of treatment on 9/18  -- remains stable form a respiratory standpoint -- maintaining sats on RA     Benign essential hypertension.   On lisinopril prior to admission.   -- meds adjusted this stay -- now on Ramipril 10mg daily and Norvasc 10mg daily, Metoprolol 25mg BID added 9/16 given tachycardia and hypertension  -- goal BP <140 systolic      Hypothyroidism.   Chronic and stable on levothyroxine    Hypernatremia: Resolved  Na 149 on 9/18 while on TFs.  -- increased free water flushes from 100ml q4h to 250ml q6h -- Na improved to 142 on 9/21 . Monitor urine output to ensure it is not DI.     FEN: no IVFs, lytes stable, on TFs via PEG as above  DVT Prophylaxis: PCDs  Code Status: Full Code    Disposition: Anticipate she will be medically ready to discharge tomorrow if WBC count continues to improve, Likely looking at LTC.      Also discussed with bedside RN, CC and SW.    Michelle Manzano MD  Hospitalist    Interval History   Remains afebrile, uneventful night.  No change in neuro status, moving RUE and RLE, and some spontaneous eye opening.     -Data reviewed today: I reviewed all new labs and imaging results over the last 24 hours. I personally reviewed no images or EKG's today.    Physical Exam   Temp: 97.4  F (36.3  C) Temp src: Axillary BP: 111/68   Heart Rate: 88 Resp: 16 SpO2: 95 % O2 Device: None (Room air)    Vitals:    09/18/17 0300 09/20/17 0658 09/21/17 0300   Weight: 40.2 kg (88 lb 10 oz) 41.5 kg (91 lb 7.9 oz) 40.9 kg (90 lb 2.7 oz)     Vital Signs with Ranges  Temp:  [97.2  F (36.2  C)-97.9  F (36.6  C)] 97.4  F (36.3  C)  Heart Rate:  [75-95] 88  Resp:  [14-16] 16  BP: (111-127)/(62-78) 111/68  SpO2:  [94 %-96 %] 95 %  I/O last 3 completed shifts:  In: 1310 [NG/GT:1310]  Out: 1550 [Urine:1550]    Constitutional: Resting comfortably, NAD  Neuro: Lethargic, no spontaneous eye opening today but occ raises R arm and moves Rt leg. Squeezes with Rt hand but not following command.  Respiratory:  CTA through anterior fields, no wheeze or coarse BS  Cardiovascular: HRRR, no MGR, no LE edema  GI: S, NT, ND, +BS, +PEG  Skin/Integumen: warm/dry      Medications        metoprolol  25 mg Oral or Feeding Tube BID     amLODIPine  10 mg Oral or Feeding Tube Daily     ramipril  10 mg Oral or Feeding Tube Daily     fiber modular  1 packet Per Feeding Tube BID     levothyroxine  50 mcg Oral or Feeding Tube QAM AC     sodium chloride (PF)  3 mL Intracatheter Q8H     pantoprazole  40 mg Per Feeding Tube Daily       Data     Recent Labs  Lab 09/21/17  0746 09/20/17  0814 09/19/17  0832 09/18/17  0820   WBC 19.6* 22.5* 20.2* 19.2*   HGB 13.0 13.8 14.1 14.6   MCV 94 95 95 96    394 374 387    145* 144 149*   POTASSIUM  --  3.6 3.6 3.9   CHLORIDE  --  107 105 113*   CO2  --  34* 34* 35*   BUN  --  33* 40* 39*   CR  --  0.59 0.64 0.67   ANIONGAP  --  4 5 1*   ANTHONY  --  9.2 9.2 9.7   GLC  --  134* 123* 137*       No results found for this or any previous visit (from the past 24 hour(s)).

## 2017-09-21 NOTE — PLAN OF CARE
Problem: Goal Outcome Summary  Goal: Goal Outcome Summary  Outcome: No Change  Somnolent for most of shift, nonverbal, does not respond to or follow commands. Neuros: L facial droop, L hemiplegia, generalized weakness. VSS, SBP w/i parameters.  NPO.  TF at 35cc/hr, q4 hr 250cc flushes, dressing CDI. Ramsay patent, x1 loose incontinent BM.  Up with lift, q2 T/R. Denies pain. Plan for d/c to LTC when medically stable, pending family wishes, nrsng cont to monitor.

## 2017-09-21 NOTE — PLAN OF CARE
Problem: Goal Outcome Summary  Goal: Goal Outcome Summary  SLP Note: Attempted to see pt for dysphagia tx. Pt somnolent and unable to participate in session. Will follow-up as able/appropriate.

## 2017-09-21 NOTE — PROGRESS NOTES
CM    I:  REGAN was updated that patients anticipated discharge date is: 9/22.  REGAN met with patient's daughter, Crystal, to review LTC choices.  Daughter states her family is researching many facilities at this time but was not able to provide SW any specific facility names.  SW then reviewed with daughter the protocol that will be followed if she could not provide any LTC choices, which entails SW sending out referrals to facilities that may have potential openings.  Daughter appeared to understand this and then did ask SW to send to the following facilities:  #1-[facility] (Crystal confirmed she would still consider this facility even though she had refused it previously. Crystal also stated her sister had been at [facility] yesterday or today and was told there was a LTC opening); #2-[facility]; #3-[place] or #4 [place] LTC facilities.  REGAN updated Crystal a referral would be sent to: #5-[facility]/ [facility], which she agreed to.  REGAN placed referrals thru [facility], along with [facility] and [facility].  Will await call backs.     P:  Continue to assist with discharge plan as needed.    SHAYY Ward    UPDATED:  REGAN left  for [facility] admissions asking to confirm LTC opening and if they would reconsider taking this patient.  REGAN also attempted to find LTC facilities in [place] areas. REGAN was unable to locate any facilities in these areas.     UPDATE@[id number]:  REGAN received call from [facility] stating they had met with daughter yesterday and, at that time, did have a LTC bed available.  Because Alex had been told by daughter they were pursuing a transfer for patient to another hospital, [facility] made the decision to give this LTC bed to another person, thus it is no longer available.  Admissions rep also stated she just called patient's daughter, Crystal, with this information as well.

## 2017-09-21 NOTE — PLAN OF CARE
Problem: Goal Outcome Summary  Goal: Goal Outcome Summary  Outcome: No Change  Somnolent, MONICA orientation, does not open eyes, non verbal, non-verbal signs indicate patient comfortable and sleeping between cares, Tylenol given. VSS, afebrile, sating mid to low 90's on RA. LS diminished, clear. Not following commands. Neuros unchanged, LUE/LLE hemiplegia, RUE/RLE hemiparesis, L facial droop. NPO, TF at goal rate 35mL/hr w/ 250mL/q4hr free h2o flushes. T&R, q2hrs, A2, lift. Freq oral care. +BS, lno BM this shift.  Barrier cream applied in inner buttocks. Na+ 142. WBC 19.6.  Bilateral hearing aids in place. Discharge pending, family looking LTC facilities. Family at bedside throughout shift. Nrsg will continue to monitor.

## 2017-09-22 ENCOUNTER — APPOINTMENT (OUTPATIENT)
Dept: ULTRASOUND IMAGING | Facility: CLINIC | Age: 82
DRG: 064 | End: 2017-09-22
Attending: HOSPITALIST
Payer: MEDICARE

## 2017-09-22 ENCOUNTER — APPOINTMENT (OUTPATIENT)
Dept: CT IMAGING | Facility: CLINIC | Age: 82
DRG: 064 | End: 2017-09-22
Attending: HOSPITALIST
Payer: MEDICARE

## 2017-09-22 ENCOUNTER — APPOINTMENT (OUTPATIENT)
Dept: SPEECH THERAPY | Facility: CLINIC | Age: 82
DRG: 064 | End: 2017-09-22
Payer: MEDICARE

## 2017-09-22 LAB
BASOPHILS # BLD AUTO: 0 10E9/L (ref 0–0.2)
BASOPHILS NFR BLD AUTO: 0.1 %
COPATH REPORT: NORMAL
DIFFERENTIAL METHOD BLD: ABNORMAL
EOSINOPHIL # BLD AUTO: 0.1 10E9/L (ref 0–0.7)
EOSINOPHIL NFR BLD AUTO: 0.6 %
IMM GRANULOCYTES # BLD: 0.3 10E9/L (ref 0–0.4)
IMM GRANULOCYTES NFR BLD: 1.3 %
LYMPHOCYTES # BLD AUTO: 1.2 10E9/L (ref 0.8–5.3)
LYMPHOCYTES NFR BLD AUTO: 5.4 %
MONOCYTES # BLD AUTO: 0.7 10E9/L (ref 0–1.3)
MONOCYTES NFR BLD AUTO: 3.3 %
NEUTROPHILS # BLD AUTO: 19.3 10E9/L (ref 1.6–8.3)
NEUTROPHILS NFR BLD AUTO: 89.3 %
NRBC # BLD AUTO: 0 10*3/UL
NRBC BLD AUTO-RTO: 0 /100
WBC # BLD AUTO: 21.7 10E9/L (ref 4–11)

## 2017-09-22 PROCEDURE — 25000125 ZZHC RX 250: Performed by: INTERNAL MEDICINE

## 2017-09-22 PROCEDURE — 12000000 ZZH R&B MED SURG/OB

## 2017-09-22 PROCEDURE — 25000132 ZZH RX MED GY IP 250 OP 250 PS 637: Mod: GY | Performed by: HOSPITALIST

## 2017-09-22 PROCEDURE — 85048 AUTOMATED LEUKOCYTE COUNT: CPT | Performed by: HOSPITALIST

## 2017-09-22 PROCEDURE — 25000128 H RX IP 250 OP 636: Performed by: INTERNAL MEDICINE

## 2017-09-22 PROCEDURE — 99232 SBSQ HOSP IP/OBS MODERATE 35: CPT | Performed by: HOSPITALIST

## 2017-09-22 PROCEDURE — 25000132 ZZH RX MED GY IP 250 OP 250 PS 637: Mod: GY | Performed by: PSYCHIATRY & NEUROLOGY

## 2017-09-22 PROCEDURE — A9270 NON-COVERED ITEM OR SERVICE: HCPCS | Mod: GY | Performed by: INTERNAL MEDICINE

## 2017-09-22 PROCEDURE — 25000132 ZZH RX MED GY IP 250 OP 250 PS 637: Mod: GY | Performed by: INTERNAL MEDICINE

## 2017-09-22 PROCEDURE — 92526 ORAL FUNCTION THERAPY: CPT | Mod: GN | Performed by: SPEECH-LANGUAGE PATHOLOGIST

## 2017-09-22 PROCEDURE — 25000125 ZZHC RX 250

## 2017-09-22 PROCEDURE — A9270 NON-COVERED ITEM OR SERVICE: HCPCS | Mod: GY | Performed by: PSYCHIATRY & NEUROLOGY

## 2017-09-22 PROCEDURE — 25000132 ZZH RX MED GY IP 250 OP 250 PS 637: Mod: GY

## 2017-09-22 PROCEDURE — 99212 OFFICE O/P EST SF 10 MIN: CPT

## 2017-09-22 PROCEDURE — A9270 NON-COVERED ITEM OR SERVICE: HCPCS | Mod: GY | Performed by: HOSPITALIST

## 2017-09-22 PROCEDURE — 40000225 ZZH STATISTIC SLP WARD VISIT: Performed by: SPEECH-LANGUAGE PATHOLOGIST

## 2017-09-22 PROCEDURE — 27210995 ZZH RX 272: Performed by: HOSPITALIST

## 2017-09-22 PROCEDURE — 74177 CT ABD & PELVIS W/CONTRAST: CPT

## 2017-09-22 PROCEDURE — 36415 COLL VENOUS BLD VENIPUNCTURE: CPT | Performed by: HOSPITALIST

## 2017-09-22 PROCEDURE — 27210429 ZZH NUTRITION PRODUCT INTERMEDIATE LITER

## 2017-09-22 PROCEDURE — 93970 EXTREMITY STUDY: CPT

## 2017-09-22 PROCEDURE — A9270 NON-COVERED ITEM OR SERVICE: HCPCS | Mod: GY

## 2017-09-22 PROCEDURE — 85004 AUTOMATED DIFF WBC COUNT: CPT | Performed by: HOSPITALIST

## 2017-09-22 RX ORDER — AMLODIPINE BESYLATE 10 MG/1
10 TABLET ORAL DAILY
Qty: 30 TABLET | DISCHARGE
Start: 2017-09-22 | End: 2017-10-13

## 2017-09-22 RX ORDER — GUAR GUM
1 PACKET (EA) ORAL 2 TIMES DAILY
DISCHARGE
Start: 2017-09-22 | End: 2023-02-14

## 2017-09-22 RX ORDER — SODIUM CHLORIDE 450 MG/100ML
INJECTION, SOLUTION INTRAVENOUS CONTINUOUS
Status: DISCONTINUED | OUTPATIENT
Start: 2017-09-22 | End: 2017-09-23

## 2017-09-22 RX ORDER — LEVOTHYROXINE SODIUM 50 UG/1
50 TABLET ORAL
Qty: 30 TABLET | DISCHARGE
Start: 2017-09-22

## 2017-09-22 RX ORDER — RAMIPRIL 10 MG/1
10 CAPSULE ORAL DAILY
Qty: 30 CAPSULE | DISCHARGE
Start: 2017-09-22 | End: 2017-10-13

## 2017-09-22 RX ORDER — IOPAMIDOL 755 MG/ML
45 INJECTION, SOLUTION INTRAVASCULAR ONCE
Status: COMPLETED | OUTPATIENT
Start: 2017-09-22 | End: 2017-09-22

## 2017-09-22 RX ADMIN — Medication 40 MG: at 08:00

## 2017-09-22 RX ADMIN — SODIUM CHLORIDE: 9 INJECTION, SOLUTION INTRAVENOUS at 18:43

## 2017-09-22 RX ADMIN — IOPAMIDOL 45 ML: 755 INJECTION, SOLUTION INTRAVENOUS at 18:43

## 2017-09-22 RX ADMIN — Medication 1 PACKET: at 08:00

## 2017-09-22 RX ADMIN — METOPROLOL TARTRATE 25 MG: 100 TABLET ORAL at 08:00

## 2017-09-22 RX ADMIN — AMLODIPINE BESYLATE 10 MG: 10 TABLET ORAL at 08:00

## 2017-09-22 RX ADMIN — SODIUM CHLORIDE: 4.5 INJECTION, SOLUTION INTRAVENOUS at 16:27

## 2017-09-22 RX ADMIN — Medication 1 PACKET: at 20:32

## 2017-09-22 RX ADMIN — LEVOTHYROXINE SODIUM 50 MCG: 50 TABLET ORAL at 05:42

## 2017-09-22 RX ADMIN — RAMIPRIL 10 MG: 10 CAPSULE ORAL at 20:32

## 2017-09-22 RX ADMIN — METOPROLOL TARTRATE 25 MG: 100 TABLET ORAL at 20:32

## 2017-09-22 ASSESSMENT — VISUAL ACUITY
OU: OTHER (SEE COMMENT)

## 2017-09-22 NOTE — CONSULTS
INFECTIOUS DISEASE CONSULTATION      ATTENDING PHYSICIAN:  Michelle Manzano MD      REASON FOR CONSULTATION:  Asked by Dr. Manzano to assess leukocytosis.      IMPRESSION:   1.  Sury Barnett is a 89-year-old female with history of hypertension.   2.  Admitted on 09/06 with change in mental status and found to have large right frontal intracranial hemorrhage.  She has residual left hemiplegia.   3.  Persistent leukocytosis with white blood count around 20,000.  I suspect that this most likely is stress induced.  There are no signs of infection on exam and the patient has had a negative evaluation for infection including negative blood cultures, small numbers of Enterococcus on urine culture which probably represent colonization given benign urinalysis, negative Clostridium difficile study, absence of pneumonia on chest x-ray.  The patient is not receiving steroid medications.  Procalcitonin is low at 0.08.      RECOMMENDATIONS:   1.  No indication for antibiotic therapy at this time.   2.  Only additional testing to consider at this time would be abdominal CT scan, though no suggestion of intra-abdominal infection by examination.      HISTORY OF PRESENT ILLNESS:  This is a 89-year-old female with a history of hypertension and hypothyroidism who was brought into the hospital because of change of mental status and found to have large right frontal intracranial hemorrhage.  She then suffered a fall.  It was felt that more likely the hemorrhage preceeded the fall.  Initial white blood count was elevated and has remained so.  She has not had significant fever during this hospitalization with the exception of one reading to 101 degrees.  She is afebrile at present.  Blood cultures have been negative.  Urine culture showed fewer than 10,000 Enterococcus, but urinalysis showed only 1 white blood cell.  Chest x-ray has shown no pneumonia.  The patient is receiving tube feedings and has had loose stools, but C. difficile has  been negative on 2 occasions.  The patient is not receiving any prednisone or other steroid medication at present.  She did receive a 7-day course of levofloxacin.  Procalcitonin has been low on several occasions.      PAST MEDICAL HISTORY:   1.  Hypertension.   2.  Hypothyroidism.   3.  Intracranial hemorrhage.      ALLERGIES:  Listed to sulfa and penicillin.      SOCIAL HISTORY:  Unobtainable.      FAMILY HISTORY:  Unobtainable.      REVIEW OF SYSTEMS:  Unobtainable.      PHYSICAL EXAMINATION:   VITAL SIGNS:  Temp 97.4, blood pressure 107/57, heart rate 69 and regular.   GENERAL:  Well-developed, well-nourished, unresponsive.   SKIN:  No rashes or nodules.   HEENT:  Eyes with no subconjunctival hemorrhages or scleral icterus.  Oropharynx:  Unable to evaluate.   NECK:  Supple, no thyromegaly.   LYMPHATICS:  No cervical or axillary lymphadenopathy.   LUNGS:  Clear to auscultation.  No use of accessory muscles of respiration.   COR:  S1, S2 normal.  No S3, S4 or murmur.   ABDOMEN:  Soft, nontender, no mass or hepatosplenomegaly.  Gastrostomy tube in place without signs of surrounding infection.   EXTREMITIES:  The patient is wearing pneumo boots.  No obvious calf tenderness or pedal edema.      For further details of patient's history, please refer to the chart.  Thank you very much for this consultation.         TOMMY SOARES MD             D: 2017 12:50   T: 2017 13:22   MT: RADHA      Name:     VICKI SILVA   MRN:      [id number]        Account:       [id number]   :      1925           Consult Date:  2017      Document: [id number]

## 2017-09-22 NOTE — PLAN OF CARE
Problem: Goal Outcome Summary  Goal: Goal Outcome Summary  Outcome: No Change  Neuros unchanged.  Not following any commands except slight movement towards  on right hand.  Left extremities hemiplegic, RUE moves independently at times.  No movement in RLE noted today.  No signs/symptoms of pain noted.  Ramsay discontinued at 1630, bladder scan at 1850 was 323cc.  Small excoriated skin noted on left buttocks, meplex applied.  Turned & repositioned Q 2 hours.  TF at goal, 30cc residual.  Plan for LTC when ready.

## 2017-09-22 NOTE — PROGRESS NOTES
[facility]    Hospitalist Progress Note    Assessment & Plan   Sury Barnett is a 89 year old female with PMHx of hypertension and hypothyroidism who was admitted on 9/6/2017 with a large R intracranial hemorrhage.      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 Seen by neurosurgery, bleed nonsurgical. Likely the hemorrhage caused fall. CT next day showed increased size but then multiple subsequent CTs with stable to slightly improved hematoma   -- repeat head CTs 9/16 and then 9/20 showed slight decrease in hemorrhage   -- clinical status has remained stable from neuro standpoint -- ongoing left sided hemiplegia, remains lethargic mostly and intermittently awake, PT signed off on 9/18 as patient was unable to participate in therapy as she was not alert enough.  -- had G-tube placed on 9/11, tolerating TFs at goal  -- goal SBP <140 -- mgmt as below     Multiple providers have had goals of care discussions with patient's daughters and son this stay.  Family wishes for restorative cares, does not want to talk about palliative care or hospice at this time. Family's main concern remains lack of nutrition during initial days of stay. Family does not wish to engage in discussions regarding patient's current status whether it is due to perceived lack of nutrition (which they feel is the  cause) vs underlying hemorrhage. Family hopes that patient will recover to a similar functional status as per prior to admission. Daughter requested second opinion and transfer to [facility] 9/20, which I discussed with Dr Olguin hospitalist at [facility] and reviewed her course so far, recommended continuing current care and no further recommendation at other facility. She requested on 9/21 that they be contacted with all details again, which I believe was not going to change  the decision on accepting her care at [facility] so no plan to call for transfer again at this point.    Discussed with daughter 9/21 as well at great length. I mentioned that patient is stable neurologically. Discharge plan outlined, and I mentioned that she will be ready for discharge if WBC count trend down or felt leucocytosis was due to stress. Discussed regarding post discharge care including tube feeding, follow ups (PCP/neurology/CT scans/Labs), catheter care if needed, position change, skin care, passive range of motion as therapy. She requested in writing, will print all info on AVS (Entered)    Fever / Leukocytosis:  Has had lingering leukocytosis this stay. Improved and was down to 11.7 on 9/13, has since been trending up: 14.8 on 9/16  Then upto 22.5 on 9/20. Developed fever 9/17 PM with Tmax 101.1. Further infectious workup pursued but has been unrevealing. WBC now 19.6.  -- 9/16 UA bland and w/o evidence of UTI (culture grew <10k enterococcus), 9/16 CXR showed resolution of infiltrate  -- blood cultures drawn 9/17 and neg, serial procalcitonins and lactate neg  -- no evidence for infection around PEG tube or peripheral IVs (has no central IV access)  -- cdiff was neg on 9/13, on TFs and stools occ semi-formed, repeat c diff 9/19 negative  -- completed 7d course of Levaquin on 9/18 as below (WBC began to bump while on abx)  -- no recurrence of fever though leukocytosis persists --  etiology not completely clear but suspect stress response due to above and ICH. Follow trend. Ramsay catheter exchanged 9/21  -- ID consulted and discussed with Dr Mckenna, will get CT abd pelvis with contrast (after discussion with daughter)    Possible aspiration pneumonia and fluid overload: Improved  CXR on 9/12 showed bilateral interstitial infiltrates consistent with pneumonia and fluid overload -- had been on IVF since admission on 9/6. IVFs dc'd. One dose of lasix 20mg IV given on 9/12 then maintained on lasix to 20mg PO  daily on 9/14 given via tube. IV Levaquin started on 9/12 (given PCN allergy).  -- managed with Lasix 20mg po daily beginning 9/14 -- no evidence of ongoing infiltrate/edema on 9/16 CXR so Lasix stopped 9/17  -- cont Levaquin -- completed 7d course of treatment on 9/18  -- remains stable form a respiratory standpoint -- maintaining sats on RA     Benign essential hypertension.   On lisinopril prior to admission.   -- meds adjusted this stay -- now on Ramipril 10mg daily and Norvasc 10mg daily, Metoprolol 25mg BID added 9/16 given tachycardia and hypertension  -- goal BP <140 systolic      Hypothyroidism.   Chronic and stable on levothyroxine    Hypernatremia: Resolved  Na 149 on 9/18 while on TFs.  -- increased free water flushes from 100ml q4h to 250ml q6h -- Na improved to 142 on 9/21 . Monitor urine output to ensure it is not DI.     FEN: on TFs via PEG as above, gentle IV hydration if CT obtained as above.  DVT Prophylaxis: PCDs  Code Status: Prior    Disposition: based on CT abd pelvis today, and WBC count. Likely 1-2 days. Likely looking at LTC. Have called her daughter Crystal, left message to call back to discuss plan and CT.    Also discussed with bedside RN, CC and SW.    Michelle Manzano MD  Hospitalist    Interval History   Remains afebrile but persistent leucocytosis.  Ramsay catheter was discontinued yesterday after conversation with her daughter but had to be re placed due to retention.  Unable to obtain ROS as patient is non verbal.    -Data reviewed today: I reviewed all new labs and imaging results over the last 24 hours. I personally reviewed no images or EKG's today.    Physical Exam   Temp: 97.4  F (36.3  C) Temp src: Axillary BP: 107/57 Pulse: 75 Heart Rate: 69 Resp: 16 SpO2: 97 % O2 Device: None (Room air)    Vitals:    09/20/17 0658 09/21/17 0300 09/22/17 0332   Weight: 41.5 kg (91 lb 7.9 oz) 40.9 kg (90 lb 2.7 oz) 41.5 kg (91 lb 7.9 oz)     Vital Signs with Ranges  Temp:  [96.7  F (35.9   C)-97.9  F (36.6  C)] 97.4  F (36.3  C)  Pulse:  [66-75] 75  Heart Rate:  [69-88] 69  Resp:  [16] 16  BP: (107-130)/(57-77) 107/57  SpO2:  [95 %-98 %] 97 %  I/O last 3 completed shifts:  In: 5840 [NG/GT:3585]  Out: 1625 [Urine:1625]    Constitutional: Resting comfortably, NAD  Neuro: Lethargic, but opens eyes with verbal stimuli, gave thumbs up on calling her name. Squeezes with Rt hand and wiggling toes on rt foot with command.  Respiratory: CTA through anterior fields, no wheeze or coarse BS  Cardiovascular: HRRR, no MGR, no LE edema  GI: S, NT, ND, +BS, +PEG, tube site normal  Skin/Integumen: warm/dry      Medications        metoprolol  25 mg Oral or Feeding Tube BID     amLODIPine  10 mg Oral or Feeding Tube Daily     ramipril  10 mg Oral or Feeding Tube Daily     fiber modular  1 packet Per Feeding Tube BID     levothyroxine  50 mcg Oral or Feeding Tube QAM AC     sodium chloride (PF)  3 mL Intracatheter Q8H     pantoprazole  40 mg Per Feeding Tube Daily       Data     Recent Labs  Lab 09/22/17  0740 09/21/17  0746 09/20/17  0814 09/19/17  0832 09/18/17  0820   WBC 21.7* 19.6* 22.5* 20.2* 19.2*   HGB  --  13.0 13.8 14.1 14.6   MCV  --  94 95 95 96   PLT  --  370 394 374 387   NA  --  142 145* 144 149*   POTASSIUM  --   --  3.6 3.6 3.9   CHLORIDE  --   --  107 105 113*   CO2  --   --  34* 34* 35*   BUN  --   --  33* 40* 39*   CR  --   --  0.59 0.64 0.67   ANIONGAP  --   --  4 5 1*   ANTHONY  --   --  9.2 9.2 9.7   GLC  --   --  134* 123* 137*       Recent Results (from the past 24 hour(s))   US Lower Extremity Venous Duplex Bilateral    Narrative    VENOUS ULTRASOUND BILATERAL LOWER EXTREMITIES  9/22/2017 11:44 AM     HISTORY: 89-year-old patient bedridden with leukocytosis. Patient has  been immobile for extended period, since September 6, 2017.    COMPARISON: None.    TECHNIQUE: Color Doppler and spectral waveform analysis performed  throughout the deep veins of both lower extremities.    FINDINGS: Both common  femoral, proximal greater saphenous, femoral,  and popliteal veins demonstrate normal blood flow, compression, and  augmentation. Posterior tibial and peroneal veins are patent.      Impression    IMPRESSION: Negative for deep venous thrombosis in both lower  extremities.

## 2017-09-22 NOTE — PROGRESS NOTES
[facility] Nurse Inpatient Wound Assessment     Initial Assessment of wound(s) on pt's:   Coccyx/buttocks        Data:   Patient History:      per MD note(s): Sury Barnett is a 89 year old female with PMHx of hypertension and hypothyroidism who was admitted on 2017 with a large R intracranial hemorrhage.        Kristofer Assessment and sub scores:   Kristofer Score  Av.9  Min: 11  Max: 15    Positioning: Foam dressing and Pillows    Mattress:  Standard , Atmos Air       Moisture Management:  Incontinence Protocol, Diaper and Urinary Catheter    Catheter secured? Yes      Current Diet / Nutrition:       Active Diet Order      NPO for Medical/Clinical Reasons Except for: Other; Specify: NPO For oral intake and gastric feedings for 6 hours post insertion Gastrostomy G/GJ tube. May feed through Jejunal or Distal PORT immediately for GJ tubes ONLY.      Advance Diet as Tolerated    Labs:   Recent Labs   Lab Test  17   0740  17   0746   17   0716   HGB   --   13.0   < >  13.5   INR   --    --    --   1.11   WBC  21.7*  19.6*   < >  13.1*    < > = values in this interval not displayed.       Wound Assessment (location):   Coccyx/buttocks  Wound History:  Pt has been in hospital for over two weeks.  Incontinent of stool.  Complete assist with repositioning and cares.  Very bony coccyx/sacrum, BMI 18.      Wound Base: coccyx/sacrum is intact but with peely outer epidermis.  Upper inner buttocks/perianal area has scattered small superficial open areas of moist pink dermis, largest area about 1 x 1 x 0cm to left side, and is reddened and moist in general.      Palpation of the wound bed:  normal    Slough appearance:  none    Eschar appearance:  none    Periwound Skin: denuded, erythema and maceration,      Color: pink    Temperature  normal     Drainage:  Scant serosang     Odor: none    Pain:  unable to assess            Intervention:     Patient's chart evaluated.      Wound(s)  assessed    Wound Care: cleansed perineal area with Kelly lotion and applied critic-aid paste; Mepilex to sacrum; pt repositioned on side    Orders  Written    Supplies  Reviewed    Discussed plan of care with Patient and Nurse          Assessment:       Superficial scattered skin breakdown and denudement to perianal area, extending to upper inner buttocks, from IAD (incontinence-associated dermatitis).   No current pressure injury.  Actual coccyx area appears intact, but is at risk for PI since is very bony and skin is already thinning, and pt completely dependent with repositioning.          Plan:     Nursing to notify the Provider(s) and re-consult the WO Nurse if wound(s) deteriorate(s) or if the wound care plan needs reevaluation.      Plan for PIP and skin care to coccyx/buttocks/perianal area:   - cover the intact upper coccyx/sacrum with Mepilex for protection, staying well above perianal area; change prn when loose/soiled; assess under dressing BID with skin inspections  - BID and prn with incontinence:  Gently cleanse perineal area/superficial wounds with Kelly lotion and soft wipes, and apply thin layer of Critic-aid barrier paste, leave open to air  - if perineal area becomes more rashy-looking, initiate antifungal powder BID (apply prior to Critic-aid paste)  - try to expose perineal skin to air for a short time whenever possible (ie., after incontinent pericares maybe can leave brief open for awhile)  - reposition pt every 1-2 hours, side to side only    WO Nurse will return: weekly and prn     Face to face time: 16-30 Minutes

## 2017-09-22 NOTE — CONSULTS
ID consult dictated.  Persistent leukocytosis.  Likely stress related.  No evidence for infection.  No indication for antibiotics.  If issues on the w/e please call Dr. Tate.

## 2017-09-22 NOTE — PLAN OF CARE
"Problem: Goal Outcome Summary  Goal: Goal Outcome Summary  Outcome: Therapy, progress toward functional goals is gradual  Discharge Planner SLP   Patient plan for discharge: not stated  Current status: Patient's daughter present and reports brushing patient's teeth prior to session. Patient initially more awake and followed simple directions for opening mouth. Note minimal ROM with opening mouth for oral cares with oral swab. Patient swallowed saliva x1 during session. Patient nodded head \"yes\" for p.o. trial of nectar thick liquid. Patient with poor oral acceptance from spoon (1/4 tsp size amount) and no swallow response noted despite max tactile, verbal cues. Patient became more sleepy as session progressed. Provided education about using oral swabs for moisture (squeezing excess water from swab). Patient's daughter verbalized understanding.  Barriers to return to prior living situation: dysphagia, weakness, aphasia  Recommendations for discharge: LTC  Rationale for recommendations: continued SLP for p.o. Readiness  Entered by: Tonja Dotson 09/22/2017 10:05 AM             "

## 2017-09-22 NOTE — PLAN OF CARE
Problem: Goal Outcome Summary  Goal: Goal Outcome Summary  OT: pt gone for procedure when OT attempting for session.

## 2017-09-22 NOTE — PLAN OF CARE
Problem: Goal Outcome Summary  Goal: Goal Outcome Summary  Outcome: No Change  Unable to assess orientation status.VSS on RA, SBP within parameters. Pt denies pain when asked, nonverbal indicators of pain not present. Pt lethargic this shift. Opens eyes to voice, follows some commands at times. Bedrest, turn/repo Q2H. Mepilex to coccyx for open area. Incontinent of stool. Pt no void after han removal, 562 cc bladder scan, new Han in place. Left sided hemiplegia, right sided hemiparesis. Left sided facial droop noted. TF running at 35ml/hr, 250 ml flush Q4H. Daughter at bedside throughout shift. Plan for d/c to LTC pending clinical course. Will continue to monitor.

## 2017-09-22 NOTE — PROGRESS NOTES
CM    I:  SW received call back from [facility] stating they have no female LTC beds.  SW also received VM from Destiny at [facility] stating they do not have a bed available.     P:  Continue to assist with discharge planning as needed.    SHAYY Ward.      UPDATE@6887:  SW received VM from [facility] they have no LTC bed for patient.  SW also received VM from Bassam, sunil at [facility], stating family has toured their facility.  Bassam asked for referral to be sent if family is in agreement with this.  REGAN will attempt to follow-up with family.  REGAN placed follow up call to [facility] LTC admissions rep, Ghassan asking for status of referral sent 9/21/17.  Ghassan stated they do not have any open beds today but will call REGAN back if there are any changes over the weekend, into Monday.    UPDATE@3888: REGAN has been unable to meet with family regarding [facility] referral.  As daughter was told by REGAN yesterday that referrals will be sent to facilities which may have openings, REGAN faxed referral over to [facility] since SW received a call directly from this facility earlier today.  Anticipated discharge date: 9/23. Also, as all facility referrals faxed yesterday have no openings, REGAN also sent the following LTC referrals thru [employer]:  [facility], [facility],   [facility]. Will await call backs.

## 2017-09-23 ENCOUNTER — APPOINTMENT (OUTPATIENT)
Dept: CT IMAGING | Facility: CLINIC | Age: 82
DRG: 064 | End: 2017-09-23
Attending: HOSPITALIST
Payer: MEDICARE

## 2017-09-23 LAB
ALBUMIN UR-MCNC: 10 MG/DL
ANION GAP SERPL CALCULATED.3IONS-SCNC: 8 MMOL/L (ref 3–14)
APPEARANCE UR: ABNORMAL
BACTERIA #/AREA URNS HPF: ABNORMAL /HPF
BACTERIA SPEC CULT: NO GROWTH
BACTERIA SPEC CULT: NO GROWTH
BASOPHILS # BLD AUTO: 0 10E9/L (ref 0–0.2)
BASOPHILS NFR BLD AUTO: 0 %
BILIRUB UR QL STRIP: NEGATIVE
BUN SERPL-MCNC: 20 MG/DL (ref 7–30)
CALCIUM SERPL-MCNC: 8.5 MG/DL (ref 8.5–10.1)
CHLORIDE SERPL-SCNC: 97 MMOL/L (ref 94–109)
CO2 SERPL-SCNC: 29 MMOL/L (ref 20–32)
COLOR UR AUTO: YELLOW
CREAT SERPL-MCNC: 0.55 MG/DL (ref 0.52–1.04)
DIFFERENTIAL METHOD BLD: ABNORMAL
EOSINOPHIL # BLD AUTO: 0.1 10E9/L (ref 0–0.7)
EOSINOPHIL NFR BLD AUTO: 0.5 %
GFR SERPL CREATININE-BSD FRML MDRD: >90 ML/MIN/1.7M2
GLUCOSE SERPL-MCNC: 96 MG/DL (ref 70–99)
GLUCOSE UR STRIP-MCNC: NEGATIVE MG/DL
HCT VFR BLD AUTO: 35.8 % (ref 35–47)
HGB BLD-MCNC: 12.4 G/DL (ref 11.7–15.7)
HGB UR QL STRIP: ABNORMAL
IMM GRANULOCYTES # BLD: 0.2 10E9/L (ref 0–0.4)
IMM GRANULOCYTES NFR BLD: 0.8 %
KETONES UR STRIP-MCNC: NEGATIVE MG/DL
LACTATE BLD-SCNC: 0.8 MMOL/L (ref 0.7–2)
LEUKOCYTE ESTERASE UR QL STRIP: ABNORMAL
LYMPHOCYTES # BLD AUTO: 1 10E9/L (ref 0.8–5.3)
LYMPHOCYTES NFR BLD AUTO: 3.9 %
Lab: NORMAL
Lab: NORMAL
MONOCYTES # BLD AUTO: 0.5 10E9/L (ref 0–1.3)
MONOCYTES NFR BLD AUTO: 2 %
NEUTROPHILS # BLD AUTO: 24.3 10E9/L (ref 1.6–8.3)
NEUTROPHILS NFR BLD AUTO: 92.8 %
NITRATE UR QL: NEGATIVE
NRBC # BLD AUTO: 0 10*3/UL
NRBC BLD AUTO-RTO: 0 /100
PH UR STRIP: 6.5 PH (ref 5–7)
POTASSIUM SERPL-SCNC: 3.3 MMOL/L (ref 3.4–5.3)
POTASSIUM SERPL-SCNC: 4 MMOL/L (ref 3.4–5.3)
RBC #/AREA URNS AUTO: 13 /HPF (ref 0–2)
SODIUM SERPL-SCNC: 134 MMOL/L (ref 133–144)
SOURCE: ABNORMAL
SP GR UR STRIP: 1.01 (ref 1–1.03)
SPECIMEN SOURCE: NORMAL
SPECIMEN SOURCE: NORMAL
UROBILINOGEN UR STRIP-MCNC: NORMAL MG/DL (ref 0–2)
WBC # BLD AUTO: 25.8 10E9/L (ref 4–11)
WBC #/AREA URNS AUTO: 56 /HPF (ref 0–2)

## 2017-09-23 PROCEDURE — 87088 URINE BACTERIA CULTURE: CPT | Performed by: INTERNAL MEDICINE

## 2017-09-23 PROCEDURE — 70450 CT HEAD/BRAIN W/O DYE: CPT

## 2017-09-23 PROCEDURE — 25000128 H RX IP 250 OP 636: Performed by: HOSPITALIST

## 2017-09-23 PROCEDURE — 84132 ASSAY OF SERUM POTASSIUM: CPT | Performed by: HOSPITALIST

## 2017-09-23 PROCEDURE — 25000128 H RX IP 250 OP 636: Performed by: INTERNAL MEDICINE

## 2017-09-23 PROCEDURE — 80048 BASIC METABOLIC PNL TOTAL CA: CPT | Performed by: HOSPITALIST

## 2017-09-23 PROCEDURE — 99207 ZZC APP CREDIT; MD BILLING SHARED VISIT: CPT | Performed by: INTERNAL MEDICINE

## 2017-09-23 PROCEDURE — 25000132 ZZH RX MED GY IP 250 OP 250 PS 637: Mod: GY | Performed by: INTERNAL MEDICINE

## 2017-09-23 PROCEDURE — 36415 COLL VENOUS BLD VENIPUNCTURE: CPT | Performed by: HOSPITALIST

## 2017-09-23 PROCEDURE — 85018 HEMOGLOBIN: CPT | Performed by: HOSPITALIST

## 2017-09-23 PROCEDURE — 87086 URINE CULTURE/COLONY COUNT: CPT | Performed by: INTERNAL MEDICINE

## 2017-09-23 PROCEDURE — 27210429 ZZH NUTRITION PRODUCT INTERMEDIATE LITER

## 2017-09-23 PROCEDURE — 81001 URINALYSIS AUTO W/SCOPE: CPT | Performed by: HOSPITALIST

## 2017-09-23 PROCEDURE — 99233 SBSQ HOSP IP/OBS HIGH 50: CPT | Performed by: HOSPITALIST

## 2017-09-23 PROCEDURE — A9270 NON-COVERED ITEM OR SERVICE: HCPCS | Mod: GY | Performed by: PHYSICIAN ASSISTANT

## 2017-09-23 PROCEDURE — 99221 1ST HOSP IP/OBS SF/LOW 40: CPT | Performed by: SURGERY

## 2017-09-23 PROCEDURE — 87186 SC STD MICRODIL/AGAR DIL: CPT | Performed by: INTERNAL MEDICINE

## 2017-09-23 PROCEDURE — 85025 COMPLETE CBC W/AUTO DIFF WBC: CPT | Performed by: HOSPITALIST

## 2017-09-23 PROCEDURE — 12000000 ZZH R&B MED SURG/OB

## 2017-09-23 PROCEDURE — 25000132 ZZH RX MED GY IP 250 OP 250 PS 637: Mod: GY | Performed by: PHYSICIAN ASSISTANT

## 2017-09-23 PROCEDURE — 83605 ASSAY OF LACTIC ACID: CPT | Performed by: INTERNAL MEDICINE

## 2017-09-23 RX ORDER — CIPROFLOXACIN 2 MG/ML
400 INJECTION, SOLUTION INTRAVENOUS EVERY 12 HOURS
Status: DISCONTINUED | OUTPATIENT
Start: 2017-09-23 | End: 2017-09-24

## 2017-09-23 RX ORDER — NYSTATIN 100000/ML
500000 SUSPENSION, ORAL (FINAL DOSE FORM) ORAL 4 TIMES DAILY
Status: DISCONTINUED | OUTPATIENT
Start: 2017-09-23 | End: 2017-09-27 | Stop reason: HOSPADM

## 2017-09-23 RX ORDER — AMLODIPINE BESYLATE 5 MG/1
5 TABLET ORAL DAILY
Status: DISCONTINUED | OUTPATIENT
Start: 2017-09-24 | End: 2017-09-27 | Stop reason: HOSPADM

## 2017-09-23 RX ADMIN — Medication 10 MEQ: at 13:51

## 2017-09-23 RX ADMIN — Medication 1 PACKET: at 19:48

## 2017-09-23 RX ADMIN — Medication 10 MEQ: at 12:52

## 2017-09-23 RX ADMIN — Medication 1 PACKET: at 11:28

## 2017-09-23 RX ADMIN — Medication 10 MEQ: at 16:39

## 2017-09-23 RX ADMIN — CIPROFLOXACIN 400 MG: 2 INJECTION, SOLUTION INTRAVENOUS at 17:59

## 2017-09-23 RX ADMIN — NYSTATIN 500000 UNITS: 100000 SUSPENSION ORAL at 21:56

## 2017-09-23 RX ADMIN — Medication 10 MEQ: at 11:26

## 2017-09-23 ASSESSMENT — VISUAL ACUITY
OU: NOT TESTABLE

## 2017-09-23 NOTE — PROGRESS NOTES
hospitalist update note:    UA abnormal.  Culture sent.  Will treat with cipro.   Follow culture.    Michelle Manzano MD  Hospitailst

## 2017-09-23 NOTE — PLAN OF CARE
Problem: Goal Outcome Summary  Goal: Goal Outcome Summary  SLP-  Spoke with nursing.  Pt is declining and not at all appropriate for treatment today.Will attempt tomorrow.

## 2017-09-23 NOTE — PLAN OF CARE
Problem: Goal Outcome Summary  Goal: Goal Outcome Summary  OT: Attempted intervention; however, pt very lethargic receiving nursing cares. Pt not following commands to participate in OT at this time.

## 2017-09-23 NOTE — PLAN OF CARE
Problem: Patient Care Overview (Adult)  Goal: Plan of Care Review  Outcome: No Change  Pt lethargic, non verbal. MONICA to assess orientation and do complete neuro assessment. Has L droop, L sided hemiplegia. [place]. VSS.  NPO, TF running at goal rate, 250ml flushes q4. 15ml residual. T/R, no incontinent BM. Ramsay in. No non verbal signs of pain. CT of abd and pelvis completed. Plan pending.    Problem: Patient Care Overview  Goal: Plan of Care Review  Outcome: No Change  Pt lethargic, non verbal. MONICA to assess orientation and do complete neuro assessment. Has L droop, L sided hemiplegia. [place]. VSS.  NPO, TF running at goal rate, 250ml flushes q4. 15ml residual. T/R, no incontinent BM. Ramsay in. No non verbal signs of pain. CT of abd and pelvis completed. Plan pending.

## 2017-09-23 NOTE — CONSULTS
Surgery Consultation     Sury Barnett MRN# [id number]   YOB: 1925 Age: 89 year old   Date of Admission: 9/6/2017     Reason for consult: I was asked by Dr. Wong to evaluate this patient for pneumoperitoneum and fluid in the pelvis.           Assessment and Plan:   Post procedural pneumoperitoneum and pelvic fluid.    Observation, resumption of enteric feeds, consider repeat imaging or surgical intervention if significant changes occur clinically.               Chief Complaint:   Due to elevation of white blood cell count CT scan of the abdomen was obtained, pneumoperitoneum and retroperitoneal air as well as some pelvic fluid can be observed.    History is obtained from the electronic health record and patient's daughter         History of Present Illness:   This patient is a 89 year old female who presents with recently placed percutaneous gastrostomy tube, due to elevation of her white blood cell, CT scan of the abdomen was obtained and this showed retroperitoneal air, some pneumoperitoneum as well as some pelvic fluid.  No extravasation of contrast can be seen.            Past Medical History:     Past Medical History:   Diagnosis Date     Hypertension      Thyroid disease              Past Surgical History:     Past Surgical History:   Procedure Laterality Date     COLONOSCOPY  7/15/2013    Procedure: COLONOSCOPY;  Colonoscopy ;  Surgeon: Maida Rehman MD;  Location:  GI     HEAD & NECK SURGERY      tonsil      TONSILLECTOMY                  Social History:     Social History   Substance Use Topics     Smoking status: Never Smoker     Smokeless tobacco: Not on file     Alcohol use No             Family History:     Family History   Problem Relation Age of Onset     Cancer - colorectal Father      Hypertension Father      Colon Cancer Father      Hypertension Mother      Hyperlipidemia Mother      Hypertension Sister      Hyperlipidemia Sister              Immunizations:   There is no  immunization history for the selected administration types on file for this patient.          Allergies:     Allergies   Allergen Reactions     Alon Flavor Difficulty breathing     Sulfa Drugs Other (See Comments)     Really sick and high fever     Amoxicillin              Medications:       [START ON 9/24/2017] amLODIPine  5 mg Oral or Feeding Tube Daily     metoprolol  25 mg Oral or Feeding Tube BID     ramipril  10 mg Oral or Feeding Tube Daily     fiber modular  1 packet Per Feeding Tube BID     levothyroxine  50 mcg Oral or Feeding Tube QAM AC     sodium chloride (PF)  3 mL Intracatheter Q8H     pantoprazole  40 mg Per Feeding Tube Daily             Review of Systems:   Review of systems not obtained due to patient factors - condition           Physical Exam:   Vitals were reviewed  Abdomen:   soft, non-distended, non-tender, involuntary guarding absent, no masses palpated           Data:   All laboratory and imaging data in the past 24 hours reviewed

## 2017-09-23 NOTE — PROGRESS NOTES
SW:    D: Received call from [facility] regarding patient. Sury ([phone]) reports that she would like to make sure patient understands that her insurance will not cover LTC and that it will cost anywhere between $9,000 to $10,000 per month. Sury reports that patient will have to pay for room and board and that medicare may pay for rehab and another needs.     P: REGAN will continuo to follow     Update:     SW:  Received call from Tripp from [facility] ([phone])  who reports that patient is good fit for [facility]. Patient will need to present with a check in the amount of $6,000.     P: REGAN will continuo to follow

## 2017-09-23 NOTE — PLAN OF CARE
"Problem: Patient Care Overview (Adult)  Goal: Plan of Care Review  Neuro exam exhibits LUE/LLE hemiplegia and neglect, and left facial droop.  Non-verbal, will give \"thumbs up\" sign with right thumb.  Blanchable erythema to coccyx, mepilex applied, excoriation around anus, cleaned with soap and water and barrier cream applied.  TF running at 35cc/hr, flushes 250cc q4h, residuals decreasing.  Buttons removed from PEG tube, new dressing applied.  Ramsay with adequate output.  CT abd/pelvis prep given, 1/2 NS IVF started.  Discharge plan pending.    Problem: Patient Care Overview  Goal: Plan of Care Review  Neuro exam exhibits LUE/LLE hemiplegia and neglect, and left facial droop.  Non-verbal, will give \"thumbs up\" sign with right thumb.  Blanchable erythema to coccyx, mepilex applied, excoriation around anus, cleaned with soap and water and barrier cream applied.  TF running at 35cc/hr, flushes 250cc q4h, residuals decreasing.  Buttons removed from PEG tube, new dressing applied.  Ramsay with adequate output.  CT abd/pelvis prep given, 1/2 NS IVF started.  Discharge plan pending.      "

## 2017-09-23 NOTE — PLAN OF CARE
Problem: Patient Care Overview (Adult)  Goal: Plan of Care Review  Outcome: No Change  Lethargic, opens eyes to touch/voice. MONICA orientation, nonverbal. VSS. Neuros with L sided hemiplegia, facial droop. NPO, TF stopped at 0130 d/t abnormal abd CT with planned surgical consult. NPO. Turn&Repo q2h. No s/sx of pain. Ramsay patent. Daughter at bedside overnight.     Problem: Patient Care Overview  Goal: Plan of Care Review  Outcome: No Change  Lethargic, opens eyes to touch/voice. MONICA orientation, nonverbal. VSS. Neuros with L sided hemiplegia, facial droop. NPO, TF stopped at 0130 d/t abnormal abd CT with planned surgical consult. NPO. Turn&Repo q2h. No s/sx of pain. Ramsay patent. Daughter at bedside overnight.

## 2017-09-23 NOTE — PROVIDER NOTIFICATION
1:28 AM Spoke to Dr. Wong on call in regards to CT abdomen showing free air/dense fluid in pelvis. Recommends to hold tube feeding and will put in a consult for general surgery.

## 2017-09-23 NOTE — PROGRESS NOTES
[facility]    Hospitalist Progress Note    Assessment & Plan   Sury Barnett is a 89 year old female with PMHx of hypertension and hypothyroidism who was admitted on 9/6/2017 with a large R intracranial hemorrhage.      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 Seen by neurosurgery, bleed nonsurgical. Likely the hemorrhage caused fall. CT next day showed increased size but then multiple subsequent CTs with stable to slightly improved hematoma   -- repeat head CTs 9/16 and then 9/20 showed slight decrease in hemorrhage   -- clinical status has remained stable from neuro standpoint -- ongoing left sided hemiplegia, remains lethargic mostly and intermittently awake, PT signed off on 9/18 as patient was unable to participate in therapy as she was not alert enough. Given increased lethargy and no movement, discussed with daughter regarding repeating head CT now, she wants to wait couple of hours as she feels she will wake up, and if remains same, then only she will go for CT head. Discussed with nursing.  -- had G-tube placed on 9/11, tolerating TFs at goal  -- goal SBP <140 -- mgmt as below     Multiple providers have had goals of care discussions with patient's daughters and son this stay.  Family wishes for restorative cares, does not want to talk about palliative care or hospice at this time. Family's main concern remains lack of nutrition during initial days of stay. Family does not wish to engage in discussions regarding patient's current status whether it is due to perceived lack of nutrition (which they feel is the  cause) vs underlying hemorrhage. Family hopes that patient will recover to a similar functional status as per prior to admission. Daughter requested second opinion and transfer to [facility] 9/20, which I discussed with Dr Olguin  hospitalist at [facility] and reviewed her course so far, recommended continuing current care and no further recommendation at other facility. She requested on 9/21 that they be contacted with all details again, which I believe was not going to change the decision on accepting her care at [facility] so subsequent call not made.    Discussed with daughter 9/21 as well at great length. I mentioned that patient is stable neurologically. Discharge plan outlined, and I mentioned that she will be ready for discharge if WBC count trend down or felt leucocytosis was due to stress. Discussed regarding post discharge care including tube feeding, follow ups (PCP/neurology/CT scans/Labs), catheter care if needed, position change, skin care, passive range of motion as therapy. She requested in writing, AVS will be printed as she is close to being discharge.    I discussed with Crystal, again this am 9/23 given her increased lethargy. I brought discussions of resuscitation in case she deteriorates and unable to protect her airway. Crystal wants full code at this point.     Fever / Leukocytosis  Pneumatosis of colon and free peritoneal and retroperitoneal cavity.  Has had lingering leukocytosis this stay. Improved and was down to 11.7 on 9/13, has since been trending up: 14.8 on 9/16 then remains elevated, 25.8 today. Developed fever 9/17 PM with Tmax 101.1. Further infectious workup pursued but has been unrevealing.    -- 9/17 UA bland and w/o evidence of UTI (culture grew <10k enterococcus), 9/16 CXR showed resolution of infiltrate  -- blood cultures drawn 9/17 and neg, repeat UA 9/17 neg, serial procalcitonins and lactate neg  -- no evidence for infection around PEG tube or peripheral IVs (has no central IV access)  -- cdiff was neg on 9/13, on TFs and stools occ semi-formed, repeat c diff 9/19 negative  -- completed 7d course of Levaquin on 9/18 as below (WBC began to bump while on abx)  -- no recurrence of fever despite marked persistent  leukocytosis --etiology not completely clear but suspect stress response due to above and ICH. Ramsay catheter exchanged 9/21  -- ID consulted and discussed with Dr Mckenna,  CT abd pelvis with contrast done, shows pneumatosis of colon and air in peritoneal and retroperitoneal cavity, and thick fluid in pelvis. Gen surgery consulted and discussed with Dr Cassidy, though air in retroperitoneal cavity is unusual with feeding tube placement, still suspected tube placement causing it. No surgical intervention planned, ok to continue tube feeding.  -- recheck UA, awaiting ID eval today. Lactic acid is negative.    Possible aspiration pneumonia and fluid overload: resolved  CXR on 9/12 showed bilateral interstitial infiltrates consistent with pneumonia and fluid overload -- had been on IVF since admission on 9/6. IVFs dc'd. One dose of lasix 20mg IV given on 9/12 then maintained on lasix to 20mg PO daily on 9/14 given via tube. IV Levaquin started on 9/12 (given PCN allergy).  -- managed with Lasix 20mg po daily beginning 9/14 -- no evidence of ongoing infiltrate/edema on 9/16 CXR so Lasix stopped 9/17  -- cont Levaquin -- completed 7d course of treatment on 9/18  -- remains stable form a respiratory standpoint -- maintaining sats on RA     Benign essential hypertension.   On lisinopril prior to admission.   -- meds adjusted this stay -- now on Ramipril 10mg daily and Norvasc 10mg daily, Metoprolol 25mg BID added 9/16 given tachycardia and hypertension  -- goal BP <140 systolic   -- BP soft this am, skip amlodipine and resume 9/24 with lower dose. Hold BP med if SBP<100     Hypothyroidism.   Chronic and stable on levothyroxine    Hypernatremia: Resolved  Hypokalemia  Na 149 on 9/18 while on TFs.  -- increased free water flushes from 100ml q4h to 250ml q6h -- Na improved to 134 on 9/23 . Monitor urine output to ensure it is not DI.  -- replace potassium per protocol.     FEN: on TFs via PEG as above   DVT Prophylaxis:  PCDs  Code Status: Prior    Disposition: once WBC count trends down. Expect 2-4 days. Likely looking at LTC.  Discussed with patient's daughter Crystal.  Also discussed with RN     Michelle Manzano MD  Hospitalist    Interval History    Patient is more lethargic this morning.  Remains afebrile but leucocytosis has worsened.  Daughter at bedside, Unable to obtain ROS as patient is non verbal.    -Data reviewed today: I reviewed all new labs and imaging results over the last 24 hours. I personally reviewed no images or EKG's today.    Physical Exam   Temp: 97.6  F (36.4  C) Temp src: Axillary BP: 90/53   Heart Rate: 76 Resp: 14 SpO2: 93 % O2 Device: None (Room air)    Vitals:    09/20/17 0658 09/21/17 0300 09/22/17 0332   Weight: 41.5 kg (91 lb 7.9 oz) 40.9 kg (90 lb 2.7 oz) 41.5 kg (91 lb 7.9 oz)     Vital Signs with Ranges  Temp:  [96.8  F (36  C)-97.8  F (36.6  C)] 97.6  F (36.4  C)  Heart Rate:  [69-78] 76  Resp:  [14-16] 14  BP: ()/(47-71) 90/53  SpO2:  [93 %-97 %] 93 %  I/O last 3 completed shifts:  In: 2135 [I.V.:272; NG/GT:1665]  Out: 1675 [Urine:1675]    Constitutional:Lethargic today, resting comfortably, NAD  Neuro: Lethargic and does not follow any command today, no spontaneous eyes movement, not moving Rt upper or lower extremities either today.  Respiratory: CTA through anterior fields, no wheeze or coarse BS. Normal work of breathing.  Cardiovascular: HRRR, no MGR, no LE edema  GI: S, NT, ND, +BS, +PEG, tube site normal  Skin/Integumen: warm/dry      Medications        [START ON 9/24/2017] amLODIPine  5 mg Oral or Feeding Tube Daily     metoprolol  25 mg Oral or Feeding Tube BID     ramipril  10 mg Oral or Feeding Tube Daily     fiber modular  1 packet Per Feeding Tube BID     levothyroxine  50 mcg Oral or Feeding Tube QAM AC     sodium chloride (PF)  3 mL Intracatheter Q8H     pantoprazole  40 mg Per Feeding Tube Daily       Data     Recent Labs  Lab 09/23/17  0826 09/22/17  0740 09/21/17  0703  09/20/17  0814 09/19/17  0832   WBC 25.8* 21.7* 19.6* 22.5* 20.2*   HGB 12.4  --  13.0 13.8 14.1   MCV  --   --  94 95 95   PLT  --   --  370 394 374     --  142 145* 144   POTASSIUM 3.3*  --   --  3.6 3.6   CHLORIDE 97  --   --  107 105   CO2 29  --   --  34* 34*   BUN 20  --   --  33* 40*   CR 0.55  --   --  0.59 0.64   ANIONGAP 8  --   --  4 5   ANTHONY 8.5  --   --  9.2 9.2   GLC 96  --   --  134* 123*       Recent Results (from the past 24 hour(s))   US Lower Extremity Venous Duplex Bilateral    Narrative    VENOUS ULTRASOUND BILATERAL LOWER EXTREMITIES  9/22/2017 11:44 AM     HISTORY: 89-year-old patient bedridden with leukocytosis. Patient has  been immobile for extended period, since September 6, 2017.    COMPARISON: None.    TECHNIQUE: Color Doppler and spectral waveform analysis performed  throughout the deep veins of both lower extremities.    FINDINGS: Both common femoral, proximal greater saphenous, femoral,  and popliteal veins demonstrate normal blood flow, compression, and  augmentation. Posterior tibial and peroneal veins are patent.      Impression    IMPRESSION: Negative for deep venous thrombosis in both lower  extremities.    KORINA TRIMBLE MD   CT Abdomen Pelvis w Contrast    Narrative    CT ABDOMEN PELVIS WITH CONTRAST 9/22/2017 6:51 PM     HISTORY: Persistent leucocytosis. Recent G tube. Evaluate for any  occult infection.    CONTRAST DOSE: 45 mL Isovue-370    Radiation dose for this scan was reduced using automated exposure  control, adjustment of the mA and/or kV according to patient size, or  iterative reconstruction technique.    FINDINGS: Intra-abdominal free peritoneal air is noted anteriorly and  adjacent to the liver. A small amount of retroperitoneal air is also  noted to the right of the proximal abdominal aorta. The liver, spleen,  kidneys, pancreas, and gallbladder appear within normal limits. There  is no evidence of bowel obstruction. A Ramsay catheter is in place  within a  collapsed urinary bladder. There is a small amount of  peritoneal fluid within the pelvis. Pelvic contents are otherwise  grossly unremarkable.      Impression    IMPRESSION:  1. Nonspecific free peritoneal air. A small amount of retroperitoneal  air is also noted adjacent to the proximal abdominal aorta and  posterior to the right kidney.  2. Nonspecific somewhat dense peritoneal fluid is noted within the  pelvis which could represent blood.  3. Percutaneous gastric tube appears to be within the gastric fundus.  4. No evidence of bowel obstruction.    OMARI ORTIZ MD

## 2017-09-23 NOTE — PROGRESS NOTES
HOSPITALIST CROSS COVER NOTE    Called by RN due to abnormal abdominal CT    CT scan personally reviewed. Chart reviewed.     Patient with nonspecific free peritoneal air and dense fluid in pelvis, possibly blood.     Patient clinically stable, but with leukocytosis. Lactic acid has been stable.  Recent G tube placement.    PLAN:     Repeat Lactic acid:     Consult general surgery    YOBANI MARCOS MD

## 2017-09-24 ENCOUNTER — APPOINTMENT (OUTPATIENT)
Dept: OCCUPATIONAL THERAPY | Facility: CLINIC | Age: 82
DRG: 064 | End: 2017-09-24
Payer: MEDICARE

## 2017-09-24 LAB
ANION GAP SERPL CALCULATED.3IONS-SCNC: 7 MMOL/L (ref 3–14)
BASOPHILS # BLD AUTO: 0 10E9/L (ref 0–0.2)
BASOPHILS NFR BLD AUTO: 0.1 %
BUN SERPL-MCNC: 20 MG/DL (ref 7–30)
CALCIUM SERPL-MCNC: 8.2 MG/DL (ref 8.5–10.1)
CHLORIDE SERPL-SCNC: 98 MMOL/L (ref 94–109)
CO2 SERPL-SCNC: 27 MMOL/L (ref 20–32)
CREAT SERPL-MCNC: 0.46 MG/DL (ref 0.52–1.04)
DIFFERENTIAL METHOD BLD: ABNORMAL
EOSINOPHIL # BLD AUTO: 0.2 10E9/L (ref 0–0.7)
EOSINOPHIL NFR BLD AUTO: 1.3 %
GFR SERPL CREATININE-BSD FRML MDRD: >90 ML/MIN/1.7M2
GLUCOSE SERPL-MCNC: 126 MG/DL (ref 70–99)
IMM GRANULOCYTES # BLD: 0.2 10E9/L (ref 0–0.4)
IMM GRANULOCYTES NFR BLD: 1.3 %
LYMPHOCYTES # BLD AUTO: 1.2 10E9/L (ref 0.8–5.3)
LYMPHOCYTES NFR BLD AUTO: 6.7 %
MONOCYTES # BLD AUTO: 0.6 10E9/L (ref 0–1.3)
MONOCYTES NFR BLD AUTO: 3.1 %
NEUTROPHILS # BLD AUTO: 16 10E9/L (ref 1.6–8.3)
NEUTROPHILS NFR BLD AUTO: 87.5 %
NRBC # BLD AUTO: 0 10*3/UL
NRBC BLD AUTO-RTO: 0 /100
POTASSIUM SERPL-SCNC: 4.1 MMOL/L (ref 3.4–5.3)
SODIUM SERPL-SCNC: 132 MMOL/L (ref 133–144)
WBC # BLD AUTO: 18.3 10E9/L (ref 4–11)

## 2017-09-24 PROCEDURE — 25000132 ZZH RX MED GY IP 250 OP 250 PS 637: Mod: GY | Performed by: PHYSICIAN ASSISTANT

## 2017-09-24 PROCEDURE — 80048 BASIC METABOLIC PNL TOTAL CA: CPT | Performed by: HOSPITALIST

## 2017-09-24 PROCEDURE — 12000000 ZZH R&B MED SURG/OB

## 2017-09-24 PROCEDURE — 25000125 ZZHC RX 250

## 2017-09-24 PROCEDURE — 25000132 ZZH RX MED GY IP 250 OP 250 PS 637: Mod: GY | Performed by: INTERNAL MEDICINE

## 2017-09-24 PROCEDURE — A9270 NON-COVERED ITEM OR SERVICE: HCPCS | Mod: GY | Performed by: INTERNAL MEDICINE

## 2017-09-24 PROCEDURE — 85048 AUTOMATED LEUKOCYTE COUNT: CPT | Performed by: HOSPITALIST

## 2017-09-24 PROCEDURE — 97535 SELF CARE MNGMENT TRAINING: CPT | Mod: GO | Performed by: OCCUPATIONAL THERAPIST

## 2017-09-24 PROCEDURE — 27210429 ZZH NUTRITION PRODUCT INTERMEDIATE LITER

## 2017-09-24 PROCEDURE — 25000128 H RX IP 250 OP 636: Performed by: HOSPITALIST

## 2017-09-24 PROCEDURE — 40000133 ZZH STATISTIC OT WARD VISIT: Performed by: OCCUPATIONAL THERAPIST

## 2017-09-24 PROCEDURE — A9270 NON-COVERED ITEM OR SERVICE: HCPCS | Mod: GY | Performed by: HOSPITALIST

## 2017-09-24 PROCEDURE — A9270 NON-COVERED ITEM OR SERVICE: HCPCS | Mod: GY | Performed by: PSYCHIATRY & NEUROLOGY

## 2017-09-24 PROCEDURE — 36415 COLL VENOUS BLD VENIPUNCTURE: CPT | Performed by: HOSPITALIST

## 2017-09-24 PROCEDURE — 85004 AUTOMATED DIFF WBC COUNT: CPT | Performed by: HOSPITALIST

## 2017-09-24 PROCEDURE — 25000132 ZZH RX MED GY IP 250 OP 250 PS 637: Mod: GY | Performed by: PSYCHIATRY & NEUROLOGY

## 2017-09-24 PROCEDURE — A9270 NON-COVERED ITEM OR SERVICE: HCPCS | Mod: GY | Performed by: PHYSICIAN ASSISTANT

## 2017-09-24 PROCEDURE — A9270 NON-COVERED ITEM OR SERVICE: HCPCS | Mod: GY

## 2017-09-24 PROCEDURE — 25000132 ZZH RX MED GY IP 250 OP 250 PS 637: Mod: GY

## 2017-09-24 PROCEDURE — 99233 SBSQ HOSP IP/OBS HIGH 50: CPT | Performed by: HOSPITALIST

## 2017-09-24 PROCEDURE — 25000132 ZZH RX MED GY IP 250 OP 250 PS 637: Mod: GY | Performed by: HOSPITALIST

## 2017-09-24 RX ORDER — AMOXICILLIN 875 MG
875 TABLET ORAL EVERY 12 HOURS SCHEDULED
Status: DISCONTINUED | OUTPATIENT
Start: 2017-09-24 | End: 2017-09-27 | Stop reason: HOSPADM

## 2017-09-24 RX ADMIN — LEVOTHYROXINE SODIUM 50 MCG: 50 TABLET ORAL at 06:34

## 2017-09-24 RX ADMIN — AMLODIPINE BESYLATE 5 MG: 5 TABLET ORAL at 08:35

## 2017-09-24 RX ADMIN — Medication 40 MG: at 08:36

## 2017-09-24 RX ADMIN — METOPROLOL TARTRATE 25 MG: 100 TABLET ORAL at 21:23

## 2017-09-24 RX ADMIN — NYSTATIN 500000 UNITS: 100000 SUSPENSION ORAL at 12:47

## 2017-09-24 RX ADMIN — METOPROLOL TARTRATE 25 MG: 100 TABLET ORAL at 08:36

## 2017-09-24 RX ADMIN — NYSTATIN 500000 UNITS: 100000 SUSPENSION ORAL at 08:35

## 2017-09-24 RX ADMIN — RAMIPRIL 10 MG: 10 CAPSULE ORAL at 21:21

## 2017-09-24 RX ADMIN — CIPROFLOXACIN 400 MG: 2 INJECTION, SOLUTION INTRAVENOUS at 16:34

## 2017-09-24 RX ADMIN — NYSTATIN 500000 UNITS: 100000 SUSPENSION ORAL at 17:34

## 2017-09-24 RX ADMIN — Medication 1 PACKET: at 21:20

## 2017-09-24 RX ADMIN — AMOXICILLIN 875 MG: 875 TABLET, COATED ORAL at 21:21

## 2017-09-24 RX ADMIN — Medication 1 PACKET: at 08:35

## 2017-09-24 RX ADMIN — CIPROFLOXACIN 400 MG: 2 INJECTION, SOLUTION INTRAVENOUS at 03:34

## 2017-09-24 ASSESSMENT — VISUAL ACUITY
OU: NOT TESTABLE

## 2017-09-24 NOTE — PROGRESS NOTES
"Cross cover re: thrush in strict NPO.    Nursing to \"paint\" nystatin suspension QID x 10 days ordered.    JoAnna Barthell, PA-C  9/23/2017   9:06 PM    "

## 2017-09-24 NOTE — PLAN OF CARE
Problem: Goal Outcome Summary  Goal: Goal Outcome Summary  Discharge Planner OT   Patient plan for discharge: rehab  Current status: Pt unable to engage in session this morning, pt unable to follow commands, keeps eyes closed. Pt's daughter is at bedside, therapist provided ed and recommendations as well as discussed discharge from OT services due to pt inability to meaningfully participate in therapy sessions. Pt's daughter verbalized understanding, states she will communicate with other family members, and endorses understanding that pt is unable to engage in skilled services at this time. Family reports they are comfortable with continuing PROM program and engaging pt in command following, community engagement in a LTC setting, as well as touch/massage.   Barriers to return to prior living situation: current level of assist  Recommendations for discharge: LTC   Rationale for recommendations:  Pt not able to meaningfully engage in skilled therapy services, no active participation; weakness; impaired activity tolerance; impaired functional mobility; needs total cares       Entered by: Monica Gibson 09/24/2017 9:19 AM      Occupational Therapy Discharge Summary    Reason for therapy discharge:    Pt discharged due to lack of participation, pt is unable to meaningfully engage in therapy sessions, not alert, not able to follow commands.     Progress towards therapy goal(s). See goals on Care Plan in [facility] electronic health record for goal details.  Goals not met.  Barriers to achieving goals:   limited tolerance for therapy.     Therapy recommendation(s):    No further therapy is recommended. Re consult if appropriate, pt family educated on therapy discharge.

## 2017-09-24 NOTE — PLAN OF CARE
Problem: Patient Care Overview (Adult)  Goal: Plan of Care Review  Outcome: No Change  MONICA orientation, nonverbal. Neuros intact except for L side hemiplegia and facial droop. VSS. NPO, G tube feedings 35 mL/hr. Incontinent of loose stool x1. Mepilex on coccyx, barrier cream applied to open wounds around coccyx. Ramsay intact. Up with Mechanical lift, turned and repositioned q2h. Continue to monitor.        Problem: Patient Care Overview  Goal: Plan of Care Review  Outcome: No Change  MONICA orientation, nonverbal. Neuros intact except for L side hemiplegia and facial droop. VSS. NPO, G tube feedings 35 mL/hr. Incontinent of loose stool x1. Mepilex on coccyx, barrier cream applied to open wounds around coccyx. Ramsay intact. Up with Mechanical lift, turned and repositioned q2h. Continue to monitor.

## 2017-09-24 NOTE — PROGRESS NOTES
BRIEF NUTRITION NOTE        Spoke with RN about low sodium levels. Na 132 (L). Current total fluids: TF + water flushes (250 Q4) = 2138 mL/ day     Dosing Weight:  40.3 kg (current wt)      ASSESSED NUTRITION NEEDS PER APPROVED PRACTICE GUIDELINES:  Estimated Energy Needs: 5563-3745 kcals (30-35 Kcal/Kg)  Justification: underweight  Estimated Protein Needs:  48-60 grams protein (1.2-1.5 g pro/Kg)  Justification: Repletion  Estimated Fluid Needs:  6989-4596 mL (1 mL/Kcal)  Justification: maintenance      Intervention:  Free water flushes: 200mL Q 6 hrs  TF + Free water flushes (200mL Q6hr) = 1438 mL/ day      Georgette Morrison RD, LD

## 2017-09-24 NOTE — PROGRESS NOTES
SW:     D: REGAN met with patient;s daughter, Dotty who reports that patient has bed on hold at [facility]. Patient's daughter would like for REGAN to contact [facility] to confirm bed hold. Patient's daughter informed that [facility] has accepted patient. Discussed the financial cost with patient's daughter. Informed her that patient will have to be private pay for LTC as it is not covered by her insurance.  verbalized an understanding and is in agreement. Patient's daughter states that the family strongly prefers to discharge to [facility]. REGAN spoke with Elizabeth at [facility] ([phone]). Angel reports that patient has  bed on hold at [facility]. Angel states that he hasn't had a chance to review the referral yet but has no reason to believe that patient will be declined.     P: SW will continuo to follow

## 2017-09-24 NOTE — PROGRESS NOTES
[facility]    Hospitalist Progress Note    Assessment & Plan   Sury Barnett is a 89 year old female with PMHx of hypertension and hypothyroidism who was admitted on 9/6/2017 with a large R intracranial hemorrhage.      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 Seen by neurosurgery, bleed nonsurgical. Likely the hemorrhage caused fall. CT next day showed increased size but then multiple subsequent CTs with stable to slightly improved hematoma   -- repeat head CTs 9/16 and then 9/20  And 9/23 showed slight decrease in hemorrhage.  -- clinical status has remained stable from neuro standpoint -- ongoing left sided hemiplegia, remains lethargic mostly and intermittently awake, PT signed off on 9/18 as patient was unable to participate in therapy as she was not alert enough. Given increased lethargy and no movement 9/23, discussed with daughter and repeat CT head was done, stable. Patient more awakre 9/24  -- had G-tube placed on 9/11, tolerating TFs at goal  -- goal SBP <140 -- mgmt as below     Multiple providers have had goals of care discussions with patient's daughters and son this stay including myself several times. Family wishes for restorative cares, does not want to talk about palliative care or hospice at this time. Family's main concern remains lack of nutrition during initial days of stay. Daughter Crystal does not wish to engage in discussions regarding patient's current status whether it is due to perceived lack of nutrition (which they feel is the  cause) vs underlying hemorrhage. Crystal hopes that patient will recover and would like see her being independent again. Family requested second opinion and transfer to [facility] 9/20, which I discussed with Dr Olguin hospitaledmar at [facility] and reviewed her course so far, recommended continuing  current care and no reason to transfer to other facility. She requested on 9/21 that they be contacted with all details again, which I believe was not going to change the decision on accepting her care at [facility] so subsequent call not made.    Have discussed with daughter Crystal daily. Have outlined discharge plan, and that she will be ready for discharge if WBC count trend down. I have discussed with her regarding post discharge care including tube feeding, follow ups (PCP/neurology/CT scans/Labs), catheter care if needed, position change, skin care, passive range of motion as therapy. AVS provided.    I discussion with Crystal, 9/23 since patient was increasingly lethargic. I discussed about resuscitation again in case she deteriorates and unable to protect her airway. Crystal wants patient be full code at this point.     Fever / Leukocytosis  Pneumatosis of colon and free peritoneal and retroperitoneal cavity  Has had lingering leukocytosis this stay. Improved and was down to 11.7 on 9/13, then started trending up and was up to 25.8 on 9/23. Developed fever 9/17 PM with Tmax 101.1. Further infectious workup pursued but has been unrevealing.    -- 9/16 CXR showed resolution of infiltrate, blood cultures drawn 9/17 now growth, repeat UA 9/17 neg (though culture grew <10k enterococcus), serial procalcitonins and lactate neg  -- no evidence for infection around PEG tube or peripheral IVs (has no central IV access)  -- cdiff was neg on 9/13, on TFs and stools occ semi-formed, repeat c diff 9/19 negative  -- completed 7d course of Levaquin on 9/18 as below (WBC began to bump while on abx)  -- ID consulted and discussed with Dr Mckenna,  CT abd pelvis with contrast done, shows pneumatosis of colon and air in peritoneal and retroperitoneal cavity, and thick fluid in pelvis. Gen surgery consulted and discussed with Dr Cassidy, though air in retroperitoneal cavity is unusual with feeding tube placement, still suspected this was due  to tube placement. No surgical intervention planned, okayed to continue tube feeding.  -- no recurrence of fever despite marked persistent leukocytosis -- etiology not completely clear but suspect stress response due to above and ICH. Ramsay catheter exchanged 9/21, and UA repeated again 9/23, appears abnormal. Started on Cipro 9/24, WBC down to 18 K today, discussed with Dr Tate, will review today, await urine culture.     Possible aspiration pneumonia and fluid overload: resolved  CXR on 9/12 showed bilateral interstitial infiltrates consistent with pneumonia and fluid overload -- had been on IVF since admission on 9/6. IVFs dc'd. One dose of lasix 20mg IV given on 9/12 then maintained on lasix to 20mg PO daily on 9/14 given via tube. IV Levaquin started on 9/12 (given PCN allergy).  -- managed with Lasix 20mg po daily beginning 9/14 -- no evidence of ongoing infiltrate/edema on 9/16 CXR so Lasix stopped 9/17  -- cont Levaquin -- completed 7d course of treatment on 9/18  -- remains stable form a respiratory standpoint -- maintaining sats on RA     Benign essential hypertension.   On lisinopril prior to admission.   -- meds adjusted this stay -- was stable on Ramipril 10mg daily and Norvasc 10mg daily, Metoprolol 25mg BID added 9/16 given tachycardia and hypertension  -- Given soft BP 9/23, amlodipine held and decreased to 5 mg daily from 9/24  -- goal BP <140 systolic   -- Hold BP med if SBP<100     Hypothyroidism.   Chronic and stable on levothyroxine    Hypernatremia: Resolved, now mild hyponatremia  Hypokalemia  Na 149 on 9/18 while on TFs.  -- increased free water flushes from 100ml q4h to 250ml q6h -- Na improved to 134 on 9/23 and now 132  -- decrease FWF to 200 ccq6 hour, follow sodium in am  -- replace potassium per protocol.     FEN: on TFs via PEG as above   DVT Prophylaxis: PCDs  Code Status: Prior    Disposition: once WBC count trends down, and urine culture back, and if sodium stable. Expect 1-3  "days. Has bed at [facility], will ask SW to hold 1 more day.  Discussed with patient's daughter Dotty 9/24, discussed with Dr Tate  Also discussed with YRN Manzano MD  Hospitalist    Interval History    Patient is awake this morning, giving thumbs up on calling her with her name.   Whispered \"no\" on asking if she had pain.  Remains afebrile, leucocytosis slightly improved today  Daughter Dotty at bedside, Unable to obtain ROS as patient is non verbal.    -Data reviewed today: I reviewed all new labs and imaging results over the last 24 hours. I personally reviewed no images or EKG's today.    Physical Exam   Temp: 97.4  F (36.3  C) Temp src: Axillary BP: 104/60   Heart Rate: 76 Resp: 18 SpO2: 96 % O2 Device: None (Room air)    Vitals:    09/20/17 0658 09/21/17 0300 09/22/17 0332   Weight: 41.5 kg (91 lb 7.9 oz) 40.9 kg (90 lb 2.7 oz) 41.5 kg (91 lb 7.9 oz)     Vital Signs with Ranges  Temp:  [97.2  F (36.2  C)-97.9  F (36.6  C)] 97.4  F (36.3  C)  Heart Rate:  [68-81] 76  Resp:  [14-18] 18  BP: ()/(52-64) 104/60  SpO2:  [95 %-100 %] 96 %  I/O last 3 completed shifts:  In: 1060 [I.V.:60; NG/GT:1000]  Out: 775 [Urine:775]    Constitutional:alert today, resting comfortably, NAD  HEENT: Oral thrush noted.  Neuro: alert, gives thumbs up, spontaneous eyes movement, whispered no on asking about pain, wiggling toes Rt foot.  Respiratory: CTA through anterior fields, no wheeze or coarse BS. Normal work of breathing.  Cardiovascular: HRRR, no MGR, no LE edema  GI: S, NT, ND, +BS, +PEG, tube site normal  Skin/Integumen: warm/dry      Medications        amLODIPine  5 mg Oral or Feeding Tube Daily     ciprofloxacin  400 mg Intravenous Q12H     nystatin  500,000 Units Swish & Swallow 4x Daily     metoprolol  25 mg Oral or Feeding Tube BID     ramipril  10 mg Oral or Feeding Tube Daily     fiber modular  1 packet Per Feeding Tube BID     levothyroxine  50 mcg Oral or Feeding Tube QAM AC     sodium chloride (PF)  3 mL " Intracatheter Q8H     pantoprazole  40 mg Per Feeding Tube Daily       Data     Recent Labs  Lab 09/24/17  0744 09/23/17  2130 09/23/17  0826 09/22/17  0740 09/21/17  0746 09/20/17  0814 09/19/17  0832   WBC 18.3*  --  25.8* 21.7* 19.6* 22.5* 20.2*   HGB  --   --  12.4  --  13.0 13.8 14.1   MCV  --   --   --   --  94 95 95   PLT  --   --   --   --  370 394 374   *  --  134  --  142 145* 144   POTASSIUM 4.1 4.0 3.3*  --   --  3.6 3.6   CHLORIDE 98  --  97  --   --  107 105   CO2 27  --  29  --   --  34* 34*   BUN 20  --  20  --   --  33* 40*   CR 0.46*  --  0.55  --   --  0.59 0.64   ANIONGAP 7  --  8  --   --  4 5   ANTHONY 8.2*  --  8.5  --   --  9.2 9.2   *  --  96  --   --  134* 123*       Recent Results (from the past 24 hour(s))   CT Head w/o Contrast    Narrative    CT SCAN OF THE HEAD WITHOUT CONTRAST   9/23/2017 2:35 PM     HISTORY: Known ICH, increased lethargy.    TECHNIQUE:  Axial images of the head and coronal reformations without  IV contrast material. Radiation dose for this scan was reduced using  automated exposure control, adjustment of the mA and/or kV according  to patient size, or iterative reconstruction technique.    COMPARISON: Head CT 9/20/2017.    FINDINGS: Fairly large hyperdense parenchymal hematoma centered in the  right frontal lobe overall does not appear significantly changed in  size. Punctate 2 mm focus of hyperdensity in the right centrum  semiovale was not appreciated on axial images on the previous CT, but  was present on the coronal images. No evidence of new intracranial  hemorrhage. Small amount of subarachnoid hemorrhage in the right  cerebral hemisphere is unchanged. Surrounding hypodensity and mass  effect on the frontal horn of the right lateral ventricle is  unchanged. Ventricular size is similar to prior without evidence of  hydrocephalus or entrapment. The basal cisterns remain patent.  Extensive periventricular white matter hypodensity is similar to  prior.  Moderate diffuse parenchymal volume loss.    The visualized portions of the sinuses and mastoids appear normal. The  bony calvarium and bones of the skull base appear intact.       Impression    IMPRESSION:     1. No significant change of the intraparenchymal hemorrhage centered  in the right frontal lobe. Unchanged small amount of subarachnoid  hemorrhage in the right cerebral hemisphere. No evidence of new  intracranial hemorrhage.  2. Diffuse parenchymal volume loss and extensive periventricular white  matter hypodensities likely due to chronic microvascular ischemic  disease. No significant change since prior.    BIMAL FERRARA MD

## 2017-09-24 NOTE — PLAN OF CARE
Problem: Patient Care Overview (Adult)  Goal: Plan of Care Review  Outcome: No Change  Neuro exam unchanged (left hemiplegia, left facial droop, right pupil sluggish, left pupil brisk) no response to painful stimuli this AM, somnolent, repeat head CT obtained (no change).  Alertness improving as shift goes on.  UA sent, patient started on IV Cipro.  K+ replaced, re-check due at 2200.  Open wound on right buttock, excoriated around anus, cleansed with soap and water, barrier cream applied.  Thrush noted on tongue.  Placed call to hospitalist for Nystatin.  Discharge pending.    Problem: Patient Care Overview  Goal: Plan of Care Review  Outcome: No Change  Neuro exam unchanged (left hemiplegia, left facial droop, right pupil sluggish, left pupil brisk) no response to painful stimuli this AM, somnolent, repeat head CT obtained (no change).  Alertness improving as shift goes on.  UA sent, patient started on IV Cipro.  K+ replaced, re-check due at 2200.  Open wound on right buttock, excoriated around anus, cleansed with soap and water, barrier cream applied.  Thrush noted on tongue.  Placed call to hospitalist for Nystatin.  Discharge pending.

## 2017-09-24 NOTE — PLAN OF CARE
Problem: Patient Care Overview (Adult)  Goal: Plan of Care Review  Outcome: No Change  Pt lethargic, arouses to gentle touch/shaking. Non-verbal. Has L droop, L hemiplegia. SBP soft, other VSS. NPO, started nystatin swish and swallow for thrush. TF running at goal rate, 250ml flushes q4 hours. T/R q2 hours. No non-verbal signs of pain. DC plan pending.    Problem: Patient Care Overview  Goal: Plan of Care Review  Outcome: No Change  Pt lethargic, arouses to gentle touch/shaking. Non-verbal. Has L droop, L hemiplegia. SBP soft, other VSS. NPO, started nystatin swish and swallow for thrush. TF running at goal rate, 250ml flushes q4 hours. T/R q2 hours. No non-verbal signs of pain. DC plan pending.

## 2017-09-24 NOTE — DISCHARGE INSTRUCTIONS
Isosource 1.5 (or equivalent) @ goal of 35 mL/hr continuous.  Nutrisource Fiber, 1 packet BID  H2O flushes of 200 cc every 6 hours    Your risk factors for stroke or TIA (transient ischemic attack):    Your Risk Factors Your Results Normal Ranges   High blood pressure BP Readings from Last 1 Encounters:   09/23/17 91/52    Less than 120/80   Cholesterol              Total No results found for: CHOL   Less than 150    Triglycerides   No results found for: TRIG Less than 150   LDL No results found for: LDL    Less than 70   HDL No results found for: HDL         Greater than 40 (men)  Greater than 50 (women)   Diabetes   Recent Labs  Lab 09/23/17  0826   GLC 96    Fasting blood glucose    Smoking/tobacco use  Quit smoking and tobacco   Overweight  Lose 1-2 pounds a week   Lack of exercise  30 minutes moderate activity each day   Other risk factors include carotid (neck) artery disease, atrial fibrillation and stress. You may be on new medicine to treat high blood pressure, cholesterol, diabetes or atrial fibrillation.    Understanding Stroke Booklet given to patient. Please refer to booklet for further information.    Stroke warning signs and symptoms - CALL 911 right away for:  - Sudden numbness or weakness in the face, arm or leg (often on one side of the body).  - Sudden confusion or trouble understanding what is going on.  - Sudden blurred or decreased vision in one or both eyes.  - Sudden trouble speaking, loss of balance, dizziness or problems with coordination.  - Sudden, severe headache for no reason.  - Fainting or seizures.  - Symptoms may go away then come back suddenly.

## 2017-09-24 NOTE — PROGRESS NOTES
" INFECTIOUS DISEASE Progress Note  September 24, 2017  [id number]  Sury S Lines    ANTIBIOTICS:  'cipro 400 mg iv q 12 hours    89 year old female with PMHx of hypertension and hypothyroidism who was admitted on 9/6/2017 with a large R intracranial hemorrhage    SUBJECTIVE: afebrile, had han, doing ok per son, very quiet, he was not aware of any amoxicillin allergy, no cough    OBJECTIVE:  /50 (BP Location: Right arm)  Pulse 75  Temp 97.3  F (36.3  C) (Oral)  Resp 16  Ht 1.499 m (4' 11\")  Wt 41.5 kg (91 lb 7.9 oz)  SpO2 95%  BMI 18.48 kg/m2  Very weak, awake, soft voice  Lung CTA  abd benign  Han  Neck supple    LAB Data:  Creat 0.4  WBC  21 to 25 to 19    MICROBIOLOGY:  BC 9/17 NG  UC 9/17-< 10k Enterococcus amp S  UC 9/23-100k Enterococcus faecalis JUVENTINO pending  UA 9/23-56 WBC  C.diff neg  Procalcitonin is low at 0.08    IMAGING:      Attestation:  I have reviewed today's vital signs, notes, medications, labs and imaging.    ASSESSMENT:  1. LEUKOCYTOSIS-worse then better, cipro added due to pyuria, UC again with Enterococcus but higher #s; could be benign, procal normal  2. POSSIBLE ENTEROCOCCAL UTI-pyuria, now greater # Enterococcus, cipro  May cover but lot more  Side effects and not optimal, would       Try to avoid vancomycin, cannot be certain a UTI but doubt she would show any sxs with han in  3. ICH  4. POSSIBLE AMOX ALLERGY-no hx of taking, no detail, think low risk      RECOMMENDATION  1. D/C CIPRO  2. Oral amoxicillin 875 mg bid x 7 days  3. F/u WBC    EDILMA ABRAMS M.D.  O:[phone]   B:[phone]          "

## 2017-09-24 NOTE — PLAN OF CARE
Problem: Patient Care Overview (Adult)  Goal: Plan of Care Review  Outcome: Improving  Neuros slightly improving.  Pt a little more alert today, opening eyes, saying some words, answering a couple questions and following limited commands.  Left sided hemiplegia, Right side hemiparesis, moves right arm at times, wiggled right toes.  +2 edema noted left hand and foot. TF at goal, flush changed to 200cc every 6 hours, last done at 1000.  Ramsay.  Loose stool x 1.  Skin breakdown on buttocks treatment per POC, multiple excoriations, no drainage.  Turned side to side.  Plan for LTC tomorrow at [facility] if remains stable.     Problem: Patient Care Overview  Goal: Plan of Care Review  Outcome: Improving  Neuros slightly improving.  Pt a little more alert today, opening eyes, saying some words, answering a couple questions and following limited commands.  Left sided hemiplegia, Right side hemiparesis, moves right arm at times, wiggled right toes.  +2 edema noted left hand and foot. TF at goal, flush changed to 200cc every 6 hours, last done at 1000.  Ramsay.  Loose stool x 1.  Skin breakdown on buttocks treatment per POC, multiple excoriations, no drainage.  Turned side to side.  Plan for LTC tomorrow at [facility] if remains stable.

## 2017-09-25 ENCOUNTER — APPOINTMENT (OUTPATIENT)
Dept: GENERAL RADIOLOGY | Facility: CLINIC | Age: 82
DRG: 064 | End: 2017-09-25
Attending: INTERNAL MEDICINE
Payer: MEDICARE

## 2017-09-25 LAB
ANION GAP SERPL CALCULATED.3IONS-SCNC: 8 MMOL/L (ref 3–14)
BACTERIA SPEC CULT: ABNORMAL
BASOPHILS # BLD AUTO: 0 10E9/L (ref 0–0.2)
BASOPHILS NFR BLD AUTO: 0.1 %
BUN SERPL-MCNC: 16 MG/DL (ref 7–30)
CALCIUM SERPL-MCNC: 8.6 MG/DL (ref 8.5–10.1)
CHLORIDE SERPL-SCNC: 99 MMOL/L (ref 94–109)
CO2 SERPL-SCNC: 28 MMOL/L (ref 20–32)
CREAT SERPL-MCNC: 0.54 MG/DL (ref 0.52–1.04)
DIFFERENTIAL METHOD BLD: ABNORMAL
EOSINOPHIL # BLD AUTO: 0.1 10E9/L (ref 0–0.7)
EOSINOPHIL NFR BLD AUTO: 0.4 %
GFR SERPL CREATININE-BSD FRML MDRD: >90 ML/MIN/1.7M2
GLUCOSE SERPL-MCNC: 136 MG/DL (ref 70–99)
IMM GRANULOCYTES # BLD: 0.2 10E9/L (ref 0–0.4)
IMM GRANULOCYTES NFR BLD: 1.5 %
LACTATE BLD-SCNC: 1.3 MMOL/L (ref 0.7–2)
LYMPHOCYTES # BLD AUTO: 0.6 10E9/L (ref 0.8–5.3)
LYMPHOCYTES NFR BLD AUTO: 3.6 %
MAGNESIUM SERPL-MCNC: 2.6 MG/DL (ref 1.6–2.3)
MONOCYTES # BLD AUTO: 0.3 10E9/L (ref 0–1.3)
MONOCYTES NFR BLD AUTO: 1.6 %
NEUTROPHILS # BLD AUTO: 15.1 10E9/L (ref 1.6–8.3)
NEUTROPHILS NFR BLD AUTO: 92.8 %
NRBC # BLD AUTO: 0 10*3/UL
NRBC BLD AUTO-RTO: 0 /100
NT-PROBNP SERPL-MCNC: 240 PG/ML (ref 0–1800)
PHOSPHATE SERPL-MCNC: 2.7 MG/DL (ref 2.5–4.5)
POTASSIUM SERPL-SCNC: 4 MMOL/L (ref 3.4–5.3)
PREALB SERPL IA-MCNC: 26 MG/DL (ref 15–45)
PROCALCITONIN SERPL-MCNC: 0.19 NG/ML
SODIUM SERPL-SCNC: 135 MMOL/L (ref 133–144)
SPECIMEN SOURCE: ABNORMAL
WBC # BLD AUTO: 16.2 10E9/L (ref 4–11)

## 2017-09-25 PROCEDURE — 83735 ASSAY OF MAGNESIUM: CPT | Performed by: INTERNAL MEDICINE

## 2017-09-25 PROCEDURE — 36415 COLL VENOUS BLD VENIPUNCTURE: CPT | Performed by: INTERNAL MEDICINE

## 2017-09-25 PROCEDURE — 84295 ASSAY OF SERUM SODIUM: CPT | Performed by: INTERNAL MEDICINE

## 2017-09-25 PROCEDURE — 83605 ASSAY OF LACTIC ACID: CPT | Performed by: INTERNAL MEDICINE

## 2017-09-25 PROCEDURE — 80048 BASIC METABOLIC PNL TOTAL CA: CPT | Performed by: INTERNAL MEDICINE

## 2017-09-25 PROCEDURE — 25000132 ZZH RX MED GY IP 250 OP 250 PS 637: Mod: GY | Performed by: PSYCHIATRY & NEUROLOGY

## 2017-09-25 PROCEDURE — A9270 NON-COVERED ITEM OR SERVICE: HCPCS | Mod: GY | Performed by: INTERNAL MEDICINE

## 2017-09-25 PROCEDURE — A9270 NON-COVERED ITEM OR SERVICE: HCPCS | Mod: GY

## 2017-09-25 PROCEDURE — 25000125 ZZHC RX 250

## 2017-09-25 PROCEDURE — 71010 XR CHEST PORT 1 VW: CPT

## 2017-09-25 PROCEDURE — 84145 PROCALCITONIN (PCT): CPT | Performed by: INTERNAL MEDICINE

## 2017-09-25 PROCEDURE — A9270 NON-COVERED ITEM OR SERVICE: HCPCS | Mod: GY | Performed by: PSYCHIATRY & NEUROLOGY

## 2017-09-25 PROCEDURE — 12000000 ZZH R&B MED SURG/OB

## 2017-09-25 PROCEDURE — 25000132 ZZH RX MED GY IP 250 OP 250 PS 637: Mod: GY | Performed by: INTERNAL MEDICINE

## 2017-09-25 PROCEDURE — 84100 ASSAY OF PHOSPHORUS: CPT | Performed by: INTERNAL MEDICINE

## 2017-09-25 PROCEDURE — 85048 AUTOMATED LEUKOCYTE COUNT: CPT | Performed by: INTERNAL MEDICINE

## 2017-09-25 PROCEDURE — 99233 SBSQ HOSP IP/OBS HIGH 50: CPT | Performed by: INTERNAL MEDICINE

## 2017-09-25 PROCEDURE — 83880 ASSAY OF NATRIURETIC PEPTIDE: CPT | Performed by: INTERNAL MEDICINE

## 2017-09-25 PROCEDURE — 84134 ASSAY OF PREALBUMIN: CPT | Performed by: INTERNAL MEDICINE

## 2017-09-25 PROCEDURE — 25000132 ZZH RX MED GY IP 250 OP 250 PS 637: Mod: GY

## 2017-09-25 PROCEDURE — 25000132 ZZH RX MED GY IP 250 OP 250 PS 637: Mod: GY | Performed by: PHYSICIAN ASSISTANT

## 2017-09-25 PROCEDURE — A9270 NON-COVERED ITEM OR SERVICE: HCPCS | Mod: GY | Performed by: PHYSICIAN ASSISTANT

## 2017-09-25 PROCEDURE — 85004 AUTOMATED DIFF WBC COUNT: CPT | Performed by: INTERNAL MEDICINE

## 2017-09-25 RX ADMIN — NYSTATIN 500000 UNITS: 100000 SUSPENSION ORAL at 18:02

## 2017-09-25 RX ADMIN — NYSTATIN 500000 UNITS: 100000 SUSPENSION ORAL at 13:32

## 2017-09-25 RX ADMIN — METOPROLOL TARTRATE 25 MG: 100 TABLET ORAL at 20:55

## 2017-09-25 RX ADMIN — Medication 1 PACKET: at 09:25

## 2017-09-25 RX ADMIN — AMOXICILLIN 875 MG: 875 TABLET, COATED ORAL at 20:55

## 2017-09-25 RX ADMIN — NYSTATIN 500000 UNITS: 100000 SUSPENSION ORAL at 09:27

## 2017-09-25 RX ADMIN — NYSTATIN 500000 UNITS: 100000 SUSPENSION ORAL at 20:56

## 2017-09-25 RX ADMIN — LEVOTHYROXINE SODIUM 50 MCG: 50 TABLET ORAL at 04:59

## 2017-09-25 RX ADMIN — AMOXICILLIN 875 MG: 875 TABLET, COATED ORAL at 09:10

## 2017-09-25 RX ADMIN — RAMIPRIL 10 MG: 10 CAPSULE ORAL at 20:55

## 2017-09-25 RX ADMIN — Medication 1 PACKET: at 20:56

## 2017-09-25 RX ADMIN — Medication 40 MG: at 09:10

## 2017-09-25 RX ADMIN — METOPROLOL TARTRATE 25 MG: 100 TABLET ORAL at 09:10

## 2017-09-25 ASSESSMENT — VISUAL ACUITY
OU: NOT TESTABLE

## 2017-09-25 NOTE — PROGRESS NOTES
[facility] Progress Note  Chart Reviewed, Pt discussed in Interdisciplinary Rounds.   Pt is not ready for discharge today. Family is anticipating discharge to [facility].  Intervention:   REGAN received call from James Bermeo at [facility] regarding referral that was made previously. They have reviewed and accepted pt for placement . [facility] is out on vacation and James will be following for admission. He can be reached at  [phone]. James updated via v-message that pt is expected to discharge Wednesday. James had been advised that pt is not discharging today and indicated that they would have a bed for pt. REGAN advised James that Wednesday is anticipated and James will return call if Wednesday is not acceptable . Prior to discharge, [facility] would need specific orders and some advanced supply of tube feeding formula.  Team Members notified:   CC,RN    Plan: Discharge anticipated [facility] on Wednesday 9/27     Follow up plan:   Discharge orders and formula to be sent to facility at discharge.  Stretcher transportation, PCS and PAS would need completion.    ADDENDUM: REGAN spoke with James at [facility] to confirm bed for Wednesday. He stated that [facility] toured three family members and room is a semi-private room. The expectation financially is that pt is private pay and a $5,000.00 down payment is due on admission. REGAN faxed tube feeding orders to James so the formula order can be filled prior to admission.   REGAN called and updated Crystal per v-message of the anticipated discharge on Wednesday and the required down payment that is required on admission.

## 2017-09-25 NOTE — PLAN OF CARE
Problem: Patient Care Overview (Adult)  Goal: Plan of Care Review  SLP: Attempted to see patient for treatment, but per nurse not appropriate. Mucus plugging this morning and continued suctioning throughout the day. Will cancel the session.     Problem: Patient Care Overview  Goal: Plan of Care Review  SLP: Attempted to see patient for treatment, but per nurse not appropriate. Mucus plugging this morning and continued suctioning throughout the day. Will cancel the session.

## 2017-09-25 NOTE — PROGRESS NOTES
"SPIRITUAL HEALTH SERVICES Progress Note  [facility] 73    Follow-up visit with pt's daughter Crystal. Crystal feels optimistic about pt's possible discharge to [facility] and hopes pt will discharge Wednesday. Family goal is that pt would begin rehab and continue \"as long as progress is being made.\" Pt's daughter expressed family solidarity around their goals for pt and felt these goals were at odds with perceived staff goals, particularly around whether or not rehab is appropriate for pt. Family is adamantly in support of rehab. SH listened as daughter discussed this conflict and her own response. SH inquired as to what the outlook is for someone with this sort of stroke in terms of rehabilitation and daughter stated that the family wants to give pt every chance she can get at recovery. SH will follow as available.    Monica Eaton  Chaplain Resident  "

## 2017-09-25 NOTE — PROGRESS NOTES
"[facility]  Infectious Disease Progress Note          Assessment and Plan:   IMPRESSION:   1.  Sury Barnett is a 89-year-old female with history of hypertension.   2.  Admitted on 09/06 with change in mental status and found to have large right frontal intracranial hemorrhage.  She has residual left hemiplegia.   3.  Persistent leukocytosis with white blood count around 20,000.  I suspect that this most likely is stress induced.  There are no signs of infection on exam and the patient has had a negative evaluation for infection including negative blood cultures, small numbers of Enterococcus on urine culture which probably represent colonization given benign urinalysis, negative Clostridium difficile study, absence of pneumonia on chest x-ray.  The patient is not receiving steroid medications.  Procalcitonin is low at 0.08.   4. Possible new enterococcal UTI.  Repeat UC with greater nos of enterococcus and UA with 56 WBCs.      RECOMMENDATIONS:   1. Would give ten day course of PO Amoxicillin        Interval History:   Repeat UA/UC as above.  Afebrile.  Remains poorly responsive.  Started on Amox.  Looks to be tolerating.  No rash.  WBC lower at 18k.              Medications:       amoxicillin  875 mg Per Feeding Tube Q12H JEZ     amLODIPine  5 mg Oral or Feeding Tube Daily     nystatin  500,000 Units Swish & Swallow 4x Daily     metoprolol  25 mg Oral or Feeding Tube BID     ramipril  10 mg Oral or Feeding Tube Daily     fiber modular  1 packet Per Feeding Tube BID     levothyroxine  50 mcg Oral or Feeding Tube QAM AC     sodium chloride (PF)  3 mL Intracatheter Q8H     pantoprazole  40 mg Per Feeding Tube Daily                  Physical Exam:   Blood pressure 122/66, pulse 75, temperature 97.9  F (36.6  C), temperature source Axillary, resp. rate 18, height 1.499 m (4' 11\"), weight 41.5 kg (91 lb 7.9 oz), SpO2 95 %, not currently breastfeeding.  [unfilled]  Vital Signs with Ranges  Temp:  [97.3 "  F (36.3  C)-97.9  F (36.6  C)] 97.9  F (36.6  C)  Heart Rate:  [64-81] 81  Resp:  [16-18] 18  BP: ()/(50-66) 122/66  SpO2:  [95 %-98 %] 95 %    Constitutional: Awake, alert, cooperative, no apparent distress   Lungs: Clear to auscultation bilaterally, no crackles or wheezing   Cardiovascular: Regular rate and rhythm, normal S1 and S2, and no murmur noted   Abdomen: Normal bowel sounds, soft, non-distended, non-tender   Skin: No rashes, no cyanosis, no edema   Other:           Data:   All microbiology laboratory data reviewed.  Recent Labs   Lab Test  09/24/17   0744  09/23/17   0826  09/22/17   0740  09/21/17   0746  09/20/17   0814  09/19/17   0832   WBC  18.3*  25.8*  21.7*  19.6*  22.5*  20.2*   HGB   --   12.4   --   13.0  13.8  14.1   HCT   --   35.8   --   39.8  41.4  43.1   MCV   --    --    --   94  95  95   PLT   --    --    --   370  394  374     Recent Labs   Lab Test  09/24/17   0744  09/23/17   0826  09/20/17   0814   CR  0.46*  0.55  0.59     No lab results found.

## 2017-09-25 NOTE — PROGRESS NOTES
[id number]: Pt was 78% on RA & RR 36 with accessory muscles use. Productive cough, yellow tinged mucous. Required frequent suctioning. No increase in sats despite 6L O2 via nasal canula. Pt required 10L via oxymask to get sats up to 92%. Dr. Manrique was updated-- chest x-ray & labs ordered.    At 1100 was able to wean pt to RA, per Dr. Manrique will leave continuous pulse oxy on & continue to monitor respiratory status.

## 2017-09-25 NOTE — PLAN OF CARE
Problem: Goal Outcome Summary  Goal: Goal Outcome Summary  Outcome: No Change  Alert, non verbal, nods appropriately or gives thumbs up. Right hemiparesis, left hemiplegia. VSS. NPO, tube feeding infusing, G-tube residual was 40 cc. Mimi rectal area excoriation, barrier cream applied per wound care. Repositioned in bed with lift. Denies pain.

## 2017-09-25 NOTE — PLAN OF CARE
Problem: Patient Care Overview  Goal: Plan of Care Review  Outcome: No Change  Lethargic, opens eyes to gentle touch. Non-verbal, gives thumbs-up occasionally. Has L droop, L hemiplegia and R hemiparesis. VSS. Frequent cough throughout night, providing prn suctioning. TF at 35ml/hr, 200ml flushes q6. T/R, han in. Barrier cream applied to excoriations in rectal area. Incontinent of BM x1. No s/sx of pain. Plan to DC to LTC, possibly today.

## 2017-09-25 NOTE — PROGRESS NOTES
Update:  With hypoxia to upper 70%'s this am, improved with high flow O2.   CXR done, with ? Infiltrate  BNP, procal negative  With cough sats improved, suspect mucus plug  Currently breathing fine on room air.     Tamir Manrique M.D.  Hospitalist  Pager [phone]  Text Page until 6 pm (after call answering service)

## 2017-09-25 NOTE — PLAN OF CARE
Problem: Goal Outcome Summary  Goal: Goal Outcome Summary  Outcome: No Change  MONICA orientation. More sommulent this AM but improved--now lethargic. Limited neuro exam. Withdraws from pain in BLE, LUE hemiplegia, RUE 2/5 strength, L facial droop, nonverbal. VSS except O2 sats 78% on RA this AM--see previous note--now resolved & back on RA at 93%.  Productive cough--yellow tinged, required frequent suctioning this morning but now requiring less throughout the day. NPO. TF at goal 35mL/hr. Thrush in mouth. Ramsay d/c'd at 1100, DTV, if unable to void--replace indwelling catheter--no straight caths. Bladder scanned for 231mL at  1450. Excoriations on buttocks, barrier cream applied & q2h repositioning. Up with lift. No evidence of pain. Plan for potential discharge Wednesday to [facility].    ADDENDUM [id number]: Pt opened eyes spontaneously. Still lethargic. More deliberate RUE movement. VSS stable, sating on RA. Productive cough, required more suctioning this evening. Still yellow tinged sputum (is receiving nystatin swab). Was incontinent of urine x1. Random bladder was 311--will continue to monitor bladder scans).

## 2017-09-25 NOTE — PROGRESS NOTES
[facility]    Hospitalist Progress Note    Assessment & Plan   Sury Barnett is a 89 year old female with PMHx of hypertension and hypothyroidism who was admitted on 9/6/2017 with a large R intracranial hemorrhage.      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 Seen by neurosurgery, bleed nonsurgical. Likely the hemorrhage caused fall. CT next day showed increased size but then multiple subsequent CTs with stable to slightly improved hematoma   -- repeat head CTs 9/16, 9/20,9/23 unchanged hemorrhage  -- clinical status has remained waxing and waning from neuro standpoint. PT signed off on 9/18 as patient was unable to participate in therapy as she was not alert enough.   -- had G-tube placed on 9/11, tolerating TFs at goal  -- goal SBP <140 -- mgmt as below  -- see below re: hypoxia     Multiple providers have had goals of care discussions with patient's daughters and son this stay several times. Family wishes for restorative cares, does not want to talk about palliative care or hospice at this time. Family's main concern remains lack of nutrition during initial days of stay. Daughter Crystal does not wish to engage in discussions regarding patient's current status whether it is due to perceived lack of nutrition (which they feel is the  cause) vs underlying hemorrhage. Crystal hopes that patient will recover and would like see her being independent again. Family requested second opinion and transfer to [facility] 9/20, discussed with Dr Olguin hospitalist at [facility] and reviewed her course so far, recommended continuing current care and no reason to transfer to other facility. Requested again on 9/21 but no indication for transfer    On most recent discussions, family adamant about pt being full code despite her underlying pathology    Hypoxia/ acute  respiratory failure, suspect 2/2 aspiration pneumonia, organism nknown  CXR on 9/12 showed bilateral interstitial infiltrates consistent with pneumonia and fluid overload -- had been on IVF since admission on 9/6. IVFs dc'd. One dose of lasix 20mg IV given on 9/12 then maintained on lasix to 20mg PO daily on 9/14 given via tube. IV Levaquin started on 9/12 (given PCN allergy).  -- s/p course of levofloxacin, completed course on 9/18  -- with acute hypoxic respiratory failure 9/25 (previously not needing O2). Given her underlying CVA and cough, suspect she may have had an aspiration event  -- high flow oxygen, may need BiPAP  -- stat CXR, check procalcitonin, BNP  -- transfer to [facility]  -- suspect will need broad spectrum antibiotics but will await CXR results     Fever / Leukocytosis  Pneumatosis of colon and free peritoneal and retroperitoneal cavity  Has had variable leukocytosis this stay. Improved and was down to 11.7 on 9/13, then started trending up and was up to 25.8 on 9/23. Developed fever 9/17 PM with Tmax 101.1. Further infectious workup pursued but has been unrevealing.    -- 9/16 CXR showed resolution of infiltrate, blood cultures drawn 9/17 now growth, repeat UA 9/17 neg (though culture grew <10k enterococcus), serial procalcitonins and lactate neg  -- no evidence for infection around PEG tube or peripheral IVs (has no central IV access)  -- cdiff was neg on 9/13, on TFs and stools occ semi-formed, repeat c diff 9/19 negative  -- completed 7d course of Levaquin on 9/18  -- CT abd pelvis with contrast done, shows pneumatosis of colon and air in peritoneal and retroperitoneal cavity, and thick fluid in pelvis. Gen surgery consulted and discussed with Dr Cassidy, though air in retroperitoneal cavity is unusual with feeding tube placement, still suspected this was due to tube placement. No surgical intervention planned, okayed to continue tube feeding.  -- above re: increased O2 needs    Benign essential  hypertension.   On lisinopril prior to admission.   -- meds adjusted this stay -- was stable on Ramipril 10mg daily and Norvasc 5 mg daily, Metoprolol 25mg BID added 9/16 given tachycardia and hypertension  -- goal BP <140 systolic   -- Hold BP med if SBP<100     Hypothyroidism.   Chronic and stable on levothyroxine    Hypernatremia: Resolved, now mild hyponatremia  Hypokalemia  Na 149 on 9/18 while on TFs.  -- increased free water flushes from 100ml q4h to 250ml q6h   -- Na normalized, 9/25 at 135  -- monitor with current fluids down NG  -- replace potassium per protocol.     FEN: on TFs via PEG as above   DVT Prophylaxis: PCDs  Code Status: Prior    Disposition: on hold given acute respiratory failure    Tamir Manrique MD  Hospitalist    Interval History    Contacted re: pt with acute respiratory distress/ failure. Was doing fine on room air, then sudden drop in O2 sats to upper 70's, RR over 30. Cough, no fever and leukocytosis improved this am compared with yesteray. Unable to obtain further history given pts underlying medical pathology (CVA)    -Data reviewed today: I reviewed all new labs and imaging results over the last 24 hours. I personally reviewed no images or EKG's today.    Physical Exam   Temp: 98.1  F (36.7  C) Temp src: Axillary BP: 126/79 Pulse: 111 Heart Rate: 81 Resp: (!) 32 SpO2: 90 % O2 Device: Oxymask Oxygen Delivery: 10 LPM  Vitals:    09/20/17 0658 09/21/17 0300 09/22/17 0332   Weight: 41.5 kg (91 lb 7.9 oz) 40.9 kg (90 lb 2.7 oz) 41.5 kg (91 lb 7.9 oz)     Vital Signs with Ranges  Temp:  [97.3  F (36.3  C)-98.1  F (36.7  C)] 98.1  F (36.7  C)  Pulse:  [110-111] 111  Heart Rate:  [64-81] 81  Resp:  [16-36] 32  BP: ()/(50-79) 126/79  SpO2:  [78 %-98 %] 90 %  I/O last 3 completed shifts:  In: 1400 [I.V.:200; NG/GT:1200]  Out: 1925 [Urine:1925]    Constitutional: not arousable  HEENT: Oral thrush noted.  Neuro: not responsive, not able to assess  Respiratory: coarse expiratory phase,  good air movement  Cardiovascular: RRR, no MGR, no LE edema  GI: S, NT, ND, +BS, +PEG, tube site normal  Skin/Integumen: warm/dry      Medications        amoxicillin  875 mg Per Feeding Tube Q12H JEZ     amLODIPine  5 mg Oral or Feeding Tube Daily     nystatin  500,000 Units Swish & Swallow 4x Daily     metoprolol  25 mg Oral or Feeding Tube BID     ramipril  10 mg Oral or Feeding Tube Daily     fiber modular  1 packet Per Feeding Tube BID     levothyroxine  50 mcg Oral or Feeding Tube QAM AC     sodium chloride (PF)  3 mL Intracatheter Q8H     pantoprazole  40 mg Per Feeding Tube Daily       Data     Recent Labs  Lab 09/25/17  0810 09/24/17  0744 09/23/17  2130 09/23/17  0826  09/21/17  0746 09/20/17  0814 09/19/17  0832   WBC 16.2* 18.3*  --  25.8*  < > 19.6* 22.5* 20.2*   HGB  --   --   --  12.4  --  13.0 13.8 14.1   MCV  --   --   --   --   --  94 95 95   PLT  --   --   --   --   --  370 394 374   NA  --  132*  --  134  --  142 145* 144   POTASSIUM  --  4.1 4.0 3.3*  --   --  3.6 3.6   CHLORIDE  --  98  --  97  --   --  107 105   CO2  --  27  --  29  --   --  34* 34*   BUN  --  20  --  20  --   --  33* 40*   CR  --  0.46*  --  0.55  --   --  0.59 0.64   ANIONGAP  --  7  --  8  --   --  4 5   ANTHONY  --  8.2*  --  8.5  --   --  9.2 9.2   GLC  --  126*  --  96  --   --  134* 123*   < > = values in this interval not displayed.    No results found for this or any previous visit (from the past 24 hour(s)).

## 2017-09-25 NOTE — PROVIDER NOTIFICATION
"Paged Dr. Manrique, \"Pt RR 36. Was 78% on RA. Now on 7LO2 oxymask. Still sating at 88%. please advise\"  "

## 2017-09-26 ENCOUNTER — APPOINTMENT (OUTPATIENT)
Dept: SPEECH THERAPY | Facility: CLINIC | Age: 82
DRG: 064 | End: 2017-09-26
Payer: MEDICARE

## 2017-09-26 LAB
ANION GAP SERPL CALCULATED.3IONS-SCNC: 7 MMOL/L (ref 3–14)
BUN SERPL-MCNC: 17 MG/DL (ref 7–30)
CALCIUM SERPL-MCNC: 8.5 MG/DL (ref 8.5–10.1)
CHLORIDE SERPL-SCNC: 100 MMOL/L (ref 94–109)
CO2 SERPL-SCNC: 29 MMOL/L (ref 20–32)
CREAT SERPL-MCNC: 0.44 MG/DL (ref 0.52–1.04)
ERYTHROCYTE [DISTWIDTH] IN BLOOD BY AUTOMATED COUNT: 13.5 % (ref 10–15)
GFR SERPL CREATININE-BSD FRML MDRD: >90 ML/MIN/1.7M2
GLUCOSE SERPL-MCNC: 135 MG/DL (ref 70–99)
HCT VFR BLD AUTO: 31.6 % (ref 35–47)
HGB BLD-MCNC: 10.8 G/DL (ref 11.7–15.7)
MCH RBC QN AUTO: 31.4 PG (ref 26.5–33)
MCHC RBC AUTO-ENTMCNC: 34.2 G/DL (ref 31.5–36.5)
MCV RBC AUTO: 92 FL (ref 78–100)
PLATELET # BLD AUTO: 393 10E9/L (ref 150–450)
POTASSIUM SERPL-SCNC: 3.6 MMOL/L (ref 3.4–5.3)
RBC # BLD AUTO: 3.44 10E12/L (ref 3.8–5.2)
SODIUM SERPL-SCNC: 136 MMOL/L (ref 133–144)
WBC # BLD AUTO: 15 10E9/L (ref 4–11)

## 2017-09-26 PROCEDURE — 25000125 ZZHC RX 250

## 2017-09-26 PROCEDURE — 92526 ORAL FUNCTION THERAPY: CPT | Mod: GN | Performed by: SPEECH-LANGUAGE PATHOLOGIST

## 2017-09-26 PROCEDURE — 80048 BASIC METABOLIC PNL TOTAL CA: CPT | Performed by: INTERNAL MEDICINE

## 2017-09-26 PROCEDURE — 40000225 ZZH STATISTIC SLP WARD VISIT: Performed by: SPEECH-LANGUAGE PATHOLOGIST

## 2017-09-26 PROCEDURE — 85027 COMPLETE CBC AUTOMATED: CPT | Performed by: INTERNAL MEDICINE

## 2017-09-26 PROCEDURE — 25000132 ZZH RX MED GY IP 250 OP 250 PS 637: Mod: GY

## 2017-09-26 PROCEDURE — A9270 NON-COVERED ITEM OR SERVICE: HCPCS | Mod: GY | Performed by: PHYSICIAN ASSISTANT

## 2017-09-26 PROCEDURE — 27210429 ZZH NUTRITION PRODUCT INTERMEDIATE LITER: Performed by: DIETITIAN, REGISTERED

## 2017-09-26 PROCEDURE — 25000132 ZZH RX MED GY IP 250 OP 250 PS 637: Mod: GY | Performed by: INTERNAL MEDICINE

## 2017-09-26 PROCEDURE — A9270 NON-COVERED ITEM OR SERVICE: HCPCS | Mod: GY

## 2017-09-26 PROCEDURE — A9270 NON-COVERED ITEM OR SERVICE: HCPCS | Mod: GY | Performed by: INTERNAL MEDICINE

## 2017-09-26 PROCEDURE — A9270 NON-COVERED ITEM OR SERVICE: HCPCS | Mod: GY | Performed by: PSYCHIATRY & NEUROLOGY

## 2017-09-26 PROCEDURE — 36415 COLL VENOUS BLD VENIPUNCTURE: CPT | Performed by: INTERNAL MEDICINE

## 2017-09-26 PROCEDURE — 25000132 ZZH RX MED GY IP 250 OP 250 PS 637: Mod: GY | Performed by: PHYSICIAN ASSISTANT

## 2017-09-26 PROCEDURE — 99232 SBSQ HOSP IP/OBS MODERATE 35: CPT | Performed by: INTERNAL MEDICINE

## 2017-09-26 PROCEDURE — 25000132 ZZH RX MED GY IP 250 OP 250 PS 637: Mod: GY | Performed by: PSYCHIATRY & NEUROLOGY

## 2017-09-26 PROCEDURE — 12000000 ZZH R&B MED SURG/OB

## 2017-09-26 PROCEDURE — 25000132 ZZH RX MED GY IP 250 OP 250 PS 637: Mod: GY | Performed by: HOSPITALIST

## 2017-09-26 PROCEDURE — A9270 NON-COVERED ITEM OR SERVICE: HCPCS | Mod: GY | Performed by: HOSPITALIST

## 2017-09-26 RX ADMIN — LEVOTHYROXINE SODIUM 50 MCG: 50 TABLET ORAL at 06:31

## 2017-09-26 RX ADMIN — AMOXICILLIN 875 MG: 875 TABLET, COATED ORAL at 09:14

## 2017-09-26 RX ADMIN — METOPROLOL TARTRATE 25 MG: 100 TABLET ORAL at 21:41

## 2017-09-26 RX ADMIN — AMLODIPINE BESYLATE 5 MG: 5 TABLET ORAL at 09:14

## 2017-09-26 RX ADMIN — NYSTATIN 500000 UNITS: 100000 SUSPENSION ORAL at 09:14

## 2017-09-26 RX ADMIN — RAMIPRIL 10 MG: 10 CAPSULE ORAL at 19:41

## 2017-09-26 RX ADMIN — Medication 1 PACKET: at 21:41

## 2017-09-26 RX ADMIN — NYSTATIN 500000 UNITS: 100000 SUSPENSION ORAL at 15:18

## 2017-09-26 RX ADMIN — NYSTATIN 500000 UNITS: 100000 SUSPENSION ORAL at 19:41

## 2017-09-26 RX ADMIN — Medication 40 MG: at 09:14

## 2017-09-26 RX ADMIN — Medication 1 PACKET: at 11:02

## 2017-09-26 RX ADMIN — NYSTATIN 500000 UNITS: 100000 SUSPENSION ORAL at 23:55

## 2017-09-26 RX ADMIN — AMOXICILLIN 875 MG: 875 TABLET, COATED ORAL at 19:41

## 2017-09-26 RX ADMIN — METOPROLOL TARTRATE 25 MG: 100 TABLET ORAL at 11:03

## 2017-09-26 ASSESSMENT — VISUAL ACUITY
OU: NOT TESTABLE
OU: NOT TESTABLE

## 2017-09-26 NOTE — PLAN OF CARE
Problem: Patient Care Overview (Adult)  Goal: Plan of Care Review  Outcome: Improving  Lethargic, opens eyes to voice.MONICA full neuros not following commands. Neuros with Lt droop Lt side heimiplegia and Rt hemiparesis.VSS on RA, productive cough ,prn suction done.Incontenent at times but only small amounts  bladder scan around 0320 am 568 catheterization done kept han in .continue to monitor.On Tube feed 35ml/hour with 200 ml flushx6 hours.On bed rest for this shift turned and repositionedx2 hours.Barrier cream applied to excoriation in rectal area.Patient slept comfortable overnight  Daughter at bed side supportive.Possible d/c to LTC friends [facility] on wenesday.

## 2017-09-26 NOTE — PROGRESS NOTES
[facility]    Hospitalist Progress Note    Assessment & Plan   Sury Barnett is a 89 year old female with PMHx of hypertension and hypothyroidism who was admitted on 9/6/2017 with a large R intracranial hemorrhage.      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 Seen by neurosurgery, bleed nonsurgical. Likely the hemorrhage caused fall. CT next day showed increased size but then multiple subsequent CTs with stable to slightly improved hematoma   -- repeat head CTs 9/16, 9/20,9/23 unchanged hemorrhage  -- clinical status has remained waxing and waning from neuro standpoint. PT signed off on 9/18 as patient was unable to participate in therapy as she was not alert enough.   -- had G-tube placed on 9/11, tolerating TFs at goal  -- goal SBP <140 -- mgmt as below  -- marked improvement in mentation noted on 9/26. Pt able to answer questions correctly nodding/ shaking head with questions     Multiple providers have had goals of care discussions with patient's daughters and son this stay several times. Family wishes for restorative cares, does not want to talk about palliative care or hospice at this time. Family's main concern remains lack of nutrition during initial days of stay. Daughter Crystal does not wish to engage in discussions regarding patient's current status whether it is due to perceived lack of nutrition (which they feel is the  cause) vs underlying hemorrhage. Crystal hopes that patient will recover and would like see her being independent again. Family requested second opinion and transfer to [facility] 9/20, discussed with Dr Olguin hospitalist at [facility] and reviewed her course so far, recommended continuing current care and no reason to transfer to other facility. Requested again on 9/21 but no indication for transfer    On most recent  discussions, family adamant about pt being full code despite her underlying pathology    Hypoxia/ acute respiratory failure 9/25, suspect 2/2 aspiration pneumonia, organism nknown  CXR on 9/12 showed bilateral interstitial infiltrates consistent with pneumonia and fluid overload -- had been on IVF since admission on 9/6. IVFs dc'd. One dose of lasix 20mg IV given on 9/12 then maintained on lasix to 20mg PO daily on 9/14 given via tube. IV Levaquin started on 9/12 (given PCN allergy).  -- resolved, likely 2/2 mucus plug  -- CXR 9/25 with minimal changes/ largely unremarkable     Fever / Leukocytosis  Pneumatosis of colon and free peritoneal and retroperitoneal cavity  Has had variable leukocytosis this stay. Improved and was down to 11.7 on 9/13, then started trending up and was up to 25.8 on 9/23. Developed fever 9/17 PM with Tmax 101.1. Further infectious workup pursued but has been unrevealing.    -- 9/16 CXR showed resolution of infiltrate, blood cultures drawn 9/17 now growth, repeat UA 9/17 neg (though culture grew <10k enterococcus), serial procalcitonins and lactate neg  -- no evidence for infection around PEG tube or peripheral IVs (has no central IV access)  -- c diff was neg on 9/13, on TFs and stools occ semi-formed, repeat c diff 9/19 negative  -- completed 7d course of Levaquin on 9/18  -- CT abd pelvis with contrast done, shows pneumatosis of colon and air in peritoneal and retroperitoneal cavity, and thick fluid in pelvis. Gen surgery consulted and discussed with Dr Cassidy, though air in retroperitoneal cavity is unusual with feeding tube placement, still suspected this was due to tube placement. No surgical intervention planned, okayed to continue tube feeding.  -- WBC improving, hold on any abx    Benign essential hypertension.   On lisinopril prior to admission.   -- meds adjusted this stay -- was stable on Ramipril 10mg daily and Norvasc 5 mg daily, Metoprolol 25mg BID added 9/16 given tachycardia  and hypertension  -- goal BP <140 systolic   -- Hold BP med if SBP<100     Hypothyroidism.   Chronic and stable on levothyroxine    Hypernatremia: Resolved, now mild hyponatremia  Hypokalemia  Na 149 on 9/18 while on TFs.  -- increased free water flushes from 100ml q4h to 250ml q6h   -- Na normalized, 9/26 at 136  -- monitor with current fluids down NG  -- replace potassium per protocol.     FEN: on TFs via PEG as above   DVT Prophylaxis: PCDs  Code Status: Prior    Disposition: hopeful d/c on 9/27 if remains stable    Called and left message with daughter, Crystal, on 6/26  Tamir Manrique M.D.  Hospitalist  Pager [phone]  Text Page until 6 pm (after call answering service)      Interval History    Marked improvement in mentation today. Denies pain, nods when asked if she is comfortable. Gave thumbs up when I told her she was doing well. Denies cp/sob.     -Data reviewed today: I reviewed all new labs and imaging results over the last 24 hours. I personally reviewed no images or EKG's today.    Physical Exam   Temp: 98.2  F (36.8  C) Temp src: Axillary BP: 112/57   Heart Rate: 75 Resp: 18 SpO2: 97 % O2 Device: None (Room air)    Vitals:    09/21/17 0300 09/22/17 0332 09/26/17 0700   Weight: 40.9 kg (90 lb 2.7 oz) 41.5 kg (91 lb 7.9 oz) 41.2 kg (90 lb 13.3 oz)     Vital Signs with Ranges  Temp:  [97.6  F (36.4  C)-98.4  F (36.9  C)] 98.2  F (36.8  C)  Heart Rate:  [75-83] 75  Resp:  [18-26] 18  BP: (102-115)/(53-68) 112/57  SpO2:  [93 %-97 %] 97 %  I/O last 3 completed shifts:  In: 1305 [NG/GT:920]  Out: 1200 [Urine:1200]    Constitutional: alert, no distress  HEENT: Oral thrush noted.  Neuro: unable to fully assess  Respiratory: large clear  Cardiovascular: RRR, no MGR, no LE edema  GI: S, NT, ND, +BS, +PEG, tube site normal  Skin/Integumen: warm/dry      Medications        amoxicillin  875 mg Per Feeding Tube Q12H JEZ     amLODIPine  5 mg Oral or Feeding Tube Daily     nystatin  500,000 Units Swish & Swallow  4x Daily     metoprolol  25 mg Oral or Feeding Tube BID     ramipril  10 mg Oral or Feeding Tube Daily     fiber modular  1 packet Per Feeding Tube BID     levothyroxine  50 mcg Oral or Feeding Tube QAM AC     sodium chloride (PF)  3 mL Intracatheter Q8H     pantoprazole  40 mg Per Feeding Tube Daily       Data     Recent Labs  Lab 09/26/17  0804 09/25/17  0810 09/24/17  0744  09/23/17  0826  09/21/17  0746 09/20/17  0814   WBC 15.0* 16.2* 18.3*  --  25.8*  < > 19.6* 22.5*   HGB 10.8*  --   --   --  12.4  --  13.0 13.8   MCV 92  --   --   --   --   --  94 95     --   --   --   --   --  370 394    135 132*  --  134  --  142 145*   POTASSIUM 3.6 4.0 4.1  < > 3.3*  --   --  3.6   CHLORIDE 100 99 98  --  97  --   --  107   CO2 29 28 27  --  29  --   --  34*   BUN 17 16 20  --  20  --   --  33*   CR 0.44* 0.54 0.46*  --  0.55  --   --  0.59   ANIONGAP 7 8 7  --  8  --   --  4   ANTHONY 8.5 8.6 8.2*  --  8.5  --   --  9.2   * 136* 126*  --  96  --   --  134*   < > = values in this interval not displayed.    No results found for this or any previous visit (from the past 24 hour(s)).

## 2017-09-26 NOTE — PROGRESS NOTES
"[facility]  Infectious Disease Progress Note          Assessment and Plan:   IMPRESSION:   1.  Sury Barnett is a 89-year-old female with history of hypertension.   2.  Admitted on 09/06 with change in mental status and found to have large right frontal intracranial hemorrhage.  She has residual left hemiplegia.   3.  Persistent leukocytosis with white blood count around 20,000.  I suspect that this most likely is stress induced.  There are no signs of infection on exam and the patient has had a negative evaluation for infection including negative blood cultures, small numbers of Enterococcus on urine culture which probably represent colonization given benign urinalysis, negative Clostridium difficile study, absence of pneumonia on chest x-ray.  The patient is not receiving steroid medications.  Procalcitonin is low at 0.08.   4. Possible new enterococcal UTI.  Repeat UC with greater nos of enterococcus and UA with 56 WBCs.      RECOMMENDATIONS:   1. Would give ten day course of PO Amoxicillin  2. We will sign off.  Please call if issues.  Thanks.        Interval History:   Afebrile.  WBC pending.  Down further at 16k yesterday.              Medications:       amoxicillin  875 mg Per Feeding Tube Q12H JEZ     amLODIPine  5 mg Oral or Feeding Tube Daily     nystatin  500,000 Units Swish & Swallow 4x Daily     metoprolol  25 mg Oral or Feeding Tube BID     ramipril  10 mg Oral or Feeding Tube Daily     fiber modular  1 packet Per Feeding Tube BID     levothyroxine  50 mcg Oral or Feeding Tube QAM AC     sodium chloride (PF)  3 mL Intracatheter Q8H     pantoprazole  40 mg Per Feeding Tube Daily                  Physical Exam:   Blood pressure 102/53, pulse 100, temperature 98  F (36.7  C), temperature source Axillary, resp. rate 20, height 1.499 m (4' 11\"), weight 41.2 kg (90 lb 13.3 oz), SpO2 96 %, not currently breastfeeding.  [unfilled]  Vital Signs with Ranges  Temp:  [97.6  F (36.4  C)-98.4  F " (36.9  C)] 98  F (36.7  C)  Pulse:  [100-116] 100  Heart Rate:  [77-83] 78  Resp:  [20-36] 20  BP: (102-126)/(53-79) 102/53  SpO2:  [78 %-98 %] 96 %    Constitutional: Awake, alert, cooperative, no apparent distress   Lungs: Clear to auscultation bilaterally, no crackles or wheezing   Cardiovascular: Regular rate and rhythm, normal S1 and S2, and no murmur noted   Abdomen: Normal bowel sounds, soft, non-distended, non-tender   Skin: No rashes, no cyanosis, no edema   Other:           Data:   All microbiology laboratory data reviewed.  Recent Labs   Lab Test  09/25/17 0810 09/24/17 0744 09/23/17   0826 09/21/17   0746  09/20/17   0814  09/19/17   0832   WBC  16.2*  18.3*  25.8*   < >  19.6*  22.5*  20.2*   HGB   --    --   12.4   --   13.0  13.8  14.1   HCT   --    --   35.8   --   39.8  41.4  43.1   MCV   --    --    --    --   94  95  95   PLT   --    --    --    --   370  394  374    < > = values in this interval not displayed.     Recent Labs   Lab Test  09/25/17 0810 09/24/17 0744 09/23/17   0826   CR  0.54  0.46*  0.55     No lab results found.

## 2017-09-26 NOTE — PLAN OF CARE
Problem: Patient Care Overview (Adult)  Goal: Plan of Care Review  Discharge Planner SLP   Patient plan for discharge: Not able to state.   Current status:  Patient seen today for treatment. She was alert with her eyes open and smiling. She was able to follow 1 step directions with 80%. She was able to follow some labial/lingual exercises. She is using gestures to assist in communication. She waved bye and mouthed it without vocalizatdion. She could not produce vocalizations with vowel sound. She currently has thrush and oral care provided with suctioning. Noted increased spontaneous swallows, but given thrush and recent mucus plugging no trials given. Recommend: 1. Continue NPO with TF. SLP will continue to follow 3x/wk.   Barriers to return to prior living situation: Dysphagia and aphasia/apraxia  Recommendations for discharge: TCU   Rationale for recommendations: Continue ST at the TCU for swallowing and complete a full speech/language evaluation due to improving alertness to participate.        Entered by: Rosie Aguero 09/26/2017 1:50 PM       Problem: Patient Care Overview  Goal: Plan of Care Review  Discharge Planner SLP   Patient plan for discharge: Not able to state.   Current status:  Patient seen today for treatment. She was alert with her eyes open and smiling. She was able to follow 1 step directions with 80%. She was able to follow some labial/lingual exercises. She is using gestures to assist in communication. She waved bye and mouthed it without vocalizatdion. She could not produce vocalizations with vowel sound. She currently has thrush and oral care provided with suctioning. Noted increased spontaneous swallows, but given thrush and recent mucus plugging no trials given. Recommend: 1. Continue NPO with TF. SLP will continue to follow 3x/wk.   Barriers to return to prior living situation: Dysphagia and aphasia/apraxia  Recommendations for discharge: TCU   Rationale for recommendations: Continue  ST at the TCU for swallowing and complete a full speech/language evaluation due to improving alertness to participate.        Entered by: Rosie Aguero 09/26/2017 1:50 PM

## 2017-09-27 VITALS
BODY MASS INDEX: 18.31 KG/M2 | DIASTOLIC BLOOD PRESSURE: 66 MMHG | HEART RATE: 73 BPM | RESPIRATION RATE: 16 BRPM | TEMPERATURE: 97.9 F | HEIGHT: 59 IN | OXYGEN SATURATION: 96 % | SYSTOLIC BLOOD PRESSURE: 119 MMHG | WEIGHT: 90.83 LBS

## 2017-09-27 LAB
ERYTHROCYTE [DISTWIDTH] IN BLOOD BY AUTOMATED COUNT: 13.5 % (ref 10–15)
HCT VFR BLD AUTO: 32.8 % (ref 35–47)
HGB BLD-MCNC: 11 G/DL (ref 11.7–15.7)
MCH RBC QN AUTO: 30.9 PG (ref 26.5–33)
MCHC RBC AUTO-ENTMCNC: 33.5 G/DL (ref 31.5–36.5)
MCV RBC AUTO: 92 FL (ref 78–100)
PLATELET # BLD AUTO: 415 10E9/L (ref 150–450)
RBC # BLD AUTO: 3.56 10E12/L (ref 3.8–5.2)
WBC # BLD AUTO: 12.1 10E9/L (ref 4–11)

## 2017-09-27 PROCEDURE — A9270 NON-COVERED ITEM OR SERVICE: HCPCS | Mod: GY | Performed by: PHYSICIAN ASSISTANT

## 2017-09-27 PROCEDURE — 25000132 ZZH RX MED GY IP 250 OP 250 PS 637: Mod: GY

## 2017-09-27 PROCEDURE — A9270 NON-COVERED ITEM OR SERVICE: HCPCS | Mod: GY

## 2017-09-27 PROCEDURE — A9270 NON-COVERED ITEM OR SERVICE: HCPCS | Mod: GY | Performed by: INTERNAL MEDICINE

## 2017-09-27 PROCEDURE — 25000132 ZZH RX MED GY IP 250 OP 250 PS 637: Mod: GY | Performed by: INTERNAL MEDICINE

## 2017-09-27 PROCEDURE — 99239 HOSP IP/OBS DSCHRG MGMT >30: CPT | Performed by: INTERNAL MEDICINE

## 2017-09-27 PROCEDURE — 27210429 ZZH NUTRITION PRODUCT INTERMEDIATE LITER

## 2017-09-27 PROCEDURE — 25000132 ZZH RX MED GY IP 250 OP 250 PS 637: Mod: GY | Performed by: HOSPITALIST

## 2017-09-27 PROCEDURE — 36415 COLL VENOUS BLD VENIPUNCTURE: CPT | Performed by: INTERNAL MEDICINE

## 2017-09-27 PROCEDURE — 25000132 ZZH RX MED GY IP 250 OP 250 PS 637: Mod: GY | Performed by: PHYSICIAN ASSISTANT

## 2017-09-27 PROCEDURE — A9270 NON-COVERED ITEM OR SERVICE: HCPCS | Mod: GY | Performed by: HOSPITALIST

## 2017-09-27 PROCEDURE — 25000125 ZZHC RX 250

## 2017-09-27 PROCEDURE — 85027 COMPLETE CBC AUTOMATED: CPT | Performed by: INTERNAL MEDICINE

## 2017-09-27 RX ORDER — NYSTATIN 100000/ML
500000 SUSPENSION, ORAL (FINAL DOSE FORM) ORAL 4 TIMES DAILY
Qty: 280 ML | DISCHARGE
Start: 2017-09-27 | End: 2023-02-14

## 2017-09-27 RX ORDER — AMOXICILLIN 875 MG
875 TABLET ORAL EVERY 12 HOURS
Refills: 0 | DISCHARGE
Start: 2017-09-27 | End: 2017-10-05

## 2017-09-27 RX ORDER — AMLODIPINE BESYLATE 5 MG/1
5 TABLET ORAL DAILY
Qty: 30 TABLET | DISCHARGE
Start: 2017-09-27 | End: 2017-09-28

## 2017-09-27 RX ADMIN — Medication 40 MG: at 09:35

## 2017-09-27 RX ADMIN — AMLODIPINE BESYLATE 5 MG: 5 TABLET ORAL at 09:27

## 2017-09-27 RX ADMIN — METOPROLOL TARTRATE 25 MG: 100 TABLET ORAL at 09:35

## 2017-09-27 RX ADMIN — NYSTATIN 500000 UNITS: 100000 SUSPENSION ORAL at 09:28

## 2017-09-27 RX ADMIN — LEVOTHYROXINE SODIUM 50 MCG: 50 TABLET ORAL at 06:39

## 2017-09-27 RX ADMIN — AMOXICILLIN 875 MG: 875 TABLET, COATED ORAL at 09:28

## 2017-09-27 RX ADMIN — Medication 1 PACKET: at 09:38

## 2017-09-27 ASSESSMENT — VISUAL ACUITY
OU: NOT TESTABLE
OU: NOT TESTABLE

## 2017-09-27 NOTE — PLAN OF CARE
Problem: Patient Care Overview (Adult)  Goal: Plan of Care Review  Outcome: No Change  Pt alert, unable to assess orientation. VSS on RA. Non-verbal indicators of pain not present. Neuros revealed left upper hemiplegia, left lower hemiparesis, left facial droop. Pt attempted to speak at times, unable to understand. Ramsay patent with adequate output. Mepilex to coccyx CDI. Turn/repo Q2H. No IV access. TF running at 35ml/hr Flush 200ml Q6H. Plan for d/c today to [facility]. Will continue to monitor.     Problem: Patient Care Overview  Goal: Plan of Care Review  Outcome: No Change  Pt alert, unable to assess orientation. VSS on RA. Non-verbal indicators of pain not present. Neuros revealed left upper hemiplegia, left lower hemiparesis, left facial droop. Pt attempted to speak at times, unable to understand. Ramsay patent with adequate output. Mepilex to coccyx CDI. Turn/repo Q2H. No IV access. TF running at 35ml/hr Flush 200ml Q6H. Plan for d/c today to [facility]. Will continue to monitor.

## 2017-09-27 NOTE — PROGRESS NOTES
CLINICAL NUTRITION SERVICES - REASSESSMENT NOTE        EVALUATION OF PROGRESS TOWARD GOALS   Diet:  NPO. SLP is following, the continue to recommend NPO with TF    Type of Feeding Tube: G-tube (placed 9/11)  Enteral Frequency:  Continuous  Enteral Regimen: Isosource 1.5 at goal of 35 ml/hr  Total Enteral Provisions: 1260 kcals (31 kcal/kg), 57 gm pro (1.4 gm/kg), 638 mL H20, 13 gm fiber, 148 gm CHO  Nutrisource Fiber, 1 packet BID: Add'l 6 gm fiber. Total fiber = 19 gm fiber/day  Free Water Flush: 200 mL every 6 hours. Total fluid (TF + flushes) = 1450 mL/day     Intake/tolerance:  Pt continues to tolerate TF without issues. Daily stools x 1.    9/8:  ND FT placed = Looped in the stomach, feeding not initiated  9/9:  Begin TF via ND  9/10: FT clogged and feeding stopped  9/11: G-tube placed, 14 FR. Feeding resumed at 15/hr  9/13: Reached goal of 35/hr.      ASSESSED NUTRITION: Dosing Weight:  40.3 kg (9/9)   Estimated Energy Needs: 0690-7822 kcals (30-35 Kcal/Kg) - underweight  Estimated Protein Needs:  48-60 grams protein (1.2-1.5 g pro/Kg) - Repletion  Estimated Fluid Needs:  1954-1294 mL (1 mL/Kcal) - maintenance      NEW FINDINGS:   Weight is stable ~90# (40.9 kg)    Previous Goals:   Isosource 1.5 @ goal of 35 mL/hr will continue to meet needs  Evaluation: Met    Previous Nutrition Diagnosis:   None identified      CURRENT NUTRITION DIAGNOSIS  No nutrition diagnosis identified at this time     INTERVENTIONS  Recommendations / Nutrition Prescription  Continue current TF and H2O flushes    Implementation  No interventions at this time    Goals  Isosource 1.5 @ goal of 35 mL/hr will continue to meet needs    MONITORING AND EVALUATION:  Progress towards goals will be monitored and evaluated per protocol and Practice Guidelines    Gissell Doe RD  Pager [phone] (M-F)            [phone] (W/E & Hol)

## 2017-09-27 NOTE — PROGRESS NOTES
SWS  D:  Pt has orders to discharge to LTC today.  Pt has been accepted by [facility].      I:  SW left message for James at [facility] to see what time they can accept pt so SW can arrange transport time, awaiting reply.   No PAS needed per James.  James requests 2 bags of TF be sent with pt-RN updated.  Orders faxed.  P:  SW following pt for SNF placement.    Update:  REGAN arranged  Stretcher transport thru [facility] for 1445 today.  James at [place] updated with time.  Stretcher transport needed due to pt having an intracranial hemorrhage with left sided hemiplegia.  PCS form completed and faxed.  Daughter Crystal is aware transport bill will be sent to insurance for payment, however, if insurance does not cover, pt could receive a bill for transport.  Crystal understands this.

## 2017-09-27 NOTE — PROGRESS NOTES
SPIRITUAL HEALTH SERVICES Progress Note  [facility]    Briefly visited with pt's daughter Crystal. Daughter is anticipating pt will be discharged to [facility] this afternoon. Pt was alert and responded to daughter's prompts by nodding her head. SH has no plans to follow unless requested.    Monica Eaton  Chaplain Resident

## 2017-09-27 NOTE — PLAN OF CARE
Problem: Patient Care Overview (Adult)  Goal: Plan of Care Review  Speech Language Therapy Discharge Summary     Reason for therapy discharge:    Discharged to long term care facility.     Progress towards therapy goal(s). See goals on Care Plan in [facility] electronic health record for goal details.  Goals not met.  Barriers to achieving goals:   discharge from facility.     Therapy recommendation(s):    Continued therapy is recommended.  Rationale/Recommendations:  Continue speech/language and swallowing treatment as tolerated. . Patient discharged with TF.            Problem: Patient Care Overview  Goal: Plan of Care Review  Speech Language Therapy Discharge Summary     Reason for therapy discharge:    Discharged to long term care facility.     Progress towards therapy goal(s). See goals on Care Plan in [facility] electronic health record for goal details.  Goals not met.  Barriers to achieving goals:   discharge from facility.     Therapy recommendation(s):    Continued therapy is recommended.  Rationale/Recommendations:  Continue speech/language and swallowing treatment as tolerated. . Patient discharged with TF.

## 2017-09-27 NOTE — DISCHARGE SUMMARY
[facility]    Discharge Summary  Hospitalist    Date of Admission:  9/6/2017  Date of Discharge:  9/27/2017  Discharging Provider: Tamir Manrique MD  Date of Service (when I saw the patient): 09/27/17    Discharge Diagnoses   Large right frontal intracranial hemorrhage with left-sided hemiplegia  Episode of acute hypoxia, resolved  Fever/ Leukocytosis  Hypertension, benign  Hypothyroidism  Hypernatremia    History of Present Illness   Sury Barnett is a 89 year old female with PMHx of hypertension and hypothyroidism who was admitted on 9/6/2017 with a large R intracranial hemorrhage.     Hospital Course   Sury Barnett was admitted on 9/6/2017.  The following problems were addressed during her hospitalization:    Large right frontal intracranial hemorrhage with left-sided hemiplegia  Mrs. Barnett was brought to the ED after having a fall and was found on head CT to have a large intraparenchymal hemorrhage. She was seen by neurosurgery and was deemed nonsurgical. Repeat head CT's over time with unchanged hemorrhage. Waxing and waning clinical status during her hospitalization, was seen by physical therapy 9/18 but deemed poor candidate for therapies at the time as pt not alert enough. She had a feeding tube placed (G-tube) 9/11 and tolerated tube feeds well. At the time of discharge she was able to nod/ shake her head appropriately answering questions.     Multiple providers have had goals of care discussions with patient's daughters and son this stay several times. Family wishes for restorative cares, does not want to talk about palliative care or hospice at this time. Family's main concern was lack of nutrition during initial days of stay. Cari Rojas does not wish to engage in discussions regarding patient's current status whether it is due to perceived lack of nutrition (which they feel is the  cause) vs underlying hemorrhage. Crystal hopes that patient will recover and would like see her being  independent again. Family requested second opinion and transfer to [facility] 9/20, discussed with Dr Olguin hospitalist at [facility] and reviewed her course so far, recommended continuing current care and no reason to transfer to other facility. Requested again on 9/21 but no indication for transfer     On most recent discussions, family adamant about pt being full code despite her underlying pathology    Episode of acute hypoxia, resolved  With episode of hypoxia on 9/27, thought to be 2/2 mucous plugging and resolved fairly quickly.     Fever/ Leukocytosis  Mrs. Barnett has had variable leukocytosis during her hospitalization, at one point peaking at ~26k. No evidence of infection was found (CXR was clear, blood cultures 9/17 showed no growth, procalcitonins and lactate were negative. C diff was checked and was negative. She also had a CT abdomen/ pelvis with contrast was done which showed pneumatosis of her colon as well as air in her peritoneal cavity. Surgery was consulted and felt as if it was secondary her G- tube placement. At the time of her discharge her white blood cell count had improved to 12.1. She should have a repeat CBC and BMPdone within a week.     Hypertension, benign  Stable on ramipril, metoprolol and amlodipine. Blood pressures were excellent control at the time of discharge.     Hypothyroidism  Continue on levothyroxine.    Hypernatremia  This corrected with free water in her feeding tube.     Tamir Manrique M.D.  Hospitalist  Pager [phone]    Significant Results and Procedures   See below    Pending Results   None    Code Status   Full Code       Primary Care Physician   Keyla Ceron    Physical Exam   Temp: 98  F (36.7  C) Temp src: Axillary BP: 119/65 Pulse: 73 Heart Rate: 85 Resp: 18 SpO2: 99 % O2 Device: None (Room air)    Vitals:    09/21/17 0300 09/22/17 0332 09/26/17 0700   Weight: 40.9 kg (90 lb 2.7 oz) 41.5 kg (91 lb 7.9 oz) 41.2 kg (90 lb 13.3 oz)     Vital Signs with  Ranges  Temp:  [97.3  F (36.3  C)-98.8  F (37.1  C)] 98  F (36.7  C)  Pulse:  [62-73] 73  Heart Rate:  [50-85] 85  Resp:  [16-18] 18  BP: (111-130)/(57-65) 119/65  SpO2:  [95 %-99 %] 99 %  I/O last 3 completed shifts:  In: 910 [NG/GT:910]  Out: 2050 [Urine:2050]    Constitutional: Alert,  no acute distress  Respiratory: Lungs clear to auscultation bilaterally, no wheezes, no crackles  Cardiovascular: Regular rate and rhythm, no murmurs  GI: Soft, non-tender, non-disteneded, good bowel sounds  Skin/Integumen: No erythema, cyanosis or edema  Other:      Discharge Disposition   Discharged to long-term care facility  Condition at discharge: Stable    Consultations This Hospital Stay   PHYSICAL THERAPY ADULT IP CONSULT  OCCUPATIONAL THERAPY ADULT IP CONSULT  NEUROSURGERY IP CONSULT  NEUROLOGY CRITICAL CARE IP CONSULT  SOCIAL WORK IP CONSULT  SWALLOW EVAL SPEECH PATH AT BEDSIDE IP CONSULT  NUTRITION SERVICES ADULT IP CONSULT  PHARMACY IP CONSULT  SOCIAL WORK IP CONSULT  PALLIATIVE CARE ADULT IP CONSULT  WOUND OSTOMY CONTINENCE NURSE  IP CONSULT  INFECTIOUS DISEASES IP CONSULT  SURGERY GENERAL IP CONSULT  NUTRITION SERVICES ADULT IP CONSULT  PHYSICAL THERAPY ADULT IP CONSULT  OCCUPATIONAL THERAPY ADULT IP CONSULT  SPEECH LANGUAGE PATH ADULT IP CONSULT    Time Spent on this Encounter   ITamir, personally saw the patient today and spent greater than 30 minutes discharging this patient.    Discharge Orders     CT Head w/o contrast*   Administration of IV contrast (contrast agent, dose, and amount) will be tailored to this examination per the appropriate written protocol listed in the Protocol E-Book, or by the supervising imaging provider.      Follow-up and recommended labs and tests    [facility] in 4-6 weeks with head CT prior.  Please call the clinic at [phone] to schedule your appointment with Vin Echeverria PA-C or Nga Fung PA-C.     Discharge Instructions   If  questions or problems arise regarding tube function (e.g. leaking, dislodges, etc.) Contact Interventional Radiology department 24 hours a day.    For procedures that were done at [facility]:    8 AM - 4 PM Monday through Friday Contact the Nurse Line [phone]  For afterhours and weekends [phone]    Ask for the Interventional Radiologist-on call.     For procedures that were done at [facility]:  8 AM - 4 PM Monday through Friday Contact   [phone]  For afterhours and weekends: [phone]   Ask for the Interventional Radiologist on call.       IF DIRECTED by the RADIOLOGIST, related to specific problems with the tube functioning,  go to the Emergency Department.     General info for SNF   Length of Stay Estimate: Long Term Care  Condition at Discharge: Stable  Level of care:board and care  Rehabilitation Potential: Poor  Admission H&P remains valid and up-to-date: Yes  Recent Chemotherapy: N/A  Use Nursing Home Standing Orders: Yes     Mantoux instructions   Give two-step Mantoux (PPD) Per Facility Policy Yes     Reason for your hospital stay   Acute hemorrhagic stroke.     Intake and output   daily     Daily weights   Call Provider for weight gain of more than 2 pounds per day or 5 pounds per week.     Ramsay catheter   To straight gravity drainage. Change catheter every 2 weeks and PRN for leaking or decreased uring output with signs of bladder distention. DO NOT change catheter without a specific MD order IF diagnosis of  neurogenic bladder, or other urological conditions     Follow Up and recommended labs and tests   Follow up with [facility] physician.  The following labs/tests are recommended: CBC and BMP with in a week.     Additional Discharge Instructions   Passive range of motion exercises/therapy  Frequent repositioning to prevent decubitus ulcer.  Monitor for signs of aspiration.  If neurological/cognitive function improves, and able to follow command and able to participate  in therapy, then therapy order per LTC MD.     Activity - Up with nursing assistance     Full Code     Nutrition Services Adult IP Consult   Reason:  Pt with tube feeds     Physical Therapy Adult Consult   Evaluate and treat as clinically indicated.    Reason:  CVA, deconditioning and treat as able     Occupational Therapy Adult Consult   Evaluate and treat as clinically indicated.    Reason:  CVA, deconditioning and treat as able     Speech Language Path Adult Consult   Evaluate and treat as clinically indicated.    Reason:  CVA, deconditioning and treat as able     Seizure precautions     Advance Diet as Tolerated   Diet upon discharge: Adult Formula Drip Feeding: Continuous Isosource 1.5; Nasoduodenal tube; Goal Rate: 35; mL/hr; Medication - Tube Feeding Flush Frequency: At least 15-30 mL water before and after medication administration and with tube clogging  - NPO strict for oral intake       Discharge Medications   Current Discharge Medication List      START taking these medications    Details   !! amLODIPine (NORVASC) 5 MG tablet 1 tablet (5 mg) by Oral or Feeding Tube route daily  Qty: 30 tablet    Associated Diagnoses: Essential hypertension      nystatin (MYCOSTATIN) 916808 UNIT/ML suspension Swish and swallow 5 mLs (500,000 Units) in mouth 4 times daily  Qty: 280 mL    Associated Diagnoses: Oral thrush      amoxicillin (AMOXIL) 875 MG tablet 1 tablet (875 mg) by Per Feeding Tube route every 12 hours for 8 days  Refills: 0    Associated Diagnoses: Urinary tract infection associated with indwelling urethral catheter, initial encounter (H)      acetaminophen (TYLENOL) 32 mg/mL solution 20.3 mLs (650 mg) by Per Feeding Tube route every 4 hours as needed for mild pain  Qty: 300 mL    Associated Diagnoses: Generalized pain      ramipril (ALTACE) 10 MG capsule 1 capsule (10 mg) by Oral or Feeding Tube route daily  Qty: 30 capsule    Associated Diagnoses: Essential hypertension      metoprolol (LOPRESSOR) 10  mg/mL SUSP 2.5 mLs (25 mg) by Oral or Feeding Tube route 2 times daily    Associated Diagnoses: Essential hypertension      !! amLODIPine (NORVASC) 10 MG tablet 1 tablet (10 mg) by Oral or Feeding Tube route daily  Qty: 30 tablet    Associated Diagnoses: Essential hypertension      fiber modular (NUTRISOURCE FIBER) packet 1 packet by Per Feeding Tube route 2 times daily    Associated Diagnoses: On tube feeding diet      hypromellose-dextran (ARTIFICAL TEARS) SOLN ophthalmic solution Apply 2-3 drops to eye every hour as needed for dry eyes    Associated Diagnoses: Dry eyes      pantoprazole (PROTONIX) SUSP suspension 20 mLs (40 mg) by Per Feeding Tube route daily    Associated Diagnoses: Gastroesophageal reflux disease without esophagitis       !! - Potential duplicate medications found. Please discuss with provider.      CONTINUE these medications which have CHANGED    Details   levothyroxine (SYNTHROID/LEVOTHROID) 50 MCG tablet 1 tablet (50 mcg) by Oral or Feeding Tube route every morning (before breakfast)  Qty: 30 tablet    Associated Diagnoses: Hypothyroidism, unspecified type         CONTINUE these medications which have NOT CHANGED    Details   VITAMIN D, CHOLECALCIFEROL, PO Take 50,000 Units by mouth once a week Friday         STOP taking these medications       LISINOPRIL PO Comments:   Reason for Stopping:             Allergies   Allergies   Allergen Reactions     [place] Flavor Difficulty breathing     Sulfa Drugs Other (See Comments)     Really sick and high fever     Amoxicillin      Data   Most Recent 3 CBC's:  Recent Labs   Lab Test  09/27/17   0818  09/26/17   0804  09/25/17   0810   09/23/17   0826   09/21/17   0746   WBC  12.1*  15.0*  16.2*   < >  25.8*   < >  19.6*   HGB  11.0*  10.8*   --    --   12.4   --   13.0   MCV  92  92   --    --    --    --   94   PLT  415  393   --    --    --    --   370    < > = values in this interval not displayed.      Most Recent 3 BMP's:  Recent Labs   Lab Test   09/26/17   0804  09/25/17   0810  09/24/17   0744   NA  136  135  132*   POTASSIUM  3.6  4.0  4.1   CHLORIDE  100  99  98   CO2  29  28  27   BUN  17  16  20   CR  0.44*  0.54  0.46*   ANIONGAP  7  8  7   ANTHONY  8.5  8.6  8.2*   GLC  135*  136*  126*     Most Recent 2 LFT's:No lab results found.  Most Recent INR's and Anticoagulation Dosing History:  Anticoagulation Dose History     Recent Dosing and Labs Latest Ref Rng & Units 9/6/2017 9/11/2017    INR 0.86 - 1.14 0.99 1.11        Most Recent 3 Troponin's:  Recent Labs   Lab Test  09/07/17   1128  07/24/17   1004   TROPI  0.015   --    TROPONIN   --   0.00     Most Recent Cholesterol Panel:No lab results found.  Most Recent 6 Bacteria Isolates From Any Culture (See EPIC Reports for Culture Details):  Recent Labs   Lab Test  09/23/17   1300  09/17/17   0911  09/17/17   0900  09/17/17   0505   CULT  >100,000 colonies/mL  Enterococcus faecalis  *  No growth  No growth  <10,000 colonies/mL  Enterococcus faecalis  *     Most Recent TSH, T4 and A1c Labs:No lab results found.  Results for orders placed or performed during the hospital encounter of 09/06/17   Cervical spine CT w/o contrast    Narrative    CT OF THE CERVICAL SPINE WITHOUT CONTRAST   9/6/2017 1:28 PM     COMPARISON: None.    HISTORY: Trauma.     TECHNIQUE: Axial images of the cervical spine were acquired without  intravenous contrast. Multiplanar reformations were created.      FINDINGS: There is normal alignment of the cervical vertebrae.  Vertebral body heights of the cervical spine are normal.  Craniocervical alignment is normal. There are no fractures of the  cervical spine.  There is moderate facet arthropathy on the left at  the C3-C4, C4-C5 and C5-C6 levels. There is degenerative endplate  spurring at the C4-C5 level. There is mild degenerative spinal canal  narrowing from lower C3 to upper C5. There is no prevertebral soft  tissue swelling.      Impression    IMPRESSION: Degenerative changes of the  cervical spine. No evidence  for fracture or traumatic malalignment.    Radiation dose for this scan was reduced using automated exposure  control, adjustment of the mA and/or kV according to patient size, or  iterative reconstruction technique.    TRIXIE VACA MD   CT Head w/o Contrast     Value    Radiologist flags Intracranial hemorrhage (AA)    Narrative    CT OF THE HEAD WITHOUT CONTRAST 9/6/2017 1:23 PM     COMPARISON: Brain MR 7/24/2017.    HISTORY: Stroke.    TECHNIQUE: 5 mm thick axial CT images of the head were acquired  without IV contrast material.    FINDINGS: There is a new large hyperdense intra-axial mass centered in  the right frontal lobe consistent with a large parenchymal hematoma  measuring 5.5 x 4.2 x 3.5 cm in the AP, transverse and cephalocaudad  dimensions respectively. There is a minimal amount of subarachnoid  extension of hemorrhage surrounding this parenchymal hematoma. Mass  effect due to the hematoma results in minimal local sulcal effacement  but no significant midline shift and no effacement of the basal  cisterns. There is no intraventricular extension of hemorrhage.     There is moderate diffuse cerebral volume loss. There are extensive  confluent areas of decreased density in the cerebral white matter  bilaterally that are consistent with sequela of chronic small vessel  ischemic disease disease. The ventricles and basal cisterns are within  normal limits in configuration given the degree of cerebral volume  loss.    No intracranial mass or recent infarct.    The visualized paranasal sinuses are well-aerated. There is no  mastoiditis. There are no fractures of the visualized bones.      Impression    IMPRESSION:   1. New large hyperdense mass in the right frontal lobe measuring 5.5 x  4.2 x 3.5 cm consistent with a large parenchymal hematoma. This does  not result in any significant midline shift or effacement of the basal  cisterns.  2. Diffuse cerebral volume loss and cerebral  white matter changes  consistent with chronic small vessel ischemic disease.     [Critical Result: Intracranial hemorrhage]    Finding was identified on 9/6/2017 1:24 PM.     Beto Tejada was contacted by Dr. Goff on 9/6/2017 1:25 PM and  verbalized understanding of the critical result.       Radiation dose for this scan was reduced using automated exposure  control, adjustment of the mA and/or kV according to patient size, or  iterative reconstruction technique.    TRIXIE GOFF MD   CT Head w/o Contrast    Narrative    CT SCAN OF THE HEAD WITHOUT CONTRAST   9/7/2017 4:02 AM     HISTORY: ICH.    TECHNIQUE:  Axial images of the head and coronal reformations without  IV contrast material. Radiation dose for this scan was reduced using  automated exposure control, adjustment of the mA and/or kV according  to patient size, or iterative reconstruction technique.    COMPARISON: 9/6/2017.    FINDINGS: Increased size of the hyperdense parenchymal hematoma  centered in the right frontal lobe now measuring approximately 6.5 x  4.2 x 4.2 cm, previously 5.5 x 4.2 x 3.5 cm. Surrounding edema around  the hematoma also appears to be increased. The hematoma now  demonstrates intraventricular extension towards the right lateral  ventricle. There is hemorrhage layering in the ventricular system,  particularly the lateral ventricles, right greater than left. There is  unchanged mass effect on the right lateral ventricle. No significant  midline shift.    Ventricular size is unchanged since prior without evidence of  hydrocephalus. The basal cisterns are patent. Moderate diffuse  parenchymal volume loss and fairly extensive periventricular white  matter changes likely related to chronic microvascular ischemic  disease.      Impression    IMPRESSION:     1. Increased size of parenchymal hematoma and surrounding edema  centered in the right frontal lobe. There is new intraventricular  extension of the hematoma. No significant  midline shift or herniation.  No hydrocephalus.  2. Diffuse parenchymal volume loss and fairly extensive white matter  changes likely due to chronic microvascular ischemic disease.    BIMAL FERRARA MD   CT Head w/o Contrast    Narrative    CT SCAN OF THE HEAD WITHOUT CONTRAST   9/8/2017 4:55 AM     HISTORY: Follow up head bleed.    TECHNIQUE:  Axial images of the head and coronal reformations without  IV contrast material.  Radiation dose for this scan was reduced using  automated exposure control, adjustment of the mA and/or kV according  to patient size, or iterative reconstruction technique.    COMPARISON: 9/7/2017.    FINDINGS: There is a heterogeneous right frontal parenchymal  hemorrhage with some surrounding low density and some localized mass  effect. This is similar in appearance to the prior day's exam. There  is also intraventricular extension of hemorrhage with blood layering  in the lateral ventricles dependent portions. This is also unchanged.  Cerebral atrophy and patchy white matter changes are noted. There is  no evidence for any acute ischemic infarcts, midline shift, or skull  fracture.      Impression    IMPRESSION: Stable right frontal parenchymal and intraventricular  hemorrhage. There is no evidence for any progressive mass effect or  any new hemorrhage.    GUMARO JAMES MD   XR Chest Port 1 View    Narrative    XR CHEST PORT 1 VW 9/8/2017 11:55 AM    HISTORY: Evaluate for pneumonia.    COMPARISON: None.    FINDINGS: No consolidation, effusion or pneumothorax. Normal heart  size.      Impression    IMPRESSION: No evidence of pneumonia.    CARMEN BHAKTA MD   XR Abdomen Port 1 View    Narrative    XR ABDOMEN PORT F1 VW 9/8/2017 3:24 PM    HISTORY: keofeed placement.    COMPARISON: None.    FINDINGS: The feeding tube is looped in the stomach. The distal tip  may be within the proximal duodenum. The bowel gas pattern is  nonobstructive.      Impression    IMPRESSION: Distal tip of the feeding tube  may be in the proximal  duodenum. A lateral view might be helpful.    CARMEN BHAKTA MD   IR Gastrostomy Tube Percutaneous Plcmnt    Narrative    G-TUBE PLACEMENT 9/11/2017 1:37 PM     HISTORY: Requires nutritional support. Unable to take adequate oral  intake.    DESCRIPTION OF PROCEDURE: After obtaining informed consent, the  patient was placed in a supine position on the fluoroscopy table. The  liver edge was marked. A nasogastric tube was placed into the stomach.  1 mg glucagon was given intravenously. The stomach was inflated with  air.     Two T-TAC suture anchors were used to enter the stomach. A small skin  incision was made in between the T-TAC entry sites. An 18-gauge needle  was used to enter the stomach through the incision. A wire was passed  and curled in the stomach. A tract for the G-tube was dilated. An 18  Welsh peel-away sheath was placed. Through the peel-away sheath, a 14  Welsh G-tube was placed. The balloon was inflated with a mixture of 1  mL contrast and 9 mL saline. Balloon was pulled back so that it was  against the anterior stomach wall. Contrast was injected to document  adequate positioning. A fluoroscopic image was saved to document tube  position.     The patient tolerated the procedure well. There were no immediate  postprocedure complications. The patient's vital signs were monitored  by radiology nursing staff under my supervision and remained stable  throughout the study.    MEDICATIONS: 1 mg glucagon    SEDATION TIME: None    FLUOROSCOPY TIME: 2.3 minutes    RADIATION DOSE: 9 mg      Impression    IMPRESSION: G-tube placed as above    BILLIE HYMAN MD   CT Head w/o Contrast    Narrative    CT HEAD WITHOUT CONTRAST September 11, 2017 12:15 PM    HISTORY: Mental status change. Follow-up hemorrhage.    TECHNIQUE: Scans were obtained through the head without IV contrast.   Radiation dose for this scan was reduced using automated exposure  control, adjustment of the mA and/or kV  according to patient size, or  iterative reconstruction technique.    COMPARISON: 09/08/2017.    FINDINGS: Extensive high density intraparenchymal subacute hemorrhage  is again identified in the right frontoparietal region. Extends over a  7.5 cm AP posterior region and 3.6 cm transverse. Intraventricular  hemorrhage also persists with some layered out blood in both occipital  horns. The scanning angle is different than yesterday and I do not  believe there has been significant change in the amount of  intraparenchymal or intraventricular hemorrhage in the interval. No  hydrocephalus. Mild mass effect in the right lateral ventricle. No  midline shift. Moderate atrophy and chronic white matter disease  likely small vessel ischemic change.    Etiology of the hemorrhage is not specific but amyloid can cause this  appearance.      Impression    IMPRESSION: Stable intraparenchymal hemorrhage in the right cerebral  hemisphere as well as intraventricular hemorrhage. No appreciable  change. Please note that the scanning angle on the axial study is  slightly different today.    SERGIO OCAMPO MD   XR Chest Port 1 View    Narrative    CHEST ONE VIEW PORTABLE   9/12/2017 9:13 AM     HISTORY: Poor cough, increased white blood count, fever.    COMPARISON: 9/8/2017.      Impression    IMPRESSION: Increased interstitial markings at the right lung base  laterally may represent an acute infiltrate. This is new since  previous exam. Left lung is clear. Normal heart size and pulmonary  vascularity. Gastrostomy.    AUBREE CERVANTES MD   CT Head w/o Contrast    Narrative    CT HEAD W/O CONTRAST   9/16/2017 1:35 PM     HISTORY: follow up ICH, r/o HCP    TECHNIQUE: Axial images of the head without IV contrast material.  Radiation dose for this scan was reduced using automated exposure  control, adjustment of the mA and/or kV according to patient size, or  iterative reconstruction technique.    COMPARISON: CT head dated  9/11/2017    FINDINGS: The area of high density in the right frontal region  consistent with a parenchymal hemorrhage has decreased in size when  compared to the previous scan. Currently it measures about 4.6 cm in  AP dimension by 3.4 cm in transverse dimension and 3.7 cm in  cephalocaudad dimension. The volume is currently approximately 29 mL.  There is still mild mass effect on the frontal horn of the right  lateral ventricle. No shift of midline structures. A small amount of  intraventricular blood is still present. This has decreased slightly.  There is generalized atrophy of the brain.  Areas of low attenuation  are present in the white matter of the cerebral hemispheres that are  consistent with small vessel ischemic disease in this age patient. .  The visualized portions of the sinuses and mastoids appear normal.  There is no evidence of trauma.      Impression    IMPRESSION:   1. Slight decrease in the size of the right frontal parenchymal  hemorrhage. No new hemorrhage.  2. Slight decrease in the amount of intraventricular hemorrhage.  3. Age related changes including diffuse brain atrophy.  White matter  changes consistent with small vessel ischemic disease.    MORENO MANZANO MD   XR Chest Port 1 View    Narrative    XR CHEST PORT 1 VW  9/16/2017 3:47 PM     HISTORY:  evaluate for new/worsening infiltrate    COMPARISON: Film dated 9/12/2017    FINDINGS:  The heart is normal in size. Left basilar opacity has  improved since the previous film. The left lung base is now  essentially clear. Aorta is tortuous. Right lung is clear.      Impression    IMPRESSION: There is been clearing of the left basilar opacity. No  significant residual infiltrate is seen.    MORENO MANZANO MD   CT Head w/o Contrast    Narrative    CT OF THE HEAD WITHOUT CONTRAST 9/20/2017 1:08 PM     COMPARISON: Head CT 9/16/2017.    HISTORY: Interval assessment of ICH.    TECHNIQUE: 5 mm thick axial CT images of the head were acquired  without IV  contrast material.    FINDINGS: Parenchyma hematoma in the anterior aspect of the right  frontal lobe is again noted and has decreased in size minimally from  the comparison study, now measuring 4.2 x 3.4 cm in the AP and  transverse dimensions, respectively, compared to 4.6 x 3.4 cm in the  same dimensions on the comparison study. There is no evidence for new  or increasing intracranial hemorrhage.    There is moderate diffuse cerebral volume loss. There are extensive  confluent areas of decreased density in the cerebral white matter  bilaterally that are consistent with sequela of chronic small vessel  ischemic disease disease. The ventricles and basal cisterns are within  normal limits in configuration given the degree of cerebral volume  loss.    No intracranial mass or recent infarct.    The visualized paranasal sinuses are well-aerated. There is no  mastoiditis. There are no fractures of the visualized bones.      Impression    IMPRESSION:   1. Minimal interval decrease in size of the parenchymal hematoma in  the anterior aspect of the right frontal lobe. No definite evidence  for new or increasing hemorrhage.  2. Diffuse cerebral volume loss and cerebral white matter changes  consistent with chronic small vessel ischemic disease.       Radiation dose for this scan was reduced using automated exposure  control, adjustment of the mA and/or kV according to patient size, or  iterative reconstruction technique.    TRIXIE VACA MD   US Lower Extremity Venous Duplex Bilateral    Narrative    VENOUS ULTRASOUND BILATERAL LOWER EXTREMITIES  9/22/2017 11:44 AM     HISTORY: 89-year-old patient bedridden with leukocytosis. Patient has  been immobile for extended period, since September 6, 2017.    COMPARISON: None.    TECHNIQUE: Color Doppler and spectral waveform analysis performed  throughout the deep veins of both lower extremities.    FINDINGS: Both common femoral, proximal greater saphenous, femoral,  and popliteal  veins demonstrate normal blood flow, compression, and  augmentation. Posterior tibial and peroneal veins are patent.      Impression    IMPRESSION: Negative for deep venous thrombosis in both lower  extremities.    KORINA TRIMBLE MD   CT Abdomen Pelvis w Contrast    Narrative    CT ABDOMEN PELVIS WITH CONTRAST 9/22/2017 6:51 PM     HISTORY: Persistent leucocytosis. Recent G tube. Evaluate for any  occult infection.    CONTRAST DOSE: 45 mL Isovue-370    Radiation dose for this scan was reduced using automated exposure  control, adjustment of the mA and/or kV according to patient size, or  iterative reconstruction technique.    FINDINGS: Intra-abdominal free peritoneal air is noted anteriorly and  adjacent to the liver. A small amount of retroperitoneal air is also  noted to the right of the proximal abdominal aorta. The liver, spleen,  kidneys, pancreas, and gallbladder appear within normal limits. There  is no evidence of bowel obstruction. A Ramsay catheter is in place  within a collapsed urinary bladder. There is a small amount of  peritoneal fluid within the pelvis. Pelvic contents are otherwise  grossly unremarkable.      Impression    IMPRESSION:  1. Nonspecific free peritoneal air. A small amount of retroperitoneal  air is also noted adjacent to the proximal abdominal aorta and  posterior to the right kidney.  2. Nonspecific somewhat dense peritoneal fluid is noted within the  pelvis which could represent blood.  3. Percutaneous gastric tube appears to be within the gastric fundus.  4. No evidence of bowel obstruction.    OMARI ORTIZ MD   CT Head w/o Contrast    Narrative    CT SCAN OF THE HEAD WITHOUT CONTRAST   9/23/2017 2:35 PM     HISTORY: Known ICH, increased lethargy.    TECHNIQUE:  Axial images of the head and coronal reformations without  IV contrast material. Radiation dose for this scan was reduced using  automated exposure control, adjustment of the mA and/or kV according  to patient size, or  iterative reconstruction technique.    COMPARISON: Head CT 9/20/2017.    FINDINGS: Fairly large hyperdense parenchymal hematoma centered in the  right frontal lobe overall does not appear significantly changed in  size. Punctate 2 mm focus of hyperdensity in the right centrum  semiovale was not appreciated on axial images on the previous CT, but  was present on the coronal images. No evidence of new intracranial  hemorrhage. Small amount of subarachnoid hemorrhage in the right  cerebral hemisphere is unchanged. Surrounding hypodensity and mass  effect on the frontal horn of the right lateral ventricle is  unchanged. Ventricular size is similar to prior without evidence of  hydrocephalus or entrapment. The basal cisterns remain patent.  Extensive periventricular white matter hypodensity is similar to  prior. Moderate diffuse parenchymal volume loss.    The visualized portions of the sinuses and mastoids appear normal. The  bony calvarium and bones of the skull base appear intact.       Impression    IMPRESSION:     1. No significant change of the intraparenchymal hemorrhage centered  in the right frontal lobe. Unchanged small amount of subarachnoid  hemorrhage in the right cerebral hemisphere. No evidence of new  intracranial hemorrhage.  2. Diffuse parenchymal volume loss and extensive periventricular white  matter hypodensities likely due to chronic microvascular ischemic  disease. No significant change since prior.    BIMAL FERRARA MD   XR Chest Port 1 View    Narrative    CHEST ONE VIEW UPRIGHT 9/25/2017 9:49 AM     HISTORY: Hypoxia.    COMPARISON: 9/16/2017.      Impression    IMPRESSION: Minimal interstitial changes are similar to previous.  There are new minimal streaky densities at both lung bases which may  be atelectasis and/or infiltrate.    FABIANA RICO MD

## 2017-09-28 ENCOUNTER — APPOINTMENT (OUTPATIENT)
Dept: GENERAL RADIOLOGY | Facility: CLINIC | Age: 82
End: 2017-09-28
Attending: EMERGENCY MEDICINE
Payer: MEDICARE

## 2017-09-28 ENCOUNTER — HOSPITAL ENCOUNTER (EMERGENCY)
Facility: CLINIC | Age: 82
Discharge: HOME | End: 2017-09-29
Attending: EMERGENCY MEDICINE | Admitting: EMERGENCY MEDICINE
Payer: MEDICARE

## 2017-09-28 VITALS
RESPIRATION RATE: 16 BRPM | TEMPERATURE: 97.2 F | SYSTOLIC BLOOD PRESSURE: 136 MMHG | DIASTOLIC BLOOD PRESSURE: 88 MMHG | OXYGEN SATURATION: 97 %

## 2017-09-28 VITALS
HEART RATE: 78 BPM | TEMPERATURE: 98.1 F | DIASTOLIC BLOOD PRESSURE: 68 MMHG | OXYGEN SATURATION: 95 % | SYSTOLIC BLOOD PRESSURE: 107 MMHG | RESPIRATION RATE: 19 BRPM

## 2017-09-28 DIAGNOSIS — T85.598A FEEDING TUBE BLOCKED, INITIAL ENCOUNTER: ICD-10-CM

## 2017-09-28 PROCEDURE — 99283 EMERGENCY DEPT VISIT LOW MDM: CPT

## 2017-09-28 PROCEDURE — 74000 XR ABDOMEN 1 VW: CPT

## 2017-09-28 NOTE — ED AVS SNAPSHOT
Emergency Department    [address] [place] [address]    Phone:  [phone]    Fax:  [phone]                                       Sury Barnett   MRN: [id number]    Department:   Emergency Department   Date of Visit:  9/28/2017           After Visit Summary Signature Page     I have received my discharge instructions, and my questions have been answered. I have discussed any challenges I see with this plan with the nurse or doctor.    ..........................................................................................................................................  Patient/Patient Representative Signature      ..........................................................................................................................................  Patient Representative Print Name and Relationship to Patient    ..................................................               ................................................  Date                                            Time    ..........................................................................................................................................  Reviewed by Signature/Title    ...................................................              ..............................................  Date                                                            Time

## 2017-09-29 ENCOUNTER — NURSING HOME VISIT (OUTPATIENT)
Dept: GERIATRICS | Facility: CLINIC | Age: 82
End: 2017-09-29
Payer: MEDICARE

## 2017-09-29 DIAGNOSIS — E87.0 HYPERNATREMIA: ICD-10-CM

## 2017-09-29 DIAGNOSIS — T83.511D URINARY TRACT INFECTION ASSOCIATED WITH INDWELLING URETHRAL CATHETER, SUBSEQUENT ENCOUNTER: ICD-10-CM

## 2017-09-29 DIAGNOSIS — D72.829 LEUKOCYTOSIS, UNSPECIFIED TYPE: ICD-10-CM

## 2017-09-29 DIAGNOSIS — E03.9 HYPOTHYROIDISM, UNSPECIFIED TYPE: ICD-10-CM

## 2017-09-29 DIAGNOSIS — R53.81 PHYSICAL DECONDITIONING: ICD-10-CM

## 2017-09-29 DIAGNOSIS — G81.94 HEMIPLEGIA AFFECTING LEFT NONDOMINANT SIDE, UNSPECIFIED ETIOLOGY, UNSPECIFIED HEMIPLEGIA TYPE (H): ICD-10-CM

## 2017-09-29 DIAGNOSIS — J96.21 ACUTE AND CHRONIC RESPIRATORY FAILURE WITH HYPOXIA (H): ICD-10-CM

## 2017-09-29 DIAGNOSIS — L89.152 DECUBITUS ULCER OF SACRAL REGION, STAGE 2 (H): ICD-10-CM

## 2017-09-29 DIAGNOSIS — R50.9 FEVER, UNSPECIFIED: ICD-10-CM

## 2017-09-29 DIAGNOSIS — Z93.1 G TUBE FEEDINGS (H): ICD-10-CM

## 2017-09-29 DIAGNOSIS — I10 BENIGN ESSENTIAL HYPERTENSION: ICD-10-CM

## 2017-09-29 DIAGNOSIS — N39.0 URINARY TRACT INFECTION ASSOCIATED WITH INDWELLING URETHRAL CATHETER, SUBSEQUENT ENCOUNTER: ICD-10-CM

## 2017-09-29 PROCEDURE — 99310 SBSQ NF CARE HIGH MDM 45: CPT | Performed by: NURSE PRACTITIONER

## 2017-09-29 NOTE — ED PROVIDER NOTES
History     Chief Complaint:  Blocked G-Tube     The history is provided by medical records. The history is limited by the condition of the patient.      Sury Barnett is a 89 year old female who presents to the emergency department today for evaluation of blocked g-tube. The patient was recently discharged yesterday from the hospital. She was admitted for 21 days total after initially having a fall and was found to have a large intraparenchymal hemorrhage. She was seen by neurosurgery and was deemed nonsurgical. Repeat head CT's over time showed an unchanged hemorrhage. She had a feeding tube placed (G-tube) on 9/11 and tolerated tube feeds well while in the hospital.      Allergies:  Sulfa drugs   Amoxicillin      Medications:    amLODIPine (NORVASC) 5 MG tablet  nystatin (MYCOSTATIN) 734822 UNIT/ML suspension  acetaminophen (TYLENOL) 32 mg/mL solution  ramipril (ALTACE) 10 MG capsule  metoprolol (LOPRESSOR) 10 mg/mL SUSP  amLODIPine (NORVASC) 10 MG tablet  fiber modular (NUTRISOURCE FIBER) packet  hypromellose-dextran (ARTIFICAL TEARS) SOLN ophthalmic solution  levothyroxine (SYNTHROID/LEVOTHROID) 50 MCG tablet  pantoprazole (PROTONIX) SUSP suspension  VITAMIN D, CHOLECALCIFEROL, PO     Past Medical History:    Hypertension   Thyroid disease   Intracerebral hemorrhage      Past Surgical History:    Tonsillectomy   Colonoscopy      Family History:    Cancer   HTN   HLP      Social History:  Negative for tobacco use.  Negative for alcohol use.  Presents to ED via EMS  Marital Status:       Review of Systems   Unable to perform ROS: Patient nonverbal     Physical Exam     Patient Vitals for the past 24 hrs:   BP Temp Temp src Heart Rate Resp SpO2   09/28/17 2132 136/88 - - - - 97 %   09/28/17 2038 114/59 - - - - 98 %   09/28/17 1925 117/77 97.2  F (36.2  C) Temporal 80 16 98 %        Physical Exam  General: Somnolent, non-verbal  Head:  Scalp is atraumatic  Eyes:  No scleral icterus  ENT:      Nose:  The  external nose is normal  Ears:  External ears are normal  Mouth/Throat: The oropharynx is normal    Mucus membranes are dry      Neck:  Trachea is in the midline         CV:  Regular rate and rhythm    No murmur   Resp:  Breath sounds are clear bilaterally    Non-labored, no retractions or accessory muscle use      GI:  Abdomen is soft, no distension, no tenderness. G-tube in position in the left upper quadrant.  Skin:  Warm and dry, No rash or lesions noted.  Neuro: Aphasic, no significant movement of the extremities       Emergency Department Course     Imaging:  Radiology findings were communicated with the patient who voiced understanding of the findings.  Abdomen XR 1 vw  There is a G tube present in the body of the stomach.  Contrast is present in the stomach verifying position.  GUMARO JAMES MD    Procedure:  After informed verbal consent from the daughter the occluded G-tube had its balloon deflated and was removed. The area was sterilely prepped with Betadine. A 14 Albanian G-tube was placed 4.5 cm and the cuff was inflated with 4 cc of saline. The G-tube was in firm position. A radiograph was performed demonstrating appropriate placement. The tube was flushed and aspirated easily. There were no complications.    Emergency Department Course:  Nursing notes and vitals reviewed.  1930 I entered the room.  1931 I performed an exam of the patient as documented above.   1950 the patient was rechecked and they were updated.    2029 I spoke with IR regarding patient's presentation, findings, and plan of care.   I discussed the treatment plan with the patient. They expressed understanding of this plan and consented to discharge. They will be discharged home with instructions for care and follow up. In addition, the patient will return to the emergency department if their symptoms persist, worsen, if new symptoms arise or if there is any concern.  All questions were answered.     Impression & Plan      Medical  Decision Making:  Sury Barnett is a 89 year old female who was seen and evaluated. We attempted to unclog the G-tube without success, therefore, after speaking with the interventional radiologist on-call, they have suggested changing out the tube given the use of the T-tac system. This was changed out without incident. Confirmatory radiographs demonstrates the tube in good position. There are no signs of surrounding infection or more concerning illness and she was discharged back to her care facility. They can utilize the tube as previous.     Diagnosis:    ICD-10-CM    1. G-tube malfunction/occlusion T85.598A      Disposition:   The patient was discharged to home as noted above.     Scribe Disclosure:  IZac, am serving as a scribe at 8:59 PM on 9/28/2017 to document services personally performed by Aaron Nails,*, based on my observations and the provider's statements to me.    EMERGENCY DEPARTMENT       Aaron Nails MD  09/29/17 0043

## 2017-09-29 NOTE — DISCHARGE INSTRUCTIONS
Discharge Instructions: Caring for Your Gastrostomy Tube (G-Tube)  You have been discharged with a gastrostomy tube, or G-tube. The G-tube was inserted through your belly (abdominal) wall and into your stomach. The tube will provide you with food, fluids, and medicine. Your G-tube may move in and out slightly. If the tube comes out all the way in the first few weeks after placement, don t put it back in. Call your healthcare provider right away. Don t wait until the next day. This is important because the G-tube tract through the skin may close very quickly, often within 24 hours. After the first few weeks, if the tube comes out, ask your provider how to get a spare tube. Ask your provider how to replace it at home.   General guidelines for use    Wash your hands thoroughly with soap and warm water before starting your feeding.    During the feeding and for 1 hour after, sit in a chair or sit up in bed.    Before feeding begins, check to see that your stomach is empty. You will need a syringe for the following steps:     Insert the tip of an empty syringe into the end of the G-tube.    Pull back on the syringe to withdraw contents of the stomach.    Don t begin the feeding if more than 100 mL remains from the previous feeding.    Clean the area around the tube with mild soap and water.    Pat the area dry during bathing and as needed.    Clean the area more often if it gets wet or is leaking some discharge (weeping).    Keep the disk (flange) a few millimeters off the skin. This should leave just enough room for a gauze sponge. Pulling the flange too tightly can damage the skin. But leaving the flange too loose leads to leaking around the G-tube. Your healthcare team will go over these guidelines before you leave the hospital.     The name of my feeding supplement/formula is:  ___________________________________________     Amount per feeding:   ___________________________________________     Times per  day:  ___________________________________________     Amount of water used to flush tube:  ___________________________________________   Gravity feeding method    Fill the feeding bag with the prescribed amount of formula. Run the fluid to the end of the tube to clear out any air. Clamp the tube.    Connect the end of the feeding bag tubing to the G-tube.    Hang the bag at least 18 inches above the level of your G-tube.    Open the clamp and allow the formula to flow into the G-tube.    Follow with the prescribed amount of water.    After each feeding, rinse the bag and tubing. Every 24 hours, wash the bag and tubing with soapy water and rinse thoroughly.  Pump feeding method    Fill the feeding bag with the prescribed amount of formula. Run the fluid to the end of the tube to clear out any air. Clamp the tube.    Connect the end of the feeding bag tubing to the G-tube. Set the pump rate of flow to the prescribed rate per hour.    Open the clamp on the tubing; press the start button on your pump.    When feeding is complete, disconnect the feeding set.    Connect the tip of an empty syringe to the feeding tube and slowly push in the prescribed amount of water.    After each feeding, rinse the bag and tubing. Every 24 hours, wash the bag and tubing with soapy water and rinse thoroughly.  Syringe feeding method    Remove the plunger from a syringe and connect the syringe to the G-tube.    Hold the syringe upright and pour the formula into the syringe.    Refill the syringe as the formula reaches the bottom of the syringe.    Repeat the process until the prescribed amount of formula is given.    Follow the feeding with the prescribed amount of water.    After each feeding, rinse the bag and tubing. Every 24 hours, wash the bag and tubing with soapy water and rinse thoroughly.  When to call your provider  Call your healthcare provider right away if you have any of the following:    The tube comes out     The tube is  blocked    Vomiting    Fever above 100.4 F (38.0 C)    Diarrhea that lasts more than 2 days    Signs of infection (redness, swelling, or warmth at the tube site)    Drainage from the tube site   Date Last Reviewed: 8/1/2016 2000-2017 [employer]. [address] [place] [address]. All rights reserved. This information is not intended as a substitute for professional medical care. Always follow your healthcare professional's instructions.

## 2017-09-29 NOTE — MR AVS SNAPSHOT
After Visit Summary   9/29/2017    Sury Barnett    MRN: [id number]           Patient Information     Date Of Birth          8/27/1925        Visit Information        Provider Department      9/29/2017 9:00 AM Paige Rucker APRN CNP [facility]        Today's Diagnoses     Intracranial hemorrhage on right side following injury, with LOC of unspecified duration, subsequent encounter    -  1    Hemiplegia affecting left nondominant side, unspecified etiology, unspecified hemiplegia type (H)        G tube feedings (H)        Physical deconditioning        Acute and chronic respiratory failure with hypoxia (H)        Leukocytosis, unspecified type        Urinary tract infection associated with indwelling urethral catheter, subsequent encounter        Fever, unspecified        Benign essential hypertension        Hypernatremia        Hypothyroidism, unspecified type        Decubitus ulcer of sacral region, stage 2           Follow-ups after your visit        Who to contact     If you have questions or need follow up information about today's clinic visit or your schedule please contact [facility] directly at [phone].  Normal or non-critical lab and imaging results will be communicated to you by [employer], letter or phone within 4 business days after the clinic has received the results. If you do not hear from us within 7 days, please contact the clinic through [employer] or phone. If you have a critical or abnormal lab result, we will notify you by phone as soon as possible.  Submit refill requests through [employer] or call your pharmacy and they will forward the refill request to us. Please allow 3 business days for your refill to be completed.          Additional Information About Your Visit        [employer] Information     Message[employer] lets you send messages to your doctor, view your test results, renew your prescriptions, schedule appointments and more. To sign up, go to  [email or web address] . Click on \"Log in\" on the left side of the screen, which will take you to the Welcome page. Then click on \"Sign up Now\" on the right side of the page.     You will be asked to enter the access code listed below, as well as some personal information. Please follow the directions to create your username and password.     Your access code is: [email or web address]  Expires: 10/22/2017  1:36 PM     Your access code will  in 90 days. If you need help or a new code, please call your [place] clinic or [phone].        Care EveryWhere ID     This is your Care EveryWhere ID. This could be used by other organizations to access your [place] medical records  [id number]        Your Vitals Were     Pulse Temperature Respirations Pulse Oximetry          78 98.1  F (36.7  C) 19 95%         Blood Pressure from Last 3 Encounters:   17 107/68   17 136/88   17 119/66    Weight from Last 3 Encounters:   17 90 lb 13.3 oz (41.2 kg)   17 100 lb (45.4 kg)   16 96 lb (43.5 kg)              Today, you had the following     No orders found for display       Primary Care Provider Office Phone # Fax #    Keyla Ceron [phone]       [address] [address]        Equal Access to Services     [facility] : Hadii jean paul rodriguez hadasho Soomaali, waaxda luqadaha, qaybta kaalmada adeegyaberta, brendon jacome . So [facility] [phone].    ATENCIÓN: Si habla español, tiene a leonard disposición servicios gratuitos de asistencia lingüística. Swathi al [phone].    We comply with applicable federal civil rights laws and [place] laws. We do not discriminate on the basis of race, color, national origin, age, disability sex, sexual orientation or gender identity.            Thank you!     Thank you for choosing [facility]  for your care. Our goal is always to provide you with excellent care. Hearing back " from our patients is one way we can continue to improve our services. Please take a few minutes to complete the written survey that you may receive in the mail after your visit with us. Thank you!             Your Updated Medication List - Protect others around you: Learn how to safely use, store and throw away your medicines at www.disposemymeds.org.          This list is accurate as of: 9/29/17 11:48 AM.  Always use your most recent med list.                   Brand Name Dispense Instructions for use Diagnosis    acetaminophen 32 mg/mL solution    TYLENOL    300 mL    20.3 mLs (650 mg) by Per Feeding Tube route every 4 hours as needed for mild pain    Generalized pain       amLODIPine 10 MG tablet    NORVASC    30 tablet    1 tablet (10 mg) by Oral or Feeding Tube route daily    Essential hypertension       amoxicillin 875 MG tablet    AMOXIL     1 tablet (875 mg) by Per Feeding Tube route every 12 hours for 8 days    Urinary tract infection associated with indwelling urethral catheter, initial encounter (H)       fiber modular packet      1 packet by Per Feeding Tube route 2 times daily    On tube feeding diet       hypromellose-dextran Soln ophthalmic solution      Apply 2-3 drops to eye every hour as needed for dry eyes    Dry eyes       levothyroxine 50 MCG tablet    SYNTHROID/LEVOTHROID    30 tablet    1 tablet (50 mcg) by Oral or Feeding Tube route every morning (before breakfast)    Hypothyroidism, unspecified type       metoprolol 10 mg/mL Susp    LOPRESSOR     2.5 mLs (25 mg) by Oral or Feeding Tube route 2 times daily    Essential hypertension       nystatin [id number] UNIT/ML suspension    MYCOSTATIN    280 mL    Swish and swallow 5 mLs (500,000 Units) in mouth 4 times daily    Oral thrush       pantoprazole Susp suspension    PROTONIX     20 mLs (40 mg) by Per Feeding Tube route daily    Gastroesophageal reflux disease without esophagitis       ramipril 10 MG capsule    ALTACE    30 capsule    1 capsule  (10 mg) by Oral or Feeding Tube route daily    Essential hypertension       SODIUM BICARBONATE PO      Take 325 mg by mouth as needed For clogged Gtube    G tube feedings (H)       VITAMIN D (CHOLECALCIFEROL) PO      Take 50,000 Units by mouth once a week Friday        ZENPEP PO      Take by mouth as needed For clogged gtube    G tube feedings (H)

## 2017-09-29 NOTE — PROGRESS NOTES
[facility]  PRIMARY CARE PROVIDER AND CLINIC:  Osmany Erlinwalter Formerly [facility], [facility] [address] [facility]   Chief Complaint   Patient presents with     Hospital F/U       HPI:    Sury Barnett is a 89 year old  (8/27/1925),admitted to the [facility] T[facility]  from [facility].  Hospital stay 09/6/17 through 09/27/17.  Admitted to this facility for  rehab, medical management and nursing care.  HPI information obtained from: facility chart records.      Hospital Course:  Sury Barnett was admitted on 9/6/2017.  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 Barnett was brought to the ED after having a fall and was found on head CT to have a large intraparenchymal hemorrhage. She was seen by neurosurgery and was deemed nonsurgical. Repeat head CT's over time with unchanged hemorrhage. Waxing and waning clinical status during her hospitalization, was seen by physical therapy 9/18 but deemed poor candidate for therapies at the time as pt not alert enough. She had a feeding tube placed (G-tube) 9/11 and tolerated tube feeds well. At the time of discharge she was able to nod/ shake her head appropriately answering questions.   --Multiple providers have had goals of care discussions with patient's daughters and son this stay several times. Family wishes for restorative cares, does not want to talk about palliative care or hospice at this time. Family's main concern was lack of nutrition during initial days of stay. Daughter Crystal does not wish to engage in discussions regarding patient's current status whether it is due to perceived lack of nutrition (which they feel is the  cause) vs underlying hemorrhage. Crystal hopes that patient will recover and would like see her being independent again. Family requested second opinion and transfer to [facility] 9/20, discussed with   Sharif hospitalist at [facility] and reviewed her course so far, recommended continuing current care and no reason to transfer to other facility. Requested again on 9/21 but no indication for transfer  --On most recent discussions, family adamant about pt being full code despite her underlying pathology  Episode of acute hypoxia, resolved  With episode of hypoxia on 9/27, thought to be 2/2 mucous plugging and resolved fairly quickly.   Fever/ Leukocytosis  Mrs. Barnett has had variable leukocytosis during her hospitalization, at one point peaking at ~26k. No evidence of infection was found (CXR was clear, blood cultures 9/17 showed no growth, procalcitonins and lactate were negative. C diff was checked and was negative. She also had a CT abdomen/ pelvis with contrast was done which showed pneumatosis of her colon as well as air in her peritoneal cavity. Surgery was consulted and felt as if it was secondary her G- tube placement. At the time of her discharge her white blood cell count had improved to 12.1. She should have a repeat CBC and BMPdone within a week.   Hypertension, benign  Stable on ramipril, metoprolol and amlodipine. Blood pressures were excellent control at the time of discharge.   Hypothyroidism  Continue on levothyroxine.  Hypernatremia  This corrected with free water in her feeding tube.     Current issues are:      Intracranial hemorrhage on right side following injury (H)  Hemiplegia affecting left nondominant side, unspecified etiology, unspecified hemiplegia type (H)  G tube feedings (H)  Physical deconditioning  Patient on continuous tube feedings - last night tube occluded, required trip to ED to clear - done successfully, came back to TCU late last night. She is in bed, responds to questions with shaking head yes/no and able to follow directions. She is very weak. Apparently plan is for spine/brain clinic f/u with 5-6 weeks with repeat CT at that time. She will have therapies at TCU and will f/u with  progress next week. All meds possible will be changed to liquid per pharmacy to avoid further tube issues.     Acute and chronic respiratory failure with hypoxia (H)  Resolved, no respiratory issues since admission to TCU.     Leukocytosis, unspecified type  Urinary tract infection associated with indwelling urethral catheter, subsequent encounter  Fever, unspecified  Had fever, leukocytosis and on antibiotics due to UTI. She does have Ramsay catheter - will leave in place at this time, f/u as/if mobility improves. No fevers since admission reported. Will f/u with CBC next week.   Lab Results   Component Value Date    WBC 12.1 09/27/2017    WBC 15.0 09/26/2017       Benign essential hypertension  Hypernatremia  Chronic issue. Recent BP: 107-118/66-70 and managed with Altace and Norvasc. Also on Lopressor. Renal function stabilized on last few checks.   Results for RICARDO, VICKI GILMORE (MRN [id number]) as of 9/29/2017 11:35   Ref. Range 9/24/2017 07:44 9/25/2017 08:10 9/25/2017 08:58 9/26/2017 08:04   Sodium Latest Ref Range: 133 - 144 mmol/L 132 (L) 135  136   Potassium Latest Ref Range: 3.4 - 5.3 mmol/L 4.1 4.0  3.6   Chloride Latest Ref Range: 94 - 109 mmol/L 98 99  100   Carbon Dioxide Latest Ref Range: 20 - 32 mmol/L 27 28  29   Urea Nitrogen Latest Ref Range: 7 - 30 mg/dL 20 16  17   Creatinine Latest Ref Range: 0.52 - 1.04 mg/dL 0.46 (L) 0.54  0.44 (L)   GFR Estimate Latest Ref Range: >60 mL/min/1.7m2 >90 >90  >90   GFR Estimate If Black Latest Ref Range: >60 mL/min/1.7m2 >90 >90  >90   Calcium Latest Ref Range: 8.5 - 10.1 mg/dL 8.2 (L) 8.6  8.5       Hypothyroidism  Chronic, stable. On Synthroid. Last TSH 0.94 on 5/11/17    Decubitus ulcer of sacral region, stage 2  Noted to have stage 2 ulcer over sacral area - very shallow loss of skin, no drainage on foam dressing. Encourage turn/reposition every 2 hrs while in bed, continue foam dressing and use air mattress. Let provider know if skin area worsens/does not  improve.     CODE STATUS/ADVANCE DIRECTIVES DISCUSSION:   CPR/Full code   Patient's living condition: lives with family, children     ALLERGIES:[place] flavor; Sulfa drugs; and Amoxicillin  PAST MEDICAL HISTORY:  has a past medical history of Hypertension and Thyroid disease. She also has no past medical history of Arthritis.  PAST SURGICAL HISTORY:  has a past surgical history that includes Head and neck surgery; Colonoscopy (7/15/2013); and Tonsillectomy.  FAMILY HISTORY: family history includes Cancer - colorectal in her father; Colon Cancer in her father; Hyperlipidemia in her mother and sister; Hypertension in her father, mother, and sister.  SOCIAL HISTORY:  reports that she has never smoked. She does not have any smokeless tobacco history on file. She reports that she does not drink alcohol or use illicit drugs.    Post Discharge Medication Reconciliation Status: discharge medications reconciled, continue medications without change.  Current Outpatient Prescriptions   Medication Sig Dispense Refill     Pancrelipase, Lip-Prot-Amyl, (ZENPEP PO) Take by mouth as needed For clogged gtube       SODIUM BICARBONATE PO Take 325 mg by mouth as needed For clogged Gtube       nystatin (MYCOSTATIN) 843872 UNIT/ML suspension Swish and swallow 5 mLs (500,000 Units) in mouth 4 times daily 280 mL      amoxicillin (AMOXIL) 875 MG tablet 1 tablet (875 mg) by Per Feeding Tube route every 12 hours for 8 days  0     acetaminophen (TYLENOL) 32 mg/mL solution 20.3 mLs (650 mg) by Per Feeding Tube route every 4 hours as needed for mild pain 300 mL      ramipril (ALTACE) 10 MG capsule 1 capsule (10 mg) by Oral or Feeding Tube route daily 30 capsule      metoprolol (LOPRESSOR) 10 mg/mL SUSP 2.5 mLs (25 mg) by Oral or Feeding Tube route 2 times daily       amLODIPine (NORVASC) 10 MG tablet 1 tablet (10 mg) by Oral or Feeding Tube route daily 30 tablet      fiber modular (NUTRISOURCE FIBER) packet 1 packet by Per Feeding Tube route 2  times daily       hypromellose-dextran (ARTIFICAL TEARS) SOLN ophthalmic solution Apply 2-3 drops to eye every hour as needed for dry eyes       levothyroxine (SYNTHROID/LEVOTHROID) 50 MCG tablet 1 tablet (50 mcg) by Oral or Feeding Tube route every morning (before breakfast) 30 tablet      pantoprazole (PROTONIX) SUSP suspension 20 mLs (40 mg) by Per Feeding Tube route daily       VITAMIN D, CHOLECALCIFEROL, PO Take 50,000 Units by mouth once a week Friday         ROS:  Unobtainable secondary to cognitive impairment or aphasia.    Exam:  /68  Pulse 78  Temp 98.1  F (36.7  C)  Resp 19  SpO2 95%  GENERAL APPEARANCE:  Alert, in no distress, pleasant, cooperative, orientation not able to assess due to aphasia  EYES:  EOM, lids, pupils and irises normal, sclera clear and conjunctiva normal, no discharge or mattering on lids or lashes noted  ENT:  Mouth normal, moist mucous membranes, nose normal without drainage or crusting, external ears without lesions, hearing acuity appears intact  NECK:  Nontender, supple, symmetrical; no adenopathy, masses or thyromegaly, trachea midline  RESP:  respiratory effort and palpation of chest normal, no chest wall tenderness, no respiratory distress, Lung sounds clear, patient is on room air  CV:  Palpation and auscultation of heart done, rate and rhythm controlled and regular today, no murmur, no rub or gallop. Edema none bilateral lower extremities. VASCULAR: no open areas on extremities and positive pedal pulses bilaterally.  ABDOMEN:  normal bowel sounds, soft, nontender, no grimacing or guarding with palpation, no hepatosplenomegaly or other masses. Feeding tube in place and patent, infusing formula.   M/S:   Gait and station bedbound and wheelchair bound, Digits and nails normal, no tenderness or swelling of the joints noted with exam. Left hemiplegia. Right leg weakness > right arm weakness.   SKIN:  Inspection and palpation of skin and subcutaneous tissue: skin  extremities  warm, dry and intact without rashes. Coccyx stage 2 ulcer covered with foam dressing and no drainage noted today.   NEURO: no facial asymmetry, aphasia but appears to be able to follow directions, generalized weakness right extremities and hemiplegia left extremities.   PSYCH:  insight and judgement unable to assess, memory unable to assess, affect and mood flat    Lab/Diagnostic data:  CBC RESULTS:   Recent Labs   Lab Test  09/27/17   0818  09/26/17   0804   WBC  12.1*  15.0*   RBC  3.56*  3.44*   HGB  11.0*  10.8*   HCT  32.8*  31.6*   MCV  92  92   MCH  30.9  31.4   MCHC  33.5  34.2   RDW  13.5  13.5   PLT  415  393       Last Basic Metabolic Panel:  Recent Labs   Lab Test  09/26/17   0804  09/25/17   0810   NA  136  135   POTASSIUM  3.6  4.0   CHLORIDE  100  99   ANTHONY  8.5  8.6   CO2  29  28   BUN  17  16   CR  0.44*  0.54   GLC  135*  136*       ASSESSMENT/PLAN:  Intracranial hemorrhage on right side following injury (H)  Hemiplegia affecting left nondominant side, unspecified etiology, unspecified hemiplegia type (H)  G tube feedings (H)  Physical deconditioning  Ongoing issues. Per hospital notes family feel patient strongly they want active rehab to see if patient makes gains now that she is tube fed. F/U with neuro as ordered 4-6 weeks.     Acute and chronic respiratory failure with hypoxia (H)  Resolved. Monitor.     Leukocytosis, unspecified type  ?unclear etiology. Now treated for UTI. F/U with CBC next week.     Urinary tract infection associated with indwelling urethral catheter, subsequent encounter  New onset, completing antibiotics. Ramsay in place. Monitor.     Fever, unspecified  Resolved. Monitor.     Benign essential hypertension  Chronic, meds as above and VS per routine.     Hypernatremia  Resolved. BMP check next week.     Hypothyroidism  Chronic. Synthroid as PTA.     Decubitus ulcer of sacral region, stage 2  Noted to be present at admission from hospital. Foam dressing, f/u next  week or sooner PRN.      Orders:  1. BMP, CBC on 10/14/17 diagnosis leukocytosis, electrolyte imbalance  2. Sacral ulcer stage 2: cleanse, cover with foam dressing and change every 3 days and PRN. Turn and reposition every 2 hrs while in bed. Pressure reducing WC cushion and air mattress to be ordered/used.     Total time spent with patient visit at the skilled nursing facility was 45 min including patient visit, review of past records and review of POC with staff. Greater than 50% of total time spent with counseling and coordinating care due to review of history, status and POC due to above issues    Electronically signed by:  JOEL Conklin CNP

## 2017-10-04 ENCOUNTER — TRANSFERRED RECORDS (OUTPATIENT)
Dept: HEALTH INFORMATION MANAGEMENT | Facility: CLINIC | Age: 82
End: 2017-10-04

## 2017-10-04 ENCOUNTER — NURSING HOME VISIT (OUTPATIENT)
Dept: GERIATRICS | Facility: CLINIC | Age: 82
End: 2017-10-04
Payer: MEDICARE

## 2017-10-04 VITALS
TEMPERATURE: 97.6 F | DIASTOLIC BLOOD PRESSURE: 75 MMHG | HEART RATE: 69 BPM | RESPIRATION RATE: 18 BRPM | BODY MASS INDEX: 17.17 KG/M2 | WEIGHT: 85 LBS | SYSTOLIC BLOOD PRESSURE: 107 MMHG | OXYGEN SATURATION: 95 %

## 2017-10-04 DIAGNOSIS — E87.1 HYPONATREMIA: ICD-10-CM

## 2017-10-04 DIAGNOSIS — R53.81 PHYSICAL DECONDITIONING: ICD-10-CM

## 2017-10-04 DIAGNOSIS — T83.511D URINARY TRACT INFECTION ASSOCIATED WITH INDWELLING URETHRAL CATHETER, SUBSEQUENT ENCOUNTER: ICD-10-CM

## 2017-10-04 DIAGNOSIS — Z93.1 G TUBE FEEDINGS (H): ICD-10-CM

## 2017-10-04 DIAGNOSIS — N39.0 URINARY TRACT INFECTION ASSOCIATED WITH INDWELLING URETHRAL CATHETER, SUBSEQUENT ENCOUNTER: ICD-10-CM

## 2017-10-04 DIAGNOSIS — G81.94 HEMIPLEGIA AFFECTING LEFT NONDOMINANT SIDE, UNSPECIFIED ETIOLOGY, UNSPECIFIED HEMIPLEGIA TYPE (H): ICD-10-CM

## 2017-10-04 DIAGNOSIS — I10 BENIGN ESSENTIAL HYPERTENSION: ICD-10-CM

## 2017-10-04 DIAGNOSIS — D72.829 LEUKOCYTOSIS, UNSPECIFIED TYPE: ICD-10-CM

## 2017-10-04 DIAGNOSIS — L89.152 DECUBITUS ULCER OF SACRAL REGION, STAGE 2 (H): ICD-10-CM

## 2017-10-04 LAB
BUN SERPL-MCNC: 20 MG/DL (ref 9–26)
CALCIUM SERPL-MCNC: 9.7 MG/DL (ref 8.4–10.2)
CHLORIDE SERPLBLD-SCNC: 96 MMOL/L (ref 98–109)
CO2 SERPL-SCNC: 28 MMOL/L (ref 22–31)
CREAT SERPL-MCNC: 0.38 MG/DL (ref 0.55–1.02)
DIFFERENTIAL: ABNORMAL
ERYTHROCYTE [DISTWIDTH] IN BLOOD BY AUTOMATED COUNT: 14 % (ref 11–15)
GFR SERPL CREATININE-BSD FRML MDRD: >60 ML/MIN/1.73M2
GLUCOSE SERPL-MCNC: 89 MG/DL (ref 70–100)
HCT VFR BLD AUTO: 36.5 % (ref 35–46)
HEMOGLOBIN: 11.8 G/DL (ref 11.8–15.5)
MCV RBC AUTO: 94.3 FL (ref 80–100)
PLATELET # BLD AUTO: 444 K/CMM (ref 140–450)
POTASSIUM SERPL-SCNC: 4.9 MMOL/L (ref 3.5–5.2)
RBC # BLD AUTO: 3.87 M/CMM (ref 3.7–5.2)
SODIUM SERPL-SCNC: 130 MMOL/L (ref 136–145)
WBC # BLD AUTO: 7.5 K/CMM (ref 3.8–11)

## 2017-10-04 PROCEDURE — 99309 SBSQ NF CARE MODERATE MDM 30: CPT | Performed by: NURSE PRACTITIONER

## 2017-10-04 NOTE — PROGRESS NOTES
[facility]    Chief Complaint   Patient presents with     RECHECK       HPI:    Sury Barnett is a 89 year old  (8/27/1925), who is being seen today for an episodic care visit at [facility] .  HPI information obtained from: facility chart records, facility staff and [facility] chart review.Today's concern is:nonverbal per staff and often gives a thumbs up or down to communicate     Intracranial hemorrhage on right side following injury, with loc of unspecified duration, subsequent encounter  Hemiplegia affecting left nondominant side, unspecified etiology, unspecified hemiplegia type (H)  G tube feedings (H)  Hyponatremia  Physical deconditioning  Urinary tract infection associated with indwelling urethral catheter, subsequent encounter  Leukocytosis, unspecified type  Benign essential hypertension  Decubitus ulcer of sacral region, stage 2     HPI: denies pain with thumbs down. No CP or nausea with thumbs down. Did not respond to all questions at all but has eye contact most of time.    ALLERGIES: [place] flavor; Sulfa drugs; and Amoxicillin  Past Medical, Surgical, Family and Social History reviewed and updated in [facility].    Current Outpatient Prescriptions   Medication Sig Dispense Refill     LISINOPRIL PO Take 20 mg by mouth daily       Pancrelipase, Lip-Prot-Amyl, (ZENPEP PO) Take by mouth as needed For clogged gtube       SODIUM BICARBONATE PO Take 325 mg by mouth as needed For clogged Gtube       nystatin (MYCOSTATIN) [id number] UNIT/ML suspension Swish and swallow 5 mLs (500,000 Units) in mouth 4 times daily 280 mL      amoxicillin (AMOXIL) 875 MG tablet 1 tablet (875 mg) by Per Feeding Tube route every 12 hours for 8 days  0     acetaminophen (TYLENOL) 32 mg/mL solution 20.3 mLs (650 mg) by Per Feeding Tube route every 4 hours as needed for mild pain 300 mL      ramipril (ALTACE) 10 MG capsule 1 capsule (10 mg) by Oral or Feeding Tube route daily 30 capsule      metoprolol (LOPRESSOR)  10 mg/mL SUSP 2.5 mLs (25 mg) by Oral or Feeding Tube route 2 times daily       amLODIPine (NORVASC) 10 MG tablet 1 tablet (10 mg) by Oral or Feeding Tube route daily 30 tablet      fiber modular (NUTRISOURCE FIBER) packet 1 packet by Per Feeding Tube route 2 times daily       hypromellose-dextran (ARTIFICAL TEARS) SOLN ophthalmic solution Apply 2-3 drops to eye every hour as needed for dry eyes       levothyroxine (SYNTHROID/LEVOTHROID) 50 MCG tablet 1 tablet (50 mcg) by Oral or Feeding Tube route every morning (before breakfast) 30 tablet      pantoprazole (PROTONIX) SUSP suspension 20 mLs (40 mg) by Per Feeding Tube route daily       VITAMIN D, CHOLECALCIFEROL, PO Take 50,000 Units by mouth once a week Friday       Medications reviewed:  Medications reconciled to facility chart and changes were made to reflect current medications as identified as above med list. Below are the changes that were made:   Medications stopped since last EPIC medication reconciliation:   There are no discontinued medications.    Medications started since last [facility] medication reconciliation:  No orders of the defined types were placed in this encounter.      REVIEW OF SYSTEMS:  Unobtainable secondary  aphasia.    Physical Exam:  /75  Pulse 69  Temp 97.6  F (36.4  C)  Resp 18  Wt 85 lb (38.6 kg)  SpO2 95%  BMI 17.17 kg/m2     GENERAL APPEARANCE:  Alert, in no distress, + eye contact, her orientation not able to assess due to aphasia  EYES:   lids, pupils and irises normal, sclera clear and conjunctiva normal, no discharge noted  ENT:  Mouth normal, moist mucous membranes-hearing acuity appears intact  RESP:  respiratory effort  of chest normal,  no respiratory distress, lung sounds clear but decreased, patient is on room air  CV:  Auscultation of heart done, RRR, ?soft murmur.  No rub or gallop.  No edema none bilateral lower extremities, +dps bilaterally.  ABDOMEN:  normal bowel sounds, soft, nontender, no grimacing or  guarding with palpation.Feeding tube in place/ patent, infusing formula and site WNL   M/S:   Wheelchair bound, Digits and nails normal.  Seen lying in bed  SKIN:  Inspection  of skin and subcutaneous tissue: skin extremities  warm, dry and intact without rashes. I did not see the Coccyx stage 2 ulcer today: no acute changes per staff.   NEURO: no facial asymmetry, aphasia but does follow some directions with thumbs up or down, generalized weakness right extremities and hemiplegia left extremities.   PSYCH:  insight and judgement unable to assess, memory unable to assess, affect and mood flat     Recent Labs:  CBC RESULTS:   Recent Labs   Lab Test  09/27/17   0818  09/26/17   0804   WBC  12.1*  15.0*   RBC  3.56*  3.44*   HGB  11.0*  10.8*   HCT  32.8*  31.6*   MCV  92  92   MCH  30.9  31.4   MCHC  33.5  34.2   RDW  13.5  13.5   PLT  415  393       Last Basic Metabolic Panel:  Recent Labs   Lab Test  09/26/17   0804  09/25/17   0810   NA  136  135   POTASSIUM  3.6  4.0   CHLORIDE  100  99   ANTHONY  8.5  8.6   CO2  29  28   BUN  17  16   CR  0.44*  0.54   GLC  135*  136*     ASSESSMENT / PLAN:  (S06.309D) Intracranial hemorrhage on right side following injury, with loc of unspecified duration, subsequent encounter  (primary encounter diagnosis)   (G81.94) Hemiplegia affecting left nondominant side, unspecified etiology, unspecified hemiplegia type (H)  (R53.81) Physical deconditioning  Comment/Plan: does not seem to be progressing much per therapies.  Cont same for now, no LCD     (Z93.1) G tube feedings (H)   (E87.1) Hyponatremia  Comment/Plan: Na now down, was up--will ask RD to eval and adjust feeding or whatever flushes as needed    (T83.511D,  N39.0) Urinary tract infection associated with indwelling urethral catheter, subsequent encounter   (D72.829) Leukocytosis, unspecified type  Comment/Plan: completing course of ABx,--no new symptoms, WBC normal today    (I10) Benign essential hypertension  Comment/Plan:  running 100-120's SBP, cont same POC, ramipril changed to lisinopril per Pharmacy substitution. May need adjustment downward.    (L89.152) Decubitus ulcer of sacral region, stage 2  Comment/Plan: cont cares, will review next visit.     Electronically signed by  JOEL Pereyra CNP

## 2017-10-09 ENCOUNTER — TRANSFERRED RECORDS (OUTPATIENT)
Dept: HEALTH INFORMATION MANAGEMENT | Facility: CLINIC | Age: 82
End: 2017-10-09

## 2017-10-09 LAB
BUN SERPL-MCNC: 30 MG/DL (ref 9–26)
CALCIUM SERPL-MCNC: 9.8 MG/DL (ref 8.4–10.2)
CHLORIDE SERPLBLD-SCNC: 104 MMOL/L (ref 98–109)
CO2 SERPL-SCNC: 28 MMOL/L (ref 22–31)
CREAT SERPL-MCNC: 0.46 MG/DL (ref 0.55–1.02)
GFR SERPL CREATININE-BSD FRML MDRD: >60 ML/MIN/1.73M2
GLUCOSE SERPL-MCNC: 143 MG/DL (ref 70–100)
POTASSIUM SERPL-SCNC: 4.3 MMOL/L (ref 3.5–5.2)
SODIUM SERPL-SCNC: 140 MMOL/L (ref 136–145)

## 2017-10-11 ENCOUNTER — NURSING HOME VISIT (OUTPATIENT)
Dept: GERIATRICS | Facility: CLINIC | Age: 82
End: 2017-10-11
Payer: MEDICARE

## 2017-10-11 VITALS
DIASTOLIC BLOOD PRESSURE: 84 MMHG | BODY MASS INDEX: 18.74 KG/M2 | OXYGEN SATURATION: 97 % | WEIGHT: 92.8 LBS | RESPIRATION RATE: 14 BRPM | HEART RATE: 73 BPM | TEMPERATURE: 97.9 F | SYSTOLIC BLOOD PRESSURE: 132 MMHG

## 2017-10-11 DIAGNOSIS — I61.0 NONTRAUMATIC SUBCORTICAL HEMORRHAGE OF RIGHT CEREBRAL HEMISPHERE (H): Primary | ICD-10-CM

## 2017-10-11 DIAGNOSIS — E03.9 ACQUIRED HYPOTHYROIDISM: ICD-10-CM

## 2017-10-11 DIAGNOSIS — Z97.8 FOLEY CATHETER IN PLACE: ICD-10-CM

## 2017-10-11 DIAGNOSIS — G81.94 HEMIPLEGIA AFFECTING LEFT NONDOMINANT SIDE, UNSPECIFIED ETIOLOGY, UNSPECIFIED HEMIPLEGIA TYPE (H): ICD-10-CM

## 2017-10-11 DIAGNOSIS — I10 BENIGN ESSENTIAL HYPERTENSION: ICD-10-CM

## 2017-10-11 DIAGNOSIS — D72.829 LEUKOCYTOSIS, UNSPECIFIED TYPE: ICD-10-CM

## 2017-10-11 PROCEDURE — 99306 1ST NF CARE HIGH MDM 50: CPT | Performed by: INTERNAL MEDICINE

## 2017-10-11 PROCEDURE — 99207 ZZC CDG-CORRECTLY CODED, REVIEWED AND AGREE: CPT | Performed by: INTERNAL MEDICINE

## 2017-10-12 NOTE — PROGRESS NOTES
Sury Barnett is a 89 year old female seen October 11, 2017 at [facility] where she was admitted 9/27/17 after [facility] hospitalization 9/6-9/27 for a fall.   Patient was self-transferring out of a WC in a parking lot when she fell backwards and hit her head.    She developed left sided weakness and neglect, and imaging showed a large intracranial hemorrhage.   By hospital evaluation it was felt the ICH preceded the fall and due to cerebral amyloid angiopathy.     Patient had waxing and waning clinical course over prolonged hospital stay  She had Gtube placed for nutrition.   She had variable leukocytosis, and extensive workup without evidence of infection anywhere    Today patient is seen on the unit with nursing staff present.   She is up to high-backed WC, a little sleepy.   She is aphasic since her event; nursing staff reports she can communicate with thumbs up/down or head nodding to respond to questions, but she does not do that today.   Occ much more alert and interactive, other times very sleepy.    Daughter is concerned that her mother is not as good as she was when she left the hospital, although by chart review there seemed to be considerable variability in MS there as well.   She has been working with therapies.     PMH:  HTN  Hypothyroidism  Right wrist fracture 2014  Cognitive impairment  Amyloid angiopathy by MRI 7/2017   ICH 9/2017        Past Surgical History:   Procedure Laterality Date     COLONOSCOPY  7/15/2013    Procedure: COLONOSCOPY;  Colonoscopy ;  Surgeon: Maida Rehman MD;  Location:  GI     HEAD & NECK SURGERY      tonsil      TONSILLECTOMY         Family History   Problem Relation Age of Onset     Cancer - colorectal Father      Hypertension Father      Colon Cancer Father      Hypertension Mother      Hyperlipidemia Mother      Hypertension Sister      Hyperlipidemia Sister      Social History   Substance Use Topics     Smoking status: Never Smoker     Smokeless tobacco:  Not on file     Alcohol use No      SH:  , she has 5 children  Lives with her daughter in [place].    Review Of Systems  Skin: negative   Eyes: impaired vision  Ears/Nose/Throat: hearing loss  Respiratory: No shortness of breath, dyspnea on exertion, cough, or hemoptysis  Cardiovascular: negative  Gastrointestinal: dysphagia  Genitourinary: indwelling Ramsay catheter.     Musculoskeletal: globally weak, non-ambulatory  Neurologic: as above  Psychiatric: negative  Hematologic/Lymphatic/Immunologic: leukocytosis, probably stress response  Endocrine: negative      GENERAL APPEARANCE: opens eyes, little other response  /84  Pulse 73  Temp 97.9  F (36.6  C)  Resp 14  Wt 92 lb 12.8 oz (42.1 kg)  SpO2 97%  BMI 18.74 kg/m2   HEENT: normocephalic, no lesion or abnormalities  NECK: no adenopathy, no asymmetry, masses, or scars and thyroid normal to palpation  RESP: lungs clear to auscultation - no rales, rhonchi or wheezes  CV: regular rate and rhythm, normal S1 S2  ABDOMEN:  soft, nontender, no HSM or masses and bowel sounds normal; Gtube site okay, no drainage or erythema  MS: extremities normal- no gross deformities noted, no ext edema    SKIN: no suspicious lesions or rashes  NEURO: aphasia, left hemiparesis.     PSYCH: affect flat  LYMPHATICS: No cervical,  or supraclavicular nodes     Lab Results   Component Value Date    WBC 7.5 10/04/2017     Lab Results   Component Value Date    HGB 11.8 10/04/2017     Lab Results   Component Value Date    MCV 94.3 10/04/2017     Lab Results   Component Value Date     10/04/2017      Last Basic Metabolic Panel:  Lab Results   Component Value Date     10/09/2017      Lab Results   Component Value Date    POTASSIUM 4.3 10/09/2017     Lab Results   Component Value Date    CHLORIDE 104 10/09/2017     Lab Results   Component Value Date    ANTHONY 9.8 10/09/2017     Lab Results   Component Value Date    CO2 28 10/09/2017     Lab Results   Component Value Date     BUN 30 10/09/2017     Lab Results   Component Value Date    CR 0.46 10/09/2017   GFR >60  Lab Results   Component Value Date     10/09/2017         IMPRESSION:   Head CT 9/23/17:  1. No significant change of the intraparenchymal hemorrhage centered  in the right frontal lobe. Unchanged small amount of subarachnoid  hemorrhage in the right cerebral hemisphere. No evidence of new  intracranial hemorrhage.  2. Diffuse parenchymal volume loss and extensive periventricular white  matter hypodensities likely due to chronic microvascular ischemic  disease. No significant change since prior.       IMP/PLAN:  (I61.0) Nontraumatic subcortical hemorrhage of right cerebral hemisphere (H)  (primary encounter diagnosis)  (G81.94) Hemiplegia affecting left nondominant side, unspecified etiology, unspecified hemiplegia type (H)  (I63.9,  R47.01) Aphasia complicating stroke (H)  Comment: and dysphagia requiring TF with water flushes   Plan: PHYSICAL THERAPY / OCCUPATIONAL THERAPY / ST for stroke Rehab, strengthening, ADLs, swallow.      I talked with daughter Crystal by phone.  She requests full intervention, single goal of her mother returning to baseline functional status.     Nursing staff requesting drying agent for secretions which patient is unable to manage.  Daughter does not wish any medication that may affect MS, so will not order.      (D72.829) Leukocytosis, unspecified type  Comment: extensive workup for infection negative in hospital  Plan: follow for fever or s/s of illness, recheck WBC next week.       (Z92.89) Ramsay catheter in place  Comment: reviewed risks/benefits at length with daughter  Plan: voiding trial this week, with bladder scan q 8 hours and I/O cath if residual >400 ccs.       (I10) Benign essential hypertension  Comment: BPs a little lower since admission  Plan: reviewed with NP, will decrease amlodipine to 5 mg/day and follow bps.      (E03.9) Acquired hypothyroidism  Comment: on  replacement  Plan: check TSH     Padmini Oliveira MD

## 2017-10-15 PROBLEM — I10 BENIGN ESSENTIAL HYPERTENSION: Status: ACTIVE | Noted: 2017-10-15

## 2018-12-04 DIAGNOSIS — N39.0 URINARY TRACT INFECTION: Primary | ICD-10-CM

## 2019-10-11 ENCOUNTER — HOSPITAL ENCOUNTER (EMERGENCY)
Facility: CLINIC | Age: 84
Discharge: HOME | End: 2019-10-11
Attending: EMERGENCY MEDICINE | Admitting: EMERGENCY MEDICINE
Payer: MEDICARE

## 2019-10-11 VITALS
DIASTOLIC BLOOD PRESSURE: 78 MMHG | RESPIRATION RATE: 20 BRPM | OXYGEN SATURATION: 98 % | HEART RATE: 100 BPM | SYSTOLIC BLOOD PRESSURE: 171 MMHG | TEMPERATURE: 98.3 F

## 2019-10-11 DIAGNOSIS — N30.00 ACUTE CYSTITIS WITHOUT HEMATURIA: ICD-10-CM

## 2019-10-11 LAB
ALBUMIN UR-MCNC: NEGATIVE MG/DL
ANION GAP SERPL CALCULATED.3IONS-SCNC: 4 MMOL/L (ref 3–14)
APPEARANCE UR: CLEAR
BACTERIA #/AREA URNS HPF: ABNORMAL /HPF
BASOPHILS # BLD AUTO: 0.1 10E9/L (ref 0–0.2)
BASOPHILS NFR BLD AUTO: 0.7 %
BILIRUB UR QL STRIP: NEGATIVE
BUN SERPL-MCNC: 11 MG/DL (ref 7–30)
CALCIUM SERPL-MCNC: 9.7 MG/DL (ref 8.5–10.1)
CHLORIDE SERPL-SCNC: 105 MMOL/L (ref 94–109)
CO2 SERPL-SCNC: 29 MMOL/L (ref 20–32)
COLOR UR AUTO: ABNORMAL
CREAT SERPL-MCNC: 0.51 MG/DL (ref 0.52–1.04)
DIFFERENTIAL METHOD BLD: NORMAL
EOSINOPHIL # BLD AUTO: 0.1 10E9/L (ref 0–0.7)
EOSINOPHIL NFR BLD AUTO: 0.9 %
ERYTHROCYTE [DISTWIDTH] IN BLOOD BY AUTOMATED COUNT: 12.8 % (ref 10–15)
GFR SERPL CREATININE-BSD FRML MDRD: 82 ML/MIN/{1.73_M2}
GLUCOSE SERPL-MCNC: 94 MG/DL (ref 70–99)
GLUCOSE UR STRIP-MCNC: NEGATIVE MG/DL
HCT VFR BLD AUTO: 43.4 % (ref 35–47)
HGB BLD-MCNC: 14 G/DL (ref 11.7–15.7)
HGB UR QL STRIP: ABNORMAL
IMM GRANULOCYTES # BLD: 0.1 10E9/L (ref 0–0.4)
IMM GRANULOCYTES NFR BLD: 1.5 %
KETONES UR STRIP-MCNC: NEGATIVE MG/DL
LEUKOCYTE ESTERASE UR QL STRIP: ABNORMAL
LYMPHOCYTES # BLD AUTO: 2.1 10E9/L (ref 0.8–5.3)
LYMPHOCYTES NFR BLD AUTO: 23.1 %
MCH RBC QN AUTO: 31.8 PG (ref 26.5–33)
MCHC RBC AUTO-ENTMCNC: 32.3 G/DL (ref 31.5–36.5)
MCV RBC AUTO: 99 FL (ref 78–100)
MONOCYTES # BLD AUTO: 0.4 10E9/L (ref 0–1.3)
MONOCYTES NFR BLD AUTO: 4.8 %
MUCOUS THREADS #/AREA URNS LPF: PRESENT /LPF
NEUTROPHILS # BLD AUTO: 6.3 10E9/L (ref 1.6–8.3)
NEUTROPHILS NFR BLD AUTO: 69 %
NITRATE UR QL: POSITIVE
NRBC # BLD AUTO: 0 10*3/UL
NRBC BLD AUTO-RTO: 0 /100
PH UR STRIP: 6.5 PH (ref 5–7)
PLATELET # BLD AUTO: 301 10E9/L (ref 150–450)
POTASSIUM SERPL-SCNC: 4.6 MMOL/L (ref 3.4–5.3)
RBC # BLD AUTO: 4.4 10E12/L (ref 3.8–5.2)
RBC #/AREA URNS AUTO: 7 /HPF (ref 0–2)
SODIUM SERPL-SCNC: 138 MMOL/L (ref 133–144)
SOURCE: ABNORMAL
SP GR UR STRIP: 1 (ref 1–1.03)
UROBILINOGEN UR STRIP-MCNC: NORMAL MG/DL (ref 0–2)
WBC # BLD AUTO: 9.1 10E9/L (ref 4–11)
WBC #/AREA URNS AUTO: 50 /HPF (ref 0–5)

## 2019-10-11 PROCEDURE — 99283 EMERGENCY DEPT VISIT LOW MDM: CPT

## 2019-10-11 PROCEDURE — 87088 URINE BACTERIA CULTURE: CPT | Performed by: EMERGENCY MEDICINE

## 2019-10-11 PROCEDURE — 87186 SC STD MICRODIL/AGAR DIL: CPT | Performed by: EMERGENCY MEDICINE

## 2019-10-11 PROCEDURE — 85025 COMPLETE CBC W/AUTO DIFF WBC: CPT | Performed by: EMERGENCY MEDICINE

## 2019-10-11 PROCEDURE — 80048 BASIC METABOLIC PNL TOTAL CA: CPT | Performed by: EMERGENCY MEDICINE

## 2019-10-11 PROCEDURE — 87086 URINE CULTURE/COLONY COUNT: CPT | Performed by: EMERGENCY MEDICINE

## 2019-10-11 PROCEDURE — 81001 URINALYSIS AUTO W/SCOPE: CPT | Performed by: EMERGENCY MEDICINE

## 2019-10-11 RX ORDER — CEPHALEXIN 500 MG/1
500 CAPSULE ORAL 2 TIMES DAILY
Qty: 14 CAPSULE | Refills: 0 | Status: SHIPPED | OUTPATIENT
Start: 2019-10-11 | End: 2019-10-18

## 2019-10-11 ASSESSMENT — ENCOUNTER SYMPTOMS
FEVER: 1
DIARRHEA: 0
VOMITING: 0
DYSURIA: 1
HEMATURIA: 1
CONFUSION: 0

## 2019-10-11 NOTE — ED AVS SNAPSHOT
[facility] Emergency Department  [address]  [facility] [address]  Phone:  [phone]  Fax:  [phone]                                    Sury Barnett   MRN: [id number]    Department:  [facility] Emergency Department   Date of Visit:  10/11/2019           After Visit Summary Signature Page    I have received my discharge instructions, and my questions have been answered. I have discussed any challenges I see with this plan with the nurse or doctor.    ..........................................................................................................................................  Patient/Patient Representative Signature      ..........................................................................................................................................  Patient Representative Print Name and Relationship to Patient    ..................................................               ................................................  Date                                   Time    ..........................................................................................................................................  Reviewed by Signature/Title    ...................................................              ..............................................  Date                                               Time          22EPIC Rev 08/18

## 2019-10-11 NOTE — ED PROVIDER NOTES
History     Chief Complaint:  Dysuria     HPI:   The history is provided by the patient and a relative (daughter).      Sury Barnett is a 89 year old female with history of CVA with residual left sided hemiplegia, hypertension, and hypothyroid who presents with daughter for evaluation of dysuria. The patient's daughter, who is her primary caregiver, states the patient was complaining of burning with urination yesterday evening and had dark colored urine with a fever of 100.5. the daughter also noticed some blood with wiping after urination. The daughter gave her ibuprofen last night which broke the fever. The patient wears briefs and does not frequently get UTI's, but has had UTI's in the past. The patient has not seemed more confused than normal. The patient is not complaining of any pain here in the ED.     Allergies:  Sulfa drugs   Amoxicillin   Alon flavor      Medications:    Lisinopril 10 mg  Keppra 500 mg BID  Levothyroxine 50 mcg    Past Medical History:    Hypothyroid    Hypertension   CVA with hemiplegia affecting left nondominant side  Osteoporosis   Physical deconditioning   Seizure      Past Surgical History:    Tonsillectomy   Radiation treatment (thyroid)  Forearm/wrist surgery x2    Family History:    Colorectal cancer- Father   Hypertension- Father, Mother, Sister   Hyperlipidemia - Mother, Sister     Social History:  The patient is accompanied to the ED by daughter  Marital Status:    PCP: Malina Raymond   Smoking status: Never  Alcohol use: Negative  Drug use: Negative       Review of Systems   Constitutional: Positive for fever.   Gastrointestinal: Negative for diarrhea and vomiting.   Genitourinary: Positive for dysuria and hematuria.   Psychiatric/Behavioral: Negative for confusion.   All other systems reviewed and are negative.    Physical Exam     Patient Vitals for the past 24 hrs:   BP Temp Temp src Pulse Resp SpO2   10/11/19 0941 (!) 171/78 98.3  F (36.8  C) Oral 100 20 98 %         Physical Exam  Nursing note and vitals reviewed.  Constitutional: Cooperative.   HENT:   Mouth/Throat: Moist mucous membranes.   Eyes: EOMI, nonicteric sclera  Cardiovascular: Normal rate, regular rhythm, no murmurs, rubs, or gallops  Pulmonary/Chest: Effort normal and breath sounds normal. No respiratory distress. No wheezes. No rales.   Abdominal: Soft. Nontender, nondistended, no guarding or rigidity. BS present.   Musculoskeletal: Normal range of motion.   Neurological: Alert. Moves all extremities spontaneously.   Skin: Skin is warm and dry. No rash noted.       Emergency Department Course     Laboratory:  CBC: WNL (WBC 9.1, HGB 14.0, )   BMP: Creatinine 0.51, o/w WNL    UA with microscopic: urine blood trace, nitrite positive, leukocyte esterase large, WBC/HPF 50, RBC/HPF 7, bacteria moderate, mucous urine present, o/w WNL  Urine culture aerobic bacterial: Pending.     Emergency Department Course:  Past medical records, nursing notes, and vitals reviewed.  1015: I performed an exam of the patient and obtained history, as documented above.  Blood drawn for laboratory testing. Results are as above.       1215: I rechecked the patient. Explained findings to the patient and daughter.    I rechecked the patient. Findings and plan explained to the Patient and Daughter. Patient discharged home with instructions regarding supportive care, medications, and reasons to return. The importance of close follow-up was reviewed.       Impression & Plan      Medical Decision Making:  Sury Barnett is a 89 year old female has symptoms consistent with a urinary tract infection.  Urinalysis confirms the infection.  While patient had self-reported fever yesterday, there has been none since, nor does she have significant back/flank pain or significant abdominal pain.  There is no clinical evidence of pyelonephritis, appendicitis,  or diverticulitis.  Her white count is normal.  The patient will be started on antibiotics  for the infection.  Daughter reports patient is at her baseline.  She is comfortable treating this as an outpatient.  The patient and daughter are instructed to return if increasing pain, vomiting, fever, or inability to tolerate the oral antibiotic.  Recommended follow-up with primary care early next week.  All questions answered.    Diagnosis:    ICD-10-CM    1. Acute cystitis without hematuria N30.00        Disposition:  discharged to home    Discharge Medications:  New Prescriptions    CEPHALEXIN (KEFLEX) 500 MG CAPSULE    Take 1 capsule (500 mg) by mouth 2 times daily for 7 days     I, Megan Beh, am serving as a scribe at 10:15 AM on 10/11/2019 to document services personally performed by Vin Woodard MD based on my observations and the provider's statements to me.      10/11/2019   [facility] EMERGENCY DEPARTMENT       Vin Woodard MD  10/11/19 [id number]

## 2019-10-11 NOTE — DISCHARGE INSTRUCTIONS
Diagnosis: UTI (Urinary tract infection)  Plan: Keflex twice/day for 7 days.   Return Precautions: Worsening fevers, confusion, vomiting, chest pain, shortness of breath, abnormal sleepiness, or for any other concerns.     Please read the remainder of your discharge instructions for more information.     Discharge Instructions  Urinary Tract Infection  You or your child have been diagnosed with a urinary tract infection, or UTI. The urinary tract includes the kidneys (which make urine/pee), ureters (the tubes that carry urine/pee from the kidneys to the bladder), the bladder (which stores urine/pee), and urethra (the tube that carries urine/pee out of the bladder). Urinary tract infections occur when bacteria travel up the urethra into the bladder (bladder infection) and, in some cases, from there into the kidneys (kidney infection).  Generally, every Emergency Department visit should have a follow-up clinic visit with either a primary or a specialty clinic/provider. Please follow-up as instructed by your emergency provider today.  Return to the Emergency Department if:  You or your child have severe back pain.  You or your child are vomiting (throwing up) so that you cannot take your medicine.  You or your child have a new fever (had not previously had a fever) over 101 F.  You or your child have confusion or are very weak, or feel very ill.  Your child seems much more ill, will not wake up, will not respond right, or is crying for a long time and will not calm down.  You or your child are showing signs of dehydration. These signs may include decreased urination (pee), dry mouth/gums/tongue, or decreased activity.    Follow-up with your provider:   Children under 24 months need to be seen by their regular provider within one week after a diagnosis of a UTI. It may be necessary to do some more tests to look at the child s kidney or bladder.  You should begin to feel better within 24 - 48 hours of starting your  "antibiotic; follow-up with your regular clinic/doctor/provider if this is not the case.    Treatment:   You will be treated with an antibiotic to kill the bacteria. We have to make an educated guess, based on what we know about common bacteria and antibiotics, as to which antibiotic will work for your infection. We will be correct most times but there will be some cases where the antibiotic chosen is not correct (see urine cultures below).  Take a pain medication such as acetaminophen (Tylenol ) or ibuprofen (Advil , Motrin , Nuprin ).      Urine Cultures:  If indicated, a urine culture may have been performed today. This test generally takes 24-48 hours to complete so the results are not known at this time. The results can confirm that an infection is present but also determine which antibiotic is effective for the specific bacteria that is causing the infection. If your urine culture shows that the antibiotic you were given today will not work to treat your infection, we will attempt to contact you to make arrangements to change the antibiotic. If the culture confirms that the antibiotic is effective for your infection, you will not be contacted. We often recommend follow-up with your regular physician/provider on the culture results regardless of this process.    Antibiotic Warning:   If you have been placed on antibiotics - watch for signs of allergic reaction.  These include rash, lip swelling, difficulty breathing, wheezing, and dizziness.  If you develop any of these symptoms, stop the antibiotic immediately and go to an emergency room or urgent care for evaluation.    Probiotics: If you have been given an antibiotic, you may want to also take a probiotic pill or eat yogurt with live cultures. Probiotics have \"good bacteria\" to help your intestines stay healthy. Studies have shown that probiotics help prevent diarrhea and other intestine problems (including C. diff infection) when you take antibiotics. You " can buy these without a prescription in the pharmacy section of the store.   If you were given a prescription for medicine here today, be sure to read all of the information (including the package insert) that comes with your prescription.  This will include important information about the medicine, its side effects, and any warnings that you need to know about.  The pharmacist who fills the prescription can provide more information and answer questions you may have about the medicine.  If you have questions or concerns that the pharmacist cannot address, please call or return to the Emergency Department.   Remember that you can always come back to the Emergency Department if you are not able to see your regular provider in the amount of time listed above, if you get any new symptoms, or if there is anything that worries you.

## 2019-10-11 NOTE — ED TRIAGE NOTES
Arrives with daughter who is her caregiver for concerns of UTI. Daughter states she has had a fever, dark colored urine, and complained of burning with urination last evening. Alert in triage, ABCs intact.  
Yes

## 2019-10-12 LAB
BACTERIA SPEC CULT: ABNORMAL
Lab: ABNORMAL
SPECIMEN SOURCE: ABNORMAL

## 2019-10-14 ENCOUNTER — TELEPHONE (OUTPATIENT)
Dept: EMERGENCY MEDICINE | Facility: CLINIC | Age: 84
End: 2019-10-14

## 2019-10-14 NOTE — TELEPHONE ENCOUNTER
[facility] Emergency Department/Urgent Care Lab result notification [Positive for uti and bacteria is susceptible to antibiotic]:    Reason for call:   Notify of Final urine culture result, confirm patient is taking antibiotic, assess symptoms, and advise per Emergency Dept/Urgent Care discharge instructions and Emergency Dept urine culture protocol.    Lab Result & Date of Final Report [copied from Result Note]:    Final Urine Culture Report on 10/13/19  Emergency Dept/Urgent Care discharge antibiotic prescribed: Cephalexin (Keflex) 500 mg capsule, 1 capsule (500 mg) by mouth 2 times daily for 7 days.  #1. Bacteria, >100,000 colonies/mL Escherichia coli, is SUSCEPTIBLE to Antibiotic.    As per [facility] ED Lab Result protocol, no change in antibiotic therapy.    Current symptoms (include time patient called):    6;11PM: Spoke with patient's daughter. She states that the Patient is doing much better.     Recommendations/Instructions:   Patient's daughter notified of lab result and treatment recommendation.   Take antibiotic as directed by the Emergency Dept/Urgent Care Provider.  Advised to follow up with PCP as directed by the ED provider.  The daughter is comfortable with the plan of care and she will contact the PCP tomorrow to determine next steps.     Please contact you PCP or return to the Emergency Department if your:    Symptoms worsen or other concerning symptoms      Aydee Ochoa RN  [facility] RN  Lung Nodule and ED Lab Result RN  Epic pool (ED late result f/u RN): [id number]  [facility] INCIDENTAL RADIOLOGY F/U NURSES: [id number]  Ph# [phone]

## 2022-09-16 NOTE — PROVIDER NOTIFICATION
"Call placed to Dr. Manzano: \"Please note UA results\"  " Hospital Follow Up Visit      History of Present Illness  Hospital admissions:   9/2/2022 [facility] Systolic HF  9/5/2022 [facility] Systolic HF, hypotension  Mr. Matti Bravo is a 51-year-old male with medical history of nonischemic cardiomyopathy.  LVEF last noted to be 15 to 20%.  Also has history of diabetes, hypertension, obesity, hyperlipidemia, CKD, and venous insufficiency.     AICD placed at [facility].  He was also recently evaluated at [facility] ER on 9/2/2022 for systolic heart failure, worsening shortness of breath, and worsening lower leg edema.  He signed out AMA.     He presented to [facility] ER yesterday with complaints of shortness of breath, chest pain, and weakness.  He was found to be hypotensive with blood pressure 61/48.  EKG shows sinus rhythm with first-degree AV block with premature ventricular complexes.  Chest x-ray showed mild to moderate pulmonary vascular congestion, and left lung mild to moderate atelectasis.     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  Denied alcohol use.    Has nonischemic cardiomyopathy.  12/8/2021 left heart cath showed minimal plaquing, nonobstructive CAD.  Echocardiogram at [facility] this on 8/4/2022 shows LVEF 15 to 20%,  Moderate MVR, mild TVR, and PI     Today, he is being seen for hospital follow. He was discharged on 9/11.  Discharge weight was 316.  He reports that he is feeling better.  Does have shortness of breath but it has improved.  His main complaint today is not being able to wear his CPAP machine because his CPAP mask broke.  He is wanting me to call [facility] in [place] order a new mask.  He reports that he is able to lie flat now and no longer having orthopnea or PND.  He lanes of leg swelling but reports that he is down weight and his goal weight to him is 289.  He has been watching his sodium and trying to decrease his water intake.  Denies recent drug use.    Discharge medications are  below:    eliquis 5 mg twice daily  potssium 10 M EQ daily  Carvedilol 12.5 mg bid  Losartan 12.5 mg daily  bumex 2 MG, twice twice daily  Spironolactone 25 MG daily  entresto 24-26 , cut in in half/ twice a day    Past Medical History:   Diagnosis Date   • Asthma    • Chronic systolic heart failure (HCC)    • Diabetes mellitus (HCC)    • Hyperlipidemia    • Hypertension    • Obesity    • Peripheral vascular disease (HCC)    • Sleep apnea      Past Surgical History:   Procedure Laterality Date   • CARDIAC CATHETERIZATION N/A 12/08/2021   • CARDIAC DEFIBRILLATOR PLACEMENT     • VARICOSE VEIN SURGERY Right 04/05/2019     Social History     Socioeconomic History   • Marital status:    Tobacco Use   • Smoking status: Former Smoker   • Smokeless tobacco: Never Used   Vaping Use   • Vaping Use: Never used   Substance and Sexual Activity   • Alcohol use: No   • Drug use: No   • Sexual activity: Not Currently     Family History   Problem Relation Age of Onset   • Diabetes Mother    • Hypertension Father        ALLERGIES:  No Known Allergies      Review of Systems   Constitutional: Negative for chills, diaphoresis, malaise/fatigue and night sweats.   HENT: Negative for congestion and hoarse voice.    Eyes: Negative for blurred vision and discharge.   Cardiovascular: Positive for dyspnea on exertion and leg swelling. Negative for chest pain, irregular heartbeat, orthopnea, paroxysmal nocturnal dyspnea and syncope.   Respiratory: Positive for shortness of breath. Negative for cough and wheezing.    Endocrine: Negative for polyphagia and polyuria.   Hematologic/Lymphatic: Negative for adenopathy and bleeding problem.   Musculoskeletal: Negative for joint swelling, muscle weakness and myalgias.   Gastrointestinal: Negative for bloating, heartburn and nausea.   Genitourinary: Negative for dysuria, hematuria and hesitancy.   Neurological: Negative for dizziness, headaches, light-headedness, loss of balance and weakness.        Current Outpatient Medications   Medication Sig Dispense Refill   • albuterol sulfate  (90 Base) MCG/ACT inhaler Inhale 2 puffs Every 4 (Four) Hours As Needed for Wheezing. 1 inhaler 3   • apixaban (ELIQUIS) 5 MG tablet tablet Take 5 mg by mouth 2 (Two) Times a Day.     • carvedilol (COREG) 12.5 MG tablet Take 1 tablet by mouth 2 (Two) Times a Day With Meals for 30 days. 60 tablet 0   • losartan (COZAAR) 25 MG tablet Take 0.5 tablets by mouth Daily for 30 days. 15 tablet 0   • lovastatin (ALTOPREV) 20 MG 24 hr tablet Take 20 mg by mouth.     • sacubitril-valsartan (ENTRESTO) 24-26 MG tablet Take 0.5 tablets by mouth.     • bumetanide (BUMEX) 2 MG tablet Take 0.5 tablets by mouth 2 (Two) Times a Day for 30 days. 30 tablet 0   • metOLazone (ZAROXOLYN) 2.5 MG tablet Take 1 tablet by mouth Every Other Day. 15 tablet 3   • potassium chloride 10 MEQ CR tablet Take 2 tablets by mouth Daily. 30 tablet 1     No current facility-administered medications for this visit.       OBJECTIVE:    Physical Exam:   Vitals reviewed.   Constitutional:       Appearance: Well-groomed and not in distress. Obese.   Eyes:      General: Lids are normal.      Conjunctiva/sclera: Conjunctivae normal.      Right eye: Right conjunctiva is not injected.      Left eye: Left conjunctiva is not injected.      Pupils: Pupils are equal, round, and reactive to light.   HENT:      Head: Normocephalic and atraumatic.   Neck:      Thyroid: No thyromegaly.      Vascular: No JVD. JVD normal.      Trachea: Trachea normal.   Pulmonary:      Effort: Pulmonary effort is normal.      Breath sounds: Normal breath sounds and air entry. No decreased air movement. No wheezing. No rhonchi.   Cardiovascular:      PMI at left midclavicular line. Normal rate. Regular rhythm. Normal S1 with normal intensity. Normal S2 with normal intensity.      Murmurs: There is no murmur.      No gallop.   Edema:     Pretibial: edema of the left pretibial area and trace  "edema of the right pretibial area.     Ankle: bilateral trace edema of the ankle.     Feet: bilateral trace edema of the feet.  Abdominal:      General: Abdomen is protuberant. There is no distension.      Palpations: Abdomen is soft. There is no shifting dullness or fluid wave.   Musculoskeletal:      Cervical back: Normal range of motion. Skin:     General: Skin is warm and dry.      Capillary Refill: Capillary refill takes less than 2 seconds.   Neurological:      Mental Status: Alert, oriented to person, place, and time and oriented to person, place and time.   Psychiatric:         Attention and Perception: Attention normal.         Mood and Affect: Mood normal.         Speech: Speech normal.         Thought Content: Thought content normal.       Vitals:    09/16/22 1439 09/16/22 1514   BP: 110/80    BP Location: Left arm    Patient Position: Sitting    Cuff Size: Adult    Pulse: (!) 40 82   SpO2: 97%    Weight: (!) 140 kg (308 lb 6.4 oz)    Height: 193 cm (75.98\")    He was having bigeminal PVCs at the time that the blood pressure and pulse oximeter was recorded heart rate of 40 bpm.  Rechecked by me-right radial artery and heart rate 82.    DATA REVIEWED:   Results for orders placed during the hospital encounter of 02/04/19    Adult Transthoracic Echo Limited W/ Cont if Necessary Per Protocol    Interpretation Summary  · 3D acquisition for left ventricular ejection fraction. Live 3D and full volume to assess left ventricular ejection fraction was utilized.  · Left ventricle systolic function is severely decreased. Estimated LVEF is 10%. Left ventricle cavity severely dilated with restrictive diastolic dysfunction. 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  · Right ventricle is mildly dilated with mildly reduced right ventricular systolic function.  · Moderate mitral valve regurgitation is present.  · Moderate tricuspid valve regurgitation is present. Pulmonary hypertension is present with a PA " systolic pressure of 70 mmHg.  · There is mild pulmonic valve regurgitation present.  · There is a trivial pericardial effusion adjacent to the left ventricle      XR Chest 1 View    Result Date: 9/23/2022  Cardiomegaly. Pulmonary vascular prominence. No change since prior exam. Electronically signed by:  Lev Markham MD  9/23/2022 10:33 AM CDT Workstation: [id number]    XR Chest 1 View    Result Date: 9/5/2022  Mild-to-moderate left lung base atelectasis versus infiltrate new since prior. Mild to moderate pulmonary vascular congestion. Electronically signed by:  Pedro Day MD  9/5/2022 5:16 PM CDT Workstation: [id number]    XR Chest 1 View    Result Date: 9/2/2022  1. Cardiomegaly. Central vascular congestion. 2. Left base opacity consistent with some combination of airspace consolidation and effusion. 3. Similar right basilar opacities consistent with airspace consolidation and possible effusion. Workstation: [id number]    US Renal Bilateral    Result Date: 9/25/2022  No hydronephrosis on either side. Bilateral chronic medical renal disease. Suboptimal assessment of the urinary bladder. Electronically signed by:  Pedro Day MD  9/25/2022 11:26 AM CDT Workstation: [id number]    CXR:     CT:     Labs: BMP, CBC, LIPID, TSH  Lab Results   Component Value Date    GLUCOSE 120 (H) 09/27/2022    CALCIUM 9.4 09/27/2022     09/27/2022    K 3.2 (L) 09/27/2022    CO2 35.0 (H) 09/27/2022    CL 96 (L) 09/27/2022    BUN 28 (H) 09/27/2022    CREATININE 1.40 (H) 09/27/2022    EGFRIFAFRI 91 12/08/2021    BCR 20.0 09/27/2022    ANIONGAP 8.0 09/27/2022     Lab Results   Component Value Date    WBC 3.65 09/27/2022    HGB 12.7 (L) 09/27/2022    HCT 39.6 09/27/2022    MCV 91.2 09/27/2022     (L) 09/27/2022     Lab Results   Component Value Date    CHOL 147 11/30/2021    CHOL 127 03/08/2019     Lab Results   Component Value Date    TRIG 88 05/23/2022    TRIG 85 11/30/2021    TRIG 98 03/08/2019     Lab Results   Component Value  Date    HDL 30 2022    HDL 31 (L) 2021    HDL 33 (L) 2019     No components found for: LDLCALC  Lab Results   Component Value Date    LDL 76 2022     2021    LDL 81 2019     No results found for: HDLLDLRATIO  No components found for: CHOLHDL  Lab Results   Component Value Date    TSH 1.350 2022     Lab Results   Component Value Date    PROBNP 2,021.0 (H) 2022     EK2022    TTE: 2021  Interpretation Summary    · No ECG evidence of myocardial ischemia on lexiscan stress.  · Left ventricular ejection fraction is severely reduced. (Calculated EF = 19%). Enlarged LV cavity size. Abnormal LV wall motion consistent with severe hypokinesis.  · A large sized myocardial perfusion defect of moderate severity is noted in basal-mid inferior, anterior and lateral walls is noted at rest which improves significantly on stress images with attenuation correction. In the setting of severely reduced LV function, underlying ischemia cannot be ruled out.  · Impressions are consistent with an intermediate risk study.       [facility]: 2021 Dr. Aranda  Conclusion :  Angiographically normal left main, LAD and LCx.  Minimal luminal irregularities noted in proximal and mid segments of RCA.  Nonischemic cardiomyopathy.     Plan:   Optimize goal-directed medical therapy for cardiomyopathy.  Further work-up to evaluate for nonischemic causes.  Routine post cath care instructions (no driving for 48 hours, not lifting more than 10 pounds from right arm for 5 days) were discussed with the patient and family.  TR band release per protocol.  Discharge later today if criteria met.  Outpatient follow-up with cardiology.  Dr. Cabrera to discuss ICD placement with the patient in the near future.      This document has been electronically signed by Dylon Aranda MD on 2021 11:17 CST       The following portions of the patient's history were reviewed and updated as appropriate:  allergies, current medications, past family history, past medical history, past social history, past surgical history and problem list.  Old records reviewed and pertinent information is included in the above objective data.     ASSESSMENT/PLAN:     Diagnosis Plan   1. Chronic systolic heart failure (HCC)  ECG 12 Lead    Basic Metabolic Panel   2. Severe left ventricular systolic dysfunction     3. NICM (nonischemic cardiomyopathy) ([facility])     4. Pulmonary hypertension ([facility])     5. Obstructive sleep apnea         1. Acute on chronic systolic CHF.   He has diuresed and showing good urinary output.  Lower leg edema has improved.  He walked in the fu without shortness of breath.        NICM EF:15-20%. NYHA Class III, Stage C.   Vital signs stable and he is in a well perfused state. He appears euvolemic.     BETA-BLOCKER:  carvedilol 6.25 mg twice daily  ACE/ARB: Losartan 12.5 mg daily  ENTRESTO: Indicated, can consider-however he reports that it drops his blood pressure he feels bad on Entresto  DIURETIC:   Bumex 2 mg twice daily   SGL2I: can start as outpatient.   ALDOSTERONE ANTAGONIST: spironolactone 50 mg daily   IMDUR/HYDRALAZINE: N/A  DIGOXIN: N/A     -Fluid restriction: 1500 ml, he is able to recall heart failure education that was done in the hospital.  He reports that he is trying to stay at 1500 mL.   -Sodium restriction:2 grams  -discussed daily weighing, adherence to medications, and abstaining from methamphetamines.     Cardiac Rehab: Can consider, does meet criteria  ICD: yes [facility]  8/2022  ADHF: yes 9/2 [facility]. 9/5-9/11/22 at [facility] ordered today to assess for kidney function.    3. Poor LV function.  Continue Eliquis 5 mg BI.      4.  Pulmonary hypertension.  Last echocardiogram showed pulmonary pressures 70 mmHg.     Continue bumex and spironolactone. Continue losartan and coreg    5.  KHANH.  Echocardiogram showed RV dilation, decreased RV function, elevated pulmonary  pressures 70 mmHg.  He reports that his facemask has broke on his CPAP machine and he is requesting me call [facility] in [place] to order a new one.  I told him I would do that.     Call [facility] and spoke to home medical.  He has to meet with their respiratory therapist in order to get a new mask.  I called and talked to his mom and discussed this over the phone.    I spent  54 minutes caring for Matti on this date of service. This time includes time spent by me in the following activities: preparing for the visit, reviewing tests, obtaining and/or reviewing a separately obtained history, performing a medically appropriate examination and/or evaluation, counseling and educating the patient/family/caregiver, ordering medications, tests, or procedures, referring and communicating with other health care professionals, documenting information in the medical record and independently interpreting results and communicating that information with the patient/family/caregiver  Return in about 4 weeks (around 10/14/2022) for Recheck.      Office visit note completed on 9/29/2022  This document has been electronically signed by SHELL Garcia on September 16, 2022 14:57 CDT   Electronically signed by SHELL Garcia, 09/16/22, 2:57 PM CDT.

## 2023-01-26 NOTE — ED AVS SNAPSHOT
Emergency Department    [address] [facility] [address]    Phone:  [phone]    Fax:  [phone]                                       Sury Barnett   MRN: [id number]    Department:   Emergency Department   Date of Visit:  9/28/2017           Patient Information     Date Of Birth          8/27/1925        Your diagnoses for this visit were:     Feeding tube blocked, initial encounter        You were seen by Aaron Nails MD.      Follow-up Information     Follow up with Keyla Ceron MD.    Why:  As needed, If symptoms worsen    Contact information:    [facility]  [address] [facility] [id number] [phone]          Follow up with  Emergency Department.    Specialty:  EMERGENCY MEDICINE    Why:  As needed, If symptoms worsen    Contact information:    [address] [facility] [id number] [phone]        Discharge Instructions         Discharge Instructions: Caring for Your Gastrostomy Tube (G-Tube)  You have been discharged with a gastrostomy tube, or G-tube. The G-tube was inserted through your belly (abdominal) wall and into your stomach. The tube will provide you with food, fluids, and medicine. Your G-tube may move in and out slightly. If the tube comes out all the way in the first few weeks after placement, don t put it back in. Call your healthcare provider right away. Don t wait until the next day. This is important because the G-tube tract through the skin may close very quickly, often within 24 hours. After the first few weeks, if the tube comes out, ask your provider how to get a spare tube. Ask your provider how to replace it at home.   General guidelines for use    Wash your hands thoroughly with soap and warm water before starting your feeding.    During the feeding and for 1 hour after, sit in a chair or sit up in bed.    Before feeding begins, check to see that your stomach is empty. You will need a syringe for the  following steps:     Insert the tip of an empty syringe into the end of the G-tube.    Pull back on the syringe to withdraw contents of the stomach.    Don t begin the feeding if more than 100 mL remains from the previous feeding.    Clean the area around the tube with mild soap and water.    Pat the area dry during bathing and as needed.    Clean the area more often if it gets wet or is leaking some discharge (weeping).    Keep the disk (flange) a few millimeters off the skin. This should leave just enough room for a gauze sponge. Pulling the flange too tightly can damage the skin. But leaving the flange too loose leads to leaking around the G-tube. Your healthcare team will go over these guidelines before you leave the hospital.     The name of my feeding supplement/formula is:  ___________________________________________     Amount per feeding:   ___________________________________________     Times per day:  ___________________________________________     Amount of water used to flush tube:  ___________________________________________   Gravity feeding method    Fill the feeding bag with the prescribed amount of formula. Run the fluid to the end of the tube to clear out any air. Clamp the tube.    Connect the end of the feeding bag tubing to the G-tube.    Hang the bag at least 18 inches above the level of your G-tube.    Open the clamp and allow the formula to flow into the G-tube.    Follow with the prescribed amount of water.    After each feeding, rinse the bag and tubing. Every 24 hours, wash the bag and tubing with soapy water and rinse thoroughly.  Pump feeding method    Fill the feeding bag with the prescribed amount of formula. Run the fluid to the end of the tube to clear out any air. Clamp the tube.    Connect the end of the feeding bag tubing to the G-tube. Set the pump rate of flow to the prescribed rate per hour.    Open the clamp on the tubing; press the start button on your pump.    When feeding  is complete, disconnect the feeding set.    Connect the tip of an empty syringe to the feeding tube and slowly push in the prescribed amount of water.    After each feeding, rinse the bag and tubing. Every 24 hours, wash the bag and tubing with soapy water and rinse thoroughly.  Syringe feeding method    Remove the plunger from a syringe and connect the syringe to the G-tube.    Hold the syringe upright and pour the formula into the syringe.    Refill the syringe as the formula reaches the bottom of the syringe.    Repeat the process until the prescribed amount of formula is given.    Follow the feeding with the prescribed amount of water.    After each feeding, rinse the bag and tubing. Every 24 hours, wash the bag and tubing with soapy water and rinse thoroughly.  When to call your provider  Call your healthcare provider right away if you have any of the following:    The tube comes out     The tube is blocked    Vomiting    Fever above 100.4 F (38.0 C)    Diarrhea that lasts more than 2 days    Signs of infection (redness, swelling, or warmth at the tube site)    Drainage from the tube site   Date Last Reviewed: 8/1/2016 2000-2017 [employer]. [address] [place] [address]. All rights reserved. This information is not intended as a substitute for professional medical care. Always follow your healthcare professional's instructions.          Future Appointments        Provider Department Dept Phone Center    9/29/2017 9:00 AM JOEL Conklin CNP Geriatrics Transitional Care [phone] [facility]      24 Hour Appointment Hotline       To make an appointment at any [facility], call [phone] ([phone]). If you don't have a family doctor or clinic, we will help you find one. [facility] are conveniently located to serve the needs of you and your family.             Review of your medicines      Our records show that you are taking the medicines listed  below. If these are incorrect, please call your family doctor or clinic.        Dose / Directions Last dose taken    acetaminophen 32 mg/mL solution   Commonly known as:  TYLENOL   Dose:  650 mg   Quantity:  300 mL        20.3 mLs (650 mg) by Per Feeding Tube route every 4 hours as needed for mild pain   Refills:  0        amLODIPine 10 MG tablet   Commonly known as:  NORVASC   Dose:  10 mg   Quantity:  30 tablet        1 tablet (10 mg) by Oral or Feeding Tube route daily   Refills:  0        amoxicillin 875 MG tablet   Commonly known as:  AMOXIL   Dose:  875 mg   Indication:  Urinary Tract Infection        1 tablet (875 mg) by Per Feeding Tube route every 12 hours for 8 days   Refills:  0        fiber modular packet   Dose:  1 packet        1 packet by Per Feeding Tube route 2 times daily   Refills:  0        hypromellose-dextran Soln ophthalmic solution   Dose:  2-3 drop        Apply 2-3 drops to eye every hour as needed for dry eyes   Refills:  0        levothyroxine 50 MCG tablet   Commonly known as:  SYNTHROID/LEVOTHROID   Dose:  50 mcg   Quantity:  30 tablet        1 tablet (50 mcg) by Oral or Feeding Tube route every morning (before breakfast)   Refills:  0        metoprolol 10 mg/mL Susp   Commonly known as:  LOPRESSOR   Dose:  25 mg        2.5 mLs (25 mg) by Oral or Feeding Tube route 2 times daily   Refills:  0        nystatin 011019 UNIT/ML suspension   Commonly known as:  MYCOSTATIN   Dose:  320565 Units   Quantity:  280 mL        Swish and swallow 5 mLs (500,000 Units) in mouth 4 times daily   Refills:  0        pantoprazole Susp suspension   Commonly known as:  PROTONIX   Dose:  40 mg        20 mLs (40 mg) by Per Feeding Tube route daily   Refills:  0        ramipril 10 MG capsule   Commonly known as:  ALTACE   Dose:  10 mg   Quantity:  30 capsule        1 capsule (10 mg) by Oral or Feeding Tube route daily   Refills:  0        SODIUM BICARBONATE PO   Dose:  325 mg        Take 325 mg by mouth as needed  For clogged Gtube   Refills:  0        VITAMIN D (CHOLECALCIFEROL) PO   Dose:  66106 Units        Take 50,000 Units by mouth once a week Friday   Refills:  0        ZENPEP PO        Take by mouth as needed For clogged gtube   Refills:  0                Procedures and tests performed during your visit     Abdomen XR 1 vw      Orders Needing Specimen Collection     None      Pending Results     No orders found from 9/26/2017 to 9/29/2017.            Pending Culture Results     No orders found from 9/26/2017 to 9/29/2017.            Pending Results Instructions     If you had any lab results that were not finalized at the time of your Discharge, you can call the ED Lab Result RN at [phone]. You will be contacted by this team for any positive Lab results or changes in treatment. The nurses are available 7 days a week from 10A to 6:30P.  You can leave a message 24 hours per day and they will return your call.        Test Results From Your Hospital Stay        9/28/2017 10:47 PM      Narrative     ABDOMEN ONE VIEW 9/28/2017 10:35 PM     HISTORY: G tube placement    COMPARISON: 9/8/2017.         Impression     IMPRESSION: There is a G tube present in the body of the stomach.  Contrast is present in the stomach verifying position.    GUMARO JAMES MD                Clinical Quality Measure: Blood Pressure Screening     Your blood pressure was checked while you were in the emergency department today. The last reading we obtained was  BP: 136/88 . Please read the guidelines below about what these numbers mean and what you should do about them.  If your systolic blood pressure (the top number) is less than 120 and your diastolic blood pressure (the bottom number) is less than 80, then your blood pressure is normal. There is nothing more that you need to do about it.  If your systolic blood pressure (the top number) is 120-139 or your diastolic blood pressure (the bottom number) is 80-89, your blood pressure may be higher  "than it should be. You should have your blood pressure rechecked within a year by a primary care provider.  If your systolic blood pressure (the top number) is 140 or greater or your diastolic blood pressure (the bottom number) is 90 or greater, you may have high blood pressure. High blood pressure is treatable, but if left untreated over time it can put you at risk for heart attack, stroke, or kidney failure. You should have your blood pressure rechecked by a primary care provider within the next 4 weeks.  If your provider in the emergency department today gave you specific instructions to follow-up with your doctor or provider even sooner than that, you should follow that instruction and not wait for up to 4 weeks for your follow-up visit.        Thank you for choosing [facility]       Thank you for choosing [facility] for your care. Our goal is always to provide you with excellent care. Hearing back from our patients is one way we can continue to improve our services. Please take a few minutes to complete the written survey that you may receive in the mail after you visit with us. Thank you!        [employer] Information     [employer] lets you send messages to your doctor, view your test results, renew your prescriptions, schedule appointments and more. To sign up, go to [email or web address] . Click on \"Log in\" on the left side of the screen, which will take you to the Welcome page. Then click on \"Sign up Now\" on the right side of the page.     You will be asked to enter the access code listed below, as well as some personal information. Please follow the directions to create your username and password.     Your access code is: [email or web address]  Expires: 10/22/2017  1:36 PM     Your access code will  in 90 days. If you need help or a new code, please call your [facility] clinic or [phone].        Care EveryWhere ID     This is your Care EveryWhere ID. This could be used by other organizations to access your " [facility] medical records  [id number]        Equal Access to Services     ANUJ FERMIN : Haley Barajas, doe alvaerz, delfina vargas, brendon byrd. So [facility] [phone].    ATENCIÓN: Si habla español, tiene a leonard disposición servicios gratuitos de asistencia lingüística. Llame al [phone].    We comply with applicable federal civil rights laws and [place] laws. We do not discriminate on the basis of race, color, national origin, age, disability sex, sexual orientation or gender identity.            After Visit Summary       This is your record. Keep this with you and show to your community pharmacist(s) and doctor(s) at your next visit.                   Vaccinations/Health system  Screening Program/  Immunization Record/Breastfeeding Log/Bottle Feeding Log/Breastfeeding Mother’s Support Group Information/Guide to Postpartum Care/Health system Hearing Screen Program/Back To Sleep Handout/Breastfeeding Guide and Packet/Birth Certificate Instructions/Discharge Medication Information for Patients and Families Pocket Guide/Tdap Vaccination (VIS Pub Date: 2012)

## 2023-02-14 ENCOUNTER — HOSPITAL ENCOUNTER (OUTPATIENT)
Facility: CLINIC | Age: 88
Setting detail: OBSERVATION
Discharge: HOME | End: 2023-02-16
Attending: EMERGENCY MEDICINE | Admitting: INTERNAL MEDICINE
Payer: MEDICARE

## 2023-02-14 ENCOUNTER — APPOINTMENT (OUTPATIENT)
Dept: CT IMAGING | Facility: CLINIC | Age: 88
End: 2023-02-14
Attending: EMERGENCY MEDICINE
Payer: MEDICARE

## 2023-02-14 ENCOUNTER — APPOINTMENT (OUTPATIENT)
Dept: GENERAL RADIOLOGY | Facility: CLINIC | Age: 88
End: 2023-02-14
Attending: EMERGENCY MEDICINE
Payer: MEDICARE

## 2023-02-14 DIAGNOSIS — I10 HYPERTENSION, UNSPECIFIED TYPE: ICD-10-CM

## 2023-02-14 DIAGNOSIS — E87.0 HYPERNATREMIA: ICD-10-CM

## 2023-02-14 DIAGNOSIS — N30.00 ACUTE CYSTITIS WITHOUT HEMATURIA: ICD-10-CM

## 2023-02-14 DIAGNOSIS — R62.7 FAILURE TO THRIVE IN ADULT: ICD-10-CM

## 2023-02-14 LAB
ALBUMIN SERPL BCG-MCNC: 3.8 G/DL (ref 3.5–5.2)
ALBUMIN UR-MCNC: 200 MG/DL
ALP SERPL-CCNC: 97 U/L (ref 35–104)
ALT SERPL W P-5'-P-CCNC: 38 U/L (ref 10–35)
ANION GAP SERPL CALCULATED.3IONS-SCNC: 9 MMOL/L (ref 7–15)
APPEARANCE UR: ABNORMAL
AST SERPL W P-5'-P-CCNC: 28 U/L (ref 10–35)
BACTERIA #/AREA URNS HPF: ABNORMAL /HPF
BASOPHILS # BLD AUTO: 0.1 10E3/UL (ref 0–0.2)
BASOPHILS NFR BLD AUTO: 0 %
BILIRUB SERPL-MCNC: 0.7 MG/DL
BILIRUB UR QL STRIP: NEGATIVE
BUN SERPL-MCNC: 30.2 MG/DL (ref 8–23)
CALCIUM SERPL-MCNC: 10.1 MG/DL (ref 8.2–9.6)
CHLORIDE SERPL-SCNC: 112 MMOL/L (ref 98–107)
COLOR UR AUTO: YELLOW
CREAT SERPL-MCNC: 0.66 MG/DL (ref 0.51–0.95)
DEPRECATED HCO3 PLAS-SCNC: 32 MMOL/L (ref 22–29)
EOSINOPHIL # BLD AUTO: 0 10E3/UL (ref 0–0.7)
EOSINOPHIL NFR BLD AUTO: 0 %
ERYTHROCYTE [DISTWIDTH] IN BLOOD BY AUTOMATED COUNT: 13.2 % (ref 10–15)
FLUAV RNA SPEC QL NAA+PROBE: NEGATIVE
FLUBV RNA RESP QL NAA+PROBE: NEGATIVE
GFR SERPL CREATININE-BSD FRML MDRD: 79 ML/MIN/1.73M2
GLUCOSE SERPL-MCNC: 115 MG/DL (ref 70–99)
GLUCOSE UR STRIP-MCNC: NEGATIVE MG/DL
HCT VFR BLD AUTO: 49 % (ref 35–47)
HGB BLD-MCNC: 15.6 G/DL (ref 11.7–15.7)
HGB UR QL STRIP: ABNORMAL
IMM GRANULOCYTES # BLD: 0.2 10E3/UL
IMM GRANULOCYTES NFR BLD: 1 %
KETONES UR STRIP-MCNC: NEGATIVE MG/DL
LEUKOCYTE ESTERASE UR QL STRIP: ABNORMAL
LIPASE SERPL-CCNC: 31 U/L (ref 13–60)
LYMPHOCYTES # BLD AUTO: 1.7 10E3/UL (ref 0.8–5.3)
LYMPHOCYTES NFR BLD AUTO: 10 %
MCH RBC QN AUTO: 31.9 PG (ref 26.5–33)
MCHC RBC AUTO-ENTMCNC: 31.8 G/DL (ref 31.5–36.5)
MCV RBC AUTO: 100 FL (ref 78–100)
MONOCYTES # BLD AUTO: 1 10E3/UL (ref 0–1.3)
MONOCYTES NFR BLD AUTO: 5 %
NEUTROPHILS # BLD AUTO: 14.9 10E3/UL (ref 1.6–8.3)
NEUTROPHILS NFR BLD AUTO: 84 %
NITRATE UR QL: NEGATIVE
NRBC # BLD AUTO: 0 10E3/UL
NRBC BLD AUTO-RTO: 0 /100
PH UR STRIP: 6.5 [PH] (ref 5–7)
PLATELET # BLD AUTO: 340 10E3/UL (ref 150–450)
POTASSIUM SERPL-SCNC: 3.4 MMOL/L (ref 3.4–5.3)
PROT SERPL-MCNC: 8 G/DL (ref 6.4–8.3)
RBC # BLD AUTO: 4.89 10E6/UL (ref 3.8–5.2)
RBC URINE: 22 /HPF
RSV RNA SPEC NAA+PROBE: NEGATIVE
SARS-COV-2 RNA RESP QL NAA+PROBE: NEGATIVE
SODIUM SERPL-SCNC: 153 MMOL/L (ref 136–145)
SP GR UR STRIP: 1.02 (ref 1–1.03)
SQUAMOUS EPITHELIAL: 2 /HPF
UROBILINOGEN UR STRIP-MCNC: NORMAL MG/DL
WBC # BLD AUTO: 17.9 10E3/UL (ref 4–11)
WBC URINE: 25 /HPF

## 2023-02-14 PROCEDURE — 87637 SARSCOV2&INF A&B&RSV AMP PRB: CPT | Performed by: EMERGENCY MEDICINE

## 2023-02-14 PROCEDURE — 80053 COMPREHEN METABOLIC PANEL: CPT | Performed by: EMERGENCY MEDICINE

## 2023-02-14 PROCEDURE — 83690 ASSAY OF LIPASE: CPT | Performed by: EMERGENCY MEDICINE

## 2023-02-14 PROCEDURE — 85025 COMPLETE CBC W/AUTO DIFF WBC: CPT | Performed by: EMERGENCY MEDICINE

## 2023-02-14 PROCEDURE — 96361 HYDRATE IV INFUSION ADD-ON: CPT

## 2023-02-14 PROCEDURE — 71046 X-RAY EXAM CHEST 2 VIEWS: CPT

## 2023-02-14 PROCEDURE — 36415 COLL VENOUS BLD VENIPUNCTURE: CPT | Performed by: EMERGENCY MEDICINE

## 2023-02-14 PROCEDURE — 258N000003 HC RX IP 258 OP 636: Performed by: EMERGENCY MEDICINE

## 2023-02-14 PROCEDURE — C9803 HOPD COVID-19 SPEC COLLECT: HCPCS

## 2023-02-14 PROCEDURE — G0378 HOSPITAL OBSERVATION PER HR: HCPCS | Mod: CS

## 2023-02-14 PROCEDURE — 96365 THER/PROPH/DIAG IV INF INIT: CPT

## 2023-02-14 PROCEDURE — 99223 1ST HOSP IP/OBS HIGH 75: CPT | Mod: AI | Performed by: INTERNAL MEDICINE

## 2023-02-14 PROCEDURE — 96360 HYDRATION IV INFUSION INIT: CPT

## 2023-02-14 PROCEDURE — 250N000011 HC RX IP 250 OP 636: Performed by: EMERGENCY MEDICINE

## 2023-02-14 PROCEDURE — 87086 URINE CULTURE/COLONY COUNT: CPT | Performed by: EMERGENCY MEDICINE

## 2023-02-14 PROCEDURE — G1010 CDSM STANSON: HCPCS

## 2023-02-14 PROCEDURE — 99285 EMERGENCY DEPT VISIT HI MDM: CPT | Mod: 25,CS

## 2023-02-14 PROCEDURE — 81001 URINALYSIS AUTO W/SCOPE: CPT | Performed by: EMERGENCY MEDICINE

## 2023-02-14 RX ORDER — CEFTRIAXONE 1 G/1
1 INJECTION, POWDER, FOR SOLUTION INTRAMUSCULAR; INTRAVENOUS ONCE
Status: COMPLETED | OUTPATIENT
Start: 2023-02-14 | End: 2023-02-14

## 2023-02-14 RX ORDER — MULTIVITAMIN
1 TABLET ORAL DAILY
COMMUNITY

## 2023-02-14 RX ORDER — LISINOPRIL 5 MG/1
1 TABLET ORAL DAILY
COMMUNITY
Start: 2022-02-17

## 2023-02-14 RX ORDER — LEVETIRACETAM 100 MG/ML
5 SOLUTION ORAL 2 TIMES DAILY
COMMUNITY

## 2023-02-14 RX ORDER — DEXTROSE MONOHYDRATE 50 MG/ML
INJECTION, SOLUTION INTRAVENOUS CONTINUOUS
Status: DISCONTINUED | OUTPATIENT
Start: 2023-02-14 | End: 2023-02-15

## 2023-02-14 RX ORDER — ACETAMINOPHEN 500 MG
500 TABLET ORAL 2 TIMES DAILY
COMMUNITY

## 2023-02-14 RX ADMIN — CEFTRIAXONE 1 G: 1 INJECTION, POWDER, FOR SOLUTION INTRAMUSCULAR; INTRAVENOUS at 18:00

## 2023-02-14 RX ADMIN — DEXTROSE MONOHYDRATE: 50 INJECTION, SOLUTION INTRAVENOUS at 18:39

## 2023-02-14 RX ADMIN — SODIUM CHLORIDE, POTASSIUM CHLORIDE, SODIUM LACTATE AND CALCIUM CHLORIDE 500 ML: 600; 310; 30; 20 INJECTION, SOLUTION INTRAVENOUS at 16:44

## 2023-02-14 ASSESSMENT — ENCOUNTER SYMPTOMS
BLOOD IN STOOL: 0
HEMATURIA: 0
FEVER: 0
APPETITE CHANGE: 1
COUGH: 1
CHILLS: 0
SEIZURES: 0
VOMITING: 0
MYALGIAS: 0

## 2023-02-14 ASSESSMENT — ACTIVITIES OF DAILY LIVING (ADL)
ADLS_ACUITY_SCORE: 35

## 2023-02-14 NOTE — ED NOTES
Attempted straight cath without success. Patient unable to relax legs, unable to follow directions, unable to relax for staff to fully visualize. Patient pushing against staff while attempting to help relax legs. Family bedside and state patient is unable to open legs from current position at baseline.     Cleansed wilver area and placed pure wick in attempt to gather urine.

## 2023-02-14 NOTE — ED TRIAGE NOTES
Patient acting more lethargic than normal. Normally follows conversation some. Barely eating for 3 days. Still drinking water. Blood pressure has been high. Stroke 5 years ago. Significant deficits from stroke, non-verbal, non ambulatory. No changes in bowel or urine. No falls.

## 2023-02-14 NOTE — ED NOTES
Rapid Assessment Note    History:   Sury Barnett is a 89 year old female who presents with decreased appetite. Sury's daughter Stephie notes that since Saturday her mother has had decreased appetite and also had high blood pressure this morning. Stephie notes that Sury hasn't complained of any pain, but her demeanor and appetite have changed, thus causing today's presentation. During evaluation, Stephie denies that Sury has had any chills, fever, bloody stool, emesis, hematuria, seizure activity, change in bowel movements, known exposure to illness, recent fall or trauma, or use of blood thinners. Stephie does note that Sury has had an increase in cough from her baseline, but hasn't noted a change in the composition of her phlegm. She also states that Sury has been drinking plenty of fluids and has had no abdominal surgeries. Sury does not have a history of recurring UTIs. Sury currently lives with her daughter, Stephie, and Stephie's  and has caregivers that come into the house to assist.     Exam:   General:  Alert, interactive  Cardiovascular:  Well perfused  Lungs:  No respiratory distress, no accessory muscle use  Neuro:  Moving all 4 extremities  Skin:  Warm, dry  Psych:  Normal affect  ***    Plan of Care:   I evaluated the patient and developed an initial plan of care. I discussed this plan and explained that I, or one of my partners, would be returning to complete the evaluation.     I, Cinthya Fernandez, am serving as a scribe to document services personally performed by Kamar Sesay MD, based on my observations and the provider's statements to me.    2/14/2023  [employer]    Portions of this medical record were completed by a scribe. UPON MY REVIEW AND AUTHENTICATION BY ELECTRONIC SIGNATURE, this confirms (a) I performed the applicable clinical services, and (b) the record is accurate.

## 2023-02-14 NOTE — ED NOTES
Pt's mom stated that she is a very difficult cath and may even need a doctor to perform this. Is not able to provide UA without being cathed. Unable to do in intake.     [place]on

## 2023-02-14 NOTE — ED PROVIDER NOTES
History     Chief Complaint:  Generalized Weakness (/)       HPI   Sury Barnett is a 89 year old female with a history of CVA, intracerebral hemorrhage, and left-sided hemiplegia who presents with decreased appetite. Sruy's daughter Stephie notes that since Saturday her mother has had decreased appetite and also had high blood pressure this morning. Stephie notes that Sury hasn't complained of any pain, but her demeanor and appetite have changed, thus causing today's presentation. During evaluation, Stephie denies that Sury has had any chills, fever, bloody stool, emesis, hematuria, seizure activity, change in bowel movements, known exposure to illness, recent fall or trauma, or use of blood thinners. Stephie does note that Sury has had an increase in cough from her baseline, but hasn't noted a change in the composition of her phlegm. She also states that Sury has been drinking plenty of fluids and has had no abdominal surgeries. Sury does not have a history of recurring UTIs. Sury currently lives with her daughter, Stephie, and Stephie's  and has caregivers that come into the house to assist.      Independent Historian:   Srinath reports as above    ROS:  Review of Systems   Constitutional: Positive for appetite change (decreased). Negative for chills and fever.   Respiratory: Positive for cough.    Gastrointestinal: Negative for blood in stool and vomiting.   Genitourinary: Negative for hematuria.   Musculoskeletal: Negative for myalgias.   Neurological: Negative for seizures.   All other systems reviewed and are negative.      Allergies:  Amoxicillin  Sulfa drugs     Medications:    Levothyroxine  Lisinopril  Amlodipine  Metoprolol  Pancrelipase  Pantoprazole    Past Medical History:    Age-related osteoporosis  Seizures  Dysphagia   Hemiplegia affecting left nondominant side  Physical deconditioning  Intracerebral hemorrhage  Hypertension  Vitamin D deficiency  Hypothyroidism  Leukocytosis  CVA  Hypertension     Past Surgical  History:    Tonsillectomy   Thyroid radiation treatment  Unspecified forearm/wrist surgery x2  Colonoscopy     Family History:    MI - brother  Colon cancer - father  Stroke - father, sister  Heart disease - mother  Thyroid disease - mother  Hyperlipidemia - mother, sister  Hypertension - father, mother, sister    Social History:  Patient is accompanied in the ED by her daughter, Stephie.  Patient lives with her daughter Stephie and Stephie's .  Patient has home help aides that come in to assist with cares.   PCP: Keyla Ceron     Physical Exam     Patient Vitals for the past 24 hrs:   BP Temp Temp src Pulse Resp SpO2 Weight   02/14/23 1700 -- -- -- -- -- -- 37.2 kg (82 lb 0.2 oz)   02/14/23 1319 (!) 170/106 98.6  F (37  C) Temporal 99 20 95 % --        Physical Exam  General: Cachectic, chronic muscle wasting.  Chronically ill-appearing at baseline  Head: The scalp, face, and head appear normal  ENT: Dry mucous membranes  Neck: Normal range of motion.   CV: Regular rate and rhythm.   Resp: Lungs are clear without wheezes or rales. No respiratory distress.   GI: Abdomen is soft, no rigidity, guarding, or rebound. No distension. No tenderness to palpation in any quadrant.     MS: Decreased tone secondary to history of CVA with left-sided hemiplegia  Skin: No rash or lesions noted. Normal capillary refill noted  Neuro: Nonverbal at baseline    Emergency Department Course     Imaging:  XR Chest 2 Views   Final Result   IMPRESSION: There are no acute infiltrates. The cardiac silhouette is   not enlarged. Pulmonary vasculature is unremarkable.      FABIANA RICO MD            SYSTEM ID:  [id number]      CT Head w/o Contrast   Final Result   IMPRESSION:   1.  No intracranial hemorrhage, mass, or definite CT evidence of   recent ischemia.   2.  Mild to moderate ventriculomegaly mildly progressed versus prior.   This may reflect central atrophy. A mild component of underlying   normal pressure hydrocephalus not excluded.    3.  Chronic right frontal lobe encephalomalacia at the site of prior   hemorrhage.   4.  Chronic left cerebellar infarct is new versus 2017.   5.  Inflammatory changes sphenoid sinuses.      ARIS HUITRON MD            SYSTEM ID:  [id number]         Report per radiology    Laboratory:  Labs Ordered and Resulted from Time of ED Arrival to Time of ED Departure   COMPREHENSIVE METABOLIC PANEL - Abnormal       Result Value    Sodium 153 (*)     Potassium 3.4      Chloride 112 (*)     Carbon Dioxide (CO2) 32 (*)     Anion Gap 9      Urea Nitrogen 30.2 (*)     Creatinine 0.66      Calcium 10.1 (*)     Glucose 115 (*)     Alkaline Phosphatase 97      AST 28      ALT 38 (*)     Protein Total 8.0      Albumin 3.8      Bilirubin Total 0.7      GFR Estimate 79     ROUTINE UA WITH MICROSCOPIC REFLEX TO CULTURE - Abnormal    Color Urine Yellow      Appearance Urine Slightly Cloudy (*)     Glucose Urine Negative      Bilirubin Urine Negative      Ketones Urine Negative      Specific Gravity Urine 1.021      Blood Urine Moderate (*)     pH Urine 6.5      Protein Albumin Urine 200 (*)     Urobilinogen Urine Normal      Nitrite Urine Negative      Leukocyte Esterase Urine Large (*)     Bacteria Urine Few (*)     RBC Urine 22 (*)     WBC Urine 25 (*)     Squamous Epithelials Urine 2 (*)    CBC WITH PLATELETS AND DIFFERENTIAL - Abnormal    WBC Count 17.9 (*)     RBC Count 4.89      Hemoglobin 15.6      Hematocrit 49.0 (*)           MCH 31.9      MCHC 31.8      RDW 13.2      Platelet Count 340      % Neutrophils 84      % Lymphocytes 10      % Monocytes 5      % Eosinophils 0      % Basophils 0      % Immature Granulocytes 1      NRBCs per 100 WBC 0      Absolute Neutrophils 14.9 (*)     Absolute Lymphocytes 1.7      Absolute Monocytes 1.0      Absolute Eosinophils 0.0      Absolute Basophils 0.1      Absolute Immature Granulocytes 0.2      Absolute NRBCs 0.0     LIPASE - Normal    Lipase 31     INFLUENZA A/B & SARS-COV2  PCR MULTIPLEX - Normal    Influenza A PCR Negative      Influenza B PCR Negative      RSV PCR Negative      SARS CoV2 PCR Negative     URINE CULTURE        Procedures   none    Emergency Department Course & Assessments:       Interventions:  Medications   cefTRIAXone (ROCEPHIN) 1 g vial to attach to  mL bag for ADULTS or NS 50 mL bag for PEDS (1 g Intravenous New Bag 2/14/23 1800)   dextrose 5% infusion (has no administration in time range)   lactated ringers BOLUS 500 mL (0 mLs Intravenous Stopped 2/14/23 1744)        Independent Interpretation (X-rays, CTs, rhythm strip):  I independently reviewed the patient's X-ray and CT.      Consultations/Discussion of Management or Tests:  1806 I consulted with Dr. Nolan of the hospitalist service and discussed patient admission. He accepted care of the patient.        Social Determinants of Health affecting care:   Nonverbal but able to convey pain    Assessments:  1443 I obtained history and examined the patient as noted above.   1734 I rechecked the patient and updated her family.     Disposition:  The patient was admitted to the hospital under the care of Dr. Nolan.     Impression & Plan    CMS Diagnoses: None      Medical Decision Making:  Patient is a 89-year-old woman with past medical history of intracranial cerebral hemorrhage and CVA with resulting left-sided hemiplegia who presents to the emergency department with failure to thrive and decreased p.o. intake.  Patient's daughter reports that at baseline she is nonverbal but is able to eat and drink normally.  She has round-the-clock at home nursing cares and is typically very well cared for.  She reports it is very abnormal for the patient to not eat or drink regularly.  However she denies any fever, vomiting, diarrhea, cough or difficulty breathing.  No clear infectious source.  On initial evaluation here she is hypertensive but otherwise hemodynamically stable.  She is afebrile.  Oxygenating well on room  air.  Daughter reports she is otherwise at her neurological baseline other than refusing to eat and drink.  She denies any abdominal pain.  Broad work-up was initiated to investigate the patient's failure to thrive.  Ultimately patient was found to have a UTI likely leading to her poor p.o. intake.  Blood work also revealed a hypernatremia.  Was treated with lactated Ringer's and was started on D5W infusion.  Given her UTI and hyponatremia will be admitted for further evaluation and treatment.  The remainder of her work-up was fairly reassuring.    Diagnosis:    ICD-10-CM    1. Hypernatremia  E87.0       2. Acute cystitis without hematuria  N30.00       3. Hypertension, unspecified type  I10       4. Failure to thrive in adult  R62.7             Scribe Disclosure:  I, Cinthya Fernandez, am serving as a scribe at 3:53 PM on 2/14/2023 to document services personally performed by Kamar Sesay MD based on my observations and the provider's statements to me.     2/14/2023   Kamar Sesay MD Battista, Christopher Joseph, MD  02/14/23 1909

## 2023-02-15 LAB
ANION GAP SERPL CALCULATED.3IONS-SCNC: 8 MMOL/L (ref 7–15)
ANION GAP SERPL CALCULATED.3IONS-SCNC: 8 MMOL/L (ref 7–15)
BUN SERPL-MCNC: 21.3 MG/DL (ref 8–23)
BUN SERPL-MCNC: 23.5 MG/DL (ref 8–23)
CALCIUM SERPL-MCNC: 9.4 MG/DL (ref 8.2–9.6)
CALCIUM SERPL-MCNC: 9.5 MG/DL (ref 8.2–9.6)
CHLORIDE SERPL-SCNC: 105 MMOL/L (ref 98–107)
CHLORIDE SERPL-SCNC: 108 MMOL/L (ref 98–107)
CREAT SERPL-MCNC: 0.49 MG/DL (ref 0.51–0.95)
CREAT SERPL-MCNC: 0.53 MG/DL (ref 0.51–0.95)
DEPRECATED HCO3 PLAS-SCNC: 31 MMOL/L (ref 22–29)
DEPRECATED HCO3 PLAS-SCNC: 31 MMOL/L (ref 22–29)
ERYTHROCYTE [DISTWIDTH] IN BLOOD BY AUTOMATED COUNT: 13.2 % (ref 10–15)
GFR SERPL CREATININE-BSD FRML MDRD: 84 ML/MIN/1.73M2
GFR SERPL CREATININE-BSD FRML MDRD: 85 ML/MIN/1.73M2
GLUCOSE SERPL-MCNC: 129 MG/DL (ref 70–99)
GLUCOSE SERPL-MCNC: 129 MG/DL (ref 70–99)
HCT VFR BLD AUTO: 43.6 % (ref 35–47)
HGB BLD-MCNC: 13.4 G/DL (ref 11.7–15.7)
MAGNESIUM SERPL-MCNC: 2.4 MG/DL (ref 1.7–2.3)
MCH RBC QN AUTO: 31.2 PG (ref 26.5–33)
MCHC RBC AUTO-ENTMCNC: 30.7 G/DL (ref 31.5–36.5)
MCV RBC AUTO: 101 FL (ref 78–100)
PLATELET # BLD AUTO: 292 10E3/UL (ref 150–450)
POTASSIUM SERPL-SCNC: 2.9 MMOL/L (ref 3.4–5.3)
POTASSIUM SERPL-SCNC: 3.5 MMOL/L (ref 3.4–5.3)
POTASSIUM SERPL-SCNC: 3.5 MMOL/L (ref 3.4–5.3)
RBC # BLD AUTO: 4.3 10E6/UL (ref 3.8–5.2)
SODIUM SERPL-SCNC: 142 MMOL/L (ref 136–145)
SODIUM SERPL-SCNC: 144 MMOL/L (ref 136–145)
SODIUM SERPL-SCNC: 147 MMOL/L (ref 136–145)
WBC # BLD AUTO: 14.1 10E3/UL (ref 4–11)

## 2023-02-15 PROCEDURE — 258N000003 HC RX IP 258 OP 636: Performed by: INTERNAL MEDICINE

## 2023-02-15 PROCEDURE — 96376 TX/PRO/DX INJ SAME DRUG ADON: CPT

## 2023-02-15 PROCEDURE — 82310 ASSAY OF CALCIUM: CPT | Performed by: INTERNAL MEDICINE

## 2023-02-15 PROCEDURE — 84132 ASSAY OF SERUM POTASSIUM: CPT | Performed by: PHYSICIAN ASSISTANT

## 2023-02-15 PROCEDURE — 250N000011 HC RX IP 250 OP 636: Performed by: INTERNAL MEDICINE

## 2023-02-15 PROCEDURE — 80048 BASIC METABOLIC PNL TOTAL CA: CPT | Mod: 91 | Performed by: PHYSICIAN ASSISTANT

## 2023-02-15 PROCEDURE — 96361 HYDRATE IV INFUSION ADD-ON: CPT

## 2023-02-15 PROCEDURE — 99232 SBSQ HOSP IP/OBS MODERATE 35: CPT | Performed by: PHYSICIAN ASSISTANT

## 2023-02-15 PROCEDURE — 36415 COLL VENOUS BLD VENIPUNCTURE: CPT | Performed by: PHYSICIAN ASSISTANT

## 2023-02-15 PROCEDURE — G0378 HOSPITAL OBSERVATION PER HR: HCPCS

## 2023-02-15 PROCEDURE — 83735 ASSAY OF MAGNESIUM: CPT | Performed by: PHYSICIAN ASSISTANT

## 2023-02-15 PROCEDURE — 82374 ASSAY BLOOD CARBON DIOXIDE: CPT | Performed by: INTERNAL MEDICINE

## 2023-02-15 PROCEDURE — 85027 COMPLETE CBC AUTOMATED: CPT | Performed by: INTERNAL MEDICINE

## 2023-02-15 PROCEDURE — 84295 ASSAY OF SERUM SODIUM: CPT | Performed by: PHYSICIAN ASSISTANT

## 2023-02-15 PROCEDURE — 250N000013 HC RX MED GY IP 250 OP 250 PS 637: Performed by: INTERNAL MEDICINE

## 2023-02-15 PROCEDURE — G0378 HOSPITAL OBSERVATION PER HR: HCPCS | Mod: CS

## 2023-02-15 PROCEDURE — 250N000013 HC RX MED GY IP 250 OP 250 PS 637: Performed by: PHYSICIAN ASSISTANT

## 2023-02-15 PROCEDURE — 36415 COLL VENOUS BLD VENIPUNCTURE: CPT | Performed by: INTERNAL MEDICINE

## 2023-02-15 RX ORDER — AMOXICILLIN 250 MG
1 CAPSULE ORAL 2 TIMES DAILY PRN
Status: DISCONTINUED | OUTPATIENT
Start: 2023-02-15 | End: 2023-02-16 | Stop reason: HOSPADM

## 2023-02-15 RX ORDER — POTASSIUM CHLORIDE 1500 MG/1
40 TABLET, EXTENDED RELEASE ORAL ONCE
Status: COMPLETED | OUTPATIENT
Start: 2023-02-15 | End: 2023-02-15

## 2023-02-15 RX ORDER — PROCHLORPERAZINE 25 MG
12.5 SUPPOSITORY, RECTAL RECTAL EVERY 12 HOURS PRN
Status: DISCONTINUED | OUTPATIENT
Start: 2023-02-15 | End: 2023-02-16 | Stop reason: HOSPADM

## 2023-02-15 RX ORDER — LEVETIRACETAM 100 MG/ML
500 SOLUTION ORAL 2 TIMES DAILY
Status: DISCONTINUED | OUTPATIENT
Start: 2023-02-15 | End: 2023-02-16 | Stop reason: HOSPADM

## 2023-02-15 RX ORDER — LEVOTHYROXINE SODIUM 50 UG/1
50 TABLET ORAL
Status: DISCONTINUED | OUTPATIENT
Start: 2023-02-15 | End: 2023-02-16 | Stop reason: HOSPADM

## 2023-02-15 RX ORDER — DEXTROSE MONOHYDRATE 50 MG/ML
INJECTION, SOLUTION INTRAVENOUS CONTINUOUS
Status: ACTIVE | OUTPATIENT
Start: 2023-02-15 | End: 2023-02-15

## 2023-02-15 RX ORDER — ONDANSETRON 4 MG/1
4 TABLET, ORALLY DISINTEGRATING ORAL EVERY 6 HOURS PRN
Status: DISCONTINUED | OUTPATIENT
Start: 2023-02-15 | End: 2023-02-16 | Stop reason: HOSPADM

## 2023-02-15 RX ORDER — LISINOPRIL 5 MG/1
5 TABLET ORAL DAILY
Status: DISCONTINUED | OUTPATIENT
Start: 2023-02-15 | End: 2023-02-16 | Stop reason: HOSPADM

## 2023-02-15 RX ORDER — MULTIPLE VITAMINS W/ MINERALS TAB 9MG-400MCG
1 TAB ORAL DAILY
Status: DISCONTINUED | OUTPATIENT
Start: 2023-02-15 | End: 2023-02-16 | Stop reason: HOSPADM

## 2023-02-15 RX ORDER — ACETAMINOPHEN 325 MG/1
325 TABLET ORAL EVERY 6 HOURS PRN
Status: DISCONTINUED | OUTPATIENT
Start: 2023-02-15 | End: 2023-02-16 | Stop reason: HOSPADM

## 2023-02-15 RX ORDER — ONDANSETRON 2 MG/ML
4 INJECTION INTRAMUSCULAR; INTRAVENOUS EVERY 6 HOURS PRN
Status: DISCONTINUED | OUTPATIENT
Start: 2023-02-15 | End: 2023-02-16 | Stop reason: HOSPADM

## 2023-02-15 RX ORDER — CEFTRIAXONE 1 G/1
1 INJECTION, POWDER, FOR SOLUTION INTRAMUSCULAR; INTRAVENOUS EVERY 24 HOURS
Status: DISCONTINUED | OUTPATIENT
Start: 2023-02-15 | End: 2023-02-16 | Stop reason: HOSPADM

## 2023-02-15 RX ORDER — PROCHLORPERAZINE MALEATE 5 MG
5 TABLET ORAL EVERY 6 HOURS PRN
Status: DISCONTINUED | OUTPATIENT
Start: 2023-02-15 | End: 2023-02-16 | Stop reason: HOSPADM

## 2023-02-15 RX ORDER — POTASSIUM CHLORIDE 20MEQ/15ML
10 LIQUID (ML) ORAL ONCE
Status: COMPLETED | OUTPATIENT
Start: 2023-02-15 | End: 2023-02-15

## 2023-02-15 RX ORDER — AMOXICILLIN 250 MG
2 CAPSULE ORAL 2 TIMES DAILY PRN
Status: DISCONTINUED | OUTPATIENT
Start: 2023-02-15 | End: 2023-02-16 | Stop reason: HOSPADM

## 2023-02-15 RX ADMIN — LISINOPRIL 5 MG: 5 TABLET ORAL at 08:17

## 2023-02-15 RX ADMIN — CEFTRIAXONE 1 G: 1 INJECTION, POWDER, FOR SOLUTION INTRAMUSCULAR; INTRAVENOUS at 18:13

## 2023-02-15 RX ADMIN — DEXTROSE MONOHYDRATE: 50 INJECTION, SOLUTION INTRAVENOUS at 08:09

## 2023-02-15 RX ADMIN — DEXTROSE MONOHYDRATE: 50 INJECTION, SOLUTION INTRAVENOUS at 01:50

## 2023-02-15 RX ADMIN — MULTIPLE VITAMINS W/ MINERALS TAB 1 TABLET: TAB at 08:17

## 2023-02-15 RX ADMIN — LEVOTHYROXINE SODIUM 50 MCG: 50 TABLET ORAL at 08:18

## 2023-02-15 RX ADMIN — LEVETIRACETAM 500 MG: 100 SOLUTION ORAL at 08:18

## 2023-02-15 RX ADMIN — POTASSIUM CHLORIDE 10 MEQ: 20 SOLUTION ORAL at 14:43

## 2023-02-15 RX ADMIN — LEVETIRACETAM 500 MG: 100 SOLUTION ORAL at 20:00

## 2023-02-15 RX ADMIN — POTASSIUM CHLORIDE 40 MEQ: 1500 TABLET, EXTENDED RELEASE ORAL at 08:18

## 2023-02-15 ASSESSMENT — ACTIVITIES OF DAILY LIVING (ADL)
ADLS_ACUITY_SCORE: 55
ADLS_ACUITY_SCORE: 53
ADLS_ACUITY_SCORE: 45
ADLS_ACUITY_SCORE: 35
ADLS_ACUITY_SCORE: 45
ADLS_ACUITY_SCORE: 53
ADLS_ACUITY_SCORE: 45
ADLS_ACUITY_SCORE: 35

## 2023-02-15 NOTE — PLAN OF CARE
PRIMARY DIAGNOSIS: Decreased Appetite, Hyponatremia and Suspected UTI  OUTPATIENT/OBSERVATION GOALS TO BE MET BEFORE DISCHARGE:  1. Diagnostic test and consults (if applicable) complete: Yes    2. Vitals signs stable or return to baseline: Yes    3. Tolerate oral intake to maintain hydration: No    4. Pain status: Pain free.    5. Tolerating oral antibiotics or has plans for home infusion setup:  Yes    6. Return to near baseline physical activity: No       Discharge Planner Nurse   Safe discharge environment identified: Yes  Barriers to discharge: No       Entered by: Samson Schroeder RN 02/15/2023 4:55 AM     Vitals are Temp: 97.2  F (36.2  C) Temp src: Axillary BP: 137/86 Pulse: 80   Resp: 17 SpO2: 95 %.    Unable to assess orientation. No non- verbal signs of pain noted.  Patient has Dextrose 5% running at 75 mL per hour.    Pt is a Regular diet. He/She are 2 assist with Lift.

## 2023-02-15 NOTE — PLAN OF CARE
PRIMARY DIAGNOSIS: Hypernatremia/ UTI/ FTT  OUTPATIENT/OBSERVATION GOALS TO BE MET BEFORE DISCHARGE:  ADLs back to baseline: Yes    Activity and level of assistance: Up with maximum assistance. This is patient's baseline.     Pain status: Pain free. Non nonverbal signs of pain present    Return to near baseline physical activity: Yes     Discharge Planner Nurse   Safe discharge environment identified: Yes  Barriers to discharge: Yes       Entered by: Aleta Álvarez RN 02/15/2023 9:33 AM   Pt is alert. Unable to assess orientation level, as pt is nonverbal at baseline. VSS, on RA. Non nonverbal signs of pain or nausea present. Breathing in unlabored and lung sounds clear. Incontinent of bowel and bladder. BM x1. Regular diet, no swallowing difficulty. PO intake encouraged. PIVx1, infusing D5W at 75ml/hr. Ax2, lift assistance. Repositioned Q2hr. Potassium replaced per protocol. magnesium WDL, recheck tomorrow. SW consulted.   Please review provider order for any additional goals.   Nurse to notify provider when observation goals have been met and patient is ready for discharge.

## 2023-02-15 NOTE — CONSULTS
Care Management Initial Consult    General Information  Assessment completed with: Children, Pt's daughter Crystal  Type of CM/SW Visit: Offer D/C Planning    Primary Care Provider verified and updated as needed: Yes   Readmission within the last 30 days:     Reason for Consult: discharge planning  Advance Care Planning: Advance Care Planning Reviewed: no concerns identified        Communication Assessment  Patient's communication style: spoken language (non-English) (non verbal)    Hearing Difficulty or Deaf: no      Cognitive  Cognitive/Neuro/Behavioral: .WDL except  Level of Consciousness: alert  Arousal Level: arouses to touch/gentle shaking, arouses to voice  Orientation: other (see comments) (MONICA- pt nonverbal at baseline)  Mood/Behavior: calm  Best Language: 3 - Mute  Speech: MONICA (unable to assess) (Pt nonverbal at baseline)    Living Environment:   People in home: child(kaila), adult     Current living Arrangements: house      Able to return to prior arrangements: yes     Family/Social Support:  Care provided by: homecare agency, child(kaila)  Provides care for: no one, unable/limited ability to care for self  Marital Status:   Children          Description of Support System: Supportive, Involved    Support Assessment: Adequate family and caregiver support, Adequate social supports    Current Resources:   Patient receiving home care services:  Yes, private duty caregivers  Equipment currently used at home:  WC    Employment/Financial:  Employment Status: retired     Financial Concerns: insurance, none   Referral to Financial Worker: No    Lifestyle & Psychosocial Needs:  Social Determinants of Health     Tobacco Use: Not on file   Alcohol Use: Not on file   Financial Resource Strain: Not on file   Food Insecurity: Not on file   Transportation Needs: Not on file   Physical Activity: Not on file   Stress: Not on file   Social Connections: Not on file   Intimate Partner Violence: Not on file   Depression: Not  on file   Housing Stability: Not on file     Functional Status:  Prior to admission patient needed assistance: Patient was admitted under observation status for hypernatremia, failure to thrive.     SW completed assessment with patient's daughter Crystal at bedside as patient is nonverbal. Dtr reports that pt is WC bound and bedbound at baseline. They have all needed DME at home to continue caring for her.     Daughter has private duty caregivers arranged to assist at home.     Reviewed JJ letter with daughter.     Mental Health Status:  Mental Health Status: No Current Concerns       Chemical Dependency Status: No current concerns        Values/Beliefs:  Spiritual, Cultural Beliefs, Zoroastrianism Practices, Values that affect care: Yes             Additional Information:  Plan: Home with daughter and caregivers when medically stable. Daughter typically arranges WC transportation through an agency. She will call SW tomorrow if that agency is unavailable and if she needs SW assistance with arranging a WC transport home. SW will continue to follow.     CATHY Dixon, [facility]   Inpatient Care Coordination  [facility]   [phone]

## 2023-02-15 NOTE — ED NOTES
[facility]  ED Nurse Handoff Report    Sury Barnett is a 89 year old female   ED Chief complaint: Generalized Weakness (/)  . ED Diagnosis:   Final diagnoses:   Hypernatremia   Acute cystitis without hematuria   Hypertension, unspecified type   Failure to thrive in adult     Allergies:   Allergies   Allergen Reactions     Alon Flavor Difficulty breathing     Sulfa Drugs Other (See Comments)     Really sick and high fever     Amoxicillin        Code Status: Full Code  Activity level - Baseline/Home:  Total Care. Activity Level - Current:   Total Care. Lift room needed: No. Bariatric: No   Needed: No   Isolation: No. Infection: Not Applicable.     Vital Signs:   Vitals:    02/14/23 1319 02/14/23 1700   BP: (!) 170/106    Pulse: 99    Resp: 20    Temp: 98.6  F (37  C)    TempSrc: Temporal    SpO2: 95%    Weight:  37.2 kg (82 lb 0.2 oz)       Cardiac Rhythm:  ,      Pain level:    Patient confused: Yes. Patient Falls Risk: Yes.   Elimination Status: Has voided. Purewick.   Patient Report - Initial Complaint: Patient acting more lethargic than normal. Normally follows conversation some. Barely eating for 3 days. Still drinking water. Blood pressure has been high. Stroke 5 years ago. Significant deficits from stroke, non-verbal, non ambulatory. No changes in bowel or urine. No falls.   . Focused Assessment: Baseline non-verbal, non ambulatory. Total care patient. Skin intact. 24 hour nursing care at home. + uti.    Tests Performed:   Labs Ordered and Resulted from Time of ED Arrival to Time of ED Departure   COMPREHENSIVE METABOLIC PANEL - Abnormal       Result Value    Sodium 153 (*)     Potassium 3.4      Chloride 112 (*)     Carbon Dioxide (CO2) 32 (*)     Anion Gap 9      Urea Nitrogen 30.2 (*)     Creatinine 0.66      Calcium 10.1 (*)     Glucose 115 (*)     Alkaline Phosphatase 97      AST 28      ALT 38 (*)     Protein Total 8.0      Albumin 3.8      Bilirubin Total 0.7      GFR Estimate 79      ROUTINE UA WITH MICROSCOPIC REFLEX TO CULTURE - Abnormal    Color Urine Yellow      Appearance Urine Slightly Cloudy (*)     Glucose Urine Negative      Bilirubin Urine Negative      Ketones Urine Negative      Specific Gravity Urine 1.021      Blood Urine Moderate (*)     pH Urine 6.5      Protein Albumin Urine 200 (*)     Urobilinogen Urine Normal      Nitrite Urine Negative      Leukocyte Esterase Urine Large (*)     Bacteria Urine Few (*)     RBC Urine 22 (*)     WBC Urine 25 (*)     Squamous Epithelials Urine 2 (*)    CBC WITH PLATELETS AND DIFFERENTIAL - Abnormal    WBC Count 17.9 (*)     RBC Count 4.89      Hemoglobin 15.6      Hematocrit 49.0 (*)           MCH 31.9      MCHC 31.8      RDW 13.2      Platelet Count 340      % Neutrophils 84      % Lymphocytes 10      % Monocytes 5      % Eosinophils 0      % Basophils 0      % Immature Granulocytes 1      NRBCs per 100 WBC 0      Absolute Neutrophils 14.9 (*)     Absolute Lymphocytes 1.7      Absolute Monocytes 1.0      Absolute Eosinophils 0.0      Absolute Basophils 0.1      Absolute Immature Granulocytes 0.2      Absolute NRBCs 0.0     LIPASE - Normal    Lipase 31     INFLUENZA A/B & SARS-COV2 PCR MULTIPLEX - Normal    Influenza A PCR Negative      Influenza B PCR Negative      RSV PCR Negative      SARS CoV2 PCR Negative     URINE CULTURE     XR Chest 2 Views   Final Result   IMPRESSION: There are no acute infiltrates. The cardiac silhouette is   not enlarged. Pulmonary vasculature is unremarkable.      FABIANA RICO MD            SYSTEM ID:  [id number]      CT Head w/o Contrast   Final Result   IMPRESSION:   1.  No intracranial hemorrhage, mass, or definite CT evidence of   recent ischemia.   2.  Mild to moderate ventriculomegaly mildly progressed versus prior.   This may reflect central atrophy. A mild component of underlying   normal pressure hydrocephalus not excluded.   3.  Chronic right frontal lobe encephalomalacia at the site of prior    hemorrhage.   4.  Chronic left cerebellar infarct is new versus 2017.   5.  Inflammatory changes sphenoid sinuses.      ARIS HUITRON MD            SYSTEM ID:  [id number]        . Abnormal Results: see results.   Treatments provided: see MAR. Antibiotics & fluids.    Family Comments: Daughter very involved at bedside will go home soon. States call at any time.   OBS brochure/video discussed/provided to patient:  No  ED Medications:   Medications   cefTRIAXone (ROCEPHIN) 1 g vial to attach to  mL bag for ADULTS or NS 50 mL bag for PEDS (1 g Intravenous New Bag 2/14/23 1800)   dextrose 5% infusion (has no administration in time range)   lactated ringers BOLUS 500 mL (0 mLs Intravenous Stopped 2/14/23 1744)     Drips infusing:  Yes  For the majority of the shift, the patient's behavior Green. Interventions performed were NA.    Sepsis treatment initiated: No     Patient tested for COVID 19 prior to admission: YES    ED Nurse Name/Phone Number: Shahzad Cody RN,   6:08 PM   RECEIVING UNIT ED HANDOFF REVIEW    Above ED Nurse Handoff Report was reviewed: Yes  Reviewed by: Samson Schroeder RN on February 15, 2023 at 12:41 AM

## 2023-02-15 NOTE — H&P
[facility]  Hospitalist H&P    Name: Sury Barnett      MRN: [id number]  YOB: 1925    Age: 89 year old  Date of admission: 2/14/2023  Primary care provider: Keyla Ceron            Assessment and Plan:     Sury Barnett is a 89 year old female with a history of CVA/intracranial hemorrhage with left-sided hemiplegia, wheelchair bound and a total care who lives at home with her daughter who presents to the emergency room today with decreased appetite over the last 4 days and found to have hyponatremia and suspected urinary tract infection and being admitted to the under observation on 2/14/2020    1. Decreased oral intake  2. Hypernatremia secondary to decreased oral intake and free water deficit  3. Dehydration.  -Per her daughter, her oral intake decreased significantly over the last 4 days.  -On presentation sodium is 153, BUN is 30.  -She is also clinically dehydrated.  -She got Ringer lactate 500 mL IV bolus x1 in the emergency.  -Requested to start patient on D5W at 75 mL.hr..  -Check BMP at midnight and in the morning.  -Adjust IV fluid based on BMP number and hydration status.    4. UTI with microscopic hematuria.  -Patient has leukocytosis, UA also showed WBC of 25 and RBC of 22 suspect more of concentrated urine.  -Got Rocephin 1 g IV x1, and will continue while she is in the hospital, if she discharged she can be discharged on oral Keflex to treat for total of 5 days.  -Follow-up culture and sensitivity  5. Hypertension  -Blood pressure is better,  -Continue lisinopril 5 mg daily.    6. History of CVA/intracranial hemorrhage.  -She is bedbound and wheelchair-bound.  Needs lift to transfer.  -She lives at home with her daughter and she also has aide.  -She has contracture and significant hemiplegia on the left side.  -She has mild speech difficulty but per her daughter she has no swallowing difficulty.  -At this time there is no need for speech-language pathology  eval.  -She is at baseline no indication for PT/OT eval.    7. Cachexia/emaciated/malnutrition  -Patient is getting bone, she is cachectic.  -Encourage oral intake.  -I suspect she is malnourished.  -Her daughter stated that this is almost at his baseline and no need for nutritional consultation.    Clinically Significant Risk Factors Present on Admission         # Hypernatremia: Highest Na = 153 mmol/L in last 2 days, will monitor as appropriate          # Hypertension: home medication list includes antihypertensive(s)              Code status: DNR/DNI per my discussion with her daughter at bedside  Admit to observation unit, expect less than 2 days stay in the hospital  Prophylaxis: PCD's          Chief Complaint:     Decreased oral intake         History of Present Illness:   Sury Barnett is a 89 year old female PMH of CVA/intracranial hemorrhage with left-sided hemiplegia, wheelchair bound and a total care, lives at home with her daughter who presents to the emergency room today with complaint of decreased oral intake for 4 days and elevated blood pressure and found to have hyponatremia and suspected urinary tract infection and being admitted to the hospital.  Per her daughter patient has significantly decreased oral intake over the last 4 days.  She stated she was able to drink but not able to quantify it.  There was no reported nausea, vomiting, diarrhea or constipation, last bowel movement this morning.  No runny nose, sore throat, fever or chills.  Patient has mild cough when she w wakes up in the morning. Patient denied any pain, due to her underlying CVA she was not able to give any detailed history.  History was obtained from my discussion with the patient's daughter, Stephie at the bedside.  I also discussed the case with the ED provider.  The electronic medical record was also reviewed.          Past Medical History:     Past Medical History:   Diagnosis Date     Hypertension      Thyroid disease    Right  frontal CVA/intracranial hemorrhage  Left-sided hemiplegia  Hypothyroidism  Hypernatremia          Past Surgical History:     Past Surgical History:   Procedure Laterality Date     COLONOSCOPY  7/15/2013    Procedure: COLONOSCOPY;  Colonoscopy ;  Surgeon: Maida Rehman MD;  Location:  GI     HEAD & NECK SURGERY      tonsil      TONSILLECTOMY               Social History:     Social History     Tobacco Use     Smoking status: Never     Smokeless tobacco: Not on file   Substance Use Topics     Alcohol use: No             Family History:   The family history was fully reviewed and non-contributory in this case.         Allergies:     Allergies   Allergen Reactions     Alon Flavor Difficulty breathing     Sulfa Drugs Other (See Comments)     Really sick and high fever     Amoxicillin              Medications:     Prior to Admission medications    Medication Sig Last Dose Taking? Auth Provider Long Term End Date   acetaminophen (TYLENOL) 500 MG tablet Take 500 mg by mouth 2 times daily 2/14/2023 at x1 Yes Unknown, Entered By History     levETIRAcetam (KEPPRA) 100 MG/ML solution Take 5 mLs by mouth 2 times daily 2/14/2023 at x1 Yes Unknown, Entered By History     levothyroxine (SYNTHROID/LEVOTHROID) 50 MCG tablet 1 tablet (50 mcg) by Oral or Feeding Tube route every morning (before breakfast) 2/14/2023 Yes Michelle Manzano MD Yes    lisinopril (ZESTRIL) 5 MG tablet Take 1 tablet by mouth daily 2/14/2023 Yes Unknown, Entered By History No    Multiple Vitamin (ONE-A-DAY ESSENTIAL) TABS Take 1 tablet by mouth daily 2/14/2023 Yes Unknown, Entered By History               Review of Systems:     A Comprehensive greater than 10 system review of systems was carried out.  Pertinent positives and negatives are noted above.  Otherwise negative for contributory information.           Physical Exam:   Blood pressure (!) 170/106, pulse 99, temperature 98.6  F (37  C), temperature source Temporal, resp. rate 20, weight 37.2  kg (82 lb 0.2 oz), SpO2 95 %, not currently breastfeeding.  Wt Readings from Last 1 Encounters:   02/14/23 37.2 kg (82 lb 0.2 oz)     Exam:  GENERAL: Cachectic, no apparent distress. Awake, no agitation.  HEENT: Normocephalic, atraumatic. Extraocular movements intact.  CARDIOVASCULAR: Regular rate and rhythm without murmurs or rubs. No S3.  PULMONARY: Clear to auscultation bilaterally.  ABDOMINAL: Soft, non-tender, non-distended. Bowel sounds normoactive.   EXTREMITIES: No cyanosis or clubbing. No appreciable edema.  NEUROLOGICAL: Left side paralysis and contracture.  DERMATOLOGICAL: No rash, ulcer, bruising, nor jaundice.          Data:   EKG: None    Laboratory:  Recent Labs   Lab 02/14/23  1510   WBC 17.9*   HGB 15.6   HCT 49.0*                Lab Results   Component Value Date     02/14/2023     10/11/2019     10/09/2017     10/04/2017    Lab Results   Component Value Date    CHLORIDE 112 02/14/2023    CHLORIDE 105 10/11/2019    CHLORIDE 104 10/09/2017    CHLORIDE 96 10/04/2017    Lab Results   Component Value Date    BUN 30.2 02/14/2023    BUN 11 10/11/2019    BUN 30 10/09/2017    BUN 20 10/04/2017      Lab Results   Component Value Date    POTASSIUM 3.4 02/14/2023    POTASSIUM 4.6 10/11/2019    POTASSIUM 4.3 10/09/2017    POTASSIUM 4.9 10/04/2017    Lab Results   Component Value Date    CO2 32 02/14/2023    CO2 29 10/11/2019    CO2 28 10/09/2017    CO2 28 10/04/2017    Lab Results   Component Value Date    CR 0.66 02/14/2023    CR 0.51 10/11/2019    CR 0.46 10/09/2017    CR 0.38 10/04/2017        No results for input(s): CULT in the last 168 hours.    Imaging:  Recent Results (from the past 24 hour(s))   CT Head w/o Contrast    Narrative    CT HEAD WITHOUT CONTRAST February 14, 2023 2:27 PM    INDICATION: Altered mental status.    TECHNIQUE: CT scan of the head without contrast. Dose reduction  techniques were used.  CONTRAST: None.    COMPARISON: Head CT  9/23/2017.    FINDINGS: No intracranial hemorrhage, extraaxial collection, mass  effect or CT evidence of acute infarct.  Severe presumed chronic small  vessel ischemic changes. Chronic left cerebellar infarct is new versus  2017. Chronic right frontal lobe encephalomalacia at the site of prior  hemorrhage. Mild/moderate ventriculomegaly somewhat disproportionate  to the degree of sulcal enlargement mildly progressed. Osseous  structures are intact. Unremarkable orbits. Shallow fluid level  sphenoid sinuses. Chronic opacification left frontal sinus. Clear  mastoid air cells.      Impression    IMPRESSION:  1.  No intracranial hemorrhage, mass, or definite CT evidence of  recent ischemia.  2.  Mild to moderate ventriculomegaly mildly progressed versus prior.  This may reflect central atrophy. A mild component of underlying  normal pressure hydrocephalus not excluded.  3.  Chronic right frontal lobe encephalomalacia at the site of prior  hemorrhage.  4.  Chronic left cerebellar infarct is new versus 2017.  5.  Inflammatory changes sphenoid sinuses.    ARIS HUITRON MD         SYSTEM ID:  [id number]   XR Chest 2 Views    Narrative    CHEST TWO VIEWS 2/14/2023 2:38 PM     HISTORY: Generalized weakness.    COMPARISON: September 25, 2017       Impression    IMPRESSION: There are no acute infiltrates. The cardiac silhouette is  not enlarged. Pulmonary vasculature is unremarkable.    FABIANA RICO MD         SYSTEM ID:  [id number]       Fabian Nolan MD  [facility] Hospitalist  [address] [facility] [place] [address]  02/14/2023

## 2023-02-15 NOTE — PLAN OF CARE
ROOM # 0207-01    Living Situation (if not independent, order SW consult):  Living at home with daughter.  : Daughter- Crystal    Activity level at baseline: Total assist  Activity level on admit: Total assist    Who will be transporting you at discharge: Daughter.    Patient registered to observation; Patient unable to talk. Patient not accompanied by family during admission. Bill of rights at the bedside table. Pericare and positioning done. I will continue to monitor.

## 2023-02-15 NOTE — PHARMACY-ADMISSION MEDICATION HISTORY
Admission medication history interview status for this patient is complete. See [facility] admission navigator for allergy information, prior to admission medications and immunization status.     Medication history interview done, indicate source(s): Family (daughter Stephie)  Medication history resources (including written lists, pill bottles, clinic record):[place], Care Everywhere  Pharmacy: Discharge Pharmacy    Changes made to PTA medication list:  Added: all meds  Removed: old  entries which pt not taking    Actions taken by pharmacist (provider contacted, etc):sticky note to MD     Additional medication history information:None    Medication reconciliation/reorder completed by provider prior to medication history?  N      Prior to Admission medications    Medication Sig Last Dose Taking? Auth Provider Long Term End Date   acetaminophen (TYLENOL) 500 MG tablet Take 500 mg by mouth 2 times daily 2023 at x1 Yes Unknown, Entered By History     levETIRAcetam (KEPPRA) 100 MG/ML solution Take 5 mLs by mouth 2 times daily 2023 at x1 Yes Unknown, Entered By History     levothyroxine (SYNTHROID/LEVOTHROID) 50 MCG tablet 1 tablet (50 mcg) by Oral or Feeding Tube route every morning (before breakfast) 2023 Yes Michelle Manzano MD Yes    lisinopril (ZESTRIL) 5 MG tablet Take 1 tablet by mouth daily 2023 Yes Unknown, Entered By History No    Multiple Vitamin (ONE-A-DAY ESSENTIAL) TABS Take 1 tablet by mouth daily 2023 Yes Unknown, Entered By History

## 2023-02-15 NOTE — PROGRESS NOTES
[facility]  Internal Medicine  Progress Note    Date of Service: 2/15/2023    Patient: Sury Barnett  MRN: [id number]  Admission Date: 2/14/2023      Assessment & Plan: Sury Barnett is a 89 year old female with a history of CVA/intracranial hemorrhage with left-sided hemiplegia, wheelchair bound and a total care who lives at home with her daughter who presented to the emergency room on 2/13/23 with decreased appetite over the last 4 days and found to have hypernatremia and suspected urinary tract infection.     Hypernatremia/Hyperchloremia secondary to decreased oral intake and free water deficit  Dehydration - Per her daughter, her oral intake decreased significantly over the last 4 days pta.  Sodium on admission 153 and appeared clinically dehydrated.  Started on D5W  - sodium improving to 147 and chloride 108 today  - pt with minimal oral intake  - continue D5W  - repeat BMP later today     UTI with microscopic hematuria - pt presented with decreased oral intake and elevated wbc with abnormal UA.  - wbc improving, remains afebrile  - continue IV Ceftriaxone (day 2)  - follow up urine culture    Hypokalemia - replace per protocol    HTN - continue pta Lisinopril     History of CVA/intracranial hemorrhage.  -She is bedbound and wheelchair-bound.  Needs lift to transfer.  -She lives at home with her daughter and she also has aide.  -She has contracture and significant hemiplegia on the left side.  -She has speech difficulty but per her daughter she has no swallowing difficulty.  -At this time there is no need for speech-language pathology eval.  -She is at baseline no indication for PT/OT eval.  - CT head on admission stable  - continue pta Keppra     Cachexia/emaciated/malnutrition  -Patient is cachectic.  -Encourage oral intake.  -Her daughter stated that this is almost at his baseline and declines the offer for a nutritional consultation.  -add ensure supplements    Hypothyroidism - continue pta  "Levothyroxine    CODE: DNR/DNI  DVT: SCD  Diet/fluids: regular, D5W  Disposition:  Awaiting improvement in electrolytes and patient demonstration of increased po intake.  Likely discharge to home on 2/16/23      Monica Ernandez MS, PA-C  Hospitalist Physician Assistant  [facility]      Subjective & Interval Hx:    Patient is sleeping and does not open eyes to voice.  Yawns loudly.  Nursing reports patient at 1/2 pancake today and minimal fluids.  Daughter at the bedside reports her mother has had decreased oral intake since the weekend.  She is wondering if she is refusing to eat because she is upset.  Patient is usually able to say \"good morning\" but otherwise appears at her baseline per daughter.      Last 24 hr care team notes reviewed.   ROS:  4 point ROS including Respiratory, CV, GI and , other than that noted in the HPI, is negative.    Physical Exam:    Blood pressure (!) 172/96, pulse 85, temperature 98.9  F (37.2  C), temperature source Axillary, resp. rate 16, height 1.499 m (4' 11.02\"), weight 36.8 kg (81 lb 1.6 oz), SpO2 96 %, not currently breastfeeding.  General: sleeping, nad, will not open eyes.  HEENT: AT/NC  Resp: clear to auscultation bilaterally, no crackles or wheezes  Cardiac: regular rate and rhythm, no murmur  Abdomen: Soft, nontender, nondistended. +BS.  Extremities: No LE edema  Skin: Warm and dry  Neuro: Left side paralysis and contracture    Labs & Images:  Reviewed in Epic   Medications:    Current Facility-Administered Medications   Medication     acetaminophen (TYLENOL) tablet 325 mg    Or     acetaminophen (TYLENOL) Suppository 325 mg     cefTRIAXone (ROCEPHIN) 1 g vial to attach to  mL bag for ADULTS or NS 50 mL bag for PEDS     dextrose 5% infusion     levETIRAcetam (KEPPRA) solution 500 mg     levothyroxine (SYNTHROID/LEVOTHROID) tablet 50 mcg     lisinopril (ZESTRIL) tablet 5 mg     melatonin tablet 1 mg     multivitamin w/minerals (THERA-VIT-M) tablet 1 " tablet     ondansetron (ZOFRAN ODT) ODT tab 4 mg    Or     ondansetron (ZOFRAN) injection 4 mg     prochlorperazine (COMPAZINE) injection 5 mg    Or     prochlorperazine (COMPAZINE) tablet 5 mg    Or     prochlorperazine (COMPAZINE) suppository 12.5 mg     senna-docusate (SENOKOT-S/PERICOLACE) 8.6-50 MG per tablet 1 tablet    Or     senna-docusate (SENOKOT-S/PERICOLACE) 8.6-50 MG per tablet 2 tablet

## 2023-02-15 NOTE — PLAN OF CARE
PRIMARY DIAGNOSIS: Hypernatremia/ UTI/ FTT  OUTPATIENT/OBSERVATION GOALS TO BE MET BEFORE DISCHARGE:  1. ADLs back to baseline: Yes     2. Activity and level of assistance: Up with maximum assistance. This is patient's baseline.      3. Pain status: Pain free. Non nonverbal signs of pain present     4. Return to near baseline physical activity: Yes          Discharge Planner Nurse   Safe discharge environment identified: Yes  Barriers to discharge: Yes       Entered by: Aleta Álvarez RN 02/15/2023 12PM   Pt is alert. Unable to assess orientation level, as pt is nonverbal at baseline. BP elevated at 172/96, provider aware. No  new interventions ordered. Other VSS, on RA. Non nonverbal signs of pain or nausea present. Breathing in unlabored and lung sounds clear. Incontinent of bowel and bladder. BM x1. Regular diet, poor appetite, no swallowing difficulty. PO intake encouraged, will trial ensures. PIVx1, infusing D5W at 75ml/hr. Ax2, lift assistance. Repositioned Q2-3hr. Potassium replaced per protocol. magnesium WDL, recheck tomorrow. SW consulted.     Please review provider order for any additional goals.   Nurse to notify provider when observation goals have been met and patient is ready for discharge

## 2023-02-15 NOTE — PLAN OF CARE
PRIMARY DIAGNOSIS: Hypernatremia/ UTI/ FTT  OUTPATIENT/OBSERVATION GOALS TO BE MET BEFORE DISCHARGE:  ADLs back to baseline: Yes     Activity and level of assistance: Up with maximum assistance. This is patient's baseline.      Pain status: Pain free. Non nonverbal signs of pain present     Return to near baseline physical activity: Yes          Discharge Planner Nurse   Safe discharge environment identified: Yes  Barriers to discharge: Yes       Entered by: Aleta Álvarez RN 02/15/2023 4PM   Pt is alert. Unable to assess orientation level, as pt is nonverbal at baseline. BP elevated 150-170s, provider aware. No  new interventions ordered. Other VSS, on RA. Non nonverbal signs of pain or nausea present. Breathing is unlabored and lung sounds clear. Incontinent of bowel and bladder. BM x3. Regular diet, poor appetite, no swallowing difficulty. PO intake encouraged, will trial ensures. PIVx1, infusing D5W at 75ml/hr. Ax2, lift assistance. Repositioned Q2-3hr. Potassium replaced per protocol x2. magnesium WDL, recheck tomorrow. SW consulted.      Please review provider order for any additional goals.   Nurse to notify provider when observation goals have been met and patient is ready for discharge

## 2023-02-16 VITALS
TEMPERATURE: 97.9 F | BODY MASS INDEX: 16.35 KG/M2 | HEIGHT: 59 IN | RESPIRATION RATE: 16 BRPM | DIASTOLIC BLOOD PRESSURE: 100 MMHG | HEART RATE: 87 BPM | WEIGHT: 81.1 LBS | OXYGEN SATURATION: 94 % | SYSTOLIC BLOOD PRESSURE: 161 MMHG

## 2023-02-16 LAB
ANION GAP SERPL CALCULATED.3IONS-SCNC: 4 MMOL/L (ref 7–15)
BACTERIA UR CULT: NORMAL
BUN SERPL-MCNC: 16.7 MG/DL (ref 8–23)
CALCIUM SERPL-MCNC: 9.4 MG/DL (ref 8.2–9.6)
CHLORIDE SERPL-SCNC: 108 MMOL/L (ref 98–107)
CREAT SERPL-MCNC: 0.53 MG/DL (ref 0.51–0.95)
DEPRECATED HCO3 PLAS-SCNC: 32 MMOL/L (ref 22–29)
ERYTHROCYTE [DISTWIDTH] IN BLOOD BY AUTOMATED COUNT: 13.1 % (ref 10–15)
GFR SERPL CREATININE-BSD FRML MDRD: 84 ML/MIN/1.73M2
GLUCOSE SERPL-MCNC: 99 MG/DL (ref 70–99)
HCT VFR BLD AUTO: 41.6 % (ref 35–47)
HGB BLD-MCNC: 13.2 G/DL (ref 11.7–15.7)
MAGNESIUM SERPL-MCNC: 2.4 MG/DL (ref 1.7–2.3)
MCH RBC QN AUTO: 31.7 PG (ref 26.5–33)
MCHC RBC AUTO-ENTMCNC: 31.7 G/DL (ref 31.5–36.5)
MCV RBC AUTO: 100 FL (ref 78–100)
PLATELET # BLD AUTO: 279 10E3/UL (ref 150–450)
POTASSIUM SERPL-SCNC: 3.4 MMOL/L (ref 3.4–5.3)
RBC # BLD AUTO: 4.16 10E6/UL (ref 3.8–5.2)
SODIUM SERPL-SCNC: 144 MMOL/L (ref 136–145)
WBC # BLD AUTO: 12.1 10E3/UL (ref 4–11)

## 2023-02-16 PROCEDURE — G0378 HOSPITAL OBSERVATION PER HR: HCPCS

## 2023-02-16 PROCEDURE — 80048 BASIC METABOLIC PNL TOTAL CA: CPT | Performed by: PHYSICIAN ASSISTANT

## 2023-02-16 PROCEDURE — 99239 HOSP IP/OBS DSCHRG MGMT >30: CPT | Performed by: PHYSICIAN ASSISTANT

## 2023-02-16 PROCEDURE — 85027 COMPLETE CBC AUTOMATED: CPT | Performed by: PHYSICIAN ASSISTANT

## 2023-02-16 PROCEDURE — 250N000013 HC RX MED GY IP 250 OP 250 PS 637: Performed by: INTERNAL MEDICINE

## 2023-02-16 PROCEDURE — 36415 COLL VENOUS BLD VENIPUNCTURE: CPT | Performed by: PHYSICIAN ASSISTANT

## 2023-02-16 PROCEDURE — 83735 ASSAY OF MAGNESIUM: CPT | Performed by: PHYSICIAN ASSISTANT

## 2023-02-16 RX ORDER — CEFDINIR 300 MG/1
300 CAPSULE ORAL 2 TIMES DAILY
Qty: 10 CAPSULE | Refills: 0 | Status: SHIPPED | OUTPATIENT
Start: 2023-02-16 | End: 2023-02-21

## 2023-02-16 RX ADMIN — MULTIPLE VITAMINS W/ MINERALS TAB 1 TABLET: TAB at 09:04

## 2023-02-16 RX ADMIN — LISINOPRIL 5 MG: 5 TABLET ORAL at 09:04

## 2023-02-16 RX ADMIN — LEVETIRACETAM 500 MG: 100 SOLUTION ORAL at 09:04

## 2023-02-16 RX ADMIN — ACETAMINOPHEN 325 MG: 325 TABLET ORAL at 09:04

## 2023-02-16 RX ADMIN — LEVOTHYROXINE SODIUM 50 MCG: 50 TABLET ORAL at 07:02

## 2023-02-16 ASSESSMENT — ACTIVITIES OF DAILY LIVING (ADL)
ADLS_ACUITY_SCORE: 55

## 2023-02-16 NOTE — PLAN OF CARE
"   PRIMARY DIAGNOSIS: HYPERNATREMIA/HYPERCHLOREMIA SECONDARY TO DECREASED ORAL INTAKE/ FAILURE TO THRIVE OUTPATIENT/OBSERVATION GOALS TO BE MET BEFORE DISCHARGE:  1. ADLs back to baseline: No    2. Activity and level of assistance: Up with maximum assistance. Consider SW and/or PT evaluation.     3. Pain status: Pain free.    4. Return to near baseline physical activity: No     Discharge Planner Nurse   Safe discharge environment identified: Yes  Barriers to discharge: Yes       Entered by: Brittany Gaitan RN 02/16/2023 2:54 AM    BP (!) 154/82 (BP Location: Left arm)   Pulse 84   Temp 97.4  F (36.3  C) (Oral)   Resp 16   Ht 1.499 m (4' 11.02\")   Wt 36.8 kg (81 lb 1.6 oz)   SpO2 96%   BMI 16.37 kg/m      Please review provider order for any additional goals.   Nurse to notify provider when observation goals have been met and patient is ready for discharge.      "

## 2023-02-16 NOTE — PLAN OF CARE
PRIMARY DIAGNOSIS: GENERALIZED WEAKNESS    OUTPATIENT/OBSERVATION GOALS TO BE MET BEFORE DISCHARGE  1. Orthostatic performed: N/A    2. Tolerating PO medications: Yes    3. Return to near baseline physical activity: Yes    4. Cleared for discharge by consultants (if involved): Yes    Vitals are Temp: 99.3  F (37.4  C) Temp src: Oral BP: (!) 167/91 Pulse: 89   Resp: 16 SpO2: 94 %.    Patient is Alert, awake, non-verbal. Daughter at bed side and patient replies to daughter with shaking head for yes and no's. Daughter reported she takes one tylenol in the morning for generalized joint pain and back pain at baseline. Given PRN tylenol for mild back and generalized pain with effective results. Patient is Saline locked. Regular diet and tolerated breakfast that daughter help feed. She is 2 assist with Lift. Incontinent of bladder and bowel. Plan is for discharge today. SW sent up w/c transport for 12 pm and discharge summary reviewed with daughter at bed side and prescription antibiotic medication filled by pharmacy and given to daughter.     Discharge Planner Nurse   Safe discharge environment identified: Yes  Barriers to discharge: Yes       Entered by: Joselyn Marshall RN 02/16/2023 11:12 AM     Please review provider order for any additional goals.   Nurse to notify provider when observation goals have been met and patient is ready for discharge.

## 2023-02-16 NOTE — PLAN OF CARE
"PRIMARY DIAGNOSIS: HYPERNATREMIA/HYPERCHLOREMIA SECONDARY TO DECREASED ORAL INTAKE/ FAILURE TO THRIVE   OUTPATIENT/OBSERVATION GOALS TO BE MET BEFORE DISCHARGE:  1. ADLs back to baseline: No    2. Activity and level of assistance: Up with maximum assistance. Consider SW and/or PT evaluation.     3. Pain status: Improved-controlled with oral pain medications.    4. Return to near baseline physical activity: No     Discharge Planner Nurse   Safe discharge environment identified: Yes  Barriers to discharge: Yes       Entered by: Brittany Gaitan RN 02/16/2023 5:35 AM   BP (!) 142/83 (BP Location: Left arm)   Pulse 79   Temp 99.4  F (37.4  C) (Oral)   Resp 18   Ht 1.499 m (4' 11.02\")   Wt 36.8 kg (81 lb 1.6 oz)   SpO2 96%   BMI 16.37 kg/m      Pt is alert and arouses to touch and voice, non verbal. /83. CPOT score 0. Lungs clear and equal. SL. Regular diet. Periorbital discoloration noted but no drainage during my shift. Total care. Incontinent. BM x2. Awaiting electrolytes improvement and PO intake. Plan discharge today.   Please review provider order for any additional goals.   Nurse to notify provider when observation goals have been met and patient is ready for discharge.  "

## 2023-02-16 NOTE — PROGRESS NOTES
Care Management Discharge Note    Discharge Date: 02/16/2023     Discharge Disposition: Home    Discharge Transportation:  [facility]    Private pay costs discussed: transportation costs    Patient/Family in Agreement with the Plan:  Yes    Handoff Referral Completed: No    Additional Information:  Discharge to home today. Dtr requesting assistance with transportation. [facility] transport arranged for today at 12pm. SW will remain available for any further needs.     CATHY Dixon, [facility]   Inpatient Care Coordination  [facility]   [phone]

## 2023-02-16 NOTE — PROGRESS NOTES
Patient's After Visit Summary was reviewed with patient and/or family.   Patient verbalized understanding of After Visit Summary, recommended follow up and was given an opportunity to ask questions. IV removed.  Discharge medications sent home with patient/family: YES, antibiotics  Discharged with daughter and EMT w/ wheelchair transport.

## 2023-02-16 NOTE — PROGRESS NOTES
PRIMARY DIAGNOSIS:   OUTPATIENT/OBSERVATION GOALS TO BE MET BEFORE DISCHARGE:   1. ADLs back to baseline: Yes    2. Activity and level of assistance: Up with maximum assistance. Consider SW and/or PT evaluation.     3. Pain status: Pain free. No signs of pain present    4. Return to near baseline physical activity: Yes     Discharge Planner Nurse   Safe discharge environment identified: Yes  Barriers to discharge: Yes       Entered by: Marcela Denton RN 02/15/2023      Pt is alert, nonverbal. VSS on RA. Incontinent of bowel and bladder. Regular diet, poor appetite. Daughter at bedside feeding pt. Takes food well  by syringe.  Swelling, purple/blue discoloration and drainage noted around eyes. PIV infusing D5W at 75ml/hr. SW consulted.   Please review provider order for any additional goals.   Nurse to notify provider when observation goals have been met and patient is ready for discharge.

## 2023-02-17 NOTE — DISCHARGE SUMMARY
[facility] Discharge Summary          Sury Barnett MRN# [id number]   Age: 89 year old YOB: 1925     Date of Admission:  2/14/2023  Date of Discharge::  2/16/2023  Admitting Physician:  Fabian Nolan MD  Discharge Physician:  Monica Ernandez PA-C  Primary Physician: Keyla Ceron     Primary Discharge Diagnoses:   Hypernatremia/Hyperchloremia secondary to decreased oral intake and free water deficit  Dehydration    Hospital Course:   For detail history, please refer to H & P from 2/14/2023. In brief, this is a 89 year old female with a history of CVA/intracranial hemorrhage with left-sided hemiplegia, wheelchair bound and a total care who lives at home with her daughter who presented to the emergency room on 2/13/23 with decreased appetite over the last 4 days and found to have hypernatremia and suspected urinary tract infection.     Hypernatremia/Hyperchloremia secondary to decreased oral intake and free water deficit  Dehydration - Per her daughter, her oral intake decreased significantly over the last 4 days pta.  Sodium on admission 153 and appeared clinically dehydrated.  Started on D5W with improvement is sodium to 142-144.  Patient demonstrated improvement in her lethargy and tolerated po intake.  Family instructed to continue to encourage water intake.      UTI with microscopic hematuria - pt presented with decreased oral intake and elevated wbc with abnormal UA.  WBC improved, patient remained afebrile.  Mentation improved with IVF and abx.  Patient was treated with IV Ceftriaxone and discharged on Cefdinir.  Final urine culture was pending at the time of discharge      Hypokalemia, resolved - replaced per protocol     HTN - continued pta Lisinopril     History of CVA/intracranial hemorrhage.  -She is bedbound and wheelchair-bound.  Needs lift to transfer.  -She lives at home with her daughter and she also has aide.  -She has contracture and significant hemiplegia on  the left side.  -She has speech difficulty but per her daughter she has no   -She is at baseline no indication for PT/OT eval.  - CT head on admission stable  - continue pta Keppra  - patient was at her baseline at the time of discahrge     Cachexia/emaciated/malnutrition  -Patient is cachectic.  -Encourage oral intake.  -Her daughter stated patient was at her baseline and declined the offer for a nutritional consultation.  Ensure supplements between meals and increased free water intake was discussed with the daughter who is her caretaker.  -add ensure supplements     Hypothyroidism - continued pta Levothyroxine    Procedures/Imaging:     Results for orders placed or performed during the hospital encounter of 02/14/23   CT Head w/o Contrast    Narrative    CT HEAD WITHOUT CONTRAST February 14, 2023 2:27 PM    INDICATION: Altered mental status.    TECHNIQUE: CT scan of the head without contrast. Dose reduction  techniques were used.  CONTRAST: None.    COMPARISON: Head CT 9/23/2017.    FINDINGS: No intracranial hemorrhage, extraaxial collection, mass  effect or CT evidence of acute infarct.  Severe presumed chronic small  vessel ischemic changes. Chronic left cerebellar infarct is new versus  2017. Chronic right frontal lobe encephalomalacia at the site of prior  hemorrhage. Mild/moderate ventriculomegaly somewhat disproportionate  to the degree of sulcal enlargement mildly progressed. Osseous  structures are intact. Unremarkable orbits. Shallow fluid level  sphenoid sinuses. Chronic opacification left frontal sinus. Clear  mastoid air cells.      Impression    IMPRESSION:  1.  No intracranial hemorrhage, mass, or definite CT evidence of  recent ischemia.  2.  Mild to moderate ventriculomegaly mildly progressed versus prior.  This may reflect central atrophy. A mild component of underlying  normal pressure hydrocephalus not excluded.  3.  Chronic right frontal lobe encephalomalacia at the site of  "prior  hemorrhage.  4.  Chronic left cerebellar infarct is new versus 2017.  5.  Inflammatory changes sphenoid sinuses.    ARIS HUITRON MD         SYSTEM ID:  [id number]   XR Chest 2 Views    Narrative    CHEST TWO VIEWS 2/14/2023 2:38 PM     HISTORY: Generalized weakness.    COMPARISON: September 25, 2017       Impression    IMPRESSION: There are no acute infiltrates. The cardiac silhouette is  not enlarged. Pulmonary vasculature is unremarkable.    FABIANA RICO MD         SYSTEM ID:  [id number]     Allergies:     Allergies   Allergen Reactions     Alon Flavor Difficulty breathing     Sulfa Drugs Other (See Comments)     Really sick and high fever     Amoxicillin         Subjective:   Patient is alert, nods her head.  Tolerated soup last night for dinner and ate today.  Daughter feels she is much improved today and is asking to discharge home.    Physical Exam:   Blood pressure (!) 161/100, pulse 87, temperature 97.9  F (36.6  C), temperature source Axillary, resp. rate 16, height 1.499 m (4' 11.02\"), weight 36.8 kg (81 lb 1.6 oz), SpO2 94 %, not currently breastfeeding.  General: awake, eyes open, nods head when I speak to her.  Aware of her daughter in the room.  HEENT: AT/NC  Resp: clear to auscultation bilaterally, no crackles or wheezes  Cardiac: regular rate and rhythm, no murmur  Abdomen: Soft, nontender, nondistended. +BS.  Extremities: No LE edema  Skin: Warm and dry  Neuro: Left side paralysis and contracture   Discharge Medicatios:        Discharge Medication List as of 2/16/2023 10:59 AM      START taking these medications    Details   cefdinir (OMNICEF) 300 MG capsule Take 1 capsule (300 mg) by mouth 2 times daily for 5 days, Disp-10 capsule, R-0, E-Prescribe         CONTINUE these medications which have NOT CHANGED    Details   acetaminophen (TYLENOL) 500 MG tablet Take 500 mg by mouth 2 times daily, Historical      levETIRAcetam (KEPPRA) 100 MG/ML solution Take 5 mLs by mouth 2 times daily, " Historical      levothyroxine (SYNTHROID/LEVOTHROID) 50 MCG tablet 1 tablet (50 mcg) by Oral or Feeding Tube route every morning (before breakfast), Disp-30 tablet, Transitional      lisinopril (ZESTRIL) 5 MG tablet Take 1 tablet by mouth daily, Historical      Multiple Vitamin (ONE-A-DAY ESSENTIAL) TABS Take 1 tablet by mouth daily, Historical             Instructions Given to Patient as Discharge:     Discharge Procedure Orders   Reason for your hospital stay   Order Comments: You were hospitalized due to dehydration and urinary tract infection.     Follow-up and recommended labs and tests    Order Comments: Follow up with PCP in one week.  Repeat electrolytes and UA is recommended.     Activity   Order Comments: Your activity upon discharge: activity as tolerated     Order Specific Question Answer Comments   Is discharge order? Yes      Diet   Order Comments: Follow this diet upon discharge: Orders Placed This Encounter      Snacks/Supplements Adult: Ensure Enlive; Between Meals      Regular Diet Adult.  Continue to offer water frequently.     Order Specific Question Answer Comments   Is discharge order? Yes        Pending Tests at Discharge:   Urine culture    Discharge Disposition:     Discharged to home     Monica GALLEGO-PRAVEEN  Hospitalist Service  Pager [phone]    >30 minutes was spent in discharge planning, care coordination, physical examination and medication reconciliation on the date of discharge, 2/16/2023

## 2023-02-18 ENCOUNTER — PATIENT OUTREACH (OUTPATIENT)
Dept: CARE COORDINATION | Facility: CLINIC | Age: 88
End: 2023-02-18
Payer: MEDICARE

## 2023-02-18 NOTE — PROGRESS NOTES
"Clinic Care Coordination Contact  [facility]: Post-Discharge Note  SITUATION                                                      Admission:    Admission Date: 02/14/23   Reason for Admission: Decreased oral intake, suspected UTI  Discharge:   Discharge Date: 02/16/23  Discharge Diagnosis: Decreased oral intake, Hypernatremia secondary to decreased oral intake and free water deficit, dehydration, UTI with microscopic hematuria, Hypertension, History of CVA/intracranial hemorrhage, Cachexia/emaciated/malnutrition    BACKGROUND                                                      Per hospital discharge summary and inpatient provider notes:    Sury Barnett is a 89 year old female PMH of CVA/intracranial hemorrhage with left-sided hemiplegia, wheelchair bound and a total care, lives at home with her daughter who presents to the emergency room today with complaint of decreased oral intake for 4 days and elevated blood pressure and found to have hyponatremia and suspected urinary tract infection and being admitted to the hospital.  Per her daughter patient has significantly decreased oral intake over the last 4 days.  She stated she was able to drink but not able to quantify it.  There was no reported nausea, vomiting, diarrhea or constipation, last bowel movement this morning.  No runny nose, sore throat, fever or chills.  Patient has mild cough when she w wakes up in the morning. Patient denied any pain, due to her underlying CVA she was not able to give any detailed history.  History was obtained from my discussion with the patient's daughter, Stephie at the bedside.  I also discussed the case with the ED provider.  The electronic medical record was also reviewed.    ASSESSMENT      Spoke with Crystal (pt's daughter) - Consent to communicate is on file.    Discharge Assessment  How are you doing now that you are home?: \"She's eating and drinking more fluids and one of her care takers is with her now\"  How are your " symptoms? (Red Flag symptoms escalate to triage hotline per guidelines): Improved  Do you feel your condition is stable enough to be safe at home until your provider visit?: Yes  Does the patient have their discharge instructions? : Yes  Does the patient have questions regarding their discharge instructions? : No  Were you started on any new medications or were there changes to any of your previous medications? : Yes  Does the patient have all of their medications?: Yes  Do you have questions regarding any of your medications? : No  Do you have all of your needed medical supplies or equipment (DME)?  (i.e. oxygen tank, CPAP, cane, etc.): Yes  Discharge follow-up appointment scheduled within 14 calendar days? : No  Is patient agreeable to assistance with scheduling? : No (Pt's daughter will call her PCP on Monday to discuss hospitalization and need for follow up as it is difficult to transport pt.)    Post-op ([facility] CTA Only)  If the patient had a surgery or procedure, do they have any questions for a nurse?: No    PLAN                                                      Outpatient Plan:      Follow up with PCP in one week. Repeat electrolytes and UA is recommended.    No future appointments.      For any urgent concerns, please contact our 24 hour nurse triage line: [phone] ([phone])       Monica Cotto  Community Health Worker  Connected Care Resource [facility]  Ph: [phone]

## 2024-01-01 ENCOUNTER — HOSPITAL ENCOUNTER (OUTPATIENT)
Facility: CLINIC | Age: 89
Setting detail: OBSERVATION
End: 2024-10-27
Attending: EMERGENCY MEDICINE | Admitting: INTERNAL MEDICINE
Payer: MEDICARE

## 2024-01-01 VITALS
OXYGEN SATURATION: 81 % | HEART RATE: 105 BPM | SYSTOLIC BLOOD PRESSURE: 73 MMHG | TEMPERATURE: 98.2 F | DIASTOLIC BLOOD PRESSURE: 39 MMHG | RESPIRATION RATE: 37 BRPM

## 2024-01-01 DIAGNOSIS — R65.21 SEPTIC SHOCK (H): ICD-10-CM

## 2024-01-01 DIAGNOSIS — N17.9 ACUTE KIDNEY INJURY (H): ICD-10-CM

## 2024-01-01 DIAGNOSIS — Z51.5 END OF LIFE CARE: ICD-10-CM

## 2024-01-01 DIAGNOSIS — A41.9 SEPTIC SHOCK (H): ICD-10-CM

## 2024-01-01 DIAGNOSIS — J96.02 ACUTE RESPIRATORY FAILURE WITH HYPOXIA AND HYPERCAPNIA (H): ICD-10-CM

## 2024-01-01 DIAGNOSIS — R79.89 ELEVATED LACTIC ACID LEVEL: ICD-10-CM

## 2024-01-01 DIAGNOSIS — J96.01 ACUTE RESPIRATORY FAILURE WITH HYPOXIA AND HYPERCAPNIA (H): ICD-10-CM

## 2024-01-01 LAB
ACINETOBACTER SPECIES: NOT DETECTED
ALBUMIN SERPL BCG-MCNC: 4.1 G/DL (ref 3.5–5.2)
ALP SERPL-CCNC: 137 U/L (ref 40–150)
ALT SERPL W P-5'-P-CCNC: 10 U/L (ref 0–50)
ANION GAP SERPL CALCULATED.3IONS-SCNC: 32 MMOL/L (ref 7–15)
AST SERPL W P-5'-P-CCNC: 35 U/L (ref 0–45)
BASE EXCESS BLDV CALC-SCNC: -17.3 MMOL/L (ref -3–3)
BASOPHILS # BLD AUTO: 0.1 10E3/UL (ref 0–0.2)
BASOPHILS NFR BLD AUTO: 0 %
BILIRUB SERPL-MCNC: 0.5 MG/DL
BUN SERPL-MCNC: 16.5 MG/DL (ref 8–23)
CALCIUM SERPL-MCNC: 9.9 MG/DL (ref 8.8–10.4)
CHLORIDE SERPL-SCNC: 102 MMOL/L (ref 98–107)
CITROBACTER SPECIES: NOT DETECTED
CREAT SERPL-MCNC: 1.29 MG/DL (ref 0.51–0.95)
CTX-M: NOT DETECTED
EGFRCR SERPLBLD CKD-EPI 2021: 37 ML/MIN/1.73M2
ENTEROBACTER SPECIES: NOT DETECTED
EOSINOPHIL # BLD AUTO: 0 10E3/UL (ref 0–0.7)
EOSINOPHIL NFR BLD AUTO: 0 %
ERYTHROCYTE [DISTWIDTH] IN BLOOD BY AUTOMATED COUNT: 12.8 % (ref 10–15)
ESCHERICHIA COLI: NOT DETECTED
GLUCOSE SERPL-MCNC: 127 MG/DL (ref 70–99)
HCO3 BLDV-SCNC: 14 MMOL/L (ref 21–28)
HCO3 SERPL-SCNC: 11 MMOL/L (ref 22–29)
HCT VFR BLD AUTO: 47 % (ref 35–47)
HGB BLD-MCNC: 14.3 G/DL (ref 11.7–15.7)
IMM GRANULOCYTES # BLD: 2.2 10E3/UL
IMM GRANULOCYTES NFR BLD: 5 %
IMP: NOT DETECTED
KLEBSIELLA OXYTOCA: NOT DETECTED
KLEBSIELLA PNEUMONIAE: DETECTED
KPC: NOT DETECTED
LACTATE SERPL-SCNC: 16 MMOL/L (ref 0.7–2)
LYMPHOCYTES # BLD AUTO: 0.9 10E3/UL (ref 0.8–5.3)
LYMPHOCYTES NFR BLD AUTO: 2 %
MCH RBC QN AUTO: 32.6 PG (ref 26.5–33)
MCHC RBC AUTO-ENTMCNC: 30.4 G/DL (ref 31.5–36.5)
MCV RBC AUTO: 107 FL (ref 78–100)
MONOCYTES # BLD AUTO: 0.8 10E3/UL (ref 0–1.3)
MONOCYTES NFR BLD AUTO: 2 %
NDM: NOT DETECTED
NEUTROPHILS # BLD AUTO: 41.1 10E3/UL (ref 1.6–8.3)
NEUTROPHILS NFR BLD AUTO: 91 %
NRBC # BLD AUTO: 0 10E3/UL
NRBC BLD AUTO-RTO: 0 /100
O2/TOTAL GAS SETTING VFR VENT: 15 %
OXA (DETECTED/NOT DETECTED): NOT DETECTED
OXYHGB MFR BLDV: 30 % (ref 70–75)
PCO2 BLDV: 57 MM HG (ref 40–50)
PH BLDV: 7.01 [PH] (ref 7.32–7.43)
PLATELET # BLD AUTO: 233 10E3/UL (ref 150–450)
PO2 BLDV: 28 MM HG (ref 25–47)
POTASSIUM SERPL-SCNC: 3.5 MMOL/L (ref 3.4–5.3)
PROCALCITONIN SERPL IA-MCNC: 80.28 NG/ML
PROT SERPL-MCNC: 8 G/DL (ref 6.4–8.3)
PROTEUS SPECIES: NOT DETECTED
PSEUDOMONAS AERUGINOSA: NOT DETECTED
RBC # BLD AUTO: 4.38 10E6/UL (ref 3.8–5.2)
SAO2 % BLDV: 30.2 % (ref 70–75)
SODIUM SERPL-SCNC: 145 MMOL/L (ref 135–145)
VIM: NOT DETECTED
WBC # BLD AUTO: 45.1 10E3/UL (ref 4–11)

## 2024-01-01 PROCEDURE — 96375 TX/PRO/DX INJ NEW DRUG ADDON: CPT

## 2024-01-01 PROCEDURE — 83605 ASSAY OF LACTIC ACID: CPT | Performed by: EMERGENCY MEDICINE

## 2024-01-01 PROCEDURE — 84145 PROCALCITONIN (PCT): CPT | Performed by: EMERGENCY MEDICINE

## 2024-01-01 PROCEDURE — 99222 1ST HOSP IP/OBS MODERATE 55: CPT | Performed by: INTERNAL MEDICINE

## 2024-01-01 PROCEDURE — 93005 ELECTROCARDIOGRAM TRACING: CPT

## 2024-01-01 PROCEDURE — 85025 COMPLETE CBC W/AUTO DIFF WBC: CPT | Performed by: EMERGENCY MEDICINE

## 2024-01-01 PROCEDURE — 87149 DNA/RNA DIRECT PROBE: CPT | Performed by: EMERGENCY MEDICINE

## 2024-01-01 PROCEDURE — G0378 HOSPITAL OBSERVATION PER HR: HCPCS

## 2024-01-01 PROCEDURE — 96374 THER/PROPH/DIAG INJ IV PUSH: CPT

## 2024-01-01 PROCEDURE — 99285 EMERGENCY DEPT VISIT HI MDM: CPT | Mod: 25

## 2024-01-01 PROCEDURE — 36415 COLL VENOUS BLD VENIPUNCTURE: CPT | Performed by: EMERGENCY MEDICINE

## 2024-01-01 PROCEDURE — 250N000011 HC RX IP 250 OP 636: Performed by: EMERGENCY MEDICINE

## 2024-01-01 PROCEDURE — 80053 COMPREHEN METABOLIC PANEL: CPT | Performed by: EMERGENCY MEDICINE

## 2024-01-01 PROCEDURE — 87040 BLOOD CULTURE FOR BACTERIA: CPT | Performed by: EMERGENCY MEDICINE

## 2024-01-01 PROCEDURE — 82805 BLOOD GASES W/O2 SATURATION: CPT | Performed by: EMERGENCY MEDICINE

## 2024-01-01 PROCEDURE — 250N000011 HC RX IP 250 OP 636: Performed by: INTERNAL MEDICINE

## 2024-01-01 PROCEDURE — 258N000003 HC RX IP 258 OP 636: Performed by: EMERGENCY MEDICINE

## 2024-01-01 RX ORDER — CARBOXYMETHYLCELLULOSE SODIUM 5 MG/ML
1-2 SOLUTION/ DROPS OPHTHALMIC
Status: DISCONTINUED | OUTPATIENT
Start: 2024-01-01 | End: 2024-01-01 | Stop reason: HOSPADM

## 2024-01-01 RX ORDER — ONDANSETRON 4 MG/1
4 TABLET, ORALLY DISINTEGRATING ORAL EVERY 6 HOURS PRN
Status: DISCONTINUED | OUTPATIENT
Start: 2024-01-01 | End: 2024-01-01 | Stop reason: HOSPADM

## 2024-01-01 RX ORDER — NALOXONE HYDROCHLORIDE 0.4 MG/ML
0.2 INJECTION, SOLUTION INTRAMUSCULAR; INTRAVENOUS; SUBCUTANEOUS
Status: DISCONTINUED | OUTPATIENT
Start: 2024-01-01 | End: 2024-01-01 | Stop reason: HOSPADM

## 2024-01-01 RX ORDER — MORPHINE SULFATE 4 MG/ML
4 INJECTION, SOLUTION INTRAMUSCULAR; INTRAVENOUS
Status: DISCONTINUED | OUTPATIENT
Start: 2024-01-01 | End: 2024-01-01 | Stop reason: HOSPADM

## 2024-01-01 RX ORDER — ACETAMINOPHEN 650 MG/1
650 SUPPOSITORY RECTAL EVERY 4 HOURS PRN
Status: DISCONTINUED | OUTPATIENT
Start: 2024-01-01 | End: 2024-01-01 | Stop reason: HOSPADM

## 2024-01-01 RX ORDER — NALOXONE HYDROCHLORIDE 0.4 MG/ML
0.4 INJECTION, SOLUTION INTRAMUSCULAR; INTRAVENOUS; SUBCUTANEOUS
Status: DISCONTINUED | OUTPATIENT
Start: 2024-01-01 | End: 2024-01-01 | Stop reason: HOSPADM

## 2024-01-01 RX ORDER — LEVETIRACETAM 100 MG/ML
500 SOLUTION ORAL 2 TIMES DAILY
Status: COMPLETED | OUTPATIENT
Start: 2024-01-01 | End: 2024-01-01

## 2024-01-01 RX ORDER — LEVOTHYROXINE SODIUM 25 UG/1
25 TABLET ORAL EVERY MORNING
COMMUNITY

## 2024-01-01 RX ORDER — LORAZEPAM 2 MG/ML
1 INJECTION INTRAMUSCULAR
Status: DISCONTINUED | OUTPATIENT
Start: 2024-01-01 | End: 2024-01-01 | Stop reason: HOSPADM

## 2024-01-01 RX ORDER — GLYCOPYRROLATE 0.2 MG/ML
0.2 INJECTION, SOLUTION INTRAMUSCULAR; INTRAVENOUS EVERY 4 HOURS PRN
Status: DISCONTINUED | OUTPATIENT
Start: 2024-01-01 | End: 2024-01-01 | Stop reason: HOSPADM

## 2024-01-01 RX ORDER — AMOXICILLIN 250 MG
1 CAPSULE ORAL 2 TIMES DAILY PRN
Status: DISCONTINUED | OUTPATIENT
Start: 2024-01-01 | End: 2024-01-01 | Stop reason: HOSPADM

## 2024-01-01 RX ORDER — ONDANSETRON 2 MG/ML
4 INJECTION INTRAMUSCULAR; INTRAVENOUS EVERY 6 HOURS PRN
Status: DISCONTINUED | OUTPATIENT
Start: 2024-01-01 | End: 2024-01-01 | Stop reason: HOSPADM

## 2024-01-01 RX ORDER — AMOXICILLIN 250 MG
2 CAPSULE ORAL 2 TIMES DAILY PRN
Status: DISCONTINUED | OUTPATIENT
Start: 2024-01-01 | End: 2024-01-01 | Stop reason: HOSPADM

## 2024-01-01 RX ORDER — ACETAMINOPHEN 325 MG/1
650 TABLET ORAL EVERY 4 HOURS PRN
Status: DISCONTINUED | OUTPATIENT
Start: 2024-01-01 | End: 2024-01-01 | Stop reason: HOSPADM

## 2024-01-01 RX ADMIN — LORAZEPAM 1 MG: 2 INJECTION INTRAMUSCULAR; INTRAVENOUS at 01:06

## 2024-01-01 RX ADMIN — SODIUM CHLORIDE 1000 ML: 9 INJECTION, SOLUTION INTRAVENOUS at 23:48

## 2024-01-01 RX ADMIN — MORPHINE SULFATE 4 MG: 4 INJECTION INTRAVENOUS at 03:04

## 2024-01-01 ASSESSMENT — ACTIVITIES OF DAILY LIVING (ADL)
ADLS_ACUITY_SCORE: 0

## 2024-01-01 ASSESSMENT — COLUMBIA-SUICIDE SEVERITY RATING SCALE - C-SSRS: IS THE PATIENT NOT ABLE TO COMPLETE C-SSRS: UNABLE TO VERBALIZE

## 2024-09-09 NOTE — PLAN OF CARE
"Problem: Patient Care Overview (Adult)  Goal: Plan of Care Review  Outcome: Improving  More alert today, smiling at RN and making thumbs up/down and \"OK\" hand gestures, as well as nodding and shaking her head for yes and no. Mouthed \"goodbye\" and verbalized \"yes\" x1. Bedrest. Tolerating TF running at 35 mL/hr with 200 mL flushes of water q6 hours. Soft BM, wilver area red and excoriated, cleaned and treated per POC. Pt turned side to side q2 hours. VSS, on RA. Dim LS at bases, requiring infrequent suctioning. Possible DC to [facility] tomorrow.     Problem: Patient Care Overview  Goal: Plan of Care Review  Outcome: Improving  More alert today, smiling at RN and making thumbs up/down and \"OK\" hand gestures, as well as nodding and shaking her head for yes and no. Mouthed \"goodbye\" and verbalized \"yes\" x1. Bedrest. Tolerating TF running at 35 mL/hr with 200 mL flushes of water q6 hours. Soft BM, wilver area red and excoriated, cleaned and treated per POC. Pt turned side to side q2 hours. VSS, on RA. Dim LS at bases, requiring infrequent suctioning. Possible DC to [facility] tomorrow.       " Please see other triage encounter

## 2024-10-27 PROBLEM — R79.89 ELEVATED LACTIC ACID LEVEL: Status: ACTIVE | Noted: 2024-01-01

## 2024-10-27 PROBLEM — J96.01 ACUTE RESPIRATORY FAILURE WITH HYPOXIA AND HYPERCAPNIA (H): Status: ACTIVE | Noted: 2024-01-01

## 2024-10-27 PROBLEM — J96.02 ACUTE RESPIRATORY FAILURE WITH HYPOXIA AND HYPERCAPNIA (H): Status: ACTIVE | Noted: 2024-01-01

## 2024-10-27 PROBLEM — N17.9 ACUTE KIDNEY INJURY (H): Status: ACTIVE | Noted: 2024-01-01

## 2024-10-27 PROBLEM — Z51.5 END OF LIFE CARE: Status: ACTIVE | Noted: 2024-01-01

## 2024-10-27 PROBLEM — A41.9 SEPTIC SHOCK (H): Status: ACTIVE | Noted: 2024-01-01

## 2024-10-27 PROBLEM — R65.21 SEPTIC SHOCK (H): Status: ACTIVE | Noted: 2024-01-01

## 2024-10-27 NOTE — H&P
History and Physical     Sury Barnett MRN# [id number]   YOB: 1925 Age: 89 year old      Date of Admission:  10/26/2024    Primary care provider: Keyla Ceron          Assessment and Plan:     Summary of Stay: Sury Barnett is a 89 year old female with a history of  htn, ICH in 2019 complicated by seizure, left sided weakness bed/wheelchair bound, hx of hypernatremia 2/2 FTT admitted on 10/26/2024 with septic shock     She lives with her dtr and hasn't been out of the house in over a year.  She is predominantly bed bound and they use a lift to move her about.  I believe has a wheelchair and occasionally with sit in the back yard.  She had FTT and at discharge from her hospitalization 2/2023 it was recommended that she go on hospice.  She enrolled in hospice only to graduate out of hospice earlier this month.      She is brought in by ambulance to the ER for AMS.  She wasn't herself at dinner and only took 3 mouthfuls of food.  She then vomited in bed while napping.  She was noted to be tachypnic and so her dtr called the triage line and she was told to bring her into the ER     ER VS notable for hypoxic respiratory failure with sats 70-80% range.  She's hypotensive, tachycardic, afebrile  CMP with bicarb 11, AG 32, bun/creat 16/1.3   lactate 16.  VBG 7.01/57  CBC leukocytosis 45 with neutrophilia and a left shift.  Procalcitonin 80 (!)  EKG NSR with poor R wave progression and prolonged QTc 550    All in all here with overwhelming sepsis with e/o shock.  Family has elected to pursue comfort measures     Problem List:   Septic shock   Acute hypoxic respiratory failure   AGMA  Everything points to an overwhelming bacterial infection, presumably lung primarily.  No abx, no IVF, no pressors, no VS, focus will be entirely on comfort   I don't anticipate that she'll survive more that hours to a few days at most    Htn  Typically on lisinopril 5 which we will stop     Hx of ICH complicated by seizures    Chronic left sided weakness  Bed/wheelchair bound   Has previously been on levetiracetam 500 mg oral solution bid.  I'm not sure if she is still on it but this would be a reasonable medication to continue for palliative reasons.  Therefore have ordered single dose for tonight     Hypothyroidism   No replacement     Suspected malnutrition based on exam    DVT Prophylaxis: comfort care   Code Status: Comfort Care  Access:  PIV              Time spent 60 minutes reviewing epic including notes/labs/prior hx, current medications.  In addition to interviewing and examining the patient, updated patient and family regarding plan of care          Chief Complaint:     AMS        History of Present Illness:   Sury Barnett is a 89 year old female with a history of  htn, ICH in 2019 complicated by seizure, left sided weakness bed/wheelchair bound, hx of hypernatremia 2/2 FTT admitted on 10/26/2024 with septic shock     She lives with her dtr and hasn't been out of the house in over a year.  She is predominantly bed bound and they use a lift to move her about.  I believe has a wheelchair and occasionally with sit in the back yard.  She had FTT and at discharge from her hospitalization 2/2023 it was recommended that she go on hospice.  She enrolled in hospice only to graduate out of hospice earlier this month.      She is brought in by ambulance to the ER for AMS.  She wasn't herself at dinner and only took 3 mouthfuls of food.  She then vomited in bed while napping.  She was noted to be tachypnic and so her dtr called the triage line and she was told to bring her into the ER     ER VS notable for hypoxic respiratory failure with sats 70-80% range.  She's hypotensive, tachycardic, afebrile  CMP with bicarb 11, AG 32, bun/creat 16/1.3   lactate 16.  VBG 7.01/57  CBC leukocytosis 45 with neutrophilia and a left shift.  Procalcitonin 80 (!)  EKG NSR with poor R wave progression and prolonged QTc 550    All in all here with  overwhelming sepsis with e/o shock.  Family has elected to pursue comfort measures       The history is obtained in discussion with the ER provider  Homero De La Vega MD and the dtr at the bedside.      Epic and Care everywhere were extensively reviewed        Past Medical History:     Past Medical History:   Diagnosis Date    Decubitus ulcer of sacral region, stage 2     Hypertension     Hypothyroidism     Intracranial hemorrhage, history of     Urine              Past Surgical History:     Past Surgical History:   Procedure Laterality Date    COLONOSCOPY  7/15/2013    Procedure: COLONOSCOPY;  Colonoscopy ;  Surgeon: Maida Rehman MD;  Location:  GI    HEAD & NECK SURGERY      tonsil     TONSILLECTOMY               Social History:     Social History     Tobacco Use    Smoking status: Never    Smokeless tobacco: Not on file   Substance Use Topics    Alcohol use: No             Family History:     Family History   Problem Relation Age of Onset    Cancer - colorectal Father     Hypertension Father     Colon Cancer Father     Hypertension Mother     Hyperlipidemia Mother     Hypertension Sister     Hyperlipidemia Sister             Allergies:     Allergies   Allergen Reactions    Alon Flavor Difficulty breathing    Sulfa Antibiotics Other (See Comments)     Really sick and high fever    Amoxicillin              Medications:     Await med rec           Review of Systems:     A Comprehensive greater than 10 system review of systems was carried out.  Pertinent positives and negatives are noted above.  Otherwise negative for contributory information.           Physical Exam:   Blood pressure (!) 73/39, pulse 105, temperature 98.2  F (36.8  C), temperature source Axillary, resp. rate (!) 37, SpO2 (!) 81%, not currently breastfeeding.  Exam:    General, laying on the gurney in the ER.  She is sleeping and appears very comfortable.          Data:     Results for orders placed or performed during the hospital encounter of  10/26/24   Blood gas venous     Status: Abnormal   Result Value Ref Range    pH Venous 7.01 (LL) 7.32 - 7.43    pCO2 Venous 57 (H) 40 - 50 mm Hg    pO2 Venous 28 25 - 47 mm Hg    Bicarbonate Venous 14 (L) 21 - 28 mmol/L    Base Excess/Deficit Venous -17.3 (L) -3.0 - 3.0 mmol/L    FIO2 15     Oxyhemoglobin Venous 30 (L) 70 - 75 %    O2 Sat, Venous 30.2 (L) 70.0 - 75.0 %    Narrative    In healthy individuals, oxyhemoglobin (O2Hb) and oxygen saturation (SO2) are approximately equal. In the presence of dyshemoglobins, oxyhemoglobin can be considerably lower than oxygen saturation.   Comprehensive metabolic panel     Status: Abnormal   Result Value Ref Range    Sodium 145 135 - 145 mmol/L    Potassium 3.5 3.4 - 5.3 mmol/L    Carbon Dioxide (CO2) 11 (L) 22 - 29 mmol/L    Anion Gap 32 (H) 7 - 15 mmol/L    Urea Nitrogen 16.5 8.0 - 23.0 mg/dL    Creatinine 1.29 (H) 0.51 - 0.95 mg/dL    GFR Estimate 37 (L) >60 mL/min/1.73m2    Calcium 9.9 8.8 - 10.4 mg/dL    Chloride 102 98 - 107 mmol/L    Glucose 127 (H) 70 - 99 mg/dL    Alkaline Phosphatase 137 40 - 150 U/L    AST 35 0 - 45 U/L    ALT 10 0 - 50 U/L    Protein Total 8.0 6.4 - 8.3 g/dL    Albumin 4.1 3.5 - 5.2 g/dL    Bilirubin Total 0.5 <=1.2 mg/dL   Lactic acid whole blood     Status: Abnormal   Result Value Ref Range    Lactic Acid 16.0 (HH) 0.7 - 2.0 mmol/L   Procalcitonin     Status: Abnormal   Result Value Ref Range    Procalcitonin 80.28 (HH) <0.50 ng/mL   CBC with platelets and differential     Status: Abnormal   Result Value Ref Range    WBC Count 45.1 (H) 4.0 - 11.0 10e3/uL    RBC Count 4.38 3.80 - 5.20 10e6/uL    Hemoglobin 14.3 11.7 - 15.7 g/dL    Hematocrit 47.0 35.0 - 47.0 %     (H) 78 - 100 fL    MCH 32.6 26.5 - 33.0 pg    MCHC 30.4 (L) 31.5 - 36.5 g/dL    RDW 12.8 10.0 - 15.0 %    Platelet Count 233 150 - 450 10e3/uL    % Neutrophils 91 %    % Lymphocytes 2 %    % Monocytes 2 %    % Eosinophils 0 %    % Basophils 0 %    % Immature Granulocytes 5 %     NRBCs per 100 WBC 0 <1 /100    Absolute Neutrophils 41.1 (H) 1.6 - 8.3 10e3/uL    Absolute Lymphocytes 0.9 0.8 - 5.3 10e3/uL    Absolute Monocytes 0.8 0.0 - 1.3 10e3/uL    Absolute Eosinophils 0.0 0.0 - 0.7 10e3/uL    Absolute Basophils 0.1 0.0 - 0.2 10e3/uL    Absolute Immature Granulocytes 2.2 (H) <=0.4 10e3/uL    Absolute NRBCs 0.0 10e3/uL   EKG 12 lead     Status: None (Preliminary result)   Result Value Ref Range    Systolic Blood Pressure  mmHg    Diastolic Blood Pressure  mmHg    Ventricular Rate 105 BPM    Atrial Rate 105 BPM    ME Interval 126 ms    QRS Duration 78 ms     ms    QTc 552 ms    P Axis 22 degrees    R AXIS -33 degrees    T Axis 49 degrees    Interpretation ECG       Sinus tachycardia  Left axis deviation  Possible Anterior infarct , age undetermined  Prolonged QT  Abnormal ECG  When compared with ECG of 06-Sep-2017 13:10,  QRS axis Shifted left  Nonspecific T wave abnormality now evident in Lateral leads  QT has lengthened     CBC + differential     Status: Abnormal    Narrative    The following orders were created for panel order CBC + differential.  Procedure                               Abnormality         Status                     ---------                               -----------         ------                     CBC with platelets and d...[[id number]]  Abnormal            Final result                 Please view results for these tests on the individual orders.

## 2024-10-27 NOTE — PROGRESS NOTES
MD DEATH PRONOUNCEMENT    Called to pronounce Sury Barnett dead.    Physical Exam: Unresponsive to noxious stimuli, Spontaneous respirations absent, Breath sounds absent, Carotid pulse absent, Heart sounds absent, Pupillary light reflex absent, and Corneal blink reflex absent    Patient was pronounced dead at 9:05 AM, October 27, 2024.      Active Problems:    End of life care    Acute kidney injury (H)    Elevated lactic acid level    Septic shock (H)    Acute respiratory failure with hypoxia and hypercapnia (H)       Infectious disease present?: NO    Communicable disease present? (examples: HIV, chicken pox, TB, Ebola, CJD) :  NO    Multi-drug resistant organism present? (example: MRSA): NO    Body disposition: Autopsy was discussed with family member:  Daughter in person.  Permission for autopsy was declined.  Body released to the morgue.

## 2024-10-27 NOTE — ED PROVIDER NOTES
Emergency Department Note      History of Present Illness     Chief Complaint  Altered Mental Status    HPI  Sury Barnett is a 89 year old female with history of intracranial hemorrhage, hypertension, and hypothyroidism who presents to the ED via EMS with her daughter for evaluation of altered mental status. Her daughter reports  patient wasn't her dinner tonight and only had 3-4 mouthfuls of food. Patient vomited in her bed when she was taking her nap which was unusual. Patient endorsed rapid breathing for 45 minutes- 1 hour, decreased responsiveness, and decreased blood pressure. Her daughter called the triage nurse line and was told to come in. Patient was discharged from hospice 3 weeks ago. Patient is DNR/DNI.     Independent Historian  Daughter as detailed above.    Review of External Notes  Care everywhere reviewed and epic updated    Past Medical History   Medical History and Problem List   Past Medical History:   Diagnosis Date    Decubitus ulcer of sacral region, stage 2     Hypertension     Hypothyroidism     Intracranial hemorrhage, history of     Urine      Medications   levETIRAcetam (KEPPRA) 100 MG/ML solution  levothyroxine (SYNTHROID/LEVOTHROID) 50 MCG tablet  lisinopril (ZESTRIL) 5 MG tablet  Multiple Vitamin (ONE-A-DAY ESSENTIAL) TABS      Surgical History   Past Surgical History:   Procedure Laterality Date    COLONOSCOPY  7/15/2013    Procedure: COLONOSCOPY;  Colonoscopy ;  Surgeon: Maida Rehman MD;  Location:  GI    HEAD & NECK SURGERY      tonsil     TONSILLECTOMY       Physical Exam   Patient Vitals for the past 24 hrs:   BP Temp Temp src Pulse Resp SpO2   10/26/24 2338 -- 98.2  F (36.8  C) Axillary -- -- --   10/26/24 2330  75/43 -- -- 105 -- 80 %   10/26/24 2325  -- -- 116 26 70 %   10/26/24 2320 74/45 -- -- -- -- --     Physical Exam  Nursing note and vitals reviewed.  Constitutional: Minimally responsive with increased work of breathing.  Thin and cachectic appearing  HENT:    Mouth/Throat: Mucous membranes are very dry.   Eyes: Pupils are midline  Cardiovascular: Tachycardic and normal heart sounds.  No murmur.   Pulmonary/Chest: Tachypneic with increased work of breathing  Abdominal: Soft. Normal appearance.   Musculoskeletal: Normal range of motion.   Neurological: GCS 7 (M5, V1, E1)  Skin: Skin is warm and dry. No rash or ulcers noted  Psychiatric: Unable to assess    Diagnostics   Lab Results   Labs Ordered and Resulted from Time of ED Arrival to Time of ED Departure   BLOOD GAS VENOUS - Abnormal       Result Value    pH Venous 7.01 (*)     pCO2 Venous 57 (*)     pO2 Venous 28      Bicarbonate Venous 14 (*)     Base Excess/Deficit Venous -17.3 (*)     FIO2 15      Oxyhemoglobin Venous 30 (*)     O2 Sat, Venous 30.2 (*)    COMPREHENSIVE METABOLIC PANEL - Abnormal    Sodium 145      Potassium 3.5      Carbon Dioxide (CO2) 11 (*)     Anion Gap 32 (*)     Urea Nitrogen 16.5      Creatinine 1.29 (*)     GFR Estimate 37 (*)     Calcium 9.9      Chloride 102      Glucose 127 (*)     Alkaline Phosphatase 137      AST 35      ALT 10      Protein Total 8.0      Albumin 4.1      Bilirubin Total 0.5     LACTIC ACID WHOLE BLOOD - Abnormal    Lactic Acid 16.0 (*)    PROCALCITONIN - Abnormal    Procalcitonin 80.28 (*)    CBC WITH PLATELETS AND DIFFERENTIAL - Abnormal    WBC Count 45.1 (*)     RBC Count 4.38      Hemoglobin 14.3      Hematocrit 47.0       (*)     MCH 32.6      MCHC 30.4 (*)     RDW 12.8      Platelet Count 233      % Neutrophils 91      % Lymphocytes 2      % Monocytes 2      % Eosinophils 0      % Basophils 0      % Immature Granulocytes 5      NRBCs per 100 WBC 0      Absolute Neutrophils 41.1 (*)     Absolute Lymphocytes 0.9      Absolute Monocytes 0.8      Absolute Eosinophils 0.0      Absolute Basophils 0.1      Absolute Immature Granulocytes 2.2 (*)     Absolute NRBCs 0.0     BLOOD CULTURE     EKG   ECG results from 10/26/24   EKG 12 lead     Value    Systolic Blood  Pressure     Diastolic Blood Pressure     Ventricular Rate 105    Atrial Rate 105    NV Interval 126    QRS Duration 78        QTc 552    P Axis 22    R AXIS -33    T Axis 49    Interpretation ECG      Sinus tachycardia  Left axis deviation  Possible Anterior infarct , age undetermined  Prolonged QT     ED Course    Medications Administered  Medications   LORazepam (ATIVAN) injection 1 mg (1 mg Intravenous $Given 10/27/24 0106)   morphine (PF) injection 4 mg (has no administration in time range)   carboxymethylcellulose PF (REFRESH PLUS) 0.5 % ophthalmic solution 1-2 drop (has no administration in time range)   glycopyrrolate (ROBINUL) injection 0.2 mg (has no administration in time range)   sodium chloride 0.9% BOLUS 1,000 mL (0 mLs Intravenous Stopped 10/27/24 0025)     Discussion of Management  Admitting Hospitalist, Dr. Stanton    Additional Documentation  None    ED Course  ED Course as of 10/27/24 0128   Sat Oct 26, 2024   2323 I obtained history and examined the patient as noted above. Daughter arrived and provided history.    2346 I rechecked the patient and explained findings to her daughter.   Sun Oct 27, 2024   0002 I rechecked the patient and spoke with her daughter and son, Jovani who was on the phone. They agree to proceed with Comfort Care.   0128 I spoke with Dr. Stanton, hospitalist, who agreed to admit the patient.      Medical Decision Making / Diagnosis     MDM  This is a 89-year-old female who presents critically ill with shock physiology almost certainly due to sepsis.  After initial discussion with the daughter who is the patient's medical decision-maker and review of the patient's POLST form we initially obtained baseline blood work well the patient's daughter spoke with her brother who is out of state.  After a collaborative conversation the appropriate decision was made to place the patient on comfort care only and end-of-life cares.  We will work on keeping the patient comfortable with  Ativan and morphine for air hunger symptoms as well as Robinul and eyedrops.  Nasal cannula oxygen in place.  Patient will be admitted to observation status to assist with these cares    Disposition  The patient was admitted to the hospital.     ICD-10 Codes:    ICD-10-CM    1. Septic shock (H)  A41.9     R65.21       2. Acute respiratory failure with hypoxia and hypercapnia (H)  J96.01     J96.02       3. Elevated lactic acid level  R79.89       4. End of life care  Z51.5       5. Acute kidney injury (H)  N17.9          Scribe Disclosure:  I, Salma Murillo, am serving as a scribe at 11:37 PM on 10/26/2024 to document services personally performed by Homero De La Vega MD based on my observations and the provider's statements to me.      Homero De La Vega MD  10/27/24 0134

## 2024-10-27 NOTE — PHARMACY-ADMISSION MEDICATION HISTORY
Pharmacy Intern Admission Medication History    Admission medication history is complete. The information provided in this note is only as accurate as the sources available at the time of the update.    Information Source(s): Family member and CareEverywhere/SureScripts via phone    Pertinent Information: Spoke with daughter (Crystal) to verify med list and last doses.    Changes made to PTA medication list:  Added: Tylenol  Deleted: Lisinopril, Multivitamin  Changed: Levothyroxine 50 mcg -> Levothyroxine 25 mcg    Allergies reviewed with patient and updates made in EHR: yes    Medication History Completed By: Tee Dockery 10/27/2024 8:39 AM    PTA Med List   Medication Sig Last Dose/Taking    acetaminophen (TYLENOL) 32 mg/mL liquid Take 20ml (640mg) by mouth three times daily. Not more than 4000mg (125ml) in 24 hours. 10/26/2024 Evening    acetaminophen (TYLENOL) 32 mg/mL liquid Take 31.2ml (1000mg) every 8 hours as needed for pain/fever. Not more than 4000mg (125ml) in 24 hours. Unknown    levothyroxine (SYNTHROID/LEVOTHROID) 25 MCG tablet Take 25 mcg by mouth every morning. 10/26/2024 Morning    levETIRAcetam (KEPPRA) 100 MG/ML solution Take 5 mLs by mouth 2 times daily 10/26/2024 Evening

## 2024-10-27 NOTE — DISCHARGE SUMMARY
[facility]  Hospitalist Discharge Summary      Date of Admission:  10/26/2024  Date of Discharge:  10/27/2024  Discharging Provider: Fabian Nolan MD  Discharge Service: Hospitalist Service    Death Summary  Patient was on comfort care after admission    Discharge Diagnoses   Septic shock   Acute hypoxic respiratory failure   AGMA    Chronic medical conditions  HTN  Hx of ICH complicated by seizures   Chronic left sided weakness  Bed/wheelchair bound   Hpothyroidism   Suspected malnutrition      Clinically Significant Risk Factors        Clinically Significant Risk Factors Present on Admission              # Anion Gap Metabolic Acidosis: Highest Anion Gap = 32 mmol/L in last 2 days, will monitor and treat as appropriate      # Hypertension: Noted on problem list   # Acute Hypoxic Respiratory Failure: Documented O2 saturation < 90%. Continue supplemental oxygen as needed  # Acute Hypercapnic Respiratory Failure: based on venous blood gas results.  Continue supplemental oxygen and ventilatory support as indicated.                   Hospital Course   Please see detailed H&P from earlier this morning by Dr. Angela Stanton.  Sury Barnett is a 89 year old female with a PMH of  HTN, ICH in 2019 complicated by seizure, left sided weakness bed/wheelchair bound, hx of hypernatremia 2/2 FTT admitted to [facility] floor early morning of 10/27/2024 with septic shock on comfort care and  at 9:05 AM.      Consultations This Hospital Stay   None    Code Status   No CPR- Do NOT Intubate         Fabian Nolan MD  [facility] OBSERVATION DEPT  [address] [facility] [address]  Phone: [phone]  _________

## 2024-10-27 NOTE — ED TRIAGE NOTES
Pt BIBA from home. Pt was on hospice care and was discharged home because she was getting better. Tonight, pt's daughter report pt was not eating dinner, vomiting a few time, soon after, pt started have decreased responsiveness, decreased BP and rapid breathing. EMS placed pt on 4L, unable to obtain accurate saturation. End tidal CO2 10. Pt GCS of 3  on arrival. Sat 71% on 15L of oxymask      Triage Assessment (Adult)       Row Name 10/26/24 8107          Triage Assessment    Airway WDL WDL        Respiratory WDL    Respiratory WDL WDL        Skin Circulation/Temperature WDL    Skin Circulation/Temperature WDL X        Cardiac WDL    Cardiac WDL WDL        Peripheral/Neurovascular WDL    Peripheral Neurovascular WDL WDL        Cognitive/Neuro/Behavioral WDL    Cognitive/Neuro/Behavioral WDL X     Level of Consciousness unresponsive     Arousal Level unresponsive     Orientation disoriented x 4     Speech unable to speak        [place] Coma Scale    Best Eye Response 1-->(E1) none     Best Motor Response 1-->(M1) none     Best Verbal Response 1-->(V1) none     [place] Coma Scale Score 3

## 2024-10-27 NOTE — ED NOTES
Pt appears calm and comfortable, pt's daughter was updated about the plan. Was reassure to keep pt comfortable

## 2024-10-27 NOTE — PLAN OF CARE
ROOM # 228    Living Situation (if not independent, order SW consult): home with daughter   Facility name:  : Crystal (daughter)    Activity level at baseline: WC/bed bound  Activity level on admit: bed bound     Who will be transporting you at discharge: TBD    Patient registered to observation; given Patient Bill of Rights; given the opportunity to ask questions about observation status and their plan of care.  Patient has been oriented to the observation room, bathroom and call light is in place.    Discussed discharge goals and expectations with patient/family.         Goal Outcome Evaluation:

## 2024-10-27 NOTE — ED NOTES
Dr. De La Vega had extensive conversation with pt's daughter in person and son over the phone, per family wish that pt's care should focus on comfort measure and end of life care. Per MD, discontinued pt from monitor and provide ativan and morphine for her comfort. Pt continues to be slight tachpneic, but appear comfortable

## 2024-10-27 NOTE — PROGRESS NOTES
Patient passed at 0905, called by Dr. Nolan.  Daughter, Crystal notified.  Life [facility] contacted.

## 2024-10-27 NOTE — ED NOTES
[facility]  ED Nurse Handoff Report    ED Chief complaint: Altered Mental Status  . ED Diagnosis:   Final diagnoses:   Septic shock (H)   Acute respiratory failure with hypoxia and hypercapnia (H)   Elevated lactic acid level   End of life care   Acute kidney injury (H)       Allergies:   Allergies   Allergen Reactions    [place] Flavor Difficulty breathing    Sulfa Antibiotics Other (See Comments)     Really sick and high fever    Amoxicillin        Code Status: DNR / DNI, comfort care only    Activity level - Baseline/Home:  in bed.  Activity Level - Current:   in bed.   Lift room needed: No.   Bariatric: No   Needed: No   Isolation: No.   Infection: Not Applicable.     Respiratory status: Nasal cannula    Vital Signs (within 30 minutes):   Vitals:    10/26/24 2330 10/26/24 2338 10/27/24 0000 10/27/24 0030   BP: (!) 75/43  (!) 73/39    Pulse: 105 103 105    Resp:  (!) 34 (!) 37    Temp:  98.2  F (36.8  C)     TempSrc:  Axillary     SpO2: (!) 80% (!) 83% (!) 84% (!) 81%       Cardiac Rhythm:  ,      Pain level:    Patient confused: Yes.   Patient Falls Risk: patient and family education.   Elimination Status: Unable to void - incontinent    Patient Report - Initial Complaint: Alter mental status.hypotensive .   Focused Assessment: Sury Barnett is a 89 year old female with history of intracranial hemorrhage, hypertension, and hypothyroidism who presents to the ED via EMS with her daughter for evaluation of altered mental status. Her daughter reports  patient wasn't her dinner tonight and only had 3-4 mouthfuls of food. Patient vomited in her bed when she was taking her nap which was unusual. Patient endorsed rapid breathing for 45 minutes- 1 hour, decreased responsiveness, and decreased blood pressure. Her daughter called the triage nurse line and was told to come in. Patient was discharged from hospice 3 weeks ago. Patient is DNR/DNI.      Abnormal Results:   Labs Ordered and Resulted  from Time of ED Arrival to Time of ED Departure   BLOOD GAS VENOUS - Abnormal       Result Value    pH Venous 7.01 (*)     pCO2 Venous 57 (*)     pO2 Venous 28      Bicarbonate Venous 14 (*)     Base Excess/Deficit Venous -17.3 (*)     FIO2 15      Oxyhemoglobin Venous 30 (*)     O2 Sat, Venous 30.2 (*)    COMPREHENSIVE METABOLIC PANEL - Abnormal    Sodium 145      Potassium 3.5      Carbon Dioxide (CO2) 11 (*)     Anion Gap 32 (*)     Urea Nitrogen 16.5      Creatinine 1.29 (*)     GFR Estimate 37 (*)     Calcium 9.9      Chloride 102      Glucose 127 (*)     Alkaline Phosphatase 137      AST 35      ALT 10      Protein Total 8.0      Albumin 4.1      Bilirubin Total 0.5     LACTIC ACID WHOLE BLOOD - Abnormal    Lactic Acid 16.0 (*)    PROCALCITONIN - Abnormal    Procalcitonin 80.28 (*)    CBC WITH PLATELETS AND DIFFERENTIAL - Abnormal    WBC Count 45.1 (*)     RBC Count 4.38      Hemoglobin 14.3      Hematocrit 47.0       (*)     MCH 32.6      MCHC 30.4 (*)     RDW 12.8      Platelet Count 233      % Neutrophils 91      % Lymphocytes 2      % Monocytes 2      % Eosinophils 0      % Basophils 0      % Immature Granulocytes 5      NRBCs per 100 WBC 0      Absolute Neutrophils 41.1 (*)     Absolute Lymphocytes 0.9      Absolute Monocytes 0.8      Absolute Eosinophils 0.0      Absolute Basophils 0.1      Absolute Immature Granulocytes 2.2 (*)     Absolute NRBCs 0.0     BLOOD CULTURE        No orders to display       Treatments provided: See mar  Family Comments: daughter at the bedside   OBS brochure/video discussed/provided to patient:  N/A  ED Medications:   Medications   LORazepam (ATIVAN) injection 1 mg (1 mg Intravenous $Given 10/27/24 0106)   morphine (PF) injection 4 mg (has no administration in time range)   carboxymethylcellulose PF (REFRESH PLUS) 0.5 % ophthalmic solution 1-2 drop (has no administration in time range)   glycopyrrolate (ROBINUL) injection 0.2 mg (has no administration in time range)    sodium chloride 0.9% BOLUS 1,000 mL (0 mLs Intravenous Stopped 10/27/24 0025)       Drips infusing:  No  For the majority of the shift this patient was Green.   Interventions performed were comfort care .    Sepsis treatment initiated: No    Cares/treatment/interventions/medications to be completed following ED care: ativan/morphine for comfort    ED Nurse Name: Margaret Faustin RN  1:33 AM  RECEIVING UNIT ED HANDOFF REVIEW    Above ED Nurse Handoff Report was reviewed: Yes  Reviewed by: CHARLY MOBLEY RN on October 27, 2024 at 4:07 AM   I Mode called the ED to inform them the note was read: Yes

## 2024-10-28 LAB
ATRIAL RATE - MUSE: 105 BPM
DIASTOLIC BLOOD PRESSURE - MUSE: NORMAL MMHG
INTERPRETATION ECG - MUSE: NORMAL
P AXIS - MUSE: 22 DEGREES
PR INTERVAL - MUSE: 126 MS
QRS DURATION - MUSE: 78 MS
QT - MUSE: 418 MS
QTC - MUSE: 552 MS
R AXIS - MUSE: -33 DEGREES
SYSTOLIC BLOOD PRESSURE - MUSE: NORMAL MMHG
T AXIS - MUSE: 49 DEGREES
VENTRICULAR RATE- MUSE: 105 BPM

## 2024-10-29 LAB
BACTERIA BLD CULT: ABNORMAL